# Patient Record
Sex: FEMALE | Race: WHITE | NOT HISPANIC OR LATINO | URBAN - METROPOLITAN AREA
[De-identification: names, ages, dates, MRNs, and addresses within clinical notes are randomized per-mention and may not be internally consistent; named-entity substitution may affect disease eponyms.]

---

## 2020-12-19 ENCOUNTER — INPATIENT (INPATIENT)
Facility: HOSPITAL | Age: 75
LOS: 8 days | Discharge: ANOTHER HOSPITAL | DRG: 207 | End: 2020-12-28
Attending: INTERNAL MEDICINE | Admitting: INTERNAL MEDICINE
Payer: MEDICARE

## 2020-12-19 VITALS
HEART RATE: 109 BPM | DIASTOLIC BLOOD PRESSURE: 74 MMHG | SYSTOLIC BLOOD PRESSURE: 118 MMHG | TEMPERATURE: 98 F | RESPIRATION RATE: 40 BRPM | HEIGHT: 64 IN | OXYGEN SATURATION: 95 % | WEIGHT: 139.99 LBS

## 2020-12-19 DIAGNOSIS — R06.89 OTHER ABNORMALITIES OF BREATHING: ICD-10-CM

## 2020-12-19 DIAGNOSIS — I10 ESSENTIAL (PRIMARY) HYPERTENSION: ICD-10-CM

## 2020-12-19 DIAGNOSIS — J96.02 ACUTE RESPIRATORY FAILURE WITH HYPERCAPNIA: ICD-10-CM

## 2020-12-19 DIAGNOSIS — F41.9 ANXIETY DISORDER, UNSPECIFIED: ICD-10-CM

## 2020-12-19 DIAGNOSIS — J44.1 CHRONIC OBSTRUCTIVE PULMONARY DISEASE WITH (ACUTE) EXACERBATION: ICD-10-CM

## 2020-12-19 DIAGNOSIS — Z87.81 PERSONAL HISTORY OF (HEALED) TRAUMATIC FRACTURE: Chronic | ICD-10-CM

## 2020-12-19 DIAGNOSIS — R41.82 ALTERED MENTAL STATUS, UNSPECIFIED: ICD-10-CM

## 2020-12-19 LAB
ALBUMIN SERPL ELPH-MCNC: 2.9 G/DL — LOW (ref 3.3–5)
ALP SERPL-CCNC: 119 U/L — SIGNIFICANT CHANGE UP (ref 40–120)
ALT FLD-CCNC: 103 U/L — HIGH (ref 10–45)
ANION GAP SERPL CALC-SCNC: 4 MMOL/L — LOW (ref 5–17)
ANION GAP SERPL CALC-SCNC: 6 MMOL/L — SIGNIFICANT CHANGE UP (ref 5–17)
APPEARANCE UR: CLEAR — SIGNIFICANT CHANGE UP
APTT BLD: 27.1 SEC — LOW (ref 27.5–35.5)
AST SERPL-CCNC: 167 U/L — HIGH (ref 10–40)
BACTERIA # UR AUTO: NEGATIVE /HPF — SIGNIFICANT CHANGE UP
BASOPHILS # BLD AUTO: 0.04 K/UL — SIGNIFICANT CHANGE UP (ref 0–0.2)
BASOPHILS NFR BLD AUTO: 0.4 % — SIGNIFICANT CHANGE UP (ref 0–2)
BILIRUB SERPL-MCNC: 0.9 MG/DL — SIGNIFICANT CHANGE UP (ref 0.2–1.2)
BILIRUB UR-MCNC: NEGATIVE — SIGNIFICANT CHANGE UP
BUN SERPL-MCNC: 19 MG/DL — SIGNIFICANT CHANGE UP (ref 7–23)
BUN SERPL-MCNC: 22 MG/DL — SIGNIFICANT CHANGE UP (ref 7–23)
CALCIUM SERPL-MCNC: 9 MG/DL — SIGNIFICANT CHANGE UP (ref 8.4–10.5)
CALCIUM SERPL-MCNC: 9.3 MG/DL — SIGNIFICANT CHANGE UP (ref 8.4–10.5)
CHLORIDE SERPL-SCNC: 94 MMOL/L — LOW (ref 96–108)
CHLORIDE SERPL-SCNC: 95 MMOL/L — LOW (ref 96–108)
CO2 BLDA-SCNC: 43 MMOL/L — HIGH (ref 22–30)
CO2 SERPL-SCNC: 37 MMOL/L — HIGH (ref 22–31)
CO2 SERPL-SCNC: 42 MMOL/L — HIGH (ref 22–31)
COLOR SPEC: YELLOW — SIGNIFICANT CHANGE UP
CREAT SERPL-MCNC: 0.86 MG/DL — SIGNIFICANT CHANGE UP (ref 0.5–1.3)
CREAT SERPL-MCNC: 0.94 MG/DL — SIGNIFICANT CHANGE UP (ref 0.5–1.3)
DIFF PNL FLD: ABNORMAL
EOSINOPHIL # BLD AUTO: 0.23 K/UL — SIGNIFICANT CHANGE UP (ref 0–0.5)
EOSINOPHIL NFR BLD AUTO: 2.3 % — SIGNIFICANT CHANGE UP (ref 0–6)
EPI CELLS # UR: SIGNIFICANT CHANGE UP
GAS PNL BLDA: SIGNIFICANT CHANGE UP
GAS PNL BLDA: SIGNIFICANT CHANGE UP
GLUCOSE SERPL-MCNC: 142 MG/DL — HIGH (ref 70–99)
GLUCOSE SERPL-MCNC: 152 MG/DL — HIGH (ref 70–99)
GLUCOSE UR QL: NEGATIVE — SIGNIFICANT CHANGE UP
GRAM STN FLD: SIGNIFICANT CHANGE UP
HCT VFR BLD CALC: 28.9 % — LOW (ref 34.5–45)
HCT VFR BLD CALC: 30.6 % — LOW (ref 34.5–45)
HGB BLD-MCNC: 8.9 G/DL — LOW (ref 11.5–15.5)
HGB BLD-MCNC: 9 G/DL — LOW (ref 11.5–15.5)
HOROWITZ INDEX BLDA+IHG-RTO: SIGNIFICANT CHANGE UP
HOROWITZ INDEX BLDA+IHG-RTO: SIGNIFICANT CHANGE UP
IMM GRANULOCYTES NFR BLD AUTO: 0.5 % — SIGNIFICANT CHANGE UP (ref 0–1.5)
INR BLD: 0.92 RATIO — SIGNIFICANT CHANGE UP (ref 0.88–1.16)
KETONES UR-MCNC: ABNORMAL
LACTATE SERPL-SCNC: 2.2 MMOL/L — HIGH (ref 0.7–2)
LACTATE SERPL-SCNC: 3.2 MMOL/L — HIGH (ref 0.7–2)
LACTATE SERPL-SCNC: 5 MMOL/L — CRITICAL HIGH (ref 0.7–2)
LEGIONELLA AG UR QL: NEGATIVE — SIGNIFICANT CHANGE UP
LEUKOCYTE ESTERASE UR-ACNC: NEGATIVE — SIGNIFICANT CHANGE UP
LYMPHOCYTES # BLD AUTO: 1.71 K/UL — SIGNIFICANT CHANGE UP (ref 1–3.3)
LYMPHOCYTES # BLD AUTO: 17 % — SIGNIFICANT CHANGE UP (ref 13–44)
MAGNESIUM SERPL-MCNC: 1.8 MG/DL — SIGNIFICANT CHANGE UP (ref 1.6–2.6)
MCHC RBC-ENTMCNC: 29.1 GM/DL — LOW (ref 32–36)
MCHC RBC-ENTMCNC: 29.4 PG — SIGNIFICANT CHANGE UP (ref 27–34)
MCHC RBC-ENTMCNC: 30.7 PG — SIGNIFICANT CHANGE UP (ref 27–34)
MCHC RBC-ENTMCNC: 31.1 GM/DL — LOW (ref 32–36)
MCV RBC AUTO: 101 FL — HIGH (ref 80–100)
MCV RBC AUTO: 98.6 FL — SIGNIFICANT CHANGE UP (ref 80–100)
MONOCYTES # BLD AUTO: 0.81 K/UL — SIGNIFICANT CHANGE UP (ref 0–0.9)
MONOCYTES NFR BLD AUTO: 8.1 % — SIGNIFICANT CHANGE UP (ref 2–14)
NEUTROPHILS # BLD AUTO: 7.21 K/UL — SIGNIFICANT CHANGE UP (ref 1.8–7.4)
NEUTROPHILS NFR BLD AUTO: 71.7 % — SIGNIFICANT CHANGE UP (ref 43–77)
NITRITE UR-MCNC: NEGATIVE — SIGNIFICANT CHANGE UP
NRBC # BLD: 0 /100 WBCS — SIGNIFICANT CHANGE UP (ref 0–0)
NRBC # BLD: 0 /100 WBCS — SIGNIFICANT CHANGE UP (ref 0–0)
PCO2 BLDA: 60 MMHG — HIGH (ref 32–46)
PCO2 BLDA: >99 MMHG — CRITICAL HIGH (ref 32–46)
PH BLDA: 7.1 — CRITICAL LOW (ref 7.35–7.45)
PH BLDA: 7.46 — HIGH (ref 7.35–7.45)
PH UR: 6 — SIGNIFICANT CHANGE UP (ref 5–8)
PHOSPHATE SERPL-MCNC: 3.3 MG/DL — SIGNIFICANT CHANGE UP (ref 2.5–4.5)
PLATELET # BLD AUTO: 341 K/UL — SIGNIFICANT CHANGE UP (ref 150–400)
PLATELET # BLD AUTO: 370 K/UL — SIGNIFICANT CHANGE UP (ref 150–400)
PO2 BLDA: 243 MMHG — HIGH (ref 74–108)
PO2 BLDA: 66 MMHG — LOW (ref 74–108)
POTASSIUM SERPL-MCNC: 4.8 MMOL/L — SIGNIFICANT CHANGE UP (ref 3.5–5.3)
POTASSIUM SERPL-MCNC: 4.9 MMOL/L — SIGNIFICANT CHANGE UP (ref 3.5–5.3)
POTASSIUM SERPL-SCNC: 4.8 MMOL/L — SIGNIFICANT CHANGE UP (ref 3.5–5.3)
POTASSIUM SERPL-SCNC: 4.9 MMOL/L — SIGNIFICANT CHANGE UP (ref 3.5–5.3)
PROCALCITONIN SERPL-MCNC: 1.2 NG/ML — HIGH
PROT SERPL-MCNC: 6.7 G/DL — SIGNIFICANT CHANGE UP (ref 6–8.3)
PROT UR-MCNC: 30 MG/DL
PROTHROM AB SERPL-ACNC: 11.2 SEC — SIGNIFICANT CHANGE UP (ref 10.6–13.6)
RAPID RVP RESULT: SIGNIFICANT CHANGE UP
RBC # BLD: 2.93 M/UL — LOW (ref 3.8–5.2)
RBC # BLD: 3.03 M/UL — LOW (ref 3.8–5.2)
RBC # FLD: 13.4 % — SIGNIFICANT CHANGE UP (ref 10.3–14.5)
RBC # FLD: 13.7 % — SIGNIFICANT CHANGE UP (ref 10.3–14.5)
RBC CASTS # UR COMP ASSIST: SIGNIFICANT CHANGE UP /HPF (ref 0–4)
SAO2 % BLDA: 94 % — SIGNIFICANT CHANGE UP (ref 92–96)
SAO2 % BLDA: 99 % — HIGH (ref 92–96)
SARS-COV-2 IGG SERPL QL IA: NEGATIVE — SIGNIFICANT CHANGE UP
SARS-COV-2 IGM SERPL IA-ACNC: <0.1 INDEX — SIGNIFICANT CHANGE UP
SARS-COV-2 RNA SPEC QL NAA+PROBE: SIGNIFICANT CHANGE UP
SARS-COV-2 RNA SPEC QL NAA+PROBE: SIGNIFICANT CHANGE UP
SODIUM SERPL-SCNC: 137 MMOL/L — SIGNIFICANT CHANGE UP (ref 135–145)
SODIUM SERPL-SCNC: 141 MMOL/L — SIGNIFICANT CHANGE UP (ref 135–145)
SP GR SPEC: 1.02 — SIGNIFICANT CHANGE UP (ref 1.01–1.02)
SPECIMEN SOURCE: SIGNIFICANT CHANGE UP
UROBILINOGEN FLD QL: NEGATIVE — SIGNIFICANT CHANGE UP
WBC # BLD: 10.05 K/UL — SIGNIFICANT CHANGE UP (ref 3.8–10.5)
WBC # BLD: 13.8 K/UL — HIGH (ref 3.8–10.5)
WBC # FLD AUTO: 10.05 K/UL — SIGNIFICANT CHANGE UP (ref 3.8–10.5)
WBC # FLD AUTO: 13.8 K/UL — HIGH (ref 3.8–10.5)
WBC UR QL: SIGNIFICANT CHANGE UP /HPF (ref 0–5)

## 2020-12-19 PROCEDURE — 99291 CRITICAL CARE FIRST HOUR: CPT | Mod: 25

## 2020-12-19 PROCEDURE — 71275 CT ANGIOGRAPHY CHEST: CPT | Mod: 26

## 2020-12-19 PROCEDURE — 71045 X-RAY EXAM CHEST 1 VIEW: CPT | Mod: 26,77

## 2020-12-19 PROCEDURE — 70450 CT HEAD/BRAIN W/O DYE: CPT | Mod: 26

## 2020-12-19 PROCEDURE — 31500 INSERT EMERGENCY AIRWAY: CPT

## 2020-12-19 PROCEDURE — 93306 TTE W/DOPPLER COMPLETE: CPT | Mod: 26

## 2020-12-19 PROCEDURE — 71045 X-RAY EXAM CHEST 1 VIEW: CPT | Mod: 26

## 2020-12-19 PROCEDURE — 93010 ELECTROCARDIOGRAM REPORT: CPT

## 2020-12-19 PROCEDURE — 99292 CRITICAL CARE ADDL 30 MIN: CPT | Mod: 25

## 2020-12-19 RX ORDER — CHLORHEXIDINE GLUCONATE 213 G/1000ML
15 SOLUTION TOPICAL EVERY 12 HOURS
Refills: 0 | Status: DISCONTINUED | OUTPATIENT
Start: 2020-12-19 | End: 2020-12-19

## 2020-12-19 RX ORDER — ALBUMIN HUMAN 25 %
250 VIAL (ML) INTRAVENOUS ONCE
Refills: 0 | Status: DISCONTINUED | OUTPATIENT
Start: 2020-12-19 | End: 2020-12-19

## 2020-12-19 RX ORDER — AZITHROMYCIN 500 MG/1
500 TABLET, FILM COATED ORAL EVERY 24 HOURS
Refills: 0 | Status: DISCONTINUED | OUTPATIENT
Start: 2020-12-20 | End: 2020-12-24

## 2020-12-19 RX ORDER — PANTOPRAZOLE SODIUM 20 MG/1
40 TABLET, DELAYED RELEASE ORAL DAILY
Refills: 0 | Status: DISCONTINUED | OUTPATIENT
Start: 2020-12-19 | End: 2020-12-28

## 2020-12-19 RX ORDER — DEXMEDETOMIDINE HYDROCHLORIDE IN 0.9% SODIUM CHLORIDE 4 UG/ML
0.7 INJECTION INTRAVENOUS
Qty: 400 | Refills: 0 | Status: DISCONTINUED | OUTPATIENT
Start: 2020-12-19 | End: 2020-12-25

## 2020-12-19 RX ORDER — ENOXAPARIN SODIUM 100 MG/ML
40 INJECTION SUBCUTANEOUS DAILY
Refills: 0 | Status: DISCONTINUED | OUTPATIENT
Start: 2020-12-19 | End: 2020-12-28

## 2020-12-19 RX ORDER — PROPOFOL 10 MG/ML
5 INJECTION, EMULSION INTRAVENOUS
Qty: 1000 | Refills: 0 | Status: DISCONTINUED | OUTPATIENT
Start: 2020-12-19 | End: 2020-12-19

## 2020-12-19 RX ORDER — CHLORHEXIDINE GLUCONATE 213 G/1000ML
15 SOLUTION TOPICAL EVERY 12 HOURS
Refills: 0 | Status: DISCONTINUED | OUTPATIENT
Start: 2020-12-19 | End: 2020-12-24

## 2020-12-19 RX ORDER — NOREPINEPHRINE BITARTRATE/D5W 8 MG/250ML
0.05 PLASTIC BAG, INJECTION (ML) INTRAVENOUS
Qty: 8 | Refills: 0 | Status: DISCONTINUED | OUTPATIENT
Start: 2020-12-19 | End: 2020-12-19

## 2020-12-19 RX ORDER — ALBUTEROL 90 UG/1
2 AEROSOL, METERED ORAL EVERY 4 HOURS
Refills: 0 | Status: DISCONTINUED | OUTPATIENT
Start: 2020-12-19 | End: 2020-12-21

## 2020-12-19 RX ORDER — SODIUM CHLORIDE 9 MG/ML
1000 INJECTION, SOLUTION INTRAVENOUS ONCE
Refills: 0 | Status: COMPLETED | OUTPATIENT
Start: 2020-12-19 | End: 2020-12-19

## 2020-12-19 RX ORDER — SODIUM CHLORIDE 9 MG/ML
1950 INJECTION INTRAMUSCULAR; INTRAVENOUS; SUBCUTANEOUS ONCE
Refills: 0 | Status: COMPLETED | OUTPATIENT
Start: 2020-12-19 | End: 2020-12-19

## 2020-12-19 RX ORDER — CEFTRIAXONE 500 MG/1
1000 INJECTION, POWDER, FOR SOLUTION INTRAMUSCULAR; INTRAVENOUS ONCE
Refills: 0 | Status: COMPLETED | OUTPATIENT
Start: 2020-12-19 | End: 2020-12-19

## 2020-12-19 RX ORDER — AZITHROMYCIN 500 MG/1
500 TABLET, FILM COATED ORAL ONCE
Refills: 0 | Status: COMPLETED | OUTPATIENT
Start: 2020-12-19 | End: 2020-12-19

## 2020-12-19 RX ORDER — CHLORHEXIDINE GLUCONATE 213 G/1000ML
1 SOLUTION TOPICAL
Refills: 0 | Status: DISCONTINUED | OUTPATIENT
Start: 2020-12-19 | End: 2020-12-19

## 2020-12-19 RX ORDER — PANTOPRAZOLE SODIUM 20 MG/1
40 TABLET, DELAYED RELEASE ORAL DAILY
Refills: 0 | Status: DISCONTINUED | OUTPATIENT
Start: 2020-12-19 | End: 2020-12-19

## 2020-12-19 RX ORDER — AZITHROMYCIN 500 MG/1
TABLET, FILM COATED ORAL
Refills: 0 | Status: DISCONTINUED | OUTPATIENT
Start: 2020-12-19 | End: 2020-12-24

## 2020-12-19 RX ORDER — SODIUM CHLORIDE 9 MG/ML
1000 INJECTION, SOLUTION INTRAVENOUS
Refills: 0 | Status: DISCONTINUED | OUTPATIENT
Start: 2020-12-19 | End: 2020-12-21

## 2020-12-19 RX ADMIN — SODIUM CHLORIDE 1000 MILLILITER(S): 9 INJECTION, SOLUTION INTRAVENOUS at 23:24

## 2020-12-19 RX ADMIN — SODIUM CHLORIDE 1000 MILLILITER(S): 9 INJECTION, SOLUTION INTRAVENOUS at 03:52

## 2020-12-19 RX ADMIN — SODIUM CHLORIDE 1950 MILLILITER(S): 9 INJECTION INTRAMUSCULAR; INTRAVENOUS; SUBCUTANEOUS at 03:15

## 2020-12-19 RX ADMIN — ALBUTEROL 2 PUFF(S): 90 AEROSOL, METERED ORAL at 21:07

## 2020-12-19 RX ADMIN — CEFTRIAXONE 100 MILLIGRAM(S): 500 INJECTION, POWDER, FOR SOLUTION INTRAMUSCULAR; INTRAVENOUS at 01:25

## 2020-12-19 RX ADMIN — CHLORHEXIDINE GLUCONATE 15 MILLILITER(S): 213 SOLUTION TOPICAL at 06:35

## 2020-12-19 RX ADMIN — Medication 40 MILLIGRAM(S): at 06:35

## 2020-12-19 RX ADMIN — ALBUTEROL 2 PUFF(S): 90 AEROSOL, METERED ORAL at 17:05

## 2020-12-19 RX ADMIN — ALBUTEROL 2 PUFF(S): 90 AEROSOL, METERED ORAL at 09:02

## 2020-12-19 RX ADMIN — AZITHROMYCIN 500 MILLIGRAM(S): 500 TABLET, FILM COATED ORAL at 02:15

## 2020-12-19 RX ADMIN — PANTOPRAZOLE SODIUM 40 MILLIGRAM(S): 20 TABLET, DELAYED RELEASE ORAL at 11:33

## 2020-12-19 RX ADMIN — Medication 40 MILLIGRAM(S): at 13:29

## 2020-12-19 RX ADMIN — CEFTRIAXONE 1000 MILLIGRAM(S): 500 INJECTION, POWDER, FOR SOLUTION INTRAMUSCULAR; INTRAVENOUS at 01:55

## 2020-12-19 RX ADMIN — CHLORHEXIDINE GLUCONATE 15 MILLILITER(S): 213 SOLUTION TOPICAL at 17:42

## 2020-12-19 RX ADMIN — CHLORHEXIDINE GLUCONATE 1 APPLICATION(S): 213 SOLUTION TOPICAL at 06:35

## 2020-12-19 RX ADMIN — Medication 40 MILLIGRAM(S): at 21:30

## 2020-12-19 RX ADMIN — Medication 40 MILLIGRAM(S): at 02:15

## 2020-12-19 RX ADMIN — DEXMEDETOMIDINE HYDROCHLORIDE IN 0.9% SODIUM CHLORIDE 3.01 MICROGRAM(S)/KG/HR: 4 INJECTION INTRAVENOUS at 14:34

## 2020-12-19 RX ADMIN — ENOXAPARIN SODIUM 40 MILLIGRAM(S): 100 INJECTION SUBCUTANEOUS at 11:33

## 2020-12-19 RX ADMIN — ALBUTEROL 2 PUFF(S): 90 AEROSOL, METERED ORAL at 13:12

## 2020-12-19 RX ADMIN — Medication 5.95 MICROGRAM(S)/KG/MIN: at 03:17

## 2020-12-19 RX ADMIN — PROPOFOL 1.91 MICROGRAM(S)/KG/MIN: 10 INJECTION, EMULSION INTRAVENOUS at 05:08

## 2020-12-19 RX ADMIN — DEXMEDETOMIDINE HYDROCHLORIDE IN 0.9% SODIUM CHLORIDE 3.01 MICROGRAM(S)/KG/HR: 4 INJECTION INTRAVENOUS at 14:27

## 2020-12-19 RX ADMIN — PROPOFOL 1.91 MICROGRAM(S)/KG/MIN: 10 INJECTION, EMULSION INTRAVENOUS at 02:12

## 2020-12-19 RX ADMIN — SODIUM CHLORIDE 1950 MILLILITER(S): 9 INJECTION INTRAMUSCULAR; INTRAVENOUS; SUBCUTANEOUS at 01:13

## 2020-12-19 NOTE — H&P ADULT - PROBLEM SELECTOR PLAN 4
Hold amlodipine for now in the setting of propofol and mechanical ventilation. Restart as hemodynamically indicated.

## 2020-12-19 NOTE — DIETITIAN INITIAL EVALUATION ADULT. - OTHER INFO
As per Physician,  75 year old female with h/o Severe COPD on home o2, ex smoker, HTN, anxiety who had recent right femur fx at [place] she was discharged to [facility] on 12/18 where shortly after developed AMS and brought to Hospital, Here found to have acute hypercarbic respiratory failure and was unresponsive so she was intubated.  Pt is NPO secondary to current intubation. As per physician, TF via nasogastric will be the primary means of nutrition s/p intubation removal. No GI distress reported. As per chart NKFA. No edema. Skin w/ PI stage 2 in left buttock. Current wt: 132 Lbs.

## 2020-12-19 NOTE — ED PROVIDER NOTE - CLINICAL SUMMARY MEDICAL DECISION MAKING FREE TEXT BOX
74 y/o F with a h/o advanced COPD (on home O2 3L), former smoker, HTN, anxiety, recent surgical repair of right femur fracture at [facility] discharged to [facility] on 12/18, presents to the ED from facility after developing respiratory difficulty and unresponsiveness. ABG performed. Pt vitals holding stable early on. Will start bipap with strict aspiration precautions pending results.   Results of ABG necessitate intubation. Patient is full code.   Intubation performed and ICU called for admission. CT ordered as well. d/w e-ICU accepted. PA from ICU will contact family.

## 2020-12-19 NOTE — H&P ADULT - ASSESSMENT
76 y/o F with a h/o advanced COPD (on home O2), former smoker, HTN, anxiety, recent surgical repair of right femur fracture at [facility] discharged to [facility] on 12/18, with acute hypercapnic respiratory failure, acute COPD exacerbation, AMS.     Admit to ICU for further management.    Case discussed in detail with eICU physician, Dr. Brown.        CRITICAL CARE TIME SPENT: 50 mins  Time spent evaluating/treating patient with medical issues that pose a high risk for life threatening deterioration and/or end-organ damage, reviewing data/labs/imaging, discussing case with multidisciplinary team, discussing plan/goals of care with patient/family. Non-inclusive of procedure time.   74 y/o F with a h/o advanced COPD (on home O2), former smoker, HTN, anxiety, recent surgical repair of right femur fracture at [facility] discharged to [facility] on 12/18, with acute hypercapnic respiratory failure, acute COPD exacerbation, AMS.     Admit to ICU for further management.    Case discussed in detail with eICU physician, Dr. Brown.    I have contacted the patient's neice and HCP, Charline, and updated her on tonight's unfortunate events and the management plan moving forward. Charline states that Maida wished to pursue a trial of intubation if necessary, but would not want long term mechanical ventilation. All questions answered and concerns addressed.      CRITICAL CARE TIME SPENT: 50 mins  Time spent evaluating/treating patient with medical issues that pose a high risk for life threatening deterioration and/or end-organ damage, reviewing data/labs/imaging, discussing case with multidisciplinary team, discussing plan/goals of care with patient/family. Non-inclusive of procedure time.

## 2020-12-19 NOTE — DIETITIAN INITIAL EVALUATION ADULT. - ADD RECOMMEND
Advance diet to PO as per SLP recommendations. Maintain bed 45 degree during and 1 hr after feeding.   Monitor residual before feedings. If 60 mL or > hold feeding.   Water flush as per MD recommendation. Suggest to add Vit C and MVI to promote wound healing. Advance diet to PO as per SLP recommendations. Maintain bed 45 degree during and 1 hr after feeding.   Monitor residual before feedings. If 60 mL or > hold feeding.   Water flush as per MD recommendation.

## 2020-12-19 NOTE — H&P ADULT - ATTENDING COMMENTS
pt seen and examined 12/19 AM  please refer to MD addendum  this note has not been edited/modified by me.

## 2020-12-19 NOTE — ED PROVIDER NOTE - OBJECTIVE STATEMENT
74 y/o F with a h/o advanced COPD (on home O2 3L), former smoker, HTN, anxiety, recent surgical repair of right femur fracture at [facility] discharged to [facility] on 12/18, presents to the ED from facility after developing respiratory difficulty and unresponsiveness.

## 2020-12-19 NOTE — ED ADULT NURSE REASSESSMENT NOTE - NS ED NURSE REASSESS COMMENT FT1
0057 intubation. VS: 88% O2, 107 HR, 32 RR, cannot obtain BP at this time. + color change noted, 8.0 tube, 25 at the lip, b/l breath sounds noted.

## 2020-12-19 NOTE — PROGRESS NOTE ADULT - SUBJECTIVE AND OBJECTIVE BOX
Addendum to H&P/ICU Consult note  - Patient seen and examined today    ICU CONSULT  Location of Patient : [facility] CCU1 0010 02 ( CCU1)  Attending requesting Consult:Saman Arreola  Chief Complaint :     Reason For consult :      Initial HPI on admission:  HPI:  75 year old female with h/o Severe COPD on home o2, ex smoker, HTN, anxiety who had recent right femur fx at [place] she was discharged to [facility] on  where shortly after developed AMS and brought to Hospital, Here found to have acute hypercarbic respiratory failure and was unresponsive so she was intubated.    at this time pt intubated, sedated on levophed with MAP >65    PAST MEDICAL & SURGICAL HISTORY:  HTN (hypertension)    Anxiety    Advanced COPD  on home O2    Status post closed fracture of right femur  surgical repair done at [place]      Allergies    No Known Allergies    Intolerances    Due to current medical status patient unable to provide medical, social, family history.  History obtained from available medical record.         Medications:  MEDICATIONS  (STANDING):  ALBUTerol    90 MICROgram(s) HFA Inhaler 2 Puff(s) Inhalation every 4 hours  azithromycin  IVPB      chlorhexidine 0.12% Liquid 15 milliLiter(s) Oral Mucosa every 12 hours  enoxaparin Injectable 40 milliGRAM(s) SubCutaneous daily  methylPREDNISolone sodium succinate Injectable 40 milliGRAM(s) IV Push every 8 hours  norepinephrine Infusion 0.05 MICROgram(s)/kG/Min (5.95 mL/Hr) IV Continuous <Continuous>  pantoprazole   Suspension 40 milliGRAM(s) Enteral Tube daily  propofol Infusion 5 MICROgram(s)/kG/Min (1.91 mL/Hr) IV Continuous <Continuous>    MEDICATIONS  (PRN):      Home Medications:  Last Order Reconciliation Date: 20 (Admission Reconciliation)  ALPRAZolam 0.25 mg oral tablet: 1 tab(s) orally 2 times a day, As Needed (20)  amLODIPine 5 mg oral tablet: 1 tab(s) orally once a day (20)  Breo Ellipta 100 mcg-25 mcg/inh inhalation powder: 1 puff(s) inhaled once a day (20)  Incruse Ellipta 62.5 mcg/inh inhalation powder: 1 puff(s) inhaled every 24 hours (20)        LABS:                        8.9    10.05 )-----------( 341      ( 19 Dec 2020 00:10 )             30.6     12-    141  |  95<L>  |  19  ----------------------------<  152<H>  4.8   |  42<H>  |  0.94    Ca    9.3      19 Dec 2020 00:10    TPro  6.7  /  Alb  2.9<L>  /  TBili  0.9  /  DBili  x   /  AST  167<H>  /  ALT  103<H>  /  AlkPhos  119      PT/INR - ( 19 Dec 2020 00:10 )   PT: 11.2 sec;   INR: 0.92 ratio         PTT - ( 19 Dec 2020 00:10 )  PTT:27.1 sec  Urinalysis Basic - ( 19 Dec 2020 01:41 )    Color: Yellow / Appearance: Clear / S.020 / pH: x  Gluc: x / Ketone: Small  / Bili: Negative / Urobili: Negative   Blood: x / Protein: 30 mg/dL / Nitrite: Negative   Leuk Esterase: Negative / RBC: 0-4 /HPF / WBC 0-2 /HPF   Sq Epi: x / Non Sq Epi: Neg.-Few / Bacteria: Negative /HPF    COVID  20 @ 08:00  COVID -   NotDetec  20 @ 00:15  COVID -   NotDetec    WBC Trend  20 @ 00:10   -  10.05    H/H Trend  20 @ 00:10   -  8.9<L> / 30.6<L>    Platelet Trend  20 @ 00:10   -  341    Trend Sodium  20 @ 00:10   -  141    Trend Potassium  20 @ 00:10   -  4.8    Trend Bun/Cr  20 @ 00:10  BUN/CR -  19 / 0.94    Lactic Acid Trend  20 @ 03:40   -   3.2<H>  20 @ 00:10   -   5.0<HH>    ABG Trend  20 @ 00:45   - 7.10<LL>/>99<HH>/243<H>/99<H>    Trend AST/ALT/ALK Phos/Bili  12-19-20 @ 00:10   167<H>/103<H>/119/0.9      Albumin Trend  20 @ 00:10   -   2.9<L>      PTT - PT - INR Trend  20 @ 00:10   -   27.1<L> - 11.2 - 0.92    Glucose Trend  20 @ 00:10   -  -- 152<H> --  20 @ 00:04   -  -- -- 166<H>        CULTURES: (if applicable)    Rapid RVP Result: NotDetec (20 @ 08:00)      CAPILLARY BLOOD GLUCOSE      POCT Blood Glucose.: 166 mg/dL (19 Dec 2020 00:04)      RADIOLOGY  CXR:  < from: CT Angio Chest w/ IV Cont (20 @ 02:02) >    EXAM:  CT ANGIO CHEST (W)AW IC      PROCEDURE DATE:  2020        INTERPRETATION:  CLINICAL INFORMATION: Unresponsive, hypoxia, critically ill    COMPARISON: No similar prior    PROCEDURE:  CT Angiography of the Chest.  90 ml of Omnipaque 350 was injected intravenously. 10 ml were discarded.  Sagittal and coronal reformats were performed as well as 3D (MIP) reconstructions.  CT dose lowering techniques were used, to include: automated exposure control, adjustment for patient size, and/or use of iterative reconstruction.?      FINDINGS:    LUNGS AND AIRWAYS: Severe, diffuse emphysema and COPD. Endotracheal tube terminates above the solomon. Mild peribronchial thickening in the right lower lobe. No pneumonic infiltrate.  PLEURA: No pleural effusion or pneumothorax  MEDIASTINUM AND JAMAR: No mediastinal hemorrhage. No enlarged lymph node measuring greater than 10 mm in short axis  VESSELS: Pulmonary arterial system is opacified to the segmental level. No acute thromboembolus. Pruning ofthe peripheral pulmonary vasculature, related to chronic lung disease. Main pulmonary artery is normal in caliber. Ectasia of the aortic annulus, measures up to 3.9 cm. Remainder of the thoracic aorta demonstrates atherosclerotic change without aneurysm.  HEART: Heart size is overall within normal limits. Right ventricle is borderline enlarged. No pericardial effusion.  CHEST WALL AND LOWER NECK: Within normal limits.  VISUALIZED UPPER ABDOMEN: No acute finding  BONES: No acute osseous abnormality    IMPRESSION:    1. No acute pulmonary embolus.  2. Severe emphysema and COPD.    < end of copied text >      CT:    < from: CT Head No Cont (20 @ 02:00) >    EXAM:  CT BRAIN      PROCEDURE DATE:  2020        INTERPRETATION:  CLINICAL INFORMATION:  AMS: Narcosis    TECHNIQUE:  Axial CT images were acquired through the head.  Intravenous contrast: None  Two-dimensional reformats were generated.    COMPARISON STUDY: None    FINDINGS:    There is no CT evidence of acute intracranial hemorrhage, mass effect, midline shift, or acute, large territorial infarct. The ventricles and sulci are prominent consistent with generalized brain parenchymal volume loss. Patchy periventricular and subcortical white matter hypodensities are nonspecific, although likely on the basis of moderate chronic microangiopathy. There is more discrete hypodensity in the left thalamus on 4:14, compatible with age indeterminate lacunar infarct. The basal cisterns are patent.    There are atherosclerotic vascular calcifications at the skull base.    The mastoid air cells and middle ear cavities are grossly clear. There is no significant paranasal sinus mucosal thickening.    The calvarium and skull base are grossly intact.    IMPRESSION:  No acute intracranial hemorrhage or mass effect. Age-related involutional changes and moderate chronic microangiopathy.    Age-indeterminate left thalamic lacunar infarct.      < end of copied text >  ECHO:  none available    VITALS:  T(C): 36.4 (20 @ 04:40), Max: 36.6 (20 @ 00:07)  T(F): 97.5 (20 @ 04:40), Max: 97.8 (20 @ 00:07)  HR: 106 (20 @ 06:00) (105 - 110)  BP: 161/80 (20 @ 06:00) (75/40 - 161/80)  BP(mean): 99 (20 @ 06:00) (57 - 115)  ABP: --  ABP(mean): --  RR: 20 (20 @ 06:00) (20 - 40)  SpO2: 96% (20 @ 06:00) (88% - 100%)  CVP(mm Hg): --  CVP(cm H2O): --    Ins and Outs     20 @ 07:01  -  20 @ 07:00  --------------------------------------------------------  IN: 99.9 mL / OUT: 900 mL / NET: -800.1 mL        Height (cm): 160 (20 @ 04:40)  Weight (kg): 60.2 (20 @ 04:40)  BMI (kg/m2): 23.5 (20 @ 04:40)    Device: LTV, Mode: AC/ CMV (Assist Control/ Continuous Mandatory Ventilation), RR (machine): 20, RR (patient): 20, TV (machine): 400, TV (patient): 400, FiO2: 40, PEEP: 5, ITime: 1, PIP: 28    I&O's Detail    18 Dec 2020 07:01  -  19 Dec 2020 07:00  --------------------------------------------------------  IN:    Norepinephrine: 32.4 mL    Propofol: 67.5 mL  Total IN: 99.9 mL    OUT:    Indwelling Catheter - Urethral (mL): 900 mL    Nasogastric/Oral tube (mL): 0 mL  Total OUT: 900 mL    Total NET: -800.1 mL

## 2020-12-19 NOTE — H&P ADULT - PROBLEM SELECTOR PLAN 2
Start ATC inhaled bronchodilators and IV steroids. Empiric abx coverage with azithromycin. No good evidence for bacterial pneumonia at this time (WBC normal, afebrile, no lung infiltrates on imaging). Blood cultures pending. Add sputum culture. Check procalcitonin level and urine legionella Ag. Suspect lactemia was related to work of breathing, not sepsis.

## 2020-12-19 NOTE — H&P ADULT - PROBLEM SELECTOR PLAN 1
Appears to be secondary to COPD exacerbation. [facility] intubated. Actively titrating vent settings to maintain SpO2 > 88%. Will repeat ABG now and adjust settings as necessary to improve ventilation and acid-base balance. Keep HOB elevated > 30 degrees. CXR without obvious acute lung infiltrates. CTA chest pending.

## 2020-12-19 NOTE — PROGRESS NOTE ADULT - ASSESSMENT
Physical Examination:  GENERAL:               Sedated, orally intubated  HEENT:                    Pupils equal, constricted  Symmetric. No JVD, dry MM  PULM:                     Bilateral air entry, diminished to auscultation bilaterally, no significant sputum production, No Rales, No Rhonchi, midl Wheezing  CVS:                         S1, S2,  + Murmur  ABD:                        Soft, nondistended, nontender, normoactive bowel sounds,   EXT:                         No edema, nontender, No Cyanosis or Clubbing   Vascular:                Warm Extremities, Normal Capillary refill, Normal Distal Pulses  NEURO:                sedated, unresponsive,   PSYC:                      Calm, No Insight.        Assessment  75 year old female with h/o Severe COPD on home o2, ex smoker, HTN, anxiety who had recent right femur fx at [place] she was discharged to [facility] on 12/18 where shortly after developed AMS and brought to Hospital, Here found to have acute hypercarbic respiratory failure and was unresponsive so she was intubated.    Problem List  Acute Hypercarbic respiratory failure due to acute on chronic COPD exacerbation, in patient with Severe COPD on home o2 (chronic hypoxic respiratory Insuficiency)    COVID/RVP Negative    Ex Smoker  Metabolic Encephelopathy due to above  Lactic Acidosis likely due to hypoxia    no source of infection found, normal UA, No PNA on CT  Recent Right femur fx     underlying Anxiety, HTN,       Plan  Patient on pressors, but MAP high, taper down  Sedation vacation ; taper propofol off, start Precedex    Repeat ABG  if able to be off pressors, and mental status ok, plan for CPAP today  Continue steroids, bronchodilators  f/u morning labs  no sputum for sputum cultures        Family Updated: 	Charline [phone]	                                 Date:  12/19/20 msg left for call back       Sedation & Analgesia:	as above  Diet/Nutrition:		Possible diet if not extubated  GI PPx:			PPI    DVT Ppx:		Lovenox  	RISK                                                          Points  	[ ] Previous VTE                                           	3  	[ ] Thrombophilia                                        	2  	[ ] Lower limb paralysis                              	2   	[ ] Current Cancer                                       	2   	[ ] Immobilization > 24 hrs                        	1  	[x ] ICU/CCU stay > 24 hours                       	1  	[x ] Age > 60                                                   	1  		Total:[2 ]      Activity:	 Advance as tolerated	                 Head of Bed:                35-45  Glycemic Control:        N/a    Lines: PIV  CENTRAL LINE: 	[ ] YES [ ] NO	                    LOCATION:   	                       DATE INSERTED:   	                    REMOVE:  [ ] YES [ ] NO    A-LINE:  	                [ ] YES [ ] NO                      LOCATION:   	                       DATE INSERTED: 		            REMOVE:  [ ] YES [ ] NO    RICH: 		        [ x] YES [ ] NO  		                                       DATE INSERTED:		            REMOVE:  [ ] YES [ x] NO      Restraints may be deemed necessary to prevent removal of life-sustaining devices [ x ] YES   [    ]  NO    Disposition: ICU monitoring     Goals of Care:

## 2020-12-19 NOTE — ED PROVIDER NOTE - WET READ LAUNCH FT
There is 1 Wet Read(s) to document. There are 2 Wet Read(s) to document. There are no Wet Read(s) to document.

## 2020-12-19 NOTE — ED PROVIDER NOTE - NS ED MD DISPO SPECIAL CONSIDERATION1
[facility]  Brief Op Note    Miriam Meyers Location: OR   [facility] [id number] MRN [id number]   Admission Date 8/11/2020 Operation Date 8/11/2020   Attending Physician Saúl Peñaloza MD Operating Physician Al Barrett MD     Pre-Operative Diagn
[facility]    PATIENT'S NAME: Ana Rosa Nico   ATTENDING PHYSICIAN: Paula Vazquez M.D. OPERATING PHYSICIAN: Paula Vazquez M.D.    PATIENT ACCOUNT#:   [de-identified]    LOCATION:  [address] [place] [address] 2 EDWP 10  MEDICAL RECORD #:   [id number]       DATE
Low Suspicion of COVID-19

## 2020-12-19 NOTE — H&P ADULT - HISTORY OF PRESENT ILLNESS
76 y/o F with a h/o advanced COPD (on home O2), former smoker, HTN, anxiety, recent surgical repair of right femur fracture at [facility] discharged to [facility] on 12/18, presents to the ED from facility after developing respiratory difficulty and AMS. Patient found to be severely hypercapnic with pCO2 >99 on ABG and unresponsive for which she was subsequently endotracheally intubated and placed on mechanical ventilation. CXR reveals hyperinflated lungs without obvious acute infiltrates.

## 2020-12-19 NOTE — ED ADULT NURSE NOTE - NSIMPLEMENTINTERV_GEN_ALL_ED
Implemented All Fall with Harm Risk Interventions:  [place] to call system. Call bell, personal items and telephone within reach. Instruct patient to call for assistance. Room bathroom lighting operational. Non-slip footwear when patient is off stretcher. Physically safe environment: no spills, clutter or unnecessary equipment. Stretcher in lowest position, wheels locked, appropriate side rails in place. Provide visual cue, wrist band, yellow gown, etc. Monitor gait and stability. Monitor for mental status changes and reorient to person, place, and time. Review medications for side effects contributing to fall risk. Reinforce activity limits and safety measures with patient and family. Provide visual clues: red socks.

## 2020-12-19 NOTE — H&P ADULT - NSICDXPASTSURGICALHX_GEN_ALL_CORE_FT
PAST SURGICAL HISTORY:  Status post closed fracture of right femur surgical repair done at [place]

## 2020-12-19 NOTE — ED ADULT NURSE NOTE - ISOLATION TYPE:
Telephone Encounter by Kishor Flores MD at 12/05/17 11:57 AM     Author:  Kishor Flores MD Service:  (none) Author Type:  Physician     Filed:  12/05/17 11:58 AM Encounter Date:  12/5/2017 Status:  Signed     :  Kishor Flores MD (Physician)            chest x-ray shows a patch of pneumonia at the right base  Recommend[LA1.1M] LEVAQUIN 500 MG DAILY FOR 10 DAYS[LA1.1T]          Revision History        User Key Date/Time User Provider Type Action    > LA1.1 12/05/17 11:58 AM Kishor Flores MD Physician Sign    M - Manual, T - Template            
Telephone Encounter by Kristi Patel RN at 12/05/17 11:32 AM     Author:  Kristi Patel RN Service:  (none) Author Type:  Registered Nurse     Filed:  12/05/17 11:33 AM Encounter Date:  12/5/2017 Status:  Signed     :  Kristi Patel RN (Registered Nurse)            Received call from radiology department with significant finding on patient's chest xray done today    Routed to Dr Flores to review[KC1.1M]      Revision History        User Key Date/Time User Provider Type Action    > KC1.1 12/05/17 11:33 AM Kristi Patel RN Registered Nurse Sign    M - Manual            
Telephone Encounter by Kristi Patel RN at 12/05/17 12:10 PM     Author:  Kristi Patel RN Service:  (none) Author Type:  Registered Nurse     Filed:  12/05/17 12:13 PM Encounter Date:  12/5/2017 Status:  Signed     :  Krsiti Patel RN (Registered Nurse)            Patient notified[KC1.1T] of Dr Flores's directives  Advised patient if symptoms persist or worsening of shortness of breath, fever, chills, coughing to call office to report    Verified allergies  Encouraged fluids and rest  Patient asked about working out  Advised patient to refrain at this time    RX escribed to pharmacy  Routed to coumadin clinic as well due to possible effects of antibiotic on INR[KC1.1M]  Mikala verbalized understanding of information given.[KC1.1T]        Revision History        User Key Date/Time User Provider Type Action    > KC1.1 12/05/17 12:13 PM Kristi Patel RN Registered Nurse Sign    M - Manual, T - Template            
None

## 2020-12-20 LAB
ALBUMIN SERPL ELPH-MCNC: 3.2 G/DL — LOW (ref 3.3–5)
ALP SERPL-CCNC: 95 U/L — SIGNIFICANT CHANGE UP (ref 40–120)
ALT FLD-CCNC: 144 U/L — HIGH (ref 10–45)
ANION GAP SERPL CALC-SCNC: 5 MMOL/L — SIGNIFICANT CHANGE UP (ref 5–17)
AST SERPL-CCNC: 112 U/L — HIGH (ref 10–40)
BILIRUB SERPL-MCNC: 0.5 MG/DL — SIGNIFICANT CHANGE UP (ref 0.2–1.2)
BLD GP AB SCN SERPL QL: SIGNIFICANT CHANGE UP
BUN SERPL-MCNC: 35 MG/DL — HIGH (ref 7–23)
CALCIUM SERPL-MCNC: 9.5 MG/DL — SIGNIFICANT CHANGE UP (ref 8.4–10.5)
CHLORIDE SERPL-SCNC: 94 MMOL/L — LOW (ref 96–108)
CO2 BLDA-SCNC: 46 MMOL/L — HIGH (ref 22–30)
CO2 SERPL-SCNC: 38 MMOL/L — HIGH (ref 22–31)
CREAT SERPL-MCNC: 0.81 MG/DL — SIGNIFICANT CHANGE UP (ref 0.5–1.3)
CULTURE RESULTS: NO GROWTH — SIGNIFICANT CHANGE UP
GAS PNL BLDA: SIGNIFICANT CHANGE UP
GLUCOSE BLDC GLUCOMTR-MCNC: 138 MG/DL — HIGH (ref 70–99)
GLUCOSE BLDC GLUCOMTR-MCNC: 146 MG/DL — HIGH (ref 70–99)
GLUCOSE BLDC GLUCOMTR-MCNC: 146 MG/DL — HIGH (ref 70–99)
GLUCOSE SERPL-MCNC: 130 MG/DL — HIGH (ref 70–99)
HCT VFR BLD CALC: 25.1 % — LOW (ref 34.5–45)
HCT VFR BLD CALC: 27 % — LOW (ref 34.5–45)
HCV AB S/CO SERPL IA: 0.06 S/CO — SIGNIFICANT CHANGE UP (ref 0–0.99)
HCV AB SERPL-IMP: SIGNIFICANT CHANGE UP
HGB BLD-MCNC: 7.7 G/DL — LOW (ref 11.5–15.5)
HGB BLD-MCNC: 8.3 G/DL — LOW (ref 11.5–15.5)
HOROWITZ INDEX BLDA+IHG-RTO: SIGNIFICANT CHANGE UP
MAGNESIUM SERPL-MCNC: 2 MG/DL — SIGNIFICANT CHANGE UP (ref 1.6–2.6)
MCHC RBC-ENTMCNC: 29.2 PG — SIGNIFICANT CHANGE UP (ref 27–34)
MCHC RBC-ENTMCNC: 29.7 PG — SIGNIFICANT CHANGE UP (ref 27–34)
MCHC RBC-ENTMCNC: 30.7 GM/DL — LOW (ref 32–36)
MCHC RBC-ENTMCNC: 30.7 GM/DL — LOW (ref 32–36)
MCV RBC AUTO: 95.1 FL — SIGNIFICANT CHANGE UP (ref 80–100)
MCV RBC AUTO: 96.8 FL — SIGNIFICANT CHANGE UP (ref 80–100)
NRBC # BLD: 0 /100 WBCS — SIGNIFICANT CHANGE UP (ref 0–0)
NRBC # BLD: 0 /100 WBCS — SIGNIFICANT CHANGE UP (ref 0–0)
PCO2 BLDA: 85 MMHG — CRITICAL HIGH (ref 32–46)
PH BLDA: 7.32 — LOW (ref 7.35–7.45)
PHOSPHATE SERPL-MCNC: 3.7 MG/DL — SIGNIFICANT CHANGE UP (ref 2.5–4.5)
PLATELET # BLD AUTO: 330 K/UL — SIGNIFICANT CHANGE UP (ref 150–400)
PLATELET # BLD AUTO: 392 K/UL — SIGNIFICANT CHANGE UP (ref 150–400)
PO2 BLDA: 89 MMHG — SIGNIFICANT CHANGE UP (ref 74–108)
POTASSIUM SERPL-MCNC: 4.4 MMOL/L — SIGNIFICANT CHANGE UP (ref 3.5–5.3)
POTASSIUM SERPL-SCNC: 4.4 MMOL/L — SIGNIFICANT CHANGE UP (ref 3.5–5.3)
PROT SERPL-MCNC: 6.6 G/DL — SIGNIFICANT CHANGE UP (ref 6–8.3)
RBC # BLD: 2.64 M/UL — LOW (ref 3.8–5.2)
RBC # BLD: 2.79 M/UL — LOW (ref 3.8–5.2)
RBC # FLD: 13.9 % — SIGNIFICANT CHANGE UP (ref 10.3–14.5)
RBC # FLD: 13.9 % — SIGNIFICANT CHANGE UP (ref 10.3–14.5)
SAO2 % BLDA: 96 % — SIGNIFICANT CHANGE UP (ref 92–96)
SODIUM SERPL-SCNC: 137 MMOL/L — SIGNIFICANT CHANGE UP (ref 135–145)
SPECIMEN SOURCE: SIGNIFICANT CHANGE UP
WBC # BLD: 10.84 K/UL — HIGH (ref 3.8–10.5)
WBC # BLD: 7.87 K/UL — SIGNIFICANT CHANGE UP (ref 3.8–10.5)
WBC # FLD AUTO: 10.84 K/UL — HIGH (ref 3.8–10.5)
WBC # FLD AUTO: 7.87 K/UL — SIGNIFICANT CHANGE UP (ref 3.8–10.5)

## 2020-12-20 PROCEDURE — 71045 X-RAY EXAM CHEST 1 VIEW: CPT | Mod: 26

## 2020-12-20 RX ORDER — MORPHINE SULFATE 50 MG/1
2 CAPSULE, EXTENDED RELEASE ORAL ONCE
Refills: 0 | Status: DISCONTINUED | OUTPATIENT
Start: 2020-12-20 | End: 2020-12-20

## 2020-12-20 RX ORDER — ALBUTEROL 90 UG/1
2 AEROSOL, METERED ORAL ONCE
Refills: 0 | Status: DISCONTINUED | OUTPATIENT
Start: 2020-12-20 | End: 2020-12-21

## 2020-12-20 RX ORDER — ALBUMIN HUMAN 25 %
100 VIAL (ML) INTRAVENOUS ONCE
Refills: 0 | Status: COMPLETED | OUTPATIENT
Start: 2020-12-20 | End: 2020-12-20

## 2020-12-20 RX ADMIN — ENOXAPARIN SODIUM 40 MILLIGRAM(S): 100 INJECTION SUBCUTANEOUS at 11:38

## 2020-12-20 RX ADMIN — ALBUTEROL 2 PUFF(S): 90 AEROSOL, METERED ORAL at 16:34

## 2020-12-20 RX ADMIN — Medication 40 MILLIGRAM(S): at 05:31

## 2020-12-20 RX ADMIN — ALBUTEROL 2 PUFF(S): 90 AEROSOL, METERED ORAL at 20:31

## 2020-12-20 RX ADMIN — ALBUTEROL 2 PUFF(S): 90 AEROSOL, METERED ORAL at 12:30

## 2020-12-20 RX ADMIN — CHLORHEXIDINE GLUCONATE 15 MILLILITER(S): 213 SOLUTION TOPICAL at 18:24

## 2020-12-20 RX ADMIN — ALBUTEROL 2 PUFF(S): 90 AEROSOL, METERED ORAL at 05:48

## 2020-12-20 RX ADMIN — MORPHINE SULFATE 2 MILLIGRAM(S): 50 CAPSULE, EXTENDED RELEASE ORAL at 19:31

## 2020-12-20 RX ADMIN — DEXMEDETOMIDINE HYDROCHLORIDE IN 0.9% SODIUM CHLORIDE 3.01 MICROGRAM(S)/KG/HR: 4 INJECTION INTRAVENOUS at 16:12

## 2020-12-20 RX ADMIN — MORPHINE SULFATE 2 MILLIGRAM(S): 50 CAPSULE, EXTENDED RELEASE ORAL at 19:45

## 2020-12-20 RX ADMIN — Medication 40 MILLIGRAM(S): at 18:24

## 2020-12-20 RX ADMIN — ALBUTEROL 2 PUFF(S): 90 AEROSOL, METERED ORAL at 08:27

## 2020-12-20 RX ADMIN — Medication 40 MILLIGRAM(S): at 23:44

## 2020-12-20 RX ADMIN — ALBUTEROL 2 PUFF(S): 90 AEROSOL, METERED ORAL at 01:23

## 2020-12-20 RX ADMIN — Medication 50 MILLILITER(S): at 00:32

## 2020-12-20 RX ADMIN — PANTOPRAZOLE SODIUM 40 MILLIGRAM(S): 20 TABLET, DELAYED RELEASE ORAL at 11:38

## 2020-12-20 RX ADMIN — AZITHROMYCIN 255 MILLIGRAM(S): 500 TABLET, FILM COATED ORAL at 02:02

## 2020-12-20 RX ADMIN — Medication 40 MILLIGRAM(S): at 11:37

## 2020-12-20 RX ADMIN — CHLORHEXIDINE GLUCONATE 15 MILLILITER(S): 213 SOLUTION TOPICAL at 05:31

## 2020-12-20 RX ADMIN — DEXMEDETOMIDINE HYDROCHLORIDE IN 0.9% SODIUM CHLORIDE 3.01 MICROGRAM(S)/KG/HR: 4 INJECTION INTRAVENOUS at 03:56

## 2020-12-20 NOTE — PROGRESS NOTE ADULT - ASSESSMENT
Physical Examination:  GENERAL:               Sedated, orally intubated  HEENT:                    Pupils equal, constricted  Symmetric. No JVD, dry MM  PULM:                     Bilateral air entry, diminished to auscultation bilaterally, no significant sputum production, No Rales, No Rhonchi, midl Wheezing  CVS:                         S1, S2,  + Murmur  ABD:                        Soft, nondistended, nontender, normoactive bowel sounds,   EXT:                         No edema, nontender, No Cyanosis or Clubbing   Vascular:                Warm Extremities, Normal Capillary refill, Normal Distal Pulses  NEURO:                sedated, unresponsive,   PSYC:                      Calm, No Insight.        Assessment  75 year old female with h/o Severe COPD on home o2, ex smoker, HTN, anxiety who had recent right femur fx at [place] she was discharged to [facility] on 12/18 where shortly after developed AMS and brought to Hospital, Here found to have acute hypercarbic respiratory failure and was unresponsive so she was intubated.    Problem List  Acute Hypercarbic respiratory failure due to acute on chronic COPD exacerbation, in patient with Severe COPD on home o2 (chronic hypoxic respiratory Insuficiency)    COVID/RVP Negative    Ex Smoker  Metabolic Encephelopathy due to above  Lactic Acidosis likely due to hypoxia    no source of infection found, normal UA, No PNA on CT  Recent Right femur fx     underlying Anxiety, HTN,       Plan  off pressors  on Precedex  cpap daily, taper PS, possible wean if tolerated, may need NIV  start tube feeding if not extubated.    increase steroids, continue  bronchodilators  f/u morning labs  no sputum for sputum cultures    repeat CBC this afternoon, type and screen, stool occult blood        Family Updated: 	Juancho Olivier [phone]	                                 Date:  12/19/20, 12/20       Sedation & Analgesia:	as above  Diet/Nutrition:		Possible diet if not extubated  GI PPx:			PPI    DVT Ppx:		Lovenox      Activity:	 Advance as tolerated	                 Head of Bed:                35-45  Glycemic Control:        N/a    Lines: PIV  CENTRAL LINE: 	[ ] YES [ ] NO	                    LOCATION:   	                       DATE INSERTED:   	                    REMOVE:  [ ] YES [ ] NO    A-LINE:  	                [ ] YES [ ] NO                      LOCATION:   	                       DATE INSERTED: 		            REMOVE:  [ ] YES [ ] NO    RICH: 		        [ x] YES [ ] NO  		                                       DATE INSERTED:		            REMOVE:  [z ] YES [ ] NO      Restraints may be deemed necessary to prevent removal of life-sustaining devices [ x ] YES   [    ]  NO    Disposition: ICU monitoring     Goals of Care: based on records full

## 2020-12-20 NOTE — PROGRESS NOTE ADULT - SUBJECTIVE AND OBJECTIVE BOX
Follow-up Critical Care Progress Note  Chief Complaint : Other abnormality of breathing      patient intubated  on precedex  arousable  overnight sedation vacation done and cpap, unable to wean  this am cpap re attempted and currently on cpap 10 and apepar to be tolerating      Allergies :No Known Allergies      PAST MEDICAL & SURGICAL HISTORY:  HTN (hypertension)    Anxiety    Advanced COPD  on home O2    Status post closed fracture of right femur  surgical repair done at [place]        Medications:  MEDICATIONS  (STANDING):  ALBUTerol    90 MICROgram(s) HFA Inhaler 2 Puff(s) Inhalation every 4 hours  azithromycin  IVPB      azithromycin  IVPB 500 milliGRAM(s) IV Intermittent every 24 hours  chlorhexidine 0.12% Liquid 15 milliLiter(s) Oral Mucosa every 12 hours  dexMEDEtomidine Infusion 0.2 MICROgram(s)/kG/Hr (3.01 mL/Hr) IV Continuous <Continuous>  enoxaparin Injectable 40 milliGRAM(s) SubCutaneous daily  lactated ringers. 1000 milliLiter(s) (1000 mL/Hr) IV Continuous <Continuous>  methylPREDNISolone sodium succinate Injectable 40 milliGRAM(s) IV Push every 8 hours  pantoprazole   Suspension 40 milliGRAM(s) Enteral Tube daily    MEDICATIONS  (PRN):    COVID  20 @ 08:00  COVID -   NotDetec  20 @ 00:15  COVID -   NotDetec      Procalcitonin Trend  20 @ 07:30   -   1.20<H>    WBC Trend  20 @ 04:55   -  7.87  20 @ 05:00   -  13.80<H>  20 @ 00:10   -  10.05    H/H Trend  20 @ 04:55   -  7.7<L> / 25.1<L>  20 @ 05:00   -  9.0<L> / 28.9<L>  20 @ 00:10   -  8.9<L> / 30.6<L>    Platelet Trend  20 @ 04:55   -  330  20 @ 05:00   -  370  20 @ 00:10   -  341    Trend Sodium  20 @ 04:55   -  137  20 @ 05:00   -  137  20 @ 00:10   -  141    Trend Potassium  12-20-20 @ 04:55   -  4.4  12-19-20 @ 05:00   -  4.9  20 @ 00:10   -  4.8    Trend Bun/Cr  20 @ 04:55  BUN/CR -  35<H> / 0.81  20 @ 05:00  BUN/CR -  22 / 0.86  20 @ 00:10  BUN/CR -  19 / 0.94    Lactic Acid Trend  20 07:30   -   2.2<H>  20 @ 03:40   -   3.2<H>  20 @ 00:10   -   5.0<HH>    ABG Trend  20 @ 08:27   - 7.46<H>/60<H>/66<L>/94  20 @ 00:45   - 7.10<LL>/>99<HH>/243<H>/99<H>    Trend AST/ALT/ALK Phos/Bili  20 @ 04:55   112<H>/144<H>/95/0.5  20 @ 00:10   167<H>/103<H>/119/0.9    Albumin Trend  20 @ 04:55   -   3.2<L>  20 00:10   -   2.9<L>      PTT - PT - INR Trend  20 @ 00:10   -   27.1<L> - 11.2 - 0.92    Glucose Trend  20 @ 08:47   -  -- -- 146<H>  20 @ 04:55   -  -- 130<H> --  20 @ 05:00   -  -- 142<H> --  20 @ 00:10   -  -- 152<H> --  20 @ 00:04   -  -- -- 166<H>        LABS:                        7.7    7.87  )-----------( 330      ( 20 Dec 2020 04:55 )             25.1         137  |  94<L>  |  35<H>  ----------------------------<  130<H>  4.4   |  38<H>  |  0.81    Ca    9.5      20 Dec 2020 04:55  Phos  3.7       Mg     2.0         TPro  6.6  /  Alb  3.2<L>  /  TBili  0.5  /  DBili  x   /  AST  112<H>  /  ALT  144<H>  /  AlkPhos  95              PT/INR - ( 19 Dec 2020 00:10 )   PT: 11.2 sec;   INR: 0.92 ratio         PTT - ( 19 Dec 2020 00:10 )  PTT:27.1 sec  Urinalysis Basic - ( 19 Dec 2020 01:41 )    Color: Yellow / Appearance: Clear / S.020 / pH: x  Gluc: x / Ketone: Small  / Bili: Negative / Urobili: Negative   Blood: x / Protein: 30 mg/dL / Nitrite: Negative   Leuk Esterase: Negative / RBC: 0-4 /HPF / WBC 0-2 /HPF   Sq Epi: x / Non Sq Epi: Neg.-Few / Bacteria: Negative /HPF      Procalcitonin, Serum: 1.20 ng/mL (20 @ 07:30)          CULTURES: (if applicable)    Culture - Sputum (collected 20 @ 11:30)  Source: .Sputum Sputum  Gram Stain (20 @ 22:33):    Few polymorphonuclear leukocytes per low power field    Rare Squamous epithelial cells per low power field    Few Gram positive cocci in pairs per oil power field    Culture - Urine (collected 20 @ 01:36)  Source: .Urine Clean Catch (Midstream)  Final Report (20 @ 08:52):    No growth      Rapid RVP Result: NotDetec (20 @ 08:00)      ABG - ( 19 Dec 2020 08:27 )  pH, Arterial: 7.46  pH, Blood: x     /  pCO2: 60    /  pO2: 66    / HCO3: x     / Base Excess: x     /  SaO2: 94        CXR clear no infiltrate, emphysema     VITALS:  T(C): 36.3 (20 @ 05:00), Max: 36.6 (20 @ 12:00)  T(F): 97.4 (20 @ 05:00), Max: 97.9 (20 @ 12:00)  HR: 87 (20 @ 08:29) (74 - 131)  BP: 126/65 (20 @ 08:00) (82/57 - 178/88)  BP(mean): 79 (20 @ 08:00) (63 - 120)  ABP: --  ABP(mean): --  RR: 16 (20 @ 08:00) (16 - 27)  SpO2: 100% (20 @ 08:29) (88% - 100%)  CVP(mm Hg): --  CVP(cm H2O): --    Ins and Outs     20 @ 07:01  -  20 @ 07:00  --------------------------------------------------------  IN: 1521.2 mL / OUT: 855 mL / NET: 666.2 mL    20 @ 07:01  -  20 @ 08:56  --------------------------------------------------------  IN: 7.5 mL / OUT: 0 mL / NET: 7.5 mL        Height (cm): 160 (20 @ 04:40)  Weight (kg): 60.2 (20 @ 04:40)  BMI (kg/m2): 23.5 (20 @ 04:40)    Device: Avea, Mode: CPAP with PS, RR (patient): 17, FiO2: 30, PEEP: 5, PS: 10, PIP: 16    I&O's Detail    19 Dec 2020 07:01  -  20 Dec 2020 07:00  --------------------------------------------------------  IN:    Dexmedetomidine: 102 mL    Enteral Tube Flush: 60 mL    IV PiggyBack: 100 mL    IV PiggyBack: 250 mL    Lactated Ringers: 1000 mL    Norepinephrine: 3.6 mL    Propofol: 5.6 mL  Total IN: 1521.2 mL    OUT:    Indwelling Catheter - Urethral (mL): 855 mL    Nasogastric/Oral tube (mL): 0 mL  Total OUT: 855 mL    Total NET: 666.2 mL      20 Dec 2020 07:01  -  20 Dec 2020 08:56  --------------------------------------------------------  IN:    Dexmedetomidine: 7.5 mL  Total IN: 7.5 mL    OUT:  Total OUT: 0 mL    Total NET: 7.5 mL

## 2020-12-20 NOTE — PROGRESS NOTE ADULT - SUBJECTIVE AND OBJECTIVE BOX
Patient is a 75y old  Female who presents with a chief complaint of Acute hypercapnic respiratory failure (19 Dec 2020 14:25)      BRIEF HOSPITAL COURSE:     75 year old female with h/o Severe COPD on home o2, ex smoker, HTN, anxiety who had recent right femur fx at [place] she was discharged to [facility] on  where shortly after developed AMS and brought to Hospital, Here found to have acute hypercarbic respiratory failure (PCO2 >99) and was unresponsive so she was intubated.    Events last 24 hours:  -off levophed at this time  -remains intubated, on precedex    PAST MEDICAL & SURGICAL HISTORY:  HTN (hypertension)    Anxiety    Advanced COPD  on home O2    Status post closed fracture of right femur  surgical repair done at [place]        Review of Systems:  unable to obtain as patient intubated and sedated       Medications:  azithromycin  IVPB      azithromycin  IVPB 500 milliGRAM(s) IV Intermittent every 24 hours      ALBUTerol    90 MICROgram(s) HFA Inhaler 2 Puff(s) Inhalation every 4 hours    dexMEDEtomidine Infusion 0.2 MICROgram(s)/kG/Hr IV Continuous <Continuous>      enoxaparin Injectable 40 milliGRAM(s) SubCutaneous daily    pantoprazole   Suspension 40 milliGRAM(s) Enteral Tube daily      methylPREDNISolone sodium succinate Injectable 40 milliGRAM(s) IV Push every 8 hours    lactated ringers. 1000 milliLiter(s) IV Continuous <Continuous>      chlorhexidine 0.12% Liquid 15 milliLiter(s) Oral Mucosa every 12 hours        Mode: AC/ CMV (Assist Control/ Continuous Mandatory Ventilation)  RR (machine): 20  TV (machine): 400  FiO2: 30  PEEP: 5  ITime: 1  MAP: 13  PIP: 38      ICU Vital Signs Last 24 Hrs  T(C): 36.5 (19 Dec 2020 20:00), Max: 36.6 (19 Dec 2020 08:00)  T(F): 97.7 (19 Dec 2020 20:00), Max: 97.9 (19 Dec 2020 12:00)  HR: 83 (20 Dec 2020 00:30) (82 - 131)  BP: 107/68 (20 Dec 2020 00:30) (75/40 - 178/88)  BP(mean): 78 (20 Dec 2020 00:30) (57 - 115)  RR: 20 (20 Dec 2020 00:30) (18 - 27)  SpO2: 100% (20 Dec 2020 00:30) (92% - 100%)      ABG - ( 19 Dec 2020 08:27 )  pH, Arterial: 7.46  pH, Blood: x     /  pCO2: 60    /  pO2: 66    / HCO3: x     / Base Excess: x     /  SaO2: 94                  I&O's Detail    18 Dec 2020 07:01  -  19 Dec 2020 07:00  --------------------------------------------------------  IN:    Norepinephrine: 43.2 mL    Propofol: 90 mL  Total IN: 133.2 mL    OUT:    Indwelling Catheter - Urethral (mL): 940 mL    Nasogastric/Oral tube (mL): 0 mL  Total OUT: 940 mL    Total NET: -806.8 mL      19 Dec 2020 07:01  -  20 Dec 2020 01:00  --------------------------------------------------------  IN:    Dexmedetomidine: 57 mL    Enteral Tube Flush: 60 mL    IV PiggyBack: 100 mL    Lactated Ringers: 1000 mL    Norepinephrine: 3.6 mL    Propofol: 5.6 mL  Total IN: 1226.2 mL    OUT:    Indwelling Catheter - Urethral (mL): 580 mL  Total OUT: 580 mL    Total NET: 646.2 mL            LABS:                        9.0    13.80 )-----------( 370      ( 19 Dec 2020 05:00 )             28.9         137  |  94<L>  |  22  ----------------------------<  142<H>  4.9   |  37<H>  |  0.86    Ca    9.0      19 Dec 2020 05:00  Phos  3.3       Mg     1.8         TPro  6.7  /  Alb  2.9<L>  /  TBili  0.9  /  DBili  x   /  AST  167<H>  /  ALT  103<H>  /  AlkPhos  119            CAPILLARY BLOOD GLUCOSE      POCT Blood Glucose.: 166 mg/dL (19 Dec 2020 00:04)    PT/INR - ( 19 Dec 2020 00:10 )   PT: 11.2 sec;   INR: 0.92 ratio         PTT - ( 19 Dec 2020 00:10 )  PTT:27.1 sec  Urinalysis Basic - ( 19 Dec 2020 01:41 )    Color: Yellow / Appearance: Clear / S.020 / pH: x  Gluc: x / Ketone: Small  / Bili: Negative / Urobili: Negative   Blood: x / Protein: 30 mg/dL / Nitrite: Negative   Leuk Esterase: Negative / RBC: 0-4 /HPF / WBC 0-2 /HPF   Sq Epi: x / Non Sq Epi: Neg.-Few / Bacteria: Negative /HPF      CULTURES:  Rapid RVP Result: NotDetec (20 @ 08:00)      Physical Examination:    General: No acute distress. on precedex, synchronous with vent     HEENT: Pupils equal, reactive to light.  Symmetric.    PULM: Clear to auscultation bilaterally, no significant sputum production    CVS: Regular rate and rhythm, no murmurs, rubs, or gallops    ABD: Soft, nondistended, nontender, normoactive bowel sounds, no masses    EXT: No edema, nontender    SKIN: Warm and well perfused, no rashes noted.

## 2020-12-20 NOTE — PROGRESS NOTE ADULT - ASSESSMENT
75 year old female with h/o Severe COPD on home o2, ex smoker, HTN, anxiety who had recent right femur fx at [place] she was discharged to [facility] on 12/18 where shortly after developed AMS and brought to Hospital, Here found to have acute hypercarbic respiratory failure (PCO2 >99) and was unresponsive so she was intubated.    Events last 24 hours:  -off levophed at this time  -remains intubated, on precedex    PLAN:  -Patient currently on Full vent support  -titrate settings to maintain SaO2 >90%, or pH >7.25  -consider low tidal volume ventilation strategy w/ goal Tv 4-6 cc/kg of ideal body weight  -plateu pressure goal <30  -Peridex oral care  -aggressive pulmonary toilet  -daily sedation vacation with spontaneous breathing trial if clinical condition warrants, discuss with respiratory therapy   -continue zithromax for AECOPD, nebs and steroids

## 2020-12-21 LAB
ANION GAP SERPL CALC-SCNC: 1 MMOL/L — LOW (ref 5–17)
BUN SERPL-MCNC: 42 MG/DL — HIGH (ref 7–23)
CALCIUM SERPL-MCNC: 9.6 MG/DL — SIGNIFICANT CHANGE UP (ref 8.4–10.5)
CHLORIDE SERPL-SCNC: 97 MMOL/L — SIGNIFICANT CHANGE UP (ref 96–108)
CO2 BLDA-SCNC: 44 MMOL/L — HIGH (ref 22–30)
CO2 SERPL-SCNC: 40 MMOL/L — HIGH (ref 22–31)
CREAT SERPL-MCNC: 0.68 MG/DL — SIGNIFICANT CHANGE UP (ref 0.5–1.3)
CULTURE RESULTS: SIGNIFICANT CHANGE UP
GAS PNL BLDA: SIGNIFICANT CHANGE UP
GLUCOSE SERPL-MCNC: 161 MG/DL — HIGH (ref 70–99)
HCT VFR BLD CALC: 24.1 % — LOW (ref 34.5–45)
HGB BLD-MCNC: 7.6 G/DL — LOW (ref 11.5–15.5)
HOROWITZ INDEX BLDA+IHG-RTO: SIGNIFICANT CHANGE UP
LACTATE SERPL-SCNC: 1 MMOL/L — SIGNIFICANT CHANGE UP (ref 0.7–2)
MAGNESIUM SERPL-MCNC: 2.1 MG/DL — SIGNIFICANT CHANGE UP (ref 1.6–2.6)
MCHC RBC-ENTMCNC: 30.2 PG — SIGNIFICANT CHANGE UP (ref 27–34)
MCHC RBC-ENTMCNC: 31.5 GM/DL — LOW (ref 32–36)
MCV RBC AUTO: 95.6 FL — SIGNIFICANT CHANGE UP (ref 80–100)
NRBC # BLD: 0 /100 WBCS — SIGNIFICANT CHANGE UP (ref 0–0)
PCO2 BLDA: 64 MMHG — HIGH (ref 32–46)
PH BLDA: 7.43 — SIGNIFICANT CHANGE UP (ref 7.35–7.45)
PHOSPHATE SERPL-MCNC: 3.5 MG/DL — SIGNIFICANT CHANGE UP (ref 2.5–4.5)
PLATELET # BLD AUTO: 377 K/UL — SIGNIFICANT CHANGE UP (ref 150–400)
PO2 BLDA: 83 MMHG — SIGNIFICANT CHANGE UP (ref 74–108)
POTASSIUM SERPL-MCNC: 3.9 MMOL/L — SIGNIFICANT CHANGE UP (ref 3.5–5.3)
POTASSIUM SERPL-SCNC: 3.9 MMOL/L — SIGNIFICANT CHANGE UP (ref 3.5–5.3)
PROCALCITONIN SERPL-MCNC: 1.16 NG/ML — HIGH
RBC # BLD: 2.52 M/UL — LOW (ref 3.8–5.2)
RBC # FLD: 13.9 % — SIGNIFICANT CHANGE UP (ref 10.3–14.5)
SAO2 % BLDA: 96 % — SIGNIFICANT CHANGE UP (ref 92–96)
SODIUM SERPL-SCNC: 138 MMOL/L — SIGNIFICANT CHANGE UP (ref 135–145)
SPECIMEN SOURCE: SIGNIFICANT CHANGE UP
WBC # BLD: 8.86 K/UL — SIGNIFICANT CHANGE UP (ref 3.8–10.5)
WBC # FLD AUTO: 8.86 K/UL — SIGNIFICANT CHANGE UP (ref 3.8–10.5)

## 2020-12-21 PROCEDURE — 71045 X-RAY EXAM CHEST 1 VIEW: CPT | Mod: 26

## 2020-12-21 RX ORDER — ALBUTEROL 90 UG/1
2 AEROSOL, METERED ORAL ONCE
Refills: 0 | Status: COMPLETED | OUTPATIENT
Start: 2020-12-21 | End: 2020-12-21

## 2020-12-21 RX ORDER — FUROSEMIDE 40 MG
40 TABLET ORAL ONCE
Refills: 0 | Status: COMPLETED | OUTPATIENT
Start: 2020-12-21 | End: 2020-12-21

## 2020-12-21 RX ORDER — CEFTRIAXONE 500 MG/1
INJECTION, POWDER, FOR SOLUTION INTRAMUSCULAR; INTRAVENOUS
Refills: 0 | Status: COMPLETED | OUTPATIENT
Start: 2020-12-21 | End: 2020-12-27

## 2020-12-21 RX ORDER — ALPRAZOLAM 0.25 MG
0.25 TABLET ORAL ONCE
Refills: 0 | Status: DISCONTINUED | OUTPATIENT
Start: 2020-12-21 | End: 2020-12-21

## 2020-12-21 RX ORDER — MORPHINE SULFATE 50 MG/1
2 CAPSULE, EXTENDED RELEASE ORAL ONCE
Refills: 0 | Status: DISCONTINUED | OUTPATIENT
Start: 2020-12-21 | End: 2020-12-21

## 2020-12-21 RX ORDER — CEFTRIAXONE 500 MG/1
1000 INJECTION, POWDER, FOR SOLUTION INTRAMUSCULAR; INTRAVENOUS EVERY 24 HOURS
Refills: 0 | Status: COMPLETED | OUTPATIENT
Start: 2020-12-22 | End: 2020-12-26

## 2020-12-21 RX ORDER — CEFTRIAXONE 500 MG/1
1000 INJECTION, POWDER, FOR SOLUTION INTRAMUSCULAR; INTRAVENOUS ONCE
Refills: 0 | Status: COMPLETED | OUTPATIENT
Start: 2020-12-21 | End: 2020-12-21

## 2020-12-21 RX ORDER — IPRATROPIUM BROMIDE 0.2 MG/ML
2 SOLUTION, NON-ORAL INHALATION EVERY 6 HOURS
Refills: 0 | Status: DISCONTINUED | OUTPATIENT
Start: 2020-12-21 | End: 2020-12-23

## 2020-12-21 RX ORDER — ALBUTEROL 90 UG/1
2 AEROSOL, METERED ORAL EVERY 6 HOURS
Refills: 0 | Status: DISCONTINUED | OUTPATIENT
Start: 2020-12-21 | End: 2020-12-23

## 2020-12-21 RX ADMIN — PANTOPRAZOLE SODIUM 40 MILLIGRAM(S): 20 TABLET, DELAYED RELEASE ORAL at 11:31

## 2020-12-21 RX ADMIN — Medication 0.25 MILLIGRAM(S): at 19:34

## 2020-12-21 RX ADMIN — Medication 40 MILLIGRAM(S): at 23:49

## 2020-12-21 RX ADMIN — CHLORHEXIDINE GLUCONATE 15 MILLILITER(S): 213 SOLUTION TOPICAL at 05:35

## 2020-12-21 RX ADMIN — ALBUTEROL 2 PUFF(S): 90 AEROSOL, METERED ORAL at 15:27

## 2020-12-21 RX ADMIN — AZITHROMYCIN 255 MILLIGRAM(S): 500 TABLET, FILM COATED ORAL at 02:10

## 2020-12-21 RX ADMIN — ALBUTEROL 2 PUFF(S): 90 AEROSOL, METERED ORAL at 21:30

## 2020-12-21 RX ADMIN — ENOXAPARIN SODIUM 40 MILLIGRAM(S): 100 INJECTION SUBCUTANEOUS at 11:31

## 2020-12-21 RX ADMIN — Medication 40 MILLIGRAM(S): at 11:31

## 2020-12-21 RX ADMIN — Medication 40 MILLIGRAM(S): at 05:36

## 2020-12-21 RX ADMIN — ALBUTEROL 2 PUFF(S): 90 AEROSOL, METERED ORAL at 01:22

## 2020-12-21 RX ADMIN — Medication 2 PUFF(S): at 15:27

## 2020-12-21 RX ADMIN — MORPHINE SULFATE 2 MILLIGRAM(S): 50 CAPSULE, EXTENDED RELEASE ORAL at 03:07

## 2020-12-21 RX ADMIN — MORPHINE SULFATE 2 MILLIGRAM(S): 50 CAPSULE, EXTENDED RELEASE ORAL at 03:22

## 2020-12-21 RX ADMIN — Medication 40 MILLIGRAM(S): at 18:41

## 2020-12-21 RX ADMIN — ALBUTEROL 2 PUFF(S): 90 AEROSOL, METERED ORAL at 06:00

## 2020-12-21 RX ADMIN — CEFTRIAXONE 100 MILLIGRAM(S): 500 INJECTION, POWDER, FOR SOLUTION INTRAMUSCULAR; INTRAVENOUS at 13:12

## 2020-12-21 RX ADMIN — ALBUTEROL 2 PUFF(S): 90 AEROSOL, METERED ORAL at 12:35

## 2020-12-21 RX ADMIN — Medication 2 PUFF(S): at 21:30

## 2020-12-21 RX ADMIN — DEXMEDETOMIDINE HYDROCHLORIDE IN 0.9% SODIUM CHLORIDE 3.01 MICROGRAM(S)/KG/HR: 4 INJECTION INTRAVENOUS at 05:36

## 2020-12-21 RX ADMIN — CHLORHEXIDINE GLUCONATE 15 MILLILITER(S): 213 SOLUTION TOPICAL at 18:40

## 2020-12-21 RX ADMIN — Medication 40 MILLIGRAM(S): at 19:33

## 2020-12-21 RX ADMIN — ALBUTEROL 2 PUFF(S): 90 AEROSOL, METERED ORAL at 08:25

## 2020-12-21 NOTE — PROGRESS NOTE ADULT - ASSESSMENT
75 year old female with h/o Severe COPD on home o2, ex smoker, HTN, anxiety who had recent right femur fx at [place] she was discharged to [facility] on 12/18 where shortly after developed AMS and brought to Hospital, Here found to have acute hypercarbic respiratory failure (PCO2 >99) and was unresponsive so she was intubated.      Events last 24 hours:   -patient is awake, alert, interactive, follows commands onv ent. However she is failing CPAP trials, becomes tachycardic, hypoxic, and develops significant accessory muscle use. This has prevented extubation  -has thus far failed trials x 3    PLAN:    -Patient currently on Full vent support  -titrate settings to maintain SaO2 >90%, or pH >7.25  -consider low tidal volume ventilation strategy w/ goal Tv 4-6 cc/kg of ideal body weight  -plateu pressure goal <30  -Peridex oral care  -aggressive pulmonary toilet  -daily sedation vacation with spontaneous breathing trial   -patient may benefit from attempt at extubating directly to BiPAP, otherwise if continues to fail SBTs will need to discuss trachesotomy   -continue zithromax for AECOPD, nebs and steroids  75 year old female with h/o Severe COPD on home o2, ex smoker, HTN, anxiety who had recent right femur fx at [place] she was discharged to [facility] on 12/18 where shortly after developed AMS and brought to Hospital, Here found to have acute hypercarbic respiratory failure (PCO2 >99) and was unresponsive so she was intubated.      Events last 24 hours:   -patient is awake, alert, interactive, follows commands onv ent. However she is failing CPAP trials, becomes tachycardic, hypoxic, and develops significant accessory muscle use. This has prevented extubation  -has thus far failed trials x 3    PLAN:    -Patient currently on Full vent support  -titrate settings to maintain SaO2 >90%, or pH >7.25  -consider low tidal volume ventilation strategy w/ goal Tv 4-6 cc/kg of ideal body weight  -plateu pressure goal <30  -Peridex oral care  -aggressive pulmonary toilet  -daily sedation vacation with spontaneous breathing trial   -patient may benefit from attempt at extubating directly to BiPAP, with discussion of patient's wishes if re-intubation becomes necessary otherwise if continues to fail SBTs will need to discuss trachesotomy   -continue zithromax for AECOPD, nebs and steroids

## 2020-12-21 NOTE — PROGRESS NOTE ADULT - SUBJECTIVE AND OBJECTIVE BOX
Patient is a 75y old  Female who presents with a chief complaint of Acute hypercapnic respiratory failure (20 Dec 2020 08:55)      BRIEF HOSPITAL COURSE:     75 year old female with h/o Severe COPD on home o2, ex smoker, HTN, anxiety who had recent right femur fx at [place] she was discharged to [facility] on 12/18 where shortly after developed AMS and brought to Hospital, Here found to have acute hypercarbic respiratory failure (PCO2 >99) and was unresponsive so she was intubated.      Events last 24 hours:   -patient is awake, alert, interactive, follows commands onv ent. However she is failing CPAP trials, becomes tachycardic, hypoxic, and develops significant accessory muscle use. This has prevented extubation  -has thus far failed trials x 3    PAST MEDICAL & SURGICAL HISTORY:  HTN (hypertension)    Anxiety    Advanced COPD  on home O2    Status post closed fracture of right femur  surgical repair done at [place]        Review of Systems:  -unable to obtain as patient is itnubated.     Medications:  azithromycin  IVPB      azithromycin  IVPB 500 milliGRAM(s) IV Intermittent every 24 hours      ALBUTerol    90 MICROgram(s) HFA Inhaler 2 Puff(s) Inhalation once PRN  ALBUTerol    90 MICROgram(s) HFA Inhaler 2 Puff(s) Inhalation every 4 hours    dexMEDEtomidine Infusion 0.2 MICROgram(s)/kG/Hr IV Continuous <Continuous>      enoxaparin Injectable 40 milliGRAM(s) SubCutaneous daily    pantoprazole   Suspension 40 milliGRAM(s) Enteral Tube daily      methylPREDNISolone sodium succinate Injectable 40 milliGRAM(s) IV Push every 6 hours    lactated ringers. 1000 milliLiter(s) IV Continuous <Continuous>      chlorhexidine 0.12% Liquid 15 milliLiter(s) Oral Mucosa every 12 hours        Mode: AC/ CMV (Assist Control/ Continuous Mandatory Ventilation)  RR (machine): 20  TV (machine): 400  FiO2: 30  PEEP: 5  ITime: 1  MAP: 11  PIP: 33      ICU Vital Signs Last 24 Hrs  T(C): 36.3 (21 Dec 2020 02:00), Max: 36.8 (20 Dec 2020 15:00)  T(F): 97.3 (21 Dec 2020 02:00), Max: 98.2 (20 Dec 2020 15:00)  HR: 97 (21 Dec 2020 03:00) (74 - 108)  BP: 160/83 (21 Dec 2020 03:00) (93/53 - 175/86)  BP(mean): 100 (21 Dec 2020 03:00) (60 - 132)  RR: 24 (21 Dec 2020 03:00) (16 - 27)  SpO2: 96% (21 Dec 2020 03:00) (88% - 100%)      ABG - ( 20 Dec 2020 14:15 )  pH, Arterial: 7.32  pH, Blood: x     /  pCO2: 85    /  pO2: 89    / HCO3: x     / Base Excess: x     /  SaO2: 96                  I&O's Detail    19 Dec 2020 07:01  -  20 Dec 2020 07:00  --------------------------------------------------------  IN:    Dexmedetomidine: 102 mL    Enteral Tube Flush: 60 mL    IV PiggyBack: 100 mL    IV PiggyBack: 250 mL    Lactated Ringers: 1000 mL    Norepinephrine: 3.6 mL    Propofol: 5.6 mL  Total IN: 1521.2 mL    OUT:    Indwelling Catheter - Urethral (mL): 855 mL    Nasogastric/Oral tube (mL): 0 mL  Total OUT: 855 mL    Total NET: 666.2 mL      20 Dec 2020 07:01  -  21 Dec 2020 03:33  --------------------------------------------------------  IN:    Dexmedetomidine: 150 mL    Enteral Tube Flush: 80 mL    Glucerna 1.5: 240 mL    IV PiggyBack: 250 mL  Total IN: 720 mL    OUT:    Indwelling Catheter - Urethral (mL): 800 mL  Total OUT: 800 mL    Total NET: -80 mL            LABS:                        8.3    10.84 )-----------( 392      ( 20 Dec 2020 13:25 )             27.0     12-20    137  |  94<L>  |  35<H>  ----------------------------<  130<H>  4.4   |  38<H>  |  0.81    Ca    9.5      20 Dec 2020 04:55  Phos  3.7     12-20  Mg     2.0     12-20    TPro  6.6  /  Alb  3.2<L>  /  TBili  0.5  /  DBili  x   /  AST  112<H>  /  ALT  144<H>  /  AlkPhos  95  12-20          CAPILLARY BLOOD GLUCOSE      POCT Blood Glucose.: 138 mg/dL (20 Dec 2020 18:26)        CULTURES:  Culture Results:   Normal Respiratory Aura present (12-19-20 @ 11:30)  Rapid RVP Result: NotDetec (12-19-20 @ 08:00)  Culture Results:   No growth (12-19-20 @ 01:36)  Culture Results:   No growth to date. (12-19-20 @ 00:15)  Culture Results:   No growth to date. (12-19-20 @ 00:10)      Physical Examination:    General: No acute distress.  Alert, oriented, interactive, nonfocal. On vent, synchronous, currently no resp. distress     HEENT: Pupils equal, reactive to light.  Symmetric.    PULM: Clear to auscultation bilaterally, no significant sputum production    CVS: Regular rate and rhythm, no murmurs, rubs, or gallops    ABD: Soft, nondistended, nontender, normoactive bowel sounds, no masses    EXT: No edema, nontender    SKIN: Warm and well perfused, no rashes noted.

## 2020-12-21 NOTE — PROGRESS NOTE ADULT - ASSESSMENT
Physical Examination:  GENERAL:               Alert, orally intubated  HEENT:                    Pupils equal, constricted  Symmetric. No JVD, dry MM  PULM:                     Bilateral air entry, diminished to auscultation bilaterally, no significant sputum production, No Rales, No Rhonchi, midl Wheezing  CVS:                         S1, S2,  + Murmur  ABD:                        Soft, nondistended, nontender, normoactive bowel sounds,   EXT:                         No edema, nontender, No Cyanosis or Clubbing   Vascular:                Warm Extremities, Normal Capillary refill, Normal Distal Pulses  NEURO:                alert, responsive, follows commands  PSYC:                      Calm, +Insight.        Assessment  75 year old female with h/o Severe COPD on home o2, ex smoker, HTN, anxiety who had recent right femur fx at [place] she was discharged to [facility] on 12/18 where shortly after developed AMS and brought to Hospital, Here found to have acute hypercarbic respiratory failure and was unresponsive so she was intubated.    Problem List  Acute Hypercarbic respiratory failure due to acute on chronic COPD exacerbation, in patient with Severe COPD on home o2 (chronic hypoxic respiratory Insuficiency)    COVID/RVP Negative    Ex Smoker  Metabolic Encephelopathy due to above  Lactic Acidosis likely due to hypoxia    no source of infection found, normal UA, No PNA on CT  Recent Right femur fx     underlying Anxiety, HTN,       Plan  off pressors  on Precedex  cpap daily, taper PS, possible wean if tolerated, may need NIV, failed again today due to hypoxia  start tube feeding if not extubated.  continue steroids, add atrovent.     increase steroids, continue  bronchodilators  f/u morning labs  cxr worsening will give cftriaxone  repeat CBC this afternoon, type and screen, stool occult blood        Family Updated: 	Juancho Olivier [phone]	                                 Date:  12/21    Sedation & Analgesia:	as above  Diet/Nutrition:		tube feeding  GI PPx:			PPI    DVT Ppx:		Lovenox      Activity:	 Advance as tolerated	                 Head of Bed:                35-45  Glycemic Control:        N/a    Lines: PIV  CENTRAL LINE: 	[ ] YES [ ] NO	                    LOCATION:   	                       DATE INSERTED:   	                    REMOVE:  [ ] YES [ ] NO    A-LINE:  	                [ ] YES [ ] NO                      LOCATION:   	                       DATE INSERTED: 		            REMOVE:  [ ] YES [ ] NO    RICH: 		        [ x] YES [ ] NO  		                                       DATE INSERTED:		            REMOVE:  [z ] YES [ ] NO      Restraints may be deemed necessary to prevent removal of life-sustaining devices [ x ] YES   [    ]  NO    Disposition: ICU monitoring     Goals of Care: based on records full

## 2020-12-21 NOTE — PROGRESS NOTE ADULT - SUBJECTIVE AND OBJECTIVE BOX
Follow-up Critical Care Progress Note  Chief Complaint : Other abnormality of breathing    patient failed cpap again  became hypoxic to 70s      Allergies :No Known Allergies      PAST MEDICAL & SURGICAL HISTORY:  HTN (hypertension)    Anxiety    Advanced COPD  on home O2    Status post closed fracture of right femur  surgical repair done at [place]        Medications:  MEDICATIONS  (STANDING):  ALBUTerol    90 MICROgram(s) HFA Inhaler 2 Puff(s) Inhalation every 4 hours  azithromycin  IVPB      azithromycin  IVPB 500 milliGRAM(s) IV Intermittent every 24 hours  chlorhexidine 0.12% Liquid 15 milliLiter(s) Oral Mucosa every 12 hours  dexMEDEtomidine Infusion 0.2 MICROgram(s)/kG/Hr (3.01 mL/Hr) IV Continuous <Continuous>  enoxaparin Injectable 40 milliGRAM(s) SubCutaneous daily  methylPREDNISolone sodium succinate Injectable 40 milliGRAM(s) IV Push every 6 hours  pantoprazole   Suspension 40 milliGRAM(s) Enteral Tube daily    MEDICATIONS  (PRN):  ALBUTerol    90 MICROgram(s) HFA Inhaler 2 Puff(s) Inhalation once PRN Bronchospasm    COVID  12-19-20 @ 08:00  COVID -   NotDetec  12-19-20 @ 00:15  COVID -   NotDetec      Procalcitonin Trend  12-21-20 @ 06:30   -   1.16<H>  12-19-20 @ 07:30   -   1.20<H>    WBC Trend  12-21-20 @ 06:30   -  8.86  12-20-20 @ 13:25   -  10.84<H>  12-20-20 @ 04:55   -  7.87  12-19-20 @ 05:00   -  13.80<H>  12-19-20 @ 00:10   -  10.05    H/H Trend  12-21-20 @ 06:30   -  7.6<L> / 24.1<L>  12-20-20 @ 13:25   -  8.3<L> / 27.0<L>  12-20-20 @ 04:55   -  7.7<L> / 25.1<L>  12-19-20 @ 05:00   -  9.0<L> / 28.9<L>  12-19-20 @ 00:10   -  8.9<L> / 30.6<L>    Platelet Trend  12-21-20 @ 06:30   -  377  12-20-20 @ 13:25   -  392  12-20-20 @ 04:55   -  330  12-19-20 @ 05:00   -  370  12-19-20 @ 00:10   -  341    Trend Sodium  12-21-20 @ 06:30   -  138  12-20-20 @ 04:55   -  137  12-19-20 @ 05:00   -  137  12-19-20 @ 00:10   -  141    Trend Potassium  12-21-20 @ 06:30   -  3.9  12-20-20 @ 04:55   -  4.4  12-19-20 @ 05:00   -  4.9  12-19-20 @ 00:10   -  4.8    Trend Bun/Cr  12-21-20 @ 06:30  BUN/CR -  42<H> / 0.68  12-20-20 @ 04:55  BUN/CR -  35<H> / 0.81  12-19-20 @ 05:00  BUN/CR -  22 / 0.86  12-19-20 @ 00:10  BUN/CR -  19 / 0.94    Lactic Acid Trend  12-21-20 @ 06:30   -   1.0  12-19-20 @ 07:30   -   2.2<H>  12-19-20 @ 03:40   -   3.2<H>  12-19-20 @ 00:10   -   5.0<HH>      LABS:                        7.6    8.86  )-----------( 377      ( 21 Dec 2020 06:30 )             24.1     12-21    138  |  97  |  42<H>  ----------------------------<  161<H>  3.9   |  40<H>  |  0.68    Ca    9.6      21 Dec 2020 06:30  Phos  3.5     12-21  Mg     2.1     12-21    TPro  6.6  /  Alb  3.2<L>  /  TBili  0.5  /  DBili  x   /  AST  112<H>  /  ALT  144<H>  /  AlkPhos  95  12-20      CULTURES: (if applicable)    Culture - Sputum (collected 12-19-20 @ 11:30)  Source: .Sputum Sputum  Gram Stain (12-19-20 @ 22:33):    Few polymorphonuclear leukocytes per low power field    Rare Squamous epithelial cells per low power field    Few Gram positive cocci in pairs per oil power field  Preliminary Report (12-20-20 @ 16:58):    Normal Respiratory Aura present    Culture - Urine (collected 12-19-20 @ 01:36)  Source: .Urine Clean Catch (Midstream)  Final Report (12-20-20 @ 08:52):    No growth    Culture - Blood (collected 12-19-20 @ 00:15)  Source: .Blood Blood-Peripheral  Preliminary Report (12-20-20 @ 10:01):    No growth to date.    Culture - Blood (collected 12-19-20 @ 00:10)  Source: .Blood Blood-Peripheral  Preliminary Report (12-20-20 @ 10:01):    No growth to date.      Rapid RVP Result: NotDetec (12-19-20 @ 08:00)    RADIOLOGY  CXR:    < from: Xray Chest 1 View- PORTABLE-Routine (12.21.20 @ 09:48) >    EXAM:  XR CHEST PORTABLE ROUTINE 1V      PROCEDURE DATE:  12/21/2020        INTERPRETATION:  Chest one view    HISTORY: COPD    COMPARISON STUDY: 12/20/2020    Frontal view of the chest shows the heart to be normal in size. Endotracheal tube and nasogastric tube are present, although the NG tube tip is not included.    The lungs show small patchy infiltrates of the lower lungs and there is no evidence of pneumothorax nor pleural effusion.    IMPRESSION:  Small patchy infiltrates.    Thank you for the courtesy of this referral.    < end of copied text >        VITALS:  T(C): 36.6 (12-21-20 @ 07:00), Max: 36.8 (12-20-20 @ 15:00)  T(F): 97.8 (12-21-20 @ 07:00), Max: 98.2 (12-20-20 @ 15:00)  HR: 102 (12-21-20 @ 11:00) (70 - 106)  BP: 157/130 (12-21-20 @ 11:00) (93/53 - 171/97)  BP(mean): 136 (12-21-20 @ 11:00) (60 - 136)  ABP: --  ABP(mean): --  RR: 23 (12-21-20 @ 11:00) (10 - 25)  SpO2: 97% (12-21-20 @ 11:00) (88% - 100%)  CVP(mm Hg): --  CVP(cm H2O): --    Ins and Outs     12-20-20 @ 07:01  -  12-21-20 @ 07:00  --------------------------------------------------------  IN: 850 mL / OUT: 850 mL / NET: 0 mL    12-21-20 @ 07:01  -  12-21-20 @ 11:35  --------------------------------------------------------  IN: 246.6 mL / OUT: 0 mL / NET: 246.6 mL        Height (cm): 160 (12-19-20 @ 04:40)  Weight (kg): 60.2 (12-19-20 @ 04:40)  BMI (kg/m2): 23.5 (12-19-20 @ 04:40)    Device: Avea, Mode: AC/ CMV (Assist Control/ Continuous Mandatory Ventilation), RR (machine): 20, RR (patient): 23, TV (machine): 400, TV (patient): 370, FiO2: 30, PEEP: 5, ITime: 1, MAP: 10, PIP: 28    I&O's Detail    20 Dec 2020 07:01  -  21 Dec 2020 07:00  --------------------------------------------------------  IN:    Dexmedetomidine: 180 mL    Enteral Tube Flush: 80 mL    Glucerna 1.5: 340 mL    IV PiggyBack: 250 mL  Total IN: 850 mL    OUT:    Incontinent per Collection Bag (mL): 50 mL    Indwelling Catheter - Urethral (mL): 800 mL  Total OUT: 850 mL    Total NET: 0 mL      21 Dec 2020 07:01  -  21 Dec 2020 11:35  --------------------------------------------------------  IN:    Dexmedetomidine: 46.6 mL    Glucerna 1.5: 200 mL  Total IN: 246.6 mL    OUT:  Total OUT: 0 mL    Total NET: 246.6 mL

## 2020-12-22 LAB
ALBUMIN SERPL ELPH-MCNC: 2.9 G/DL — LOW (ref 3.3–5)
ALP SERPL-CCNC: 98 U/L — SIGNIFICANT CHANGE UP (ref 40–120)
ALT FLD-CCNC: 89 U/L — HIGH (ref 10–45)
ANION GAP SERPL CALC-SCNC: 0 MMOL/L — LOW (ref 5–17)
APTT BLD: 27.3 SEC — LOW (ref 27.5–35.5)
AST SERPL-CCNC: 36 U/L — SIGNIFICANT CHANGE UP (ref 10–40)
BILIRUB SERPL-MCNC: 0.2 MG/DL — SIGNIFICANT CHANGE UP (ref 0.2–1.2)
BUN SERPL-MCNC: 50 MG/DL — HIGH (ref 7–23)
CALCIUM SERPL-MCNC: 9.4 MG/DL — SIGNIFICANT CHANGE UP (ref 8.4–10.5)
CHLORIDE SERPL-SCNC: 98 MMOL/L — SIGNIFICANT CHANGE UP (ref 96–108)
CO2 BLDA-SCNC: 46 MMOL/L — HIGH (ref 22–30)
CO2 SERPL-SCNC: 43 MMOL/L — HIGH (ref 22–31)
CREAT SERPL-MCNC: 0.77 MG/DL — SIGNIFICANT CHANGE UP (ref 0.5–1.3)
GAS PNL BLDA: SIGNIFICANT CHANGE UP
GLUCOSE SERPL-MCNC: 151 MG/DL — HIGH (ref 70–99)
HCT VFR BLD CALC: 24.4 % — LOW (ref 34.5–45)
HGB BLD-MCNC: 7.7 G/DL — LOW (ref 11.5–15.5)
HOROWITZ INDEX BLDA+IHG-RTO: SIGNIFICANT CHANGE UP
INR BLD: 0.9 RATIO — SIGNIFICANT CHANGE UP (ref 0.88–1.16)
MAGNESIUM SERPL-MCNC: 2.1 MG/DL — SIGNIFICANT CHANGE UP (ref 1.6–2.6)
MCHC RBC-ENTMCNC: 30.4 PG — SIGNIFICANT CHANGE UP (ref 27–34)
MCHC RBC-ENTMCNC: 31.6 GM/DL — LOW (ref 32–36)
MCV RBC AUTO: 96.4 FL — SIGNIFICANT CHANGE UP (ref 80–100)
NRBC # BLD: 0 /100 WBCS — SIGNIFICANT CHANGE UP (ref 0–0)
PCO2 BLDA: 99 MMHG — CRITICAL HIGH (ref 32–46)
PH BLDA: 7.26 — LOW (ref 7.35–7.45)
PHOSPHATE SERPL-MCNC: 3.7 MG/DL — SIGNIFICANT CHANGE UP (ref 2.5–4.5)
PLATELET # BLD AUTO: 406 K/UL — HIGH (ref 150–400)
PO2 BLDA: 123 MMHG — HIGH (ref 74–108)
POTASSIUM SERPL-MCNC: 3.5 MMOL/L — SIGNIFICANT CHANGE UP (ref 3.5–5.3)
POTASSIUM SERPL-SCNC: 3.5 MMOL/L — SIGNIFICANT CHANGE UP (ref 3.5–5.3)
PROT SERPL-MCNC: 6.3 G/DL — SIGNIFICANT CHANGE UP (ref 6–8.3)
PROTHROM AB SERPL-ACNC: 11 SEC — SIGNIFICANT CHANGE UP (ref 10.6–13.6)
RBC # BLD: 2.53 M/UL — LOW (ref 3.8–5.2)
RBC # FLD: 14.2 % — SIGNIFICANT CHANGE UP (ref 10.3–14.5)
SAO2 % BLDA: 97 % — HIGH (ref 92–96)
SODIUM SERPL-SCNC: 141 MMOL/L — SIGNIFICANT CHANGE UP (ref 135–145)
WBC # BLD: 9.37 K/UL — SIGNIFICANT CHANGE UP (ref 3.8–10.5)
WBC # FLD AUTO: 9.37 K/UL — SIGNIFICANT CHANGE UP (ref 3.8–10.5)

## 2020-12-22 RX ORDER — FENTANYL CITRATE 50 UG/ML
50 INJECTION INTRAVENOUS ONCE
Refills: 0 | Status: DISCONTINUED | OUTPATIENT
Start: 2020-12-22 | End: 2020-12-22

## 2020-12-22 RX ORDER — ALPRAZOLAM 0.25 MG
0.5 TABLET ORAL EVERY 12 HOURS
Refills: 0 | Status: DISCONTINUED | OUTPATIENT
Start: 2020-12-22 | End: 2020-12-24

## 2020-12-22 RX ORDER — BUDESONIDE AND FORMOTEROL FUMARATE DIHYDRATE 160; 4.5 UG/1; UG/1
2 AEROSOL RESPIRATORY (INHALATION)
Refills: 0 | Status: DISCONTINUED | OUTPATIENT
Start: 2020-12-22 | End: 2020-12-23

## 2020-12-22 RX ADMIN — Medication 2 PUFF(S): at 09:10

## 2020-12-22 RX ADMIN — Medication 40 MILLIGRAM(S): at 06:16

## 2020-12-22 RX ADMIN — DEXMEDETOMIDINE HYDROCHLORIDE IN 0.9% SODIUM CHLORIDE 10.5 MICROGRAM(S)/KG/HR: 4 INJECTION INTRAVENOUS at 20:57

## 2020-12-22 RX ADMIN — Medication 2 PUFF(S): at 21:22

## 2020-12-22 RX ADMIN — ALBUTEROL 2 PUFF(S): 90 AEROSOL, METERED ORAL at 16:46

## 2020-12-22 RX ADMIN — DEXMEDETOMIDINE HYDROCHLORIDE IN 0.9% SODIUM CHLORIDE 3.01 MICROGRAM(S)/KG/HR: 4 INJECTION INTRAVENOUS at 01:27

## 2020-12-22 RX ADMIN — Medication 40 MILLIGRAM(S): at 23:03

## 2020-12-22 RX ADMIN — AZITHROMYCIN 255 MILLIGRAM(S): 500 TABLET, FILM COATED ORAL at 01:57

## 2020-12-22 RX ADMIN — PANTOPRAZOLE SODIUM 40 MILLIGRAM(S): 20 TABLET, DELAYED RELEASE ORAL at 11:11

## 2020-12-22 RX ADMIN — Medication 40 MILLIGRAM(S): at 11:10

## 2020-12-22 RX ADMIN — ENOXAPARIN SODIUM 40 MILLIGRAM(S): 100 INJECTION SUBCUTANEOUS at 11:10

## 2020-12-22 RX ADMIN — DEXMEDETOMIDINE HYDROCHLORIDE IN 0.9% SODIUM CHLORIDE 10.5 MICROGRAM(S)/KG/HR: 4 INJECTION INTRAVENOUS at 12:28

## 2020-12-22 RX ADMIN — FENTANYL CITRATE 50 MICROGRAM(S): 50 INJECTION INTRAVENOUS at 01:31

## 2020-12-22 RX ADMIN — CHLORHEXIDINE GLUCONATE 15 MILLILITER(S): 213 SOLUTION TOPICAL at 18:41

## 2020-12-22 RX ADMIN — CEFTRIAXONE 100 MILLIGRAM(S): 500 INJECTION, POWDER, FOR SOLUTION INTRAMUSCULAR; INTRAVENOUS at 11:10

## 2020-12-22 RX ADMIN — Medication 2 PUFF(S): at 04:57

## 2020-12-22 RX ADMIN — ALBUTEROL 2 PUFF(S): 90 AEROSOL, METERED ORAL at 21:22

## 2020-12-22 RX ADMIN — Medication 0.5 MILLIGRAM(S): at 18:41

## 2020-12-22 RX ADMIN — CHLORHEXIDINE GLUCONATE 15 MILLILITER(S): 213 SOLUTION TOPICAL at 06:16

## 2020-12-22 RX ADMIN — Medication 2 PUFF(S): at 16:46

## 2020-12-22 RX ADMIN — ALBUTEROL 2 PUFF(S): 90 AEROSOL, METERED ORAL at 09:10

## 2020-12-22 RX ADMIN — Medication 40 MILLIGRAM(S): at 18:41

## 2020-12-22 RX ADMIN — ALBUTEROL 2 PUFF(S): 90 AEROSOL, METERED ORAL at 04:56

## 2020-12-22 RX ADMIN — FENTANYL CITRATE 50 MICROGRAM(S): 50 INJECTION INTRAVENOUS at 01:46

## 2020-12-22 NOTE — PROGRESS NOTE ADULT - ASSESSMENT
Physical Examination:  GENERAL:               Alert, orally intubated  HEENT:                    Pupils equal, Symmetric. No JVD, dry MM  PULM:                     Bilateral air entry, clear to auscultation bilaterally, no significant sputum production, No Rales, No Rhonchi, midl Wheezing  CVS:                         S1, S2,  + Murmur  ABD:                        Soft, nondistended, nontender, normoactive bowel sounds,   EXT:                         No edema, nontender, No Cyanosis or Clubbing   Vascular:                Warm Extremities, Normal Capillary refill, Normal Distal Pulses  NEURO:                alert, responsive, follows commands  PSYC:                      Calm, +Insight.        Assessment  75 year old female with h/o Severe COPD on home o2, ex smoker, HTN, anxiety who had recent right femur fx at [place] she was discharged to [facility] on 12/18 where shortly after developed AMS and brought to Hospital, Here found to have acute hypercarbic respiratory failure and was unresponsive so she was intubated.    Problem List  Acute Hypercarbic respiratory failure due to acute on chronic COPD exacerbation, in patient with Severe COPD on home o2 (chronic hypoxic respiratory Insuficiency)    COVID/RVP Negative    Ex Smoker  Metabolic Encephelopathy due to above  Lactic Acidosis likely due to hypoxia    no source of infection found, normal UA, No PNA on CT  Recent Right femur fx   underlying Anxiety, HTN,       Plan  on Precedex  cpap daily, taper PS, possible wean if tolerated, may need NIV, failed again today due to hypoxia    Failed again today  On atrovent/albuterol ATC will add Symbicort    tube feeding as tolerated    f/u morning labs  cxr showing increased markings will give ceftriaxone        Family Updated: 	Juancho Olivier [phone]	                                 Date:  12/21    Sedation & Analgesia:	as above  Diet/Nutrition:		tube feeding  GI PPx:			PPI    DVT Ppx:		Lovenox      Activity:	 Advance as tolerated	                 Head of Bed:                35-45  Glycemic Control:        N/a    Lines: PIV  CENTRAL LINE: 	[ ] YES [ ] NO	                    LOCATION:   	                       DATE INSERTED:   	                    REMOVE:  [ ] YES [ ] NO    A-LINE:  	                [ ] YES [ ] NO                      LOCATION:   	                       DATE INSERTED: 		            REMOVE:  [ ] YES [ ] NO    RICH: 		        [ ] YES [ x] NO  		                                       DATE INSERTED:		            REMOVE:  [ ] YES [ ] NO      Restraints may be deemed necessary to prevent removal of life-sustaining devices [ x ] YES   [    ]  NO    Disposition: ICU monitoring     Goals of Care: based on records full Physical Examination:  GENERAL:               Alert, orally intubated  HEENT:                    Pupils equal, Symmetric. No JVD, dry MM  PULM:                     Bilateral air entry, clear to auscultation bilaterally, no significant sputum production, No Rales, No Rhonchi, midl Wheezing  CVS:                         S1, S2,  + Murmur  ABD:                        Soft, nondistended, nontender, normoactive bowel sounds,   EXT:                         No edema, nontender, No Cyanosis or Clubbing   Vascular:                Warm Extremities, Normal Capillary refill, Normal Distal Pulses  NEURO:                alert, responsive, follows commands  PSYC:                      Calm, +Insight.        Assessment  75 year old female with h/o Severe COPD on home o2, ex smoker, HTN, anxiety who had recent right femur fx at [place] she was discharged to [facility] on 12/18 where shortly after developed AMS and brought to Hospital, Here found to have acute hypercarbic respiratory failure and was unresponsive so she was intubated.    Problem List  Acute Hypercarbic respiratory failure due to acute on chronic COPD exacerbation, in patient with Severe COPD on home o2 (chronic hypoxic respiratory Insuficiency)    COVID/RVP Negative    Ex Smoker  Metabolic Encephelopathy due to above  Lactic Acidosis likely due to hypoxia    no source of infection found, normal UA, No PNA on CT  Recent Right femur fx   underlying Anxiety, HTN,       Plan  on Precedex  cpap daily, taper PS, possible wean if tolerated, may need NIV, failed again today due to hypoxia    Failed again today  On atrovent/albuterol ATC will add Symbicort    tube feeding as tolerated    f/u morning labs  as having difficulty weaning, will give empiric abx        Family Updated: 	Juancho Olivier [phone]	                                 Date:  12/22    Sedation & Analgesia:	as above  Diet/Nutrition:		tube feeding  GI PPx:			PPI    DVT Ppx:		Lovenox      Activity:	 Advance as tolerated	                 Head of Bed:                35-45  Glycemic Control:        N/a    Lines: PIV  CENTRAL LINE: 	[ ] YES [ ] NO	                    LOCATION:   	                       DATE INSERTED:   	                    REMOVE:  [ ] YES [ ] NO    A-LINE:  	                [ ] YES [ ] NO                      LOCATION:   	                       DATE INSERTED: 		            REMOVE:  [ ] YES [ ] NO    RICH: 		        [ ] YES [ x] NO  		                                       DATE INSERTED:		            REMOVE:  [ ] YES [ ] NO      Restraints may be deemed necessary to prevent removal of life-sustaining devices [ x ] YES   [    ]  NO    Disposition: ICU monitoring     Goals of Care: based on records full

## 2020-12-22 NOTE — PROGRESS NOTE ADULT - ASSESSMENT
75 year old female with h/o Severe COPD on home o2, ex smoker, HTN, anxiety who had recent right femur fx at [place] she was discharged to [facility] on 12/18 where shortly after developed AMS and brought to Hospital, Here found to have acute hypercarbic respiratory failure (PCO2 >99) and was unresponsive so she was intubated.    Events last 24 hours:   -has remained un-able to extubate. Failing CPAP trails daily, becomes very anxious, tachycardic, with very high RSBI.      PLAN:    -Patient currently on Full vent support  -titrate settings to maintain SaO2 >90%, or pH >7.25  -consider low tidal volume ventilation strategy w/ goal Tv 4-6 cc/kg of ideal body weight  -plateu pressure goal <30  -Peridex oral care  -aggressive pulmonary toilet  -daily sedation vacation with spontaneous breathing trial   -patient may benefit from attempt at extubating directly to BiPAP, with discussion of patient's wishes if re-intubation becomes necessary otherwise if continues to fail SBTs will need to discuss trachesotomy   -continue rocephin/zithromax for AECOPD, nebs and steroids

## 2020-12-22 NOTE — PROGRESS NOTE ADULT - SUBJECTIVE AND OBJECTIVE BOX
Patient is a 75y old  Female who presents with a chief complaint of Acute hypercapnic respiratory failure (22 Dec 2020 20:00)      BRIEF HOSPITAL COURSE:     75 year old female with h/o Severe COPD on home o2, ex smoker, HTN, anxiety who had recent right femur fx at [place] she was discharged to [facility] on 12/18 where shortly after developed AMS and brought to Hospital, Here found to have acute hypercarbic respiratory failure (PCO2 >99) and was unresponsive so she was intubated.    Events last 24 hours:   -has remained un-able to extubate. Failing CPAP trails daily, becomes very anxious, tachycardic, with very high RSBI.    PAST MEDICAL & SURGICAL HISTORY:  HTN (hypertension)    Anxiety    Advanced COPD  on home O2    Status post closed fracture of right femur  surgical repair done at [place]        Review of Systems:  -unable to obtain as patient is intubated       Medications:  azithromycin  IVPB      azithromycin  IVPB 500 milliGRAM(s) IV Intermittent every 24 hours  cefTRIAXone   IVPB 1000 milliGRAM(s) IV Intermittent every 24 hours  cefTRIAXone   IVPB          ALBUTerol    90 MICROgram(s) HFA Inhaler 2 Puff(s) Inhalation every 6 hours  budesonide 160 MICROgram(s)/formoterol 4.5 MICROgram(s) Inhaler 2 Puff(s) Inhalation two times a day  ipratropium 17 MICROgram(s) HFA Inhaler 2 Puff(s) Inhalation every 6 hours    ALPRAZolam 0.5 milliGRAM(s) Oral every 12 hours  dexMEDEtomidine Infusion 0.7 MICROgram(s)/kG/Hr IV Continuous <Continuous>      enoxaparin Injectable 40 milliGRAM(s) SubCutaneous daily    pantoprazole   Suspension 40 milliGRAM(s) Enteral Tube daily      methylPREDNISolone sodium succinate Injectable 40 milliGRAM(s) IV Push every 6 hours        chlorhexidine 0.12% Liquid 15 milliLiter(s) Oral Mucosa every 12 hours        Mode: AC/ CMV (Assist Control/ Continuous Mandatory Ventilation)  RR (machine): 20  TV (machine): 400  FiO2: 30  PEEP: 5  ITime: 1  MAP: 12  PIP: 31      ICU Vital Signs Last 24 Hrs  T(C): 36.4 (22 Dec 2020 21:00), Max: 36.8 (22 Dec 2020 05:00)  T(F): 97.6 (22 Dec 2020 21:00), Max: 98.3 (22 Dec 2020 05:00)  HR: 71 (22 Dec 2020 22:00) (71 - 120)  BP: 132/65 (22 Dec 2020 22:00) (95/57 - 204/87)  BP(mean): 82 (22 Dec 2020 22:00) (66 - 150)  RR: 20 (22 Dec 2020 22:00) (20 - 28)  SpO2: 99% (22 Dec 2020 22:00) (88% - 100%)      ABG - ( 22 Dec 2020 10:20 )  pH, Arterial: 7.26  pH, Blood: x     /  pCO2: 99    /  pO2: 123   / HCO3: x     / Base Excess: x     /  SaO2: 97                  I&O's Detail    21 Dec 2020 07:01  -  22 Dec 2020 07:00  --------------------------------------------------------  IN:    Dexmedetomidine: 197.3 mL    Enteral Tube Flush: 150 mL    Glucerna 1.5: 1190 mL    IV PiggyBack: 250 mL  Total IN: 1787.3 mL    OUT:    Incontinent per Collection Bag (mL): 1150 mL    Nasogastric/Oral tube (mL): 150 mL  Total OUT: 1300 mL    Total NET: 487.3 mL      22 Dec 2020 07:01  -  22 Dec 2020 23:36  --------------------------------------------------------  IN:    Dexmedetomidine: 10.5 mL    Dexmedetomidine: 148.8 mL    Free Water: 150 mL    Glucerna 1.5: 575 mL  Total IN: 884.3 mL    OUT:    Voided (mL): 1200 mL  Total OUT: 1200 mL    Total NET: -315.7 mL            LABS:                        7.7    9.37  )-----------( 406      ( 22 Dec 2020 05:20 )             24.4     12-22    141  |  98  |  50<H>  ----------------------------<  151<H>  3.5   |  43<H>  |  0.77    Ca    9.4      22 Dec 2020 05:20  Phos  3.7     12-22  Mg     2.1     12-22    TPro  6.3  /  Alb  2.9<L>  /  TBili  0.2  /  DBili  x   /  AST  36  /  ALT  89<H>  /  AlkPhos  98  12-22          CAPILLARY BLOOD GLUCOSE        PT/INR - ( 22 Dec 2020 05:20 )   PT: 11.0 sec;   INR: 0.90 ratio         PTT - ( 22 Dec 2020 05:20 )  PTT:27.3 sec    CULTURES:  Culture Results:   Normal Respiratory Aura present (12-19-20 @ 11:30)  Rapid RVP Result: NotDetec (12-19-20 @ 08:00)  Culture Results:   No growth (12-19-20 @ 01:36)  Culture Results:   No growth to date. (12-19-20 @ 00:15)  Culture Results:   No growth to date. (12-19-20 @ 00:10)      Physical Examination:    General: No acute distress.  Alert, oriented, interactive, nonfocal. On vent, synchronous, currently no resp. distress     HEENT: Pupils equal, reactive to light.  Symmetric.    PULM: Clear to auscultation bilaterally, no significant sputum production    CVS: Regular rate and rhythm, no murmurs, rubs, or gallops    ABD: Soft, nondistended, nontender, normoactive bowel sounds, no masses    EXT: No edema, nontender    SKIN: Warm and well perfused, no rashes noted.

## 2020-12-22 NOTE — PROGRESS NOTE ADULT - SUBJECTIVE AND OBJECTIVE BOX
Follow-up Critical Care Progress Note  Chief Complaint : Other abnormality of breathing      patient did cpap again today  noted hypercarbia  and unable to be weaned off ventilator.       Allergies :No Known Allergies      PAST MEDICAL & SURGICAL HISTORY:  HTN (hypertension)    Anxiety    Advanced COPD  on home O2    Status post closed fracture of right femur  surgical repair done at [place]        Medications:  MEDICATIONS  (STANDING):  ALBUTerol    90 MICROgram(s) HFA Inhaler 2 Puff(s) Inhalation every 6 hours  azithromycin  IVPB      azithromycin  IVPB 500 milliGRAM(s) IV Intermittent every 24 hours  cefTRIAXone   IVPB 1000 milliGRAM(s) IV Intermittent every 24 hours  cefTRIAXone   IVPB      chlorhexidine 0.12% Liquid 15 milliLiter(s) Oral Mucosa every 12 hours  dexMEDEtomidine Infusion 0.7 MICROgram(s)/kG/Hr (10.5 mL/Hr) IV Continuous <Continuous>  enoxaparin Injectable 40 milliGRAM(s) SubCutaneous daily  ipratropium 17 MICROgram(s) HFA Inhaler 2 Puff(s) Inhalation every 6 hours  methylPREDNISolone sodium succinate Injectable 40 milliGRAM(s) IV Push every 6 hours  pantoprazole   Suspension 40 milliGRAM(s) Enteral Tube daily    MEDICATIONS  (PRN):    COVID  12-19-20 @ 08:00  COVID -   NotDetec  12-19-20 @ 00:15  COVID -   NotDetec    Covid 19 IgG ab Index/Interpretation 12-19-20 @ 07:30 <0.10 / Negative      Procalcitonin Trend  12-21-20 @ 06:30   -   1.16<H>  12-19-20 @ 07:30   -   1.20<H>    WBC Trend  12-22-20 @ 05:20   -  9.37  12-21-20 @ 06:30   -  8.86  12-20-20 @ 13:25   -  10.84<H>  12-20-20 @ 04:55   -  7.87    H/H Trend  12-22-20 @ 05:20   -  7.7<L> / 24.4<L>  12-21-20 @ 06:30   -  7.6<L> / 24.1<L>  12-20-20 @ 13:25   -  8.3<L> / 27.0<L>  12-20-20 @ 04:55   -  7.7<L> / 25.1<L>    Platelet Trend  12-22-20 @ 05:20   -  406<H>  12-21-20 @ 06:30   -  377  12-20-20 @ 13:25   -  392  12-20-20 @ 04:55   -  330    Trend Sodium  12-22-20 @ 05:20   -  141  12-21-20 @ 06:30   -  138  12-20-20 @ 04:55   -  137    Trend Potassium  12-22-20 @ 05:20   -  3.5  12-21-20 @ 06:30   -  3.9  12-20-20 @ 04:55   -  4.4    Trend Bun/Cr  12-22-20 @ 05:20  BUN/CR -  50<H> / 0.77  12-21-20 @ 06:30  BUN/CR -  42<H> / 0.68  12-20-20 @ 04:55  BUN/CR -  35<H> / 0.81    Lactic Acid Trend  12-21-20 @ 06:30   -   1.0  12-19-20 @ 07:30   -   2.2<H>  12-19-20 @ 03:40   -   3.2<H>  12-19-20 @ 00:10   -   5.0<HH>    ABG Trend  12-22-20 @ 10:20   - 7.26<L>/99<HH>/123<H>/97<H>  12-21-20 @ 05:05   - 7.43/64<H>/83/96  12-20-20 @ 14:15   - 7.32<L>/85<HH>/89/96  12-19-20 @ 08:27   - 7.46<H>/60<H>/66<L>/94  12-19-20 @ 00:45   - 7.10<LL>/>99<HH>/243<H>/99<H>    Trend AST/ALT/ALK Phos/Bili  12-22-20 @ 05:20   36/89<H>/98/0.2  12-20-20 @ 04:55   112<H>/144<H>/95/0.5  12-19-20 @ 00:10   167<H>/103<H>/119/0.9      Albumin Trend  12-22-20 @ 05:20   -   2.9<L>  12-20-20 @ 04:55   -   3.2<L>  12-19-20 @ 00:10   -   2.9<L>      PTT - PT - INR Trend  12-22-20 @ 05:20   -   27.3<L> - 11.0 - 0.90  12-19-20 @ 00:10   -   27.1<L> - 11.2 - 0.92    Glucose Trend  12-22-20 @ 05:20   -  -- 151<H> --  12-21-20 @ 06:30   -  -- 161<H> --  12-20-20 @ 18:26   -  -- -- 138<H>  12-20-20 @ 11:48   -  -- -- 146<H>  12-20-20 @ 08:47   -  -- -- 146<H>  12-20-20 @ 04:55   -  -- 130<H> --        LABS:                        7.7    9.37  )-----------( 406      ( 22 Dec 2020 05:20 )             24.4     12-22    141  |  98  |  50<H>  ----------------------------<  151<H>  3.5   |  43<H>  |  0.77    Ca    9.4      22 Dec 2020 05:20  Phos  3.7     12-22  Mg     2.1     12-22    TPro  6.3  /  Alb  2.9<L>  /  TBili  0.2  /  DBili  x   /  AST  36  /  ALT  89<H>  /  AlkPhos  98  12-22      PT/INR - ( 22 Dec 2020 05:20 )   PT: 11.0 sec;   INR: 0.90 ratio         PTT - ( 22 Dec 2020 05:20 )  PTT:27.3 sec    Procalcitonin, Serum: 1.16 ng/mL (12-21-20 @ 06:30)          CULTURES: (if applicable)    Culture - Sputum (collected 12-19-20 @ 11:30)  Source: .Sputum Sputum  Gram Stain (12-19-20 @ 22:33):    Few polymorphonuclear leukocytes per low power field    Rare Squamous epithelial cells per low power field    Few Gram positive cocci in pairs per oil power field  Final Report (12-21-20 @ 16:53):    Normal Respiratory Aura present    Culture - Urine (collected 12-19-20 @ 01:36)  Source: .Urine Clean Catch (Midstream)  Final Report (12-20-20 @ 08:52):    No growth    Culture - Blood (collected 12-19-20 @ 00:15)  Source: .Blood Blood-Peripheral  Preliminary Report (12-20-20 @ 10:01):    No growth to date.    Culture - Blood (collected 12-19-20 @ 00:10)  Source: .Blood Blood-Peripheral  Preliminary Report (12-20-20 @ 10:01):    No growth to date.      Rapid RVP Result: NotDetec (12-19-20 @ 08:00)          VITALS:  T(C): 36.6 (12-22-20 @ 08:00), Max: 36.8 (12-22-20 @ 05:00)  T(F): 97.9 (12-22-20 @ 08:00), Max: 98.3 (12-22-20 @ 05:00)  HR: 89 (12-22-20 @ 11:00) (72 - 120)  BP: 98/57 (12-22-20 @ 11:00) (98/57 - 204/87)  BP(mean): 67 (12-22-20 @ 11:00) (67 - 131)  ABP: --  ABP(mean): --  RR: 20 (12-22-20 @ 11:00) (18 - 28)  SpO2: 99% (12-22-20 @ 11:00) (95% - 100%)  CVP(mm Hg): --  CVP(cm H2O): --    Ins and Outs     12-21-20 @ 07:01  -  12-22-20 @ 07:00  --------------------------------------------------------  IN: 1787.3 mL / OUT: 1300 mL / NET: 487.3 mL    12-22-20 @ 07:01  -  12-22-20 @ 11:37  --------------------------------------------------------  IN: 99 mL / OUT: 0 mL / NET: 99 mL            Device: Avea, Mode: AC/ CMV (Assist Control/ Continuous Mandatory Ventilation), RR (machine): 20, RR (patient): 22, TV (machine): 400, TV (patient): 400, FiO2: 30, PEEP: 5, ITime: 1, MAP: 11, PIP: 25    I&O's Detail    21 Dec 2020 07:01  -  22 Dec 2020 07:00  --------------------------------------------------------  IN:    Dexmedetomidine: 197.3 mL    Enteral Tube Flush: 150 mL    Glucerna 1.5: 1190 mL    IV PiggyBack: 250 mL  Total IN: 1787.3 mL    OUT:    Incontinent per Collection Bag (mL): 1150 mL    Nasogastric/Oral tube (mL): 150 mL  Total OUT: 1300 mL    Total NET: 487.3 mL      22 Dec 2020 07:01  -  22 Dec 2020 11:37  --------------------------------------------------------  IN:    Dexmedetomidine: 10.5 mL    Dexmedetomidine: 13.5 mL    Glucerna 1.5: 75 mL  Total IN: 99 mL    OUT:  Total OUT: 0 mL    Total NET: 99 mL   Follow-up Critical Care Progress Note  Chief Complaint : Other abnormality of breathing      patient did cpap again today  noted hypercarbia  and unable to be weaned off ventilator.       Allergies :No Known Allergies      PAST MEDICAL & SURGICAL HISTORY:  HTN (hypertension)    Anxiety    Advanced COPD  on home O2    Status post closed fracture of right femur  surgical repair done at [place]        Medications:  MEDICATIONS  (STANDING):  ALBUTerol    90 MICROgram(s) HFA Inhaler 2 Puff(s) Inhalation every 6 hours  azithromycin  IVPB      azithromycin  IVPB 500 milliGRAM(s) IV Intermittent every 24 hours  cefTRIAXone   IVPB 1000 milliGRAM(s) IV Intermittent every 24 hours  cefTRIAXone   IVPB      chlorhexidine 0.12% Liquid 15 milliLiter(s) Oral Mucosa every 12 hours  dexMEDEtomidine Infusion 0.7 MICROgram(s)/kG/Hr (10.5 mL/Hr) IV Continuous <Continuous>  enoxaparin Injectable 40 milliGRAM(s) SubCutaneous daily  ipratropium 17 MICROgram(s) HFA Inhaler 2 Puff(s) Inhalation every 6 hours  methylPREDNISolone sodium succinate Injectable 40 milliGRAM(s) IV Push every 6 hours  pantoprazole   Suspension 40 milliGRAM(s) Enteral Tube daily    MEDICATIONS  (PRN):    COVID  12-19-20 @ 08:00  COVID -   NotDetec  12-19-20 @ 00:15  COVID -   NotDetec    Covid 19 IgG ab Index/Interpretation 12-19-20 @ 07:30 <0.10 / Negative      Procalcitonin Trend  12-21-20 @ 06:30   -   1.16<H>  12-19-20 @ 07:30   -   1.20<H>    WBC Trend  12-22-20 @ 05:20   -  9.37  12-21-20 @ 06:30   -  8.86  12-20-20 @ 13:25   -  10.84<H>  12-20-20 @ 04:55   -  7.87    H/H Trend  12-22-20 @ 05:20   -  7.7<L> / 24.4<L>  12-21-20 @ 06:30   -  7.6<L> / 24.1<L>  12-20-20 @ 13:25   -  8.3<L> / 27.0<L>  12-20-20 @ 04:55   -  7.7<L> / 25.1<L>    Platelet Trend  12-22-20 @ 05:20   -  406<H>  12-21-20 @ 06:30   -  377  12-20-20 @ 13:25   -  392  12-20-20 @ 04:55   -  330    Trend Sodium  12-22-20 @ 05:20   -  141  12-21-20 @ 06:30   -  138  12-20-20 @ 04:55   -  137    Trend Potassium  12-22-20 @ 05:20   -  3.5  12-21-20 @ 06:30   -  3.9  12-20-20 @ 04:55   -  4.4    Trend Bun/Cr  12-22-20 @ 05:20  BUN/CR -  50<H> / 0.77  12-21-20 @ 06:30  BUN/CR -  42<H> / 0.68  12-20-20 @ 04:55  BUN/CR -  35<H> / 0.81    Lactic Acid Trend  12-21-20 @ 06:30   -   1.0  12-19-20 @ 07:30   -   2.2<H>  12-19-20 @ 03:40   -   3.2<H>  12-19-20 @ 00:10   -   5.0<HH>    ABG Trend  12-22-20 @ 10:20   - 7.26<L>/99<HH>/123<H>/97<H> on CPAP   12-21-20 @ 05:05   - 7.43/64<H>/83/96  12-20-20 @ 14:15   - 7.32<L>/85<HH>/89/96 on CPAP   12-19-20 @ 08:27   - 7.46<H>/60<H>/66<L>/94  12-19-20 @ 00:45   - 7.10<LL>/>99<HH>/243<H>/99<H>    Trend AST/ALT/ALK Phos/Bili  12-22-20 @ 05:20   36/89<H>/98/0.2  12-20-20 @ 04:55   112<H>/144<H>/95/0.5  12-19-20 @ 00:10   167<H>/103<H>/119/0.9      Albumin Trend  12-22-20 @ 05:20   -   2.9<L>  12-20-20 @ 04:55   -   3.2<L>  12-19-20 @ 00:10   -   2.9<L>      PTT - PT - INR Trend  12-22-20 @ 05:20   -   27.3<L> - 11.0 - 0.90  12-19-20 @ 00:10   -   27.1<L> - 11.2 - 0.92    Glucose Trend  12-22-20 @ 05:20   -  -- 151<H> --  12-21-20 @ 06:30   -  -- 161<H> --  12-20-20 @ 18:26   -  -- -- 138<H>  12-20-20 @ 11:48   -  -- -- 146<H>  12-20-20 @ 08:47   -  -- -- 146<H>  12-20-20 @ 04:55   -  -- 130<H> --        LABS:                        7.7    9.37  )-----------( 406      ( 22 Dec 2020 05:20 )             24.4     12-22    141  |  98  |  50<H>  ----------------------------<  151<H>  3.5   |  43<H>  |  0.77    Ca    9.4      22 Dec 2020 05:20  Phos  3.7     12-22  Mg     2.1     12-22    TPro  6.3  /  Alb  2.9<L>  /  TBili  0.2  /  DBili  x   /  AST  36  /  ALT  89<H>  /  AlkPhos  98  12-22      PT/INR - ( 22 Dec 2020 05:20 )   PT: 11.0 sec;   INR: 0.90 ratio         PTT - ( 22 Dec 2020 05:20 )  PTT:27.3 sec    Procalcitonin, Serum: 1.16 ng/mL (12-21-20 @ 06:30)          CULTURES: (if applicable)    Culture - Sputum (collected 12-19-20 @ 11:30)  Source: .Sputum Sputum  Gram Stain (12-19-20 @ 22:33):    Few polymorphonuclear leukocytes per low power field    Rare Squamous epithelial cells per low power field    Few Gram positive cocci in pairs per oil power field  Final Report (12-21-20 @ 16:53):    Normal Respiratory Aura present    Culture - Urine (collected 12-19-20 @ 01:36)  Source: .Urine Clean Catch (Midstream)  Final Report (12-20-20 @ 08:52):    No growth    Culture - Blood (collected 12-19-20 @ 00:15)  Source: .Blood Blood-Peripheral  Preliminary Report (12-20-20 @ 10:01):    No growth to date.    Culture - Blood (collected 12-19-20 @ 00:10)  Source: .Blood Blood-Peripheral  Preliminary Report (12-20-20 @ 10:01):    No growth to date.      Rapid RVP Result: NotDetec (12-19-20 @ 08:00)          VITALS:  T(C): 36.6 (12-22-20 @ 08:00), Max: 36.8 (12-22-20 @ 05:00)  T(F): 97.9 (12-22-20 @ 08:00), Max: 98.3 (12-22-20 @ 05:00)  HR: 89 (12-22-20 @ 11:00) (72 - 120)  BP: 98/57 (12-22-20 @ 11:00) (98/57 - 204/87)  BP(mean): 67 (12-22-20 @ 11:00) (67 - 131)  ABP: --  ABP(mean): --  RR: 20 (12-22-20 @ 11:00) (18 - 28)  SpO2: 99% (12-22-20 @ 11:00) (95% - 100%)  CVP(mm Hg): --  CVP(cm H2O): --    Ins and Outs     12-21-20 @ 07:01  -  12-22-20 @ 07:00  --------------------------------------------------------  IN: 1787.3 mL / OUT: 1300 mL / NET: 487.3 mL    12-22-20 @ 07:01  -  12-22-20 @ 11:37  --------------------------------------------------------  IN: 99 mL / OUT: 0 mL / NET: 99 mL            Device: Avea, Mode: AC/ CMV (Assist Control/ Continuous Mandatory Ventilation), RR (machine): 20, RR (patient): 22, TV (machine): 400, TV (patient): 400, FiO2: 30, PEEP: 5, ITime: 1, MAP: 11, PIP: 25    I&O's Detail    21 Dec 2020 07:01  -  22 Dec 2020 07:00  --------------------------------------------------------  IN:    Dexmedetomidine: 197.3 mL    Enteral Tube Flush: 150 mL    Glucerna 1.5: 1190 mL    IV PiggyBack: 250 mL  Total IN: 1787.3 mL    OUT:    Incontinent per Collection Bag (mL): 1150 mL    Nasogastric/Oral tube (mL): 150 mL  Total OUT: 1300 mL    Total NET: 487.3 mL      22 Dec 2020 07:01  -  22 Dec 2020 11:37  --------------------------------------------------------  IN:    Dexmedetomidine: 10.5 mL    Dexmedetomidine: 13.5 mL    Glucerna 1.5: 75 mL  Total IN: 99 mL    OUT:  Total OUT: 0 mL    Total NET: 99 mL

## 2020-12-23 LAB
ANION GAP SERPL CALC-SCNC: 0 MMOL/L — LOW (ref 5–17)
BUN SERPL-MCNC: 52 MG/DL — HIGH (ref 7–23)
CALCIUM SERPL-MCNC: 9.1 MG/DL — SIGNIFICANT CHANGE UP (ref 8.4–10.5)
CHLORIDE SERPL-SCNC: 99 MMOL/L — SIGNIFICANT CHANGE UP (ref 96–108)
CO2 SERPL-SCNC: 43 MMOL/L — HIGH (ref 22–31)
CREAT SERPL-MCNC: 0.67 MG/DL — SIGNIFICANT CHANGE UP (ref 0.5–1.3)
GAS PNL BLDA: SIGNIFICANT CHANGE UP
GLUCOSE SERPL-MCNC: 145 MG/DL — HIGH (ref 70–99)
HCT VFR BLD CALC: 27.2 % — LOW (ref 34.5–45)
HGB BLD-MCNC: 8.2 G/DL — LOW (ref 11.5–15.5)
HOROWITZ INDEX BLDA+IHG-RTO: SIGNIFICANT CHANGE UP
MAGNESIUM SERPL-MCNC: 2.2 MG/DL — SIGNIFICANT CHANGE UP (ref 1.6–2.6)
MCHC RBC-ENTMCNC: 29.6 PG — SIGNIFICANT CHANGE UP (ref 27–34)
MCHC RBC-ENTMCNC: 30.1 GM/DL — LOW (ref 32–36)
MCV RBC AUTO: 98.2 FL — SIGNIFICANT CHANGE UP (ref 80–100)
NRBC # BLD: 0 /100 WBCS — SIGNIFICANT CHANGE UP (ref 0–0)
NT-PROBNP SERPL-SCNC: 589 PG/ML — HIGH (ref 0–300)
PCO2 BLDA: >103 MMHG — SIGNIFICANT CHANGE UP (ref 32–46)
PH BLDA: 7.21 — LOW (ref 7.35–7.45)
PHOSPHATE SERPL-MCNC: 4.2 MG/DL — SIGNIFICANT CHANGE UP (ref 2.5–4.5)
PLATELET # BLD AUTO: 428 K/UL — HIGH (ref 150–400)
PO2 BLDA: 104 MMHG — SIGNIFICANT CHANGE UP (ref 74–108)
POTASSIUM SERPL-MCNC: 4 MMOL/L — SIGNIFICANT CHANGE UP (ref 3.5–5.3)
POTASSIUM SERPL-SCNC: 4 MMOL/L — SIGNIFICANT CHANGE UP (ref 3.5–5.3)
PROCALCITONIN SERPL-MCNC: 0.27 NG/ML — HIGH
RBC # BLD: 2.77 M/UL — LOW (ref 3.8–5.2)
RBC # FLD: 14.1 % — SIGNIFICANT CHANGE UP (ref 10.3–14.5)
SAO2 % BLDA: 95 % — SIGNIFICANT CHANGE UP (ref 92–96)
SODIUM SERPL-SCNC: 142 MMOL/L — SIGNIFICANT CHANGE UP (ref 135–145)
WBC # BLD: 13.11 K/UL — HIGH (ref 3.8–10.5)
WBC # FLD AUTO: 13.11 K/UL — HIGH (ref 3.8–10.5)

## 2020-12-23 PROCEDURE — 71045 X-RAY EXAM CHEST 1 VIEW: CPT | Mod: 26

## 2020-12-23 RX ORDER — IPRATROPIUM/ALBUTEROL SULFATE 18-103MCG
3 AEROSOL WITH ADAPTER (GRAM) INHALATION EVERY 6 HOURS
Refills: 0 | Status: DISCONTINUED | OUTPATIENT
Start: 2020-12-23 | End: 2020-12-27

## 2020-12-23 RX ORDER — FUROSEMIDE 40 MG
20 TABLET ORAL ONCE
Refills: 0 | Status: COMPLETED | OUTPATIENT
Start: 2020-12-23 | End: 2020-12-23

## 2020-12-23 RX ORDER — ISOSORBIDE DINITRATE 5 MG/1
10 TABLET ORAL THREE TIMES A DAY
Refills: 0 | Status: DISCONTINUED | OUTPATIENT
Start: 2020-12-23 | End: 2020-12-28

## 2020-12-23 RX ORDER — BUDESONIDE, MICRONIZED 100 %
0.5 POWDER (GRAM) MISCELLANEOUS EVERY 12 HOURS
Refills: 0 | Status: DISCONTINUED | OUTPATIENT
Start: 2020-12-23 | End: 2020-12-28

## 2020-12-23 RX ADMIN — ISOSORBIDE DINITRATE 10 MILLIGRAM(S): 5 TABLET ORAL at 21:12

## 2020-12-23 RX ADMIN — Medication 2 PUFF(S): at 09:08

## 2020-12-23 RX ADMIN — CHLORHEXIDINE GLUCONATE 15 MILLILITER(S): 213 SOLUTION TOPICAL at 05:26

## 2020-12-23 RX ADMIN — Medication 2 PUFF(S): at 06:00

## 2020-12-23 RX ADMIN — CHLORHEXIDINE GLUCONATE 15 MILLILITER(S): 213 SOLUTION TOPICAL at 17:49

## 2020-12-23 RX ADMIN — PANTOPRAZOLE SODIUM 40 MILLIGRAM(S): 20 TABLET, DELAYED RELEASE ORAL at 12:04

## 2020-12-23 RX ADMIN — CEFTRIAXONE 100 MILLIGRAM(S): 500 INJECTION, POWDER, FOR SOLUTION INTRAMUSCULAR; INTRAVENOUS at 12:00

## 2020-12-23 RX ADMIN — Medication 0.5 MILLIGRAM(S): at 17:53

## 2020-12-23 RX ADMIN — ALBUTEROL 2 PUFF(S): 90 AEROSOL, METERED ORAL at 05:59

## 2020-12-23 RX ADMIN — AZITHROMYCIN 255 MILLIGRAM(S): 500 TABLET, FILM COATED ORAL at 01:46

## 2020-12-23 RX ADMIN — Medication 40 MILLIGRAM(S): at 05:26

## 2020-12-23 RX ADMIN — ISOSORBIDE DINITRATE 10 MILLIGRAM(S): 5 TABLET ORAL at 15:29

## 2020-12-23 RX ADMIN — DEXMEDETOMIDINE HYDROCHLORIDE IN 0.9% SODIUM CHLORIDE 10.5 MICROGRAM(S)/KG/HR: 4 INJECTION INTRAVENOUS at 20:15

## 2020-12-23 RX ADMIN — Medication 3 MILLILITER(S): at 15:24

## 2020-12-23 RX ADMIN — ALBUTEROL 2 PUFF(S): 90 AEROSOL, METERED ORAL at 09:09

## 2020-12-23 RX ADMIN — Medication 3 MILLILITER(S): at 21:15

## 2020-12-23 RX ADMIN — ENOXAPARIN SODIUM 40 MILLIGRAM(S): 100 INJECTION SUBCUTANEOUS at 12:00

## 2020-12-23 RX ADMIN — Medication 40 MILLIGRAM(S): at 23:33

## 2020-12-23 RX ADMIN — Medication 40 MILLIGRAM(S): at 12:00

## 2020-12-23 RX ADMIN — DEXMEDETOMIDINE HYDROCHLORIDE IN 0.9% SODIUM CHLORIDE 10.5 MICROGRAM(S)/KG/HR: 4 INJECTION INTRAVENOUS at 03:54

## 2020-12-23 RX ADMIN — BUDESONIDE AND FORMOTEROL FUMARATE DIHYDRATE 2 PUFF(S): 160; 4.5 AEROSOL RESPIRATORY (INHALATION) at 09:09

## 2020-12-23 RX ADMIN — Medication 20 MILLIGRAM(S): at 12:04

## 2020-12-23 RX ADMIN — Medication 0.5 MILLIGRAM(S): at 05:26

## 2020-12-23 RX ADMIN — Medication 40 MILLIGRAM(S): at 17:49

## 2020-12-23 NOTE — PROGRESS NOTE ADULT - ASSESSMENT
75 year old female with h/o Severe COPD on home o2, ex smoker, HTN, anxiety who had recent right femur fx at [place] she was discharged to [facility] on 12/18 where shortly after developed AMS and brought to Hospital, Here found to have acute hypercarbic respiratory failure (PCO2 >99) and was unresponsive so she was intubated.    Events last 24 hours:   -has remained un-able to extubate. Failing CPAP trails daily, becomes very anxious, tachycardic, with very high RSBI.      PLAN:    -Patient currently on Full vent support  -titrate settings to maintain SaO2 >90%, or pH >7.25  -consider low tidal volume ventilation strategy w/ goal Tv 4-6 cc/kg of ideal body weight  -plateu pressure goal <30  -Peridex oral care  -aggressive pulmonary toilet  -daily sedation vacation with spontaneous breathing trial   -patient may benefit from attempt at extubating directly to BiPAP, with discussion of patient's wishes if re-intubation becomes necessary otherwise if continues to fail SBTs will need to discuss trachesotomy   -continue rocephin/zithromax for AECOPD, nebs and steroids

## 2020-12-23 NOTE — PROGRESS NOTE ADULT - SUBJECTIVE AND OBJECTIVE BOX
Follow-up Critical Care Progress Note  Chief Complaint : Other abnormality of breathing      patient seen and examined  Failed cpap again today for high pco2  states difficulty breathing while on cpap        Allergies :No Known Allergies      PAST MEDICAL & SURGICAL HISTORY:  HTN (hypertension)    Anxiety    Advanced COPD  on home O2    Status post closed fracture of right femur  surgical repair done at [place]        Medications:  MEDICATIONS  (STANDING):  ALBUTerol    90 MICROgram(s) HFA Inhaler 2 Puff(s) Inhalation every 6 hours  ALPRAZolam 0.5 milliGRAM(s) Oral every 12 hours  azithromycin  IVPB 500 milliGRAM(s) IV Intermittent every 24 hours  azithromycin  IVPB      budesonide 160 MICROgram(s)/formoterol 4.5 MICROgram(s) Inhaler 2 Puff(s) Inhalation two times a day  cefTRIAXone   IVPB 1000 milliGRAM(s) IV Intermittent every 24 hours  cefTRIAXone   IVPB      chlorhexidine 0.12% Liquid 15 milliLiter(s) Oral Mucosa every 12 hours  dexMEDEtomidine Infusion 0.7 MICROgram(s)/kG/Hr (10.5 mL/Hr) IV Continuous <Continuous>  enoxaparin Injectable 40 milliGRAM(s) SubCutaneous daily  ipratropium 17 MICROgram(s) HFA Inhaler 2 Puff(s) Inhalation every 6 hours  methylPREDNISolone sodium succinate Injectable 40 milliGRAM(s) IV Push every 6 hours  pantoprazole   Suspension 40 milliGRAM(s) Enteral Tube daily    MEDICATIONS  (PRN):    COVID  12-19-20 @ 08:00  COVID -   NotDetec  12-19-20 @ 00:15  COVID -   NotDetec    Trend BNP  12-23-20 @ 05:45   -  589<H>    Procalcitonin Trend  12-23-20 @ 05:45   -   0.27<H>  12-21-20 @ 06:30   -   1.16<H>  12-19-20 @ 07:30   -   1.20<H>    WBC Trend  12-23-20 @ 05:45   -  13.11<H>  12-22-20 @ 05:20   -  9.37  12-21-20 @ 06:30   -  8.86  12-20-20 @ 13:25   -  10.84<H>    H/H Trend  12-23-20 @ 05:45   -  8.2<L> / 27.2<L>  12-22-20 @ 05:20   -  7.7<L> / 24.4<L>  12-21-20 @ 06:30   -  7.6<L> / 24.1<L>  12-20-20 @ 13:25   -  8.3<L> / 27.0<L>    Platelet Trend  12-23-20 @ 05:45   -  428<H>  12-22-20 @ 05:20   -  406<H>  12-21-20 @ 06:30   -  377  12-20-20 @ 13:25   -  392    Trend Sodium  12-23-20 @ 05:45   -  142  12-22-20 @ 05:20   -  141  12-21-20 @ 06:30   -  138    Lactic Acid Trend  12-21-20 @ 06:30   -   1.0  12-19-20 @ 07:30   -   2.2<H>  12-19-20 @ 03:40   -   3.2<H>  12-19-20 @ 00:10   -   5.0<HH>    ABG Trend  12-23-20 @ 09:10   - 7.21<L>/>103/104/95               CPAP  12-22-20 @ 10:20   - 7.26<L>/99<HH>/123<H>/97<H>CPAP  12-21-20 @ 05:05   - 7.43/64<H>/83/96  12-20-20 @ 14:15   - 7.32<L>/85<HH>/89/96  12-19-20 @ 08:27   - 7.46<H>/60<H>/66<L>/94  12-19-20 @ 00:45   - 7.10<LL>/>99<HH>/243<H>/99<H>      LABS:                        8.2    13.11 )-----------( 428      ( 23 Dec 2020 05:45 )             27.2     12-23    142  |  99  |  52<H>  ----------------------------<  145<H>  4.0   |  43<H>  |  0.67    Ca    9.1      23 Dec 2020 05:45  Phos  4.2     12-23  Mg     2.2     12-23    TPro  6.3  /  Alb  2.9<L>  /  TBili  0.2  /  DBili  x   /  AST  36  /  ALT  89<H>  /  AlkPhos  98  12-22        Procalcitonin, Serum: 0.27 ng/mL (12-23-20 @ 05:45)  Procalcitonin, Serum: 1.16 ng/mL (12-21-20 @ 06:30)    Serum Pro-Brain Natriuretic Peptide: 589 pg/mL (12-23-20 @ 05:45)        CULTURES: (if applicable)    Culture - Sputum (collected 12-19-20 @ 11:30)  Source: .Sputum Sputum  Gram Stain (12-19-20 @ 22:33):    Few polymorphonuclear leukocytes per low power field    Rare Squamous epithelial cells per low power field    Few Gram positive cocci in pairs per oil power field  Final Report (12-21-20 @ 16:53):    Normal Respiratory Aura present    Culture - Urine (collected 12-19-20 @ 01:36)  Source: .Urine Clean Catch (Midstream)  Final Report (12-20-20 @ 08:52):    No growth    Culture - Blood (collected 12-19-20 @ 00:15)  Source: .Blood Blood-Peripheral  Preliminary Report (12-20-20 @ 10:01):    No growth to date.    Culture - Blood (collected 12-19-20 @ 00:10)  Source: .Blood Blood-Peripheral  Preliminary Report (12-20-20 @ 10:01):    No growth to date.      Rapid RVP Result: NotDetec (12-19-20 @ 08:00)      ABG - ( 23 Dec 2020 09:10 )  pH, Arterial: 7.21  pH, Blood: x     /  pCO2: >103  /  pO2: 104   / HCO3: x     / Base Excess: x     /  SaO2: 95          CXR no gross infiltrate      VITALS:  T(C): 36.1 (12-23-20 @ 08:00), Max: 37 (12-23-20 @ 05:00)  T(F): 97 (12-23-20 @ 08:00), Max: 98.6 (12-23-20 @ 05:00)  HR: 91 (12-23-20 @ 09:42) (67 - 109)  BP: 185/79 (12-23-20 @ 09:00) (95/57 - 185/79)  BP(mean): 102 (12-23-20 @ 09:00) (66 - 150)  ABP: --  ABP(mean): --  RR: 30 (12-23-20 @ 09:00) (13 - 30)  SpO2: 100% (12-23-20 @ 09:42) (88% - 100%)  CVP(mm Hg): --  CVP(cm H2O): --    Ins and Outs     12-22-20 @ 07:01  -  12-23-20 @ 07:00  --------------------------------------------------------  IN: 1992.3 mL / OUT: 1850 mL / NET: 142.3 mL            Device: Avea, Mode: AC/ CMV (Assist Control/ Continuous Mandatory Ventilation), RR (machine): 20, RR (patient): 20, TV (machine): 400, TV (patient): 380, FiO2: 50, PEEP: 5, MAP: 12, PIP: 32    I&O's Detail    22 Dec 2020 07:01  -  23 Dec 2020 07:00  --------------------------------------------------------  IN:    Dexmedetomidine: 10.5 mL    Dexmedetomidine: 256.8 mL    Free Water: 450 mL    Glucerna 1.5: 1025 mL    IV PiggyBack: 250 mL  Total IN: 1992.3 mL    OUT:    Voided (mL): 1850 mL  Total OUT: 1850 mL    Total NET: 142.3 mL

## 2020-12-23 NOTE — PROGRESS NOTE ADULT - SUBJECTIVE AND OBJECTIVE BOX
Patient is a 75y old  Female who presents with a chief complaint of Acute hypercapnic respiratory failure (23 Dec 2020 20:21)      BRIEF HOSPITAL COURSE:     75 year old female with h/o Severe COPD on home o2, ex smoker, HTN, anxiety who had recent right femur fx at [place] she was discharged to [facility] on 12/18 where shortly after developed AMS and brought to Hospital, Here found to have acute hypercarbic respiratory failure (PCO2 >99) and was unresponsive so she was intubated.    Events last 24 hours:   -has remained un-able to extubate. Failing CPAP trails daily, becomes very anxious, tachycardic, with very high RSBI.          PAST MEDICAL & SURGICAL HISTORY:  HTN (hypertension)    Anxiety    Advanced COPD  on home O2    Status post closed fracture of right femur  surgical repair done at [place]        Review of Systems:    -unable to obtain as patient is intubated       Medications:  azithromycin  IVPB      azithromycin  IVPB 500 milliGRAM(s) IV Intermittent every 24 hours  cefTRIAXone   IVPB      cefTRIAXone   IVPB 1000 milliGRAM(s) IV Intermittent every 24 hours    isosorbide   dinitrate Tablet (ISORDIL) 10 milliGRAM(s) Oral three times a day    albuterol/ipratropium for Nebulization 3 milliLiter(s) Nebulizer every 6 hours  buDESOnide    Inhalation Suspension 0.5 milliGRAM(s) Inhalation every 12 hours    ALPRAZolam 0.5 milliGRAM(s) Oral every 12 hours  dexMEDEtomidine Infusion 0.7 MICROgram(s)/kG/Hr IV Continuous <Continuous>      enoxaparin Injectable 40 milliGRAM(s) SubCutaneous daily    pantoprazole   Suspension 40 milliGRAM(s) Enteral Tube daily      methylPREDNISolone sodium succinate Injectable 40 milliGRAM(s) IV Push every 6 hours        chlorhexidine 0.12% Liquid 15 milliLiter(s) Oral Mucosa every 12 hours        Mode: AC/ CMV (Assist Control/ Continuous Mandatory Ventilation)  RR (machine): 20  TV (machine): 400  FiO2: 50  PEEP: 5  MAP: 10  PIP: 30      ICU Vital Signs Last 24 Hrs  T(C): 36.4 (23 Dec 2020 20:00), Max: 37 (23 Dec 2020 05:00)  T(F): 97.6 (23 Dec 2020 20:00), Max: 98.6 (23 Dec 2020 05:00)  HR: 67 (23 Dec 2020 20:00) (67 - 109)  BP: 147/74 (23 Dec 2020 20:00) (90/54 - 185/79)  BP(mean): 92 (23 Dec 2020 20:00) (61 - 103)  RR: 20 (23 Dec 2020 20:00) (13 - 30)  SpO2: 100% (23 Dec 2020 20:00) (99% - 100%)      ABG - ( 23 Dec 2020 09:10 )  pH, Arterial: 7.21  pH, Blood: x     /  pCO2: >103  /  pO2: 104   / HCO3: x     / Base Excess: x     /  SaO2: 95                  I&O's Detail    22 Dec 2020 07:01  -  23 Dec 2020 07:00  --------------------------------------------------------  IN:    Dexmedetomidine: 10.5 mL    Dexmedetomidine: 256.8 mL    Free Water: 450 mL    Glucerna 1.5: 1025 mL    IV PiggyBack: 250 mL  Total IN: 1992.3 mL    OUT:    Voided (mL): 1850 mL  Total OUT: 1850 mL    Total NET: 142.3 mL      23 Dec 2020 07:01  -  23 Dec 2020 20:46  --------------------------------------------------------  IN:    Enteral Tube Flush: 30 mL    Free Water: 450 mL    Glucerna 1.5: 500 mL    IV PiggyBack: 50 mL  Total IN: 1030 mL    OUT:  Total OUT: 0 mL    Total NET: 1030 mL            LABS:                        8.2    13.11 )-----------( 428      ( 23 Dec 2020 05:45 )             27.2     12-23    142  |  99  |  52<H>  ----------------------------<  145<H>  4.0   |  43<H>  |  0.67    Ca    9.1      23 Dec 2020 05:45  Phos  4.2     12-23  Mg     2.2     12-23    TPro  6.3  /  Alb  2.9<L>  /  TBili  0.2  /  DBili  x   /  AST  36  /  ALT  89<H>  /  AlkPhos  98  12-22          CAPILLARY BLOOD GLUCOSE        PT/INR - ( 22 Dec 2020 05:20 )   PT: 11.0 sec;   INR: 0.90 ratio         PTT - ( 22 Dec 2020 05:20 )  PTT:27.3 sec    CULTURES:  Culture Results:   Normal Respiratory Aura present (12-19-20 @ 11:30)  Rapid RVP Result: NotDetec (12-19-20 @ 08:00)  Culture Results:   No growth (12-19-20 @ 01:36)  Culture Results:   No growth to date. (12-19-20 @ 00:15)  Culture Results:   No growth to date. (12-19-20 @ 00:10)      Physical Examination:    General: No acute distress.  Alert, oriented, interactive, nonfocal. On vent, synchronous, currently no resp. distress     HEENT: Pupils equal, reactive to light.  Symmetric.    PULM: Clear to auscultation bilaterally, no significant sputum production    CVS: Regular rate and rhythm, no murmurs, rubs, or gallops    ABD: Soft, nondistended, nontender, normoactive bowel sounds, no masses    EXT: No edema, nontender    SKIN: Warm and well perfused, no rashes noted.

## 2020-12-23 NOTE — PROVIDER CONTACT NOTE (CRITICAL VALUE NOTIFICATION) - ACTION/TREATMENT ORDERED:
placed back on AC control on the ventilator
GETTING BETTER ,WAS 3.2 BEFORE SO NO ORDERS
NO ORDER ,JUST MONITOR PATIENT
Primary RN made aware
Fio2 was increased from 40 10 60%

## 2020-12-23 NOTE — PROGRESS NOTE ADULT - ASSESSMENT
Physical Examination:  GENERAL:               Alert, orally intubated  HEENT:                    Pupils equal, Symmetric. No JVD, dry MM  PULM:                     Bilateral air entry, clear to auscultation bilaterally, no significant sputum production, No Rales, No Rhonchi, midl Wheezing  CVS:                         S1, S2,  + Murmur  ABD:                        Soft, nondistended, nontender, normoactive bowel sounds,   EXT:                         No edema, nontender, No Cyanosis or Clubbing   Vascular:                Warm Extremities, Normal Capillary refill, Normal Distal Pulses  NEURO:                alert, responsive, follows commands  PSYC:                      Calm, +Insight.        Assessment  75 year old female with h/o Severe COPD on home o2, ex smoker, HTN, anxiety who had recent right femur fx at [place] she was discharged to [facility] on 12/18 where shortly after developed AMS and brought to Hospital, Here found to have acute hypercarbic respiratory failure and was unresponsive so she was intubated.    Problem List  Acute Hypercarbic respiratory failure due to acute on chronic COPD exacerbation, in patient with Severe COPD on home o2 (chronic hypoxic respiratory Insuficiency)    COVID/RVP Negative    Ex Smoker  Metabolic Encephelopathy due to above  Lactic Acidosis likely due to hypoxia    no source of infection found, normal UA, No PNA on CT  Recent Right femur fx   underlying Anxiety, HTN,       Plan  patinet continues to fail cpap with patient feeling sob, as well as on ABG.  unsure if will tolerate NIV    Will change MDI to Nebs - try duoneb/pulmicort instead of symbicort/albuterol/atrovent  Will give lasix x 1 (low dose) and start low dose nitrates  pt on empiric abx as failing cpap    on Precedex for comfort  daily cpap trial     tube feeding as tolerated        Family Updated: 	Juancho Olivier [phone]	                                 Date:  12/22 12/23 told nurse to call in afternoon - will reach out to  her then          Sedation & Analgesia:	as above  Diet/Nutrition:		tube feeding  GI PPx:			PPI    DVT Ppx:		Lovenox      Activity:	 Advance as tolerated	                 Head of Bed:                35-45  Glycemic Control:        N/a    Lines: PIV  CENTRAL LINE: 	[ ] YES [ ] NO	                    LOCATION:   	                       DATE INSERTED:   	                    REMOVE:  [ ] YES [ ] NO    A-LINE:  	                [ ] YES [ ] NO                      LOCATION:   	                       DATE INSERTED: 		            REMOVE:  [ ] YES [ ] NO    RICH: 		        [ ] YES [ x] NO  		                                       DATE INSERTED:		            REMOVE:  [ ] YES [ ] NO      Restraints may be deemed necessary to prevent removal of life-sustaining devices [ x ] YES   [    ]  NO    Disposition: ICU monitoring     Goals of Care: based on records full

## 2020-12-24 LAB
ALBUMIN SERPL ELPH-MCNC: 2.8 G/DL — LOW (ref 3.3–5)
ALP SERPL-CCNC: 105 U/L — SIGNIFICANT CHANGE UP (ref 40–120)
ALT FLD-CCNC: 62 U/L — HIGH (ref 10–45)
ANION GAP SERPL CALC-SCNC: 2 MMOL/L — LOW (ref 5–17)
AST SERPL-CCNC: 22 U/L — SIGNIFICANT CHANGE UP (ref 10–40)
BASOPHILS # BLD AUTO: 0.02 K/UL — SIGNIFICANT CHANGE UP (ref 0–0.2)
BASOPHILS NFR BLD AUTO: 0.2 % — SIGNIFICANT CHANGE UP (ref 0–2)
BILIRUB SERPL-MCNC: 0.2 MG/DL — SIGNIFICANT CHANGE UP (ref 0.2–1.2)
BUN SERPL-MCNC: 61 MG/DL — HIGH (ref 7–23)
CALCIUM SERPL-MCNC: 8.9 MG/DL — SIGNIFICANT CHANGE UP (ref 8.4–10.5)
CHLORIDE SERPL-SCNC: 96 MMOL/L — SIGNIFICANT CHANGE UP (ref 96–108)
CO2 BLDA-SCNC: 42 MMOL/L — HIGH (ref 22–30)
CO2 BLDA-SCNC: 44 MMOL/L — HIGH (ref 22–30)
CO2 SERPL-SCNC: 43 MMOL/L — HIGH (ref 22–31)
CREAT SERPL-MCNC: 0.7 MG/DL — SIGNIFICANT CHANGE UP (ref 0.5–1.3)
CRP SERPL-MCNC: 0.19 MG/DL — SIGNIFICANT CHANGE UP (ref 0–0.4)
CULTURE RESULTS: SIGNIFICANT CHANGE UP
CULTURE RESULTS: SIGNIFICANT CHANGE UP
EOSINOPHIL # BLD AUTO: 0.01 K/UL — SIGNIFICANT CHANGE UP (ref 0–0.5)
EOSINOPHIL NFR BLD AUTO: 0.1 % — SIGNIFICANT CHANGE UP (ref 0–6)
GAS PNL BLDA: SIGNIFICANT CHANGE UP
GAS PNL BLDA: SIGNIFICANT CHANGE UP
GLUCOSE SERPL-MCNC: 152 MG/DL — HIGH (ref 70–99)
HCT VFR BLD CALC: 25.5 % — LOW (ref 34.5–45)
HGB BLD-MCNC: 7.8 G/DL — LOW (ref 11.5–15.5)
HOROWITZ INDEX BLDA+IHG-RTO: SIGNIFICANT CHANGE UP
HOROWITZ INDEX BLDA+IHG-RTO: SIGNIFICANT CHANGE UP
IMM GRANULOCYTES NFR BLD AUTO: 0.6 % — SIGNIFICANT CHANGE UP (ref 0–1.5)
LYMPHOCYTES # BLD AUTO: 1.05 K/UL — SIGNIFICANT CHANGE UP (ref 1–3.3)
LYMPHOCYTES # BLD AUTO: 8.1 % — LOW (ref 13–44)
MAGNESIUM SERPL-MCNC: 2.3 MG/DL — SIGNIFICANT CHANGE UP (ref 1.6–2.6)
MCHC RBC-ENTMCNC: 29.9 PG — SIGNIFICANT CHANGE UP (ref 27–34)
MCHC RBC-ENTMCNC: 30.6 GM/DL — LOW (ref 32–36)
MCV RBC AUTO: 97.7 FL — SIGNIFICANT CHANGE UP (ref 80–100)
MONOCYTES # BLD AUTO: 1.06 K/UL — HIGH (ref 0–0.9)
MONOCYTES NFR BLD AUTO: 8.2 % — SIGNIFICANT CHANGE UP (ref 2–14)
NEUTROPHILS # BLD AUTO: 10.74 K/UL — HIGH (ref 1.8–7.4)
NEUTROPHILS NFR BLD AUTO: 82.8 % — HIGH (ref 43–77)
NRBC # BLD: 0 /100 WBCS — SIGNIFICANT CHANGE UP (ref 0–0)
PCO2 BLDA: 59 MMHG — HIGH (ref 32–46)
PCO2 BLDA: 63 MMHG — HIGH (ref 32–46)
PH BLDA: 7.44 — SIGNIFICANT CHANGE UP (ref 7.35–7.45)
PH BLDA: 7.44 — SIGNIFICANT CHANGE UP (ref 7.35–7.45)
PHOSPHATE SERPL-MCNC: 3.6 MG/DL — SIGNIFICANT CHANGE UP (ref 2.5–4.5)
PLATELET # BLD AUTO: 386 K/UL — SIGNIFICANT CHANGE UP (ref 150–400)
PO2 BLDA: 62 MMHG — LOW (ref 74–108)
PO2 BLDA: 74 MMHG — SIGNIFICANT CHANGE UP (ref 74–108)
POTASSIUM SERPL-MCNC: 4 MMOL/L — SIGNIFICANT CHANGE UP (ref 3.5–5.3)
POTASSIUM SERPL-SCNC: 4 MMOL/L — SIGNIFICANT CHANGE UP (ref 3.5–5.3)
PROT SERPL-MCNC: 5.9 G/DL — LOW (ref 6–8.3)
RBC # BLD: 2.61 M/UL — LOW (ref 3.8–5.2)
RBC # FLD: 14.1 % — SIGNIFICANT CHANGE UP (ref 10.3–14.5)
SAO2 % BLDA: 91 % — LOW (ref 92–96)
SAO2 % BLDA: 94 % — SIGNIFICANT CHANGE UP (ref 92–96)
SODIUM SERPL-SCNC: 141 MMOL/L — SIGNIFICANT CHANGE UP (ref 135–145)
SPECIMEN SOURCE: SIGNIFICANT CHANGE UP
SPECIMEN SOURCE: SIGNIFICANT CHANGE UP
TSH SERPL-MCNC: 0.27 UIU/ML — SIGNIFICANT CHANGE UP (ref 0.27–4.2)
WBC # BLD: 12.96 K/UL — HIGH (ref 3.8–10.5)
WBC # FLD AUTO: 12.96 K/UL — HIGH (ref 3.8–10.5)

## 2020-12-24 RX ORDER — METOPROLOL TARTRATE 50 MG
5 TABLET ORAL ONCE
Refills: 0 | Status: COMPLETED | OUTPATIENT
Start: 2020-12-24 | End: 2020-12-24

## 2020-12-24 RX ORDER — IPRATROPIUM/ALBUTEROL SULFATE 18-103MCG
3 AEROSOL WITH ADAPTER (GRAM) INHALATION ONCE
Refills: 0 | Status: COMPLETED | OUTPATIENT
Start: 2020-12-24 | End: 2020-12-24

## 2020-12-24 RX ORDER — ALPRAZOLAM 0.25 MG
0.25 TABLET ORAL ONCE
Refills: 0 | Status: DISCONTINUED | OUTPATIENT
Start: 2020-12-24 | End: 2020-12-24

## 2020-12-24 RX ADMIN — Medication 3 MILLILITER(S): at 09:04

## 2020-12-24 RX ADMIN — Medication 0.5 MILLIGRAM(S): at 05:00

## 2020-12-24 RX ADMIN — Medication 40 MILLIGRAM(S): at 11:46

## 2020-12-24 RX ADMIN — Medication 5 MILLIGRAM(S): at 18:24

## 2020-12-24 RX ADMIN — ENOXAPARIN SODIUM 40 MILLIGRAM(S): 100 INJECTION SUBCUTANEOUS at 11:46

## 2020-12-24 RX ADMIN — Medication 0.25 MILLIGRAM(S): at 18:24

## 2020-12-24 RX ADMIN — CHLORHEXIDINE GLUCONATE 15 MILLILITER(S): 213 SOLUTION TOPICAL at 05:00

## 2020-12-24 RX ADMIN — ISOSORBIDE DINITRATE 10 MILLIGRAM(S): 5 TABLET ORAL at 05:00

## 2020-12-24 RX ADMIN — Medication 0.5 MILLIGRAM(S): at 21:17

## 2020-12-24 RX ADMIN — AZITHROMYCIN 255 MILLIGRAM(S): 500 TABLET, FILM COATED ORAL at 00:33

## 2020-12-24 RX ADMIN — DEXMEDETOMIDINE HYDROCHLORIDE IN 0.9% SODIUM CHLORIDE 10.5 MICROGRAM(S)/KG/HR: 4 INJECTION INTRAVENOUS at 04:59

## 2020-12-24 RX ADMIN — CEFTRIAXONE 100 MILLIGRAM(S): 500 INJECTION, POWDER, FOR SOLUTION INTRAMUSCULAR; INTRAVENOUS at 11:46

## 2020-12-24 RX ADMIN — PANTOPRAZOLE SODIUM 40 MILLIGRAM(S): 20 TABLET, DELAYED RELEASE ORAL at 11:46

## 2020-12-24 RX ADMIN — Medication 3 MILLILITER(S): at 13:49

## 2020-12-24 RX ADMIN — Medication 40 MILLIGRAM(S): at 23:06

## 2020-12-24 RX ADMIN — Medication 3 MILLILITER(S): at 05:30

## 2020-12-24 RX ADMIN — Medication 0.5 MILLIGRAM(S): at 09:04

## 2020-12-24 RX ADMIN — Medication 3 MILLILITER(S): at 21:17

## 2020-12-24 RX ADMIN — Medication 40 MILLIGRAM(S): at 05:00

## 2020-12-24 RX ADMIN — Medication 40 MILLIGRAM(S): at 17:34

## 2020-12-24 NOTE — PROGRESS NOTE ADULT - SUBJECTIVE AND OBJECTIVE BOX
Follow-up Critical Care Progress Note  Chief Complaint : Other abnormality of breathing    today pt placed on cpap  having apnea episodes   currently Precedex held, xanax d/ethel  attemp to cpap again will be done       Allergies :No Known Allergies      PAST MEDICAL & SURGICAL HISTORY:  HTN (hypertension)    Anxiety    Advanced COPD  on home O2    Status post closed fracture of right femur  surgical repair done at [place]        Medications:  MEDICATIONS  (STANDING):  albuterol/ipratropium for Nebulization 3 milliLiter(s) Nebulizer every 6 hours  buDESOnide    Inhalation Suspension 0.5 milliGRAM(s) Inhalation every 12 hours  cefTRIAXone   IVPB      cefTRIAXone   IVPB 1000 milliGRAM(s) IV Intermittent every 24 hours  chlorhexidine 0.12% Liquid 15 milliLiter(s) Oral Mucosa every 12 hours  dexMEDEtomidine Infusion 0.7 MICROgram(s)/kG/Hr (10.5 mL/Hr) IV Continuous <Continuous>  enoxaparin Injectable 40 milliGRAM(s) SubCutaneous daily  isosorbide   dinitrate Tablet (ISORDIL) 10 milliGRAM(s) Oral three times a day  methylPREDNISolone sodium succinate Injectable 40 milliGRAM(s) IV Push every 6 hours  pantoprazole   Suspension 40 milliGRAM(s) Enteral Tube daily    MEDICATIONS  (PRN):    COVID  12-19-20 @ 08:00  COVID -   NotDetec  12-19-20 @ 00:15  COVID -   NotDetec      COVID Biomarkers          Covid 19 IgG ab Index/Interpretation 12-19-20 @ 07:30 <0.10 / Negative    Trend Cardiac Enzymes    Trend BNP  12-23-20 @ 05:45   -  589<H>    Procalcitonin Trend  12-23-20 @ 05:45   -   0.27<H>  12-21-20 @ 06:30   -   1.16<H>  12-19-20 @ 07:30   -   1.20<H>    WBC Trend  12-24-20 @ 06:30   -  12.96<H>  12-23-20 @ 05:45   -  13.11<H>  12-22-20 @ 05:20   -  9.37    H/H Trend  12-24-20 @ 06:30   -  7.8<L> / 25.5<L>  12-23-20 @ 05:45   -  8.2<L> / 27.2<L>  12-22-20 @ 05:20   -  7.7<L> / 24.4<L>    Platelet Trend  12-24-20 @ 06:30   -  386  12-23-20 @ 05:45   -  428<H>  12-22-20 @ 05:20   -  406<H>    Trend Sodium  12-24-20 @ 06:30   -  141  12-23-20 @ 05:45   -  142  12-22-20 @ 05:20   -  141    Trend Potassium  12-24-20 @ 06:30   -  4.0  12-23-20 @ 05:45   -  4.0  12-22-20 @ 05:20   -  3.5    Trend Bun/Cr  12-24-20 @ 06:30  BUN/CR -  61<H> / 0.70  12-23-20 @ 05:45  BUN/CR -  52<H> / 0.67  12-22-20 @ 05:20  BUN/CR -  50<H> / 0.77    Lactic Acid Trend  12-21-20 @ 06:30   -   1.0  12-19-20 @ 07:30   -   2.2<H>  12-19-20 @ 03:40   -   3.2<H>  12-19-20 @ 00:10   -   5.0<HH>    ABG Trend  12-23-20 @ 09:10   - 7.21<L>/>103/104/95  12-22-20 @ 10:20   - 7.26<L>/99<HH>/123<H>/97<H>  12-21-20 @ 05:05   - 7.43/64<H>/83/96  12-20-20 @ 14:15   - 7.32<L>/85<HH>/89/96  12-19-20 @ 08:27   - 7.46<H>/60<H>/66<L>/94  12-19-20 @ 00:45   - 7.10<LL>/>99<HH>/243<H>/99<H>    Trend AST/ALT/ALK Phos/Bili  12-24-20 @ 06:30   22/62<H>/105/0.2  12-22-20 @ 05:20   36/89<H>/98/0.2  12-20-20 @ 04:55   112<H>/144<H>/95/0.5  12-19-20 @ 00:10   167<H>/103<H>/119/0.9      Ammonia Trend      Amylase / Lipase Trend      Albumin Trend  12-24-20 @ 06:30   -   2.8<L>  12-22-20 @ 05:20   -   2.9<L>  12-20-20 @ 04:55   -   3.2<L>  12-19-20 @ 00:10   -   2.9<L>      PTT - PT - INR Trend  12-22-20 @ 05:20   -   27.3<L> - 11.0 - 0.90  12-19-20 @ 00:10   -   27.1<L> - 11.2 - 0.92    Glucose Trend  12-24-20 @ 06:30   -  -- 152<H> --  12-23-20 @ 05:45   -  -- 145<H> --  12-22-20 @ 05:20   -  -- 151<H> --        LABS:                        7.8    12.96 )-----------( 386      ( 24 Dec 2020 06:30 )             25.5     12-24    141  |  96  |  61<H>  ----------------------------<  152<H>  4.0   |  43<H>  |  0.70    Ca    8.9      24 Dec 2020 06:30  Phos  3.6     12-24  Mg     2.3     12-24    TPro  5.9<L>  /  Alb  2.8<L>  /  TBili  0.2  /  DBili  x   /  AST  22  /  ALT  62<H>  /  AlkPhos  105  12-24                Procalcitonin, Serum: 0.27 ng/mL (12-23-20 @ 05:45)    Serum Pro-Brain Natriuretic Peptide: 589 pg/mL (12-23-20 @ 05:45)        CULTURES: (if applicable)    Culture - Sputum (collected 12-19-20 @ 11:30)  Source: .Sputum Sputum  Gram Stain (12-19-20 @ 22:33):    Few polymorphonuclear leukocytes per low power field    Rare Squamous epithelial cells per low power field    Few Gram positive cocci in pairs per oil power field  Final Report (12-21-20 @ 16:53):    Normal Respiratory Aura present    Culture - Urine (collected 12-19-20 @ 01:36)  Source: .Urine Clean Catch (Midstream)  Final Report (12-20-20 @ 08:52):    No growth    Culture - Blood (collected 12-19-20 @ 00:15)  Source: .Blood Blood-Peripheral  Final Report (12-24-20 @ 10:00):    No Growth Final    Culture - Blood (collected 12-19-20 @ 00:10)  Source: .Blood Blood-Peripheral  Final Report (12-24-20 @ 10:00):    No Growth Final      Rapid RVP Result: NotDetec (12-19-20 @ 08:00)      ABG - ( 23 Dec 2020 09:10 )  pH, Arterial: 7.21  pH, Blood: x     /  pCO2: >103  /  pO2: 104   / HCO3: x     / Base Excess: x     /  SaO2: 95                CAPILLARY BLOOD GLUCOSE          RADIOLOGY  CXR:      CT:    ECHO:      VITALS:  T(C): 36.6 (12-24-20 @ 08:00), Max: 36.7 (12-23-20 @ 16:00)  T(F): 97.9 (12-24-20 @ 08:00), Max: 98 (12-23-20 @ 16:00)  HR: 71 (12-24-20 @ 08:00) (62 - 83)  BP: 146/81 (12-24-20 @ 08:00) (90/54 - 167/85)  BP(mean): 97 (12-24-20 @ 08:00) (61 - 104)  ABP: --  ABP(mean): --  RR: 20 (12-24-20 @ 08:00) (0 - 21)  SpO2: 100% (12-24-20 @ 08:00) (100% - 100%)  CVP(mm Hg): --  CVP(cm H2O): --    Ins and Outs     12-23-20 @ 07:01  -  12-24-20 @ 07:00  --------------------------------------------------------  IN: 2486 mL / OUT: 500 mL / NET: 1986 mL    12-24-20 @ 07:01 - 12-24-20 @ 10:00  --------------------------------------------------------  IN: 12 mL / OUT: 0 mL / NET: 12 mL            Device: Avea, Mode: AC/ CMV (Assist Control/ Continuous Mandatory Ventilation), RR (machine): 20, RR (patient): 20, TV (machine): 400, TV (patient): 400, FiO2: 50, PEEP: 5, MAP: 12, PIP: 37    I&O's Detail    23 Dec 2020 07:01  -  24 Dec 2020 07:00  --------------------------------------------------------  IN:    Dexmedetomidine: 156 mL    Enteral Tube Flush: 30 mL    Free Water: 900 mL    Glucerna 1.5: 1100 mL    IV PiggyBack: 300 mL  Total IN: 2486 mL    OUT:    Voided (mL): 500 mL  Total OUT: 500 mL    Total NET: 1986 mL      24 Dec 2020 07:01  -  24 Dec 2020 10:00  --------------------------------------------------------  IN:    Dexmedetomidine: 12 mL  Total IN: 12 mL    OUT:  Total OUT: 0 mL    Total NET: 12 mL

## 2020-12-24 NOTE — PROGRESS NOTE ADULT - ASSESSMENT
Physical Examination:  GENERAL:               Alert, orally intubated  HEENT:                    No JVD, dry MM, poor dentition  PULM:                     Bilateral air entry, clear to auscultation bilaterally, no significant sputum production, No Rales, No Rhonchi, midl Wheezing  CVS:                         S1, S2,  + Murmur  ABD:                        Soft, nondistended, nontender, normoactive bowel sounds,   EXT:                         No edema, nontender, No Cyanosis or Clubbing   Vascular:                Warm Extremities, Normal Capillary refill, Normal Distal Pulses  NEURO:                alert, responsive, follows commands  PSYC:                      Calm, +Insight.        Assessment  75 year old female with h/o Severe COPD on home o2, ex smoker, HTN, anxiety who had recent right femur fx at [place] she was discharged to [facility] on 12/18 where shortly after developed AMS and brought to Hospital, Here found to have acute hypercarbic respiratory failure and was unresponsive so she was intubated.    Problem List  Acute Hypercarbic respiratory failure due to acute on chronic COPD exacerbation, in patient with Severe COPD on home o2 (chronic hypoxic respiratory Insuficiency)    COVID/RVP Negative    Ex Smoker  Metabolic Encephelopathy due to above  Lactic Acidosis likely due to hypoxia    no source of infection found, normal UA, No PNA on CT  Recent Right femur fx   underlying Anxiety, HTN,       Plan  CPAP if able  d/c xanax  precedex is held  continue  duoneb/pulmicort   d/c zithromax    tube feeding as tolerated        Family Updated: 	Juancho Olivier [phone]	                                 Date:  12/23           Sedation & Analgesia:	as above  Diet/Nutrition:		tube feeding  GI PPx:			PPI    DVT Ppx:		Lovenox      Activity:	 Advance as tolerated	                 Head of Bed:                35-45  Glycemic Control:        N/a    Lines: PIV  Restraints may be deemed necessary to prevent removal of life-sustaining devices [ x ] YES   [    ]  NO    Disposition: ICU monitoring     Goals of Care: based on records full

## 2020-12-25 LAB
ANION GAP SERPL CALC-SCNC: 8 MMOL/L — SIGNIFICANT CHANGE UP (ref 5–17)
BLOOD GAS COMMENTS ARTERIAL: SIGNIFICANT CHANGE UP
BUN SERPL-MCNC: 37 MG/DL — HIGH (ref 7–23)
CALCIUM SERPL-MCNC: 8.9 MG/DL — SIGNIFICANT CHANGE UP (ref 8.4–10.5)
CHLORIDE SERPL-SCNC: 103 MMOL/L — SIGNIFICANT CHANGE UP (ref 96–108)
CO2 BLDA-SCNC: 44 MMOL/L — HIGH (ref 22–30)
CO2 SERPL-SCNC: 38 MMOL/L — HIGH (ref 22–31)
CREAT SERPL-MCNC: 0.56 MG/DL — SIGNIFICANT CHANGE UP (ref 0.5–1.3)
GAS PNL BLDA: SIGNIFICANT CHANGE UP
GLUCOSE SERPL-MCNC: 107 MG/DL — HIGH (ref 70–99)
HCT VFR BLD CALC: 28 % — LOW (ref 34.5–45)
HGB BLD-MCNC: 8.6 G/DL — LOW (ref 11.5–15.5)
HOROWITZ INDEX BLDA+IHG-RTO: SIGNIFICANT CHANGE UP
INR BLD: 0.97 RATIO — SIGNIFICANT CHANGE UP (ref 0.88–1.16)
MAGNESIUM SERPL-MCNC: 1.9 MG/DL — SIGNIFICANT CHANGE UP (ref 1.6–2.6)
MCHC RBC-ENTMCNC: 30.1 PG — SIGNIFICANT CHANGE UP (ref 27–34)
MCHC RBC-ENTMCNC: 30.7 GM/DL — LOW (ref 32–36)
MCV RBC AUTO: 97.9 FL — SIGNIFICANT CHANGE UP (ref 80–100)
NRBC # BLD: 0 /100 WBCS — SIGNIFICANT CHANGE UP (ref 0–0)
PCO2 BLDA: 62 MMHG — HIGH (ref 32–46)
PH BLDA: 7.44 — SIGNIFICANT CHANGE UP (ref 7.35–7.45)
PHOSPHATE SERPL-MCNC: 3.4 MG/DL — SIGNIFICANT CHANGE UP (ref 2.5–4.5)
PLATELET # BLD AUTO: 498 K/UL — HIGH (ref 150–400)
PO2 BLDA: 104 MMHG — SIGNIFICANT CHANGE UP (ref 74–108)
POTASSIUM SERPL-MCNC: 3.7 MMOL/L — SIGNIFICANT CHANGE UP (ref 3.5–5.3)
POTASSIUM SERPL-SCNC: 3.7 MMOL/L — SIGNIFICANT CHANGE UP (ref 3.5–5.3)
PROTHROM AB SERPL-ACNC: 11.8 SEC — SIGNIFICANT CHANGE UP (ref 10.6–13.6)
RBC # BLD: 2.86 M/UL — LOW (ref 3.8–5.2)
RBC # FLD: 14.5 % — SIGNIFICANT CHANGE UP (ref 10.3–14.5)
SAO2 % BLDA: 97 % — HIGH (ref 92–96)
SODIUM SERPL-SCNC: 149 MMOL/L — HIGH (ref 135–145)
WBC # BLD: 17.96 K/UL — HIGH (ref 3.8–10.5)
WBC # FLD AUTO: 17.96 K/UL — HIGH (ref 3.8–10.5)

## 2020-12-25 RX ORDER — ALPRAZOLAM 0.25 MG
0.25 TABLET ORAL EVERY 8 HOURS
Refills: 0 | Status: DISCONTINUED | OUTPATIENT
Start: 2020-12-25 | End: 2020-12-28

## 2020-12-25 RX ORDER — METOPROLOL TARTRATE 50 MG
5 TABLET ORAL ONCE
Refills: 0 | Status: COMPLETED | OUTPATIENT
Start: 2020-12-25 | End: 2020-12-25

## 2020-12-25 RX ORDER — AMLODIPINE BESYLATE 2.5 MG/1
5 TABLET ORAL DAILY
Refills: 0 | Status: DISCONTINUED | OUTPATIENT
Start: 2020-12-26 | End: 2020-12-28

## 2020-12-25 RX ORDER — HYDRALAZINE HCL 50 MG
10 TABLET ORAL ONCE
Refills: 0 | Status: COMPLETED | OUTPATIENT
Start: 2020-12-25 | End: 2020-12-25

## 2020-12-25 RX ORDER — AMLODIPINE BESYLATE 2.5 MG/1
10 TABLET ORAL ONCE
Refills: 0 | Status: COMPLETED | OUTPATIENT
Start: 2020-12-25 | End: 2020-12-25

## 2020-12-25 RX ADMIN — Medication 40 MILLIGRAM(S): at 17:38

## 2020-12-25 RX ADMIN — Medication 0.25 MILLIGRAM(S): at 13:01

## 2020-12-25 RX ADMIN — Medication 40 MILLIGRAM(S): at 23:38

## 2020-12-25 RX ADMIN — Medication 5 MILLIGRAM(S): at 02:54

## 2020-12-25 RX ADMIN — AMLODIPINE BESYLATE 10 MILLIGRAM(S): 2.5 TABLET ORAL at 12:12

## 2020-12-25 RX ADMIN — Medication 3 MILLILITER(S): at 10:35

## 2020-12-25 RX ADMIN — ISOSORBIDE DINITRATE 10 MILLIGRAM(S): 5 TABLET ORAL at 23:38

## 2020-12-25 RX ADMIN — Medication 3 MILLILITER(S): at 16:11

## 2020-12-25 RX ADMIN — ENOXAPARIN SODIUM 40 MILLIGRAM(S): 100 INJECTION SUBCUTANEOUS at 11:30

## 2020-12-25 RX ADMIN — Medication 0.5 MILLIGRAM(S): at 10:40

## 2020-12-25 RX ADMIN — Medication 40 MILLIGRAM(S): at 05:08

## 2020-12-25 RX ADMIN — ISOSORBIDE DINITRATE 10 MILLIGRAM(S): 5 TABLET ORAL at 13:01

## 2020-12-25 RX ADMIN — Medication 3 MILLILITER(S): at 21:03

## 2020-12-25 RX ADMIN — Medication 10 MILLIGRAM(S): at 05:02

## 2020-12-25 RX ADMIN — Medication 40 MILLIGRAM(S): at 11:30

## 2020-12-25 RX ADMIN — CEFTRIAXONE 100 MILLIGRAM(S): 500 INJECTION, POWDER, FOR SOLUTION INTRAMUSCULAR; INTRAVENOUS at 11:31

## 2020-12-25 RX ADMIN — Medication 0.5 MILLIGRAM(S): at 21:03

## 2020-12-25 RX ADMIN — Medication 3 MILLILITER(S): at 04:03

## 2020-12-25 RX ADMIN — Medication 5 MILLIGRAM(S): at 01:11

## 2020-12-25 NOTE — PROGRESS NOTE ADULT - SUBJECTIVE AND OBJECTIVE BOX
Patient is a 75y old  Female who presents with a chief complaint of Acute hypercapnic respiratory failure (25 Dec 2020 19:29)      BRIEF HOSPITAL COURSE:     75 year old female with h/o Severe COPD on home o2, ex smoker, HTN, anxiety who had recent right femur fx at [place] she was discharged to [facility] on 12/18 where shortly after developed AMS and brought to Hospital, Here found to have acute hypercarbic respiratory failure (PCO2 >99) and was unresponsive so she was intubated.    Events last 24 hours:   -patient was extubated directly to BiPAP yesterday, now has been able to toelrate BiPAP free periods, but does appears anxious when off BiPAP.     PAST MEDICAL & SURGICAL HISTORY:  HTN (hypertension)    Anxiety    Advanced COPD  on home O2    Status post closed fracture of right femur  surgical repair done at [place]        Review of Systems:  CONSTITUTIONAL: No fever, chills, or fatigue  EYES: No eye pain, visual disturbances, or discharge  ENMT:  No difficulty hearing, tinnitus, vertigo; No sinus or throat pain  NECK: No pain or stiffness  RESPIRATORY: No cough, wheezing, chills or hemoptysis; No shortness of breath  CARDIOVASCULAR: No chest pain, palpitations, dizziness, or leg swelling  GASTROINTESTINAL: No abdominal or epigastric pain. No nausea, vomiting, or hematemesis; No diarrhea or constipation. No melena or hematochezia.  GENITOURINARY: No dysuria, frequency, hematuria, or incontinence  NEUROLOGICAL: No headaches, memory loss, loss of strength, numbness, or tremors  SKIN: No itching, burning, rashes, or lesions   MUSCULOSKELETAL: No joint pain or swelling; No muscle, back, or extremity pain  PSYCHIATRIC: (+) Anxiety    Medications:  cefTRIAXone   IVPB      cefTRIAXone   IVPB 1000 milliGRAM(s) IV Intermittent every 24 hours    isosorbide   dinitrate Tablet (ISORDIL) 10 milliGRAM(s) Oral three times a day    albuterol/ipratropium for Nebulization 3 milliLiter(s) Nebulizer every 6 hours  buDESOnide    Inhalation Suspension 0.5 milliGRAM(s) Inhalation every 12 hours    ALPRAZolam 0.25 milliGRAM(s) Oral every 8 hours PRN      enoxaparin Injectable 40 milliGRAM(s) SubCutaneous daily    pantoprazole   Suspension 40 milliGRAM(s) Enteral Tube daily      methylPREDNISolone sodium succinate Injectable 40 milliGRAM(s) IV Push every 6 hours                  ICU Vital Signs Last 24 Hrs  T(C): 36.5 (25 Dec 2020 15:00), Max: 37 (25 Dec 2020 11:00)  T(F): 97.7 (25 Dec 2020 15:00), Max: 98.6 (25 Dec 2020 11:00)  HR: 118 (25 Dec 2020 18:00) (80 - 118)  BP: 186/97 (25 Dec 2020 18:00) (127/65 - 206/85)  BP(mean): 116 (25 Dec 2020 18:00) (80 - 122)  ABP: --  ABP(mean): --  RR: 22 (25 Dec 2020 18:00) (17 - 38)  SpO2: 100% (25 Dec 2020 18:00) (97% - 100%)      ABG - ( 25 Dec 2020 04:00 )  pH, Arterial: 7.44  pH, Blood: x     /  pCO2: 62    /  pO2: 104   / HCO3: x     / Base Excess: x     /  SaO2: 97                  I&O's Detail    24 Dec 2020 07:01  -  25 Dec 2020 07:00  --------------------------------------------------------  IN:    Dexmedetomidine: 18 mL    Free Water: 150 mL    Glucerna 1.5: 50 mL  Total IN: 218 mL    OUT:    Voided (mL): 1600 mL  Total OUT: 1600 mL    Total NET: -1382 mL            LABS:                        8.6    17.96 )-----------( 498      ( 25 Dec 2020 05:30 )             28.0     12-25    149<H>  |  103  |  37<H>  ----------------------------<  107<H>  3.7   |  38<H>  |  0.56    Ca    8.9      25 Dec 2020 05:30  Phos  3.4     12-25  Mg     1.9     12-25    TPro  5.9<L>  /  Alb  2.8<L>  /  TBili  0.2  /  DBili  x   /  AST  22  /  ALT  62<H>  /  AlkPhos  105  12-24          CAPILLARY BLOOD GLUCOSE        PT/INR - ( 25 Dec 2020 05:30 )   PT: 11.8 sec;   INR: 0.97 ratio             CULTURES:  Culture Results:   Normal Respiratory Aura present (12-19-20 @ 11:30)  Rapid RVP Result: NotDetec (12-19-20 @ 08:00)  Culture Results:   No growth (12-19-20 @ 01:36)  Culture Results:   No Growth Final (12-19-20 @ 00:15)  Culture Results:   No Growth Final (12-19-20 @ 00:10)      Physical Examination:    General: Appears anxious.  Alert, oriented, interactive, nonfocal    HEENT: Pupils equal, reactive to light.  Symmetric.    PULM: Clear to auscultation bilaterally, no significant sputum production    CVS: Regular rate and rhythm, no murmurs, rubs, or gallops    ABD: Soft, nondistended, nontender, normoactive bowel sounds, no masses    EXT: No edema, nontender    SKIN: Warm and well perfused, no rashes noted.

## 2020-12-25 NOTE — PROGRESS NOTE ADULT - ASSESSMENT
75 year old female with h/o Severe COPD on home o2, ex smoker, HTN, anxiety who had recent right femur fx at [place] she was discharged to [facility] on 12/18 where shortly after developed AMS and brought to Hospital, Here found to have acute hypercarbic respiratory failure (PCO2 >99) and was unresponsive so she was intubated.    Events last 24 hours:   -patient was extubated directly to BiPAP yesterday, now has been able to toelrate BiPAP free periods, but does appears anxious when off BiPAP.      PLAN:    -now extubated, will continue to offer BiPAP with NIPPV free periods on nasal canula  -will likeyl need to use BiPAp at bedtime eahc night  -on course of rocephin now, will need to complete.   -nebs and steroids for COPD  -mild benzo 9Xanax) to help with anxiety  -has been hypertensive as well, likely from anxiety. Is on isosorbide, also on norvasc at home will re-start now.

## 2020-12-25 NOTE — PROGRESS NOTE ADULT - SUBJECTIVE AND OBJECTIVE BOX
Follow-up Critical Care Progress Note  Chief Complaint : Other abnormality of breathing    patient extubated yesterday  was on NIV  episodes of hypertension and anxiety noted.   off precedex.         Allergies :No Known Allergies      PAST MEDICAL & SURGICAL HISTORY:  HTN (hypertension)    Anxiety    Advanced COPD  on home O2    Status post closed fracture of right femur  surgical repair done at [facility]        Medications:  MEDICATIONS  (STANDING):  albuterol/ipratropium for Nebulization 3 milliLiter(s) Nebulizer every 6 hours  buDESOnide    Inhalation Suspension 0.5 milliGRAM(s) Inhalation every 12 hours  cefTRIAXone   IVPB      cefTRIAXone   IVPB 1000 milliGRAM(s) IV Intermittent every 24 hours  dexMEDEtomidine Infusion 0.7 MICROgram(s)/kG/Hr (10.5 mL/Hr) IV Continuous <Continuous>  enoxaparin Injectable 40 milliGRAM(s) SubCutaneous daily  isosorbide   dinitrate Tablet (ISORDIL) 10 milliGRAM(s) Oral three times a day  methylPREDNISolone sodium succinate Injectable 40 milliGRAM(s) IV Push every 6 hours  pantoprazole   Suspension 40 milliGRAM(s) Enteral Tube daily    MEDICATIONS  (PRN):    COVID  12-19-20 @ 08:00  COVID -   NotDetec  12-19-20 @ 00:15  COVID -   NotDete      COVID Biomarkers    12-24-20 @ 06:30 ESR --  ---  CRP 0.19  ---  DDimer  --   ---   LDH --   ---   Ferritin --          Covid 19 IgG ab Index/Interpretation 12-19-20 @ 07:30 <0.10 / Negative    Trend Cardiac Enzymes    Trend BNP  12-23-20 @ 05:45   -  589<H>    Procalcitonin Trend  12-23-20 @ 05:45   -   0.27<H>  12-21-20 @ 06:30   -   1.16<H>  12-19-20 @ 07:30   -   1.20<H>    WBC Trend  12-25-20 @ 05:30   -  17.96<H>  12-24-20 @ 06:30   -  12.96<H>  12-23-20 @ 05:45   -  13.11<H>    H/H Trend  12-25-20 @ 05:30   -  8.6<L> / 28.0<L>  12-24-20 @ 06:30   -  7.8<L> / 25.5<L>  12-23-20 @ 05:45   -  8.2<L> / 27.2<L>    Platelet Trend  12-25-20 @ 05:30   -  498<H>  12-24-20 @ 06:30   -  386  12-23-20 @ 05:45   -  428<H>    Trend Sodium  12-25-20 @ 05:30   -  149<H>  12-24-20 @ 06:30   -  141  12-23-20 @ 05:45   -  142    Trend Potassium  12-25-20 @ 05:30   -  3.7  12-24-20 @ 06:30   -  4.0  12-23-20 @ 05:45   -  4.0    Trend Bun/Cr  12-25-20 @ 05:30  BUN/CR -  37<H> / 0.56  12-24-20 @ 06:30  BUN/CR -  61<H> / 0.70  12-23-20 @ 05:45  BUN/CR -  52<H> / 0.67    Lactic Acid Trend  12-21-20 @ 06:30   -   1.0  12-19-20 @ 07:30   -   2.2<H>  12-19-20 @ 03:40   -   3.2<H>  12-19-20 @ 00:10   -   5.0<HH>    ABG Trend  12-25-20 @ 04:00   - 7.44/62<H>/104/97<H>  12-24-20 @ 13:25   - 7.44/63<H>/74/94  12-24-20 @ 11:10   - 7.44/59<H>/62<L>/91<L>  12-23-20 @ 09:10   - 7.21<L>/>103/104/95  PTT - PT - INR Trend  12-25-20 @ 05:30   -   -- - 11.8 - 0.97  12-22-20 @ 05:20   -   27.3<L> - 11.0 - 0.90  12-19-20 @ 00:10   -   27.1<L> - 11.2 - 0.92    Glucose Trend  12-25-20 @ 05:30   -  -- 107<H> --  12-24-20 @ 06:30   -  -- 152<H> --  12-23-20 @ 05:45   -  -- 145<H> --        LABS:                        8.6    17.96 )-----------( 498      ( 25 Dec 2020 05:30 )             28.0     12-25    149<H>  |  103  |  37<H>  ----------------------------<  107<H>  3.7   |  38<H>  |  0.56    Ca    8.9      25 Dec 2020 05:30  Phos  3.4     12-25  Mg     1.9     12-25    TPro  5.9<L>  /  Alb  2.8<L>  /  TBili  0.2  /  DBili  x   /  AST  22  /  ALT  62<H>  /  AlkPhos  105  12-24            PT/INR - ( 25 Dec 2020 05:30 )   PT: 11.8 sec;   INR: 0.97 ratio             Procalcitonin, Serum: 0.27 ng/mL (12-23-20 @ 05:45)    Serum Pro-Brain Natriuretic Peptide: 589 pg/mL (12-23-20 @ 05:45)        CULTURES: (if applicable)    Culture - Sputum (collected 12-19-20 @ 11:30)  Source: .Sputum Sputum  Gram Stain (12-19-20 @ 22:33):    Few polymorphonuclear leukocytes per low power field    Rare Squamous epithelial cells per low power field    Few Gram positive cocci in pairs per oil power field  Final Report (12-21-20 @ 16:53):    Normal Respiratory Aura present    Culture - Urine (collected 12-19-20 @ 01:36)  Source: .Urine Clean Catch (Midstream)  Final Report (12-20-20 @ 08:52):    No growth    Culture - Blood (collected 12-19-20 @ 00:15)  Source: .Blood Blood-Peripheral  Final Report (12-24-20 @ 10:00):    No Growth Final    Culture - Blood (collected 12-19-20 @ 00:10)  Source: .Blood Blood-Peripheral  Final Report (12-24-20 @ 10:00):    No Growth Final      Rapid RVP Result: NotDetec (12-19-20 @ 08:00)      ABG - ( 25 Dec 2020 04:00 )  pH, Arterial: 7.44  pH, Blood: x     /  pCO2: 62    /  pO2: 104   / HCO3: x     / Base Excess: x     /  SaO2: 97            RADIOLOGY  CXR:      CT:    ECHO:      VITALS:  T(C): 36.9 (12-25-20 @ 07:00), Max: 36.9 (12-24-20 @ 23:00)  T(F): 98.4 (12-25-20 @ 07:00), Max: 98.4 (12-24-20 @ 23:00)  HR: 109 (12-25-20 @ 09:30) (81 - 113)  BP: 185/80 (12-25-20 @ 09:30) (105/81 - 206/85)  BP(mean): 103 (12-25-20 @ 09:30) (79 - 122)  ABP: --  ABP(mean): --  RR: 24 (12-25-20 @ 09:30) (17 - 38)  SpO2: 99% (12-25-20 @ 09:30) (98% - 100%)  CVP(mm Hg): --  CVP(cm H2O): --    Ins and Outs     12-24-20 @ 07:01  -  12-25-20 @ 07:00  --------------------------------------------------------  IN: 218 mL / OUT: 1600 mL / NET: -1382 mL                I&O's Detail    24 Dec 2020 07:01  -  25 Dec 2020 07:00  --------------------------------------------------------  IN:    Dexmedetomidine: 18 mL    Free Water: 150 mL    Glucerna 1.5: 50 mL  Total IN: 218 mL    OUT:    Voided (mL): 1600 mL  Total OUT: 1600 mL    Total NET: -1382 mL         Follow-up Critical Care Progress Note  Chief Complaint : Other abnormality of breathing    patient extubated yesterday  was on NIV  episodes of hypertension and anxiety noted.   off precedex.     episodes of hypertension and tachycardia suspect Anxiety  placed on n/c today with sats 100% on 1-4 L, but on RA sat hypoxic < 80!  now back on n/c  no secretions, cough noted.     Allergies :No Known Allergies      PAST MEDICAL & SURGICAL HISTORY:  HTN (hypertension)    Anxiety    Advanced COPD  on home O2    Status post closed fracture of right femur  surgical repair done at [place]        Medications:  MEDICATIONS  (STANDING):  albuterol/ipratropium for Nebulization 3 milliLiter(s) Nebulizer every 6 hours  buDESOnide    Inhalation Suspension 0.5 milliGRAM(s) Inhalation every 12 hours  cefTRIAXone   IVPB      cefTRIAXone   IVPB 1000 milliGRAM(s) IV Intermittent every 24 hours  dexMEDEtomidine Infusion 0.7 MICROgram(s)/kG/Hr (10.5 mL/Hr) IV Continuous <Continuous>  enoxaparin Injectable 40 milliGRAM(s) SubCutaneous daily  isosorbide   dinitrate Tablet (ISORDIL) 10 milliGRAM(s) Oral three times a day  methylPREDNISolone sodium succinate Injectable 40 milliGRAM(s) IV Push every 6 hours  pantoprazole   Suspension 40 milliGRAM(s) Enteral Tube daily    MEDICATIONS  (PRN):    COVID  12-19-20 @ 08:00  COVID -   NotDetec  12-19-20 @ 00:15  [facility]OVID -   [facility]      COVID Biomarkers    12-24-20 @ 06:30 ESR --  ---  CRP 0.19  ---  DDimer  --   ---   LDH --   ---   Ferritin --          Covid 19 IgG ab Index/Interpretation 12-19-20 @ 07:30 <0.10 / Negative    Trend Cardiac Enzymes    Trend BNP  12-23-20 @ 05:45   -  589<H>    Procalcitonin Trend  12-23-20 @ 05:45   -   0.27<H>  12-21-20 @ 06:30   -   1.16<H>  12-19-20 @ 07:30   -   1.20<H>    WBC Trend  12-25-20 @ 05:30   -  17.96<H>  12-24-20 @ 06:30   -  12.96<H>  12-23-20 @ 05:45   -  13.11<H>    H/H Trend  12-25-20 @ 05:30   -  8.6<L> / 28.0<L>  12-24-20 @ 06:30   -  7.8<L> / 25.5<L>  12-23-20 @ 05:45   -  8.2<L> / 27.2<L>    Platelet Trend  12-25-20 @ 05:30   -  498<H>  12-24-20 @ 06:30   -  386  12-23-20 @ 05:45   -  428<H>    Trend Sodium  12-25-20 @ 05:30   -  149<H>  12-24-20 @ 06:30   -  141  12-23-20 @ 05:45   -  142    Trend Potassium  12-25-20 @ 05:30   -  3.7  12-24-20 @ 06:30   -  4.0  12-23-20 @ 05:45   -  4.0    Trend Bun/Cr  12-25-20 @ 05:30  BUN/CR -  37<H> / 0.56  12-24-20 @ 06:30  BUN/CR -  61<H> / 0.70  12-23-20 @ 05:45  BUN/CR -  52<H> / 0.67    Lactic Acid Trend  12-21-20 @ 06:30   -   1.0  12-19-20 @ 07:30   -   2.2<H>  12-19-20 @ 03:40   -   3.2<H>  12-19-20 @ 00:10   -   5.0<HH>    ABG Trend  12-25-20 @ 04:00   - 7.44/62<H>/104/97<H>  12-24-20 @ 13:25   - 7.44/63<H>/74/94  12-24-20 @ 11:10   - 7.44/59<H>/62<L>/91<L>  12-23-20 @ 09:10   - 7.21<L>/>103/104/95  PTT - PT - INR Trend  12-25-20 @ 05:30   -   -- - 11.8 - 0.97  12-22-20 @ 05:20   -   27.3<L> - 11.0 - 0.90  12-19-20 @ 00:10   -   27.1<L> - 11.2 - 0.92    Glucose Trend  12-25-20 @ 05:30   -  -- 107<H> --  12-24-20 @ 06:30   -  -- 152<H> --  12-23-20 @ 05:45   -  -- 145<H> --        LABS:                        8.6    17.96 )-----------( 498      ( 25 Dec 2020 05:30 )             28.0     12-25    149<H>  |  103  |  37<H>  ----------------------------<  107<H>  3.7   |  38<H>  |  0.56    Ca    8.9      25 Dec 2020 05:30  Phos  3.4     12-25  Mg     1.9     12-25    TPro  5.9<L>  /  Alb  2.8<L>  /  TBili  0.2  /  DBili  x   /  AST  22  /  ALT  62<H>  /  AlkPhos  105  12-24            PT/INR - ( 25 Dec 2020 05:30 )   PT: 11.8 sec;   INR: 0.97 ratio             Procalcitonin, Serum: 0.27 ng/mL (12-23-20 @ 05:45)    Serum Pro-Brain Natriuretic Peptide: 589 pg/mL (12-23-20 @ 05:45)        CULTURES: (if applicable)    Culture - Sputum (collected 12-19-20 @ 11:30)  Source: .Sputum Sputum  Gram Stain (12-19-20 @ 22:33):    Few polymorphonuclear leukocytes per low power field    Rare Squamous epithelial cells per low power field    Few Gram positive cocci in pairs per oil power field  Final Report (12-21-20 @ 16:53):    Normal Respiratory Aura present    Culture - Urine (collected 12-19-20 @ 01:36)  Source: .Urine Clean Catch (Midstream)  Final Report (12-20-20 @ 08:52):    No growth    Culture - Blood (collected 12-19-20 @ 00:15)  Source: .Blood Blood-Peripheral  Final Report (12-24-20 @ 10:00):    No Growth Final    Culture - Blood (collected 12-19-20 @ 00:10)  Source: .Blood Blood-Peripheral  Final Report (12-24-20 @ 10:00):    No Growth Final      Rapid RVP Result: NotDetec (12-19-20 @ 08:00)      ABG - ( 25 Dec 2020 04:00 )  pH, Arterial: 7.44  pH, Blood: x     /  pCO2: 62    /  pO2: 104   / HCO3: x     / Base Excess: x     /  SaO2: 97          VITALS:  T(C): 36.9 (12-25-20 @ 07:00), Max: 36.9 (12-24-20 @ 23:00)  T(F): 98.4 (12-25-20 @ 07:00), Max: 98.4 (12-24-20 @ 23:00)  HR: 109 (12-25-20 @ 09:30) (81 - 113)  BP: 185/80 (12-25-20 @ 09:30) (105/81 - 206/85)  BP(mean): 103 (12-25-20 @ 09:30) (79 - 122)  ABP: --  ABP(mean): --  RR: 24 (12-25-20 @ 09:30) (17 - 38)  SpO2: 99% (12-25-20 @ 09:30) (98% - 100%)  CVP(mm Hg): --  CVP(cm H2O): --    Ins and Outs     12-24-20 @ 07:01  -  12-25-20 @ 07:00  --------------------------------------------------------  IN: 218 mL / OUT: 1600 mL / NET: -1382 mL                I&O's Detail    24 Dec 2020 07:01  -  25 Dec 2020 07:00  --------------------------------------------------------  IN:    Dexmedetomidine: 18 mL    Free Water: 150 mL    Glucerna 1.5: 50 mL  Total IN: 218 mL    OUT:    Voided (mL): 1600 mL  Total OUT: 1600 mL    Total NET: -1382 mL

## 2020-12-25 NOTE — PROGRESS NOTE ADULT - ASSESSMENT
Physical Examination:  GENERAL:               Alert, verbal, NAD on N/C  HEENT:                    No JVD, dry MM, poor dentition  PULM:                     Bilateral air entry, diminished to auscultation bilaterally, no significant sputum production, No Rales, No Rhonchi, no Wheezing  CVS:                         S1, S2,  + Murmur  ABD:                        Soft, nondistended, nontender, normoactive bowel sounds,   EXT:                         No edema, nontender, No Cyanosis or Clubbing   Vascular:                Warm Extremities, Normal Capillary refill, Normal Distal Pulses  NEURO:                alert, responsive, follows commands  PSYC:                      Calm, +Insight.        Assessment  75 year old female with h/o Severe COPD on home o2, ex smoker, HTN, anxiety who had recent right femur fx at [place] she was discharged to [facility] on 12/18 where shortly after developed AMS and brought to Hospital, Here found to have acute hypercarbic respiratory failure and was unresponsive so she was intubated.    Problem List  Acute Hypercarbic respiratory failure due to acute on chronic COPD exacerbation, in patient with Severe COPD on home o2 (chronic hypoxic and hypercarbic respiratory Insufficiency    S/p Extubation on 12/24/20     COVID/RVP Negative    Ex Smoker  Metabolic Encephelopathy due to above Resolved  Lactic Acidosis likely due to hypoxia Resolved    no source of infection found, normal UA, No PNA on CT  Recent Right femur fx   underlying Anxiety, HTN,       Plan  N/C o2 with NIV qhs  xanax for anxiety to be restarted   BP control with Norvasc  finished course of abx  continue steroids and bronchodilators via neb  advance diet  tube feeding as tolerated        Family Updated: 	Juancho Olivier [phone]	                                 Date:  12/24-5           Sedation & Analgesia:	as above  Diet/Nutrition:		tube feeding  GI PPx:			PPI    DVT Ppx:		Lovenox      Activity:	 Advance as tolerated	                 Head of Bed:                35-45  Glycemic Control:        N/a    Lines: PIV  Restraints may be deemed necessary to prevent removal of life-sustaining devices [  ] YES   [   x ]  NO    Disposition: ICU monitoring     Goals of Care: based on records full

## 2020-12-26 LAB
ANION GAP SERPL CALC-SCNC: 4 MMOL/L — LOW (ref 5–17)
BUN SERPL-MCNC: 44 MG/DL — HIGH (ref 7–23)
CALCIUM SERPL-MCNC: 9.5 MG/DL — SIGNIFICANT CHANGE UP (ref 8.4–10.5)
CHLORIDE SERPL-SCNC: 103 MMOL/L — SIGNIFICANT CHANGE UP (ref 96–108)
CO2 SERPL-SCNC: 41 MMOL/L — HIGH (ref 22–31)
CREAT SERPL-MCNC: 0.62 MG/DL — SIGNIFICANT CHANGE UP (ref 0.5–1.3)
D DIMER BLD IA.RAPID-MCNC: 462 NG/ML DDU — HIGH
GLUCOSE SERPL-MCNC: 152 MG/DL — HIGH (ref 70–99)
HCT VFR BLD CALC: 27.9 % — LOW (ref 34.5–45)
HGB BLD-MCNC: 8.3 G/DL — LOW (ref 11.5–15.5)
MAGNESIUM SERPL-MCNC: 2.1 MG/DL — SIGNIFICANT CHANGE UP (ref 1.6–2.6)
MCHC RBC-ENTMCNC: 29.5 PG — SIGNIFICANT CHANGE UP (ref 27–34)
MCHC RBC-ENTMCNC: 29.7 GM/DL — LOW (ref 32–36)
MCV RBC AUTO: 99.3 FL — SIGNIFICANT CHANGE UP (ref 80–100)
NRBC # BLD: 0 /100 WBCS — SIGNIFICANT CHANGE UP (ref 0–0)
PHOSPHATE SERPL-MCNC: 3.4 MG/DL — SIGNIFICANT CHANGE UP (ref 2.5–4.5)
PLATELET # BLD AUTO: 461 K/UL — HIGH (ref 150–400)
POTASSIUM SERPL-MCNC: 3.9 MMOL/L — SIGNIFICANT CHANGE UP (ref 3.5–5.3)
POTASSIUM SERPL-SCNC: 3.9 MMOL/L — SIGNIFICANT CHANGE UP (ref 3.5–5.3)
RBC # BLD: 2.81 M/UL — LOW (ref 3.8–5.2)
RBC # FLD: 14.6 % — HIGH (ref 10.3–14.5)
SODIUM SERPL-SCNC: 148 MMOL/L — HIGH (ref 135–145)
WBC # BLD: 16.8 K/UL — HIGH (ref 3.8–10.5)
WBC # FLD AUTO: 16.8 K/UL — HIGH (ref 3.8–10.5)

## 2020-12-26 PROCEDURE — 71045 X-RAY EXAM CHEST 1 VIEW: CPT | Mod: 26

## 2020-12-26 RX ADMIN — ISOSORBIDE DINITRATE 10 MILLIGRAM(S): 5 TABLET ORAL at 13:39

## 2020-12-26 RX ADMIN — Medication 3 MILLILITER(S): at 04:40

## 2020-12-26 RX ADMIN — ISOSORBIDE DINITRATE 10 MILLIGRAM(S): 5 TABLET ORAL at 05:03

## 2020-12-26 RX ADMIN — Medication 0.25 MILLIGRAM(S): at 20:27

## 2020-12-26 RX ADMIN — Medication 3 MILLILITER(S): at 15:12

## 2020-12-26 RX ADMIN — Medication 40 MILLIGRAM(S): at 05:03

## 2020-12-26 RX ADMIN — Medication 40 MILLIGRAM(S): at 17:40

## 2020-12-26 RX ADMIN — Medication 0.5 MILLIGRAM(S): at 10:24

## 2020-12-26 RX ADMIN — Medication 0.25 MILLIGRAM(S): at 07:54

## 2020-12-26 RX ADMIN — Medication 3 MILLILITER(S): at 10:24

## 2020-12-26 RX ADMIN — PANTOPRAZOLE SODIUM 40 MILLIGRAM(S): 20 TABLET, DELAYED RELEASE ORAL at 11:18

## 2020-12-26 RX ADMIN — Medication 0.5 MILLIGRAM(S): at 21:11

## 2020-12-26 RX ADMIN — ISOSORBIDE DINITRATE 10 MILLIGRAM(S): 5 TABLET ORAL at 20:28

## 2020-12-26 RX ADMIN — Medication 40 MILLIGRAM(S): at 11:18

## 2020-12-26 RX ADMIN — ENOXAPARIN SODIUM 40 MILLIGRAM(S): 100 INJECTION SUBCUTANEOUS at 11:18

## 2020-12-26 RX ADMIN — CEFTRIAXONE 100 MILLIGRAM(S): 500 INJECTION, POWDER, FOR SOLUTION INTRAMUSCULAR; INTRAVENOUS at 11:17

## 2020-12-26 RX ADMIN — AMLODIPINE BESYLATE 5 MILLIGRAM(S): 2.5 TABLET ORAL at 05:03

## 2020-12-26 NOTE — PROGRESS NOTE ADULT - SUBJECTIVE AND OBJECTIVE BOX
CC: Patient is a 75y old  Female who presents with a chief complaint of Acute hypercapnic respiratory failure (25 Dec 2020 19:29)      S: No events overnight. Patient tolerated BIPAP overnight. Transition to Oxymizer nasal cannula with great response. Tolerating oral diet. BIPAP remains on standby  as needed and at for sleeping.     Patient seen and examined at bedside.    ALLERGIES:  No Known Allergies      MEDICATIONS:  albuterol/ipratropium for Nebulization 3 milliLiter(s) Nebulizer every 6 hours  ALPRAZolam 0.25 milliGRAM(s) Oral every 8 hours PRN  amLODIPine   Tablet 5 milliGRAM(s) Oral daily  buDESOnide    Inhalation Suspension 0.5 milliGRAM(s) Inhalation every 12 hours  enoxaparin Injectable 40 milliGRAM(s) SubCutaneous daily  isosorbide   dinitrate Tablet (ISORDIL) 10 milliGRAM(s) Oral three times a day  methylPREDNISolone sodium succinate Injectable 40 milliGRAM(s) IV Push every 6 hours  pantoprazole   Suspension 40 milliGRAM(s) Enteral Tube daily        Vital Signs Last 24 Hrs  T(F): 98 (26 Dec 2020 08:00), Max: 98.5 (25 Dec 2020 19:00)  HR: 120 (26 Dec 2020 11:00) (80 - 120)  BP: 141/61 (26 Dec 2020 11:00) (117/68 - 186/97)  RR: 22 (26 Dec 2020 11:00) (14 - 30)  SpO2: 100% (26 Dec 2020 11:00) (97% - 100%)  I&O's Summary    25 Dec 2020 07:01  -  26 Dec 2020 07:00  --------------------------------------------------------  IN: 700 mL / OUT: 500 mL / NET: 200 mL    26 Dec 2020 07:01  -  26 Dec 2020 12:07  --------------------------------------------------------  IN: 240 mL / OUT: 200 mL / NET: 40 mL        PHYSICAL EXAM:  General: NAD  ENT: MMM  Neck: Supple, No JVD  Lungs: Supplemental O2 via NC, mildly diminished breath sound  Cardio: RRR, S1/S2, No murmurs  Abdomen: Soft, Nontender, Nondistended; Bowel sounds present  Extremities: No cyanosis, No edema  Neuro: no new deficits  Skin: no rashes  Psych: awake, alert. pleasantly confused with intermittent    LABS:                        8.3    16.80 )-----------( 461      ( 26 Dec 2020 05:00 )             27.9     12-26    148  |  103  |  44  ----------------------------<  152  3.9   |  41  |  0.62    Ca    9.5      26 Dec 2020 05:00  Phos  3.4     12-26  Mg     2.1     12-26    TPro  5.9  /  Alb  2.8  /  TBili  0.2  /  DBili  x   /  AST  22  /  ALT  62  /  AlkPhos  105  12-24    eGFR if Non African American: 89 mL/min/1.73M2 (12-26-20 @ 05:00)  eGFR if : 103 mL/min/1.73M2 (12-26-20 @ 05:00)    PT/INR - ( 25 Dec 2020 05:30 )   PT: 11.8 sec;   INR: 0.97 ratio                   TSH 0.27   TSH with FT4 reflex --  Total T3 --      ABG - ( 25 Dec 2020 04:00 )  pH, Arterial: 7.44  pH, Blood: x     /  pCO2: 62    /  pO2: 104   / HCO3: x     / Base Excess: x     /  SaO2: 97                                RADIOLOGY & ADDITIONAL TESTS:    Care Discussed with Consultants/Other Providers:

## 2020-12-26 NOTE — PROGRESS NOTE ADULT - ASSESSMENT
ASSESSMENT:  1.  Hypoxic-Hypercarbic respiratory failure 2/2 COPD exacerbation on home O2 at baseline  2.   3.  4.  5.  6.  7.      PLAN:  -  -  -  -  -  -  -  -  _  -  -  - Physical Examination:  GENERAL:               Alert, verbal, NAD on N/C  HEENT:                    No JVD, dry MM, poor dentition  PULM:                     Bilateral air entry, diminished to auscultation bilaterally, no significant sputum production, No Rales, No Rhonchi, no Wheezing  CVS:                         S1, S2,  + Murmur  ABD:                        Soft, nondistended, nontender, normoactive bowel sounds,   EXT:                         No edema, nontender, No Cyanosis or Clubbing   Vascular:                Warm Extremities, Normal Capillary refill, Normal Distal Pulses  NEURO:                alert, responsive, follows commands  PSYC:                      Calm, +Insight.        Assessment  75 year old female with h/o Severe COPD on home o2, ex smoker, HTN, anxiety who had recent right femur fx at [place] she was discharged to [facility] on 12/18 where shortly after developed AMS and brought to Hospital, Here found to have acute hypercarbic respiratory failure and was unresponsive so she was intubated.    Problem List  Acute Hypercarbic respiratory failure due to acute on chronic COPD exacerbation, in patient with Severe COPD on home o2 (chronic hypoxic and hypercarbic respiratory Insufficiency    S/p Extubation on 12/24/20     COVID/RVP Negative    Ex Smoker  Metabolic Encephelopathy due to above Resolved  Lactic Acidosis likely due to hypoxia Resolved    no source of infection found, normal UA, No PNA on CT  Recent Right femur fx   underlying Anxiety, HTN,       Plan  N/C o2 with NIV qhs  xanax for anxiety to be restarted   BP control with Norvasc  finished course of abx  taper steroids and continue  bronchodilators via neb  advance diet  PT  S&S eval     Family Updated: 	Juancho Olivier [phone]	                                 Date:  12/26           Sedation & Analgesia:	as above  Diet/Nutrition:		tube feeding  GI PPx:			PPI    DVT Ppx:		Lovenox      Activity:	 Advance as tolerated	                 Head of Bed:                35-45  Glycemic Control:        N/a    Lines: PIV  Restraints may be deemed necessary to prevent removal of life-sustaining devices [  ] YES   [   x ]  NO    Disposition: transfer to     Goals of Care: based on records full

## 2020-12-27 LAB
ANION GAP SERPL CALC-SCNC: 3 MMOL/L — LOW (ref 5–17)
BUN SERPL-MCNC: 55 MG/DL — HIGH (ref 7–23)
CALCIUM SERPL-MCNC: 9.7 MG/DL — SIGNIFICANT CHANGE UP (ref 8.4–10.5)
CHLORIDE SERPL-SCNC: 99 MMOL/L — SIGNIFICANT CHANGE UP (ref 96–108)
CO2 SERPL-SCNC: 40 MMOL/L — HIGH (ref 22–31)
CREAT SERPL-MCNC: 0.72 MG/DL — SIGNIFICANT CHANGE UP (ref 0.5–1.3)
GLUCOSE SERPL-MCNC: 150 MG/DL — HIGH (ref 70–99)
HCT VFR BLD CALC: 27.2 % — LOW (ref 34.5–45)
HGB BLD-MCNC: 8.3 G/DL — LOW (ref 11.5–15.5)
MCHC RBC-ENTMCNC: 30.4 PG — SIGNIFICANT CHANGE UP (ref 27–34)
MCHC RBC-ENTMCNC: 30.5 GM/DL — LOW (ref 32–36)
MCV RBC AUTO: 99.6 FL — SIGNIFICANT CHANGE UP (ref 80–100)
NRBC # BLD: 0 /100 WBCS — SIGNIFICANT CHANGE UP (ref 0–0)
PLATELET # BLD AUTO: 443 K/UL — HIGH (ref 150–400)
POTASSIUM SERPL-MCNC: 3.9 MMOL/L — SIGNIFICANT CHANGE UP (ref 3.5–5.3)
POTASSIUM SERPL-SCNC: 3.9 MMOL/L — SIGNIFICANT CHANGE UP (ref 3.5–5.3)
RBC # BLD: 2.73 M/UL — LOW (ref 3.8–5.2)
RBC # FLD: 14.2 % — SIGNIFICANT CHANGE UP (ref 10.3–14.5)
SODIUM SERPL-SCNC: 142 MMOL/L — SIGNIFICANT CHANGE UP (ref 135–145)
WBC # BLD: 13.7 K/UL — HIGH (ref 3.8–10.5)
WBC # FLD AUTO: 13.7 K/UL — HIGH (ref 3.8–10.5)

## 2020-12-27 PROCEDURE — 99233 SBSQ HOSP IP/OBS HIGH 50: CPT

## 2020-12-27 RX ORDER — IPRATROPIUM/ALBUTEROL SULFATE 18-103MCG
3 AEROSOL WITH ADAPTER (GRAM) INHALATION EVERY 6 HOURS
Refills: 0 | Status: DISCONTINUED | OUTPATIENT
Start: 2020-12-27 | End: 2020-12-27

## 2020-12-27 RX ORDER — LEVALBUTEROL 1.25 MG/.5ML
1.25 SOLUTION, CONCENTRATE RESPIRATORY (INHALATION) EVERY 6 HOURS
Refills: 0 | Status: DISCONTINUED | OUTPATIENT
Start: 2020-12-27 | End: 2020-12-28

## 2020-12-27 RX ADMIN — Medication 0.5 MILLIGRAM(S): at 21:15

## 2020-12-27 RX ADMIN — Medication 0.25 MILLIGRAM(S): at 04:50

## 2020-12-27 RX ADMIN — Medication 40 MILLIGRAM(S): at 00:30

## 2020-12-27 RX ADMIN — Medication 0.25 MILLIGRAM(S): at 20:16

## 2020-12-27 RX ADMIN — Medication 40 MILLIGRAM(S): at 20:15

## 2020-12-27 RX ADMIN — LEVALBUTEROL 1.25 MILLIGRAM(S): 1.25 SOLUTION, CONCENTRATE RESPIRATORY (INHALATION) at 21:15

## 2020-12-27 RX ADMIN — Medication 40 MILLIGRAM(S): at 04:52

## 2020-12-27 RX ADMIN — LEVALBUTEROL 1.25 MILLIGRAM(S): 1.25 SOLUTION, CONCENTRATE RESPIRATORY (INHALATION) at 17:15

## 2020-12-27 RX ADMIN — LEVALBUTEROL 1.25 MILLIGRAM(S): 1.25 SOLUTION, CONCENTRATE RESPIRATORY (INHALATION) at 10:35

## 2020-12-27 RX ADMIN — ISOSORBIDE DINITRATE 10 MILLIGRAM(S): 5 TABLET ORAL at 13:49

## 2020-12-27 RX ADMIN — Medication 40 MILLIGRAM(S): at 13:48

## 2020-12-27 RX ADMIN — ISOSORBIDE DINITRATE 10 MILLIGRAM(S): 5 TABLET ORAL at 04:51

## 2020-12-27 RX ADMIN — AMLODIPINE BESYLATE 5 MILLIGRAM(S): 2.5 TABLET ORAL at 04:51

## 2020-12-27 RX ADMIN — Medication 0.5 MILLIGRAM(S): at 10:34

## 2020-12-27 RX ADMIN — ENOXAPARIN SODIUM 40 MILLIGRAM(S): 100 INJECTION SUBCUTANEOUS at 11:52

## 2020-12-27 RX ADMIN — PANTOPRAZOLE SODIUM 40 MILLIGRAM(S): 20 TABLET, DELAYED RELEASE ORAL at 11:52

## 2020-12-27 RX ADMIN — ISOSORBIDE DINITRATE 10 MILLIGRAM(S): 5 TABLET ORAL at 20:16

## 2020-12-27 RX ADMIN — Medication 3 MILLILITER(S): at 04:36

## 2020-12-27 NOTE — PROGRESS NOTE ADULT - SUBJECTIVE AND OBJECTIVE BOX
Patient is a 75y old  Female who presents with a chief complaint of Acute hypercapnic respiratory failure (27 Dec 2020 07:36)      INTERVAL HPI/OVERNIGHT EVENTS: Patient seen and examined at bedside. States she is on 2L NC at home. Had BiPAP overnight.     MEDICATIONS  (STANDING):  albuterol/ipratropium for Nebulization 3 milliLiter(s) Nebulizer every 6 hours  amLODIPine   Tablet 5 milliGRAM(s) Oral daily  buDESOnide    Inhalation Suspension 0.5 milliGRAM(s) Inhalation every 12 hours  enoxaparin Injectable 40 milliGRAM(s) SubCutaneous daily  isosorbide   dinitrate Tablet (ISORDIL) 10 milliGRAM(s) Oral three times a day  levalbuterol Inhalation 1.25 milliGRAM(s) Inhalation every 6 hours  methylPREDNISolone sodium succinate Injectable 40 milliGRAM(s) IV Push every 8 hours  pantoprazole   Suspension 40 milliGRAM(s) Enteral Tube daily    MEDICATIONS  (PRN):  ALPRAZolam 0.25 milliGRAM(s) Oral every 8 hours PRN anxiety      Allergies    No Known Allergies    Intolerances        REVIEW OF SYSTEMS:  CONSTITUTIONAL: No fever or chills  HEENT:  No headache, no sore throat  RESPIRATORY: positive cough, no wheezing, continued shortness of breath  CARDIOVASCULAR: No chest pain, palpitations  GASTROINTESTINAL: No abd pain, nausea, vomiting, or diarrhea  GENITOURINARY: No dysuria, frequency, or hematuria  NEUROLOGICAL: lower extremity paraplegia     Vital Signs Last 24 Hrs  T(C): 36.7 (27 Dec 2020 05:00), Max: 36.8 (26 Dec 2020 21:00)  T(F): 98.1 (27 Dec 2020 05:00), Max: 98.2 (26 Dec 2020 21:00)  HR: 94 (27 Dec 2020 10:39) (94 - 124)  BP: 163/95 (27 Dec 2020 08:06) (115/55 - 163/95)  BP(mean): 70 (26 Dec 2020 14:00) (70 - 113)  RR: 20 (27 Dec 2020 08:55) (16 - 26)  SpO2: 100% (27 Dec 2020 10:39) (94% - 100%)    PHYSICAL EXAM:  GENERAL: elderly female in NAD, 4L NC  HEENT:  AT/NC, anicteric, dry mucous membranes  CHEST/LUNG: diminished breath sounds throughout, tight airway   HEART:  RRR, S1, S2  ABDOMEN:  BS+, soft, nontender, nondistended  EXTREMITIES: no edema, cyanosis, or calf tenderness  NERVOUS SYSTEM: A&Ox2-3, lower extremity paraplegia    LABS:                        8.3    13.70 )-----------( 443      ( 27 Dec 2020 08:17 )             27.2     12-27    142  |  99  |  55  ----------------------------<  150  3.9   |  40  |  0.72    Ca    9.7      27 Dec 2020 08:17  Phos  3.4     12-26  Mg     2.1     12-26          eGFR if Non African American: 82 mL/min/1.73M2 (12-27-20 @ 08:17)  eGFR if African American: 95 mL/min/1.73M2 (12-27-20 @ 08:17)    PT/INR - ( 25 Dec 2020 05:30 )   PT: 11.8 sec;   INR: 0.97 ratio                         ABG - ( 25 Dec 2020 04:00 )  pH, Arterial: 7.44  pH, Blood: x     /  pCO2: 62    /  pO2: 104   / HCO3: x     / Base Excess: x     /  SaO2: 97                                    RADIOLOGY & ADDITIONAL TESTS: Personally reviewed.   < from: Xray Chest 1 View- PORTABLE-Routine (Xray Chest 1 View- PORTABLE-Routine in AM.) (12.26.20 @ 08:22) >    EXAM:  XR CHEST PORTABLE ROUTINE 1V      PROCEDURE DATE:  12/26/2020        INTERPRETATION:  HISTORY: ADMDIAG1: R06.89 OTHER ABNORMALITIES OF BREATHING/; eval pna; eval copd/pna;  TECHNIQUE: Portable frontal view of the chest, 1 view.  COMPARISON: 12/23/2020.  FINDINGS:    HEART:  Enlarged  LUNGS: free of consolidation or effusion.. Calcified right apical nodule.  BONES: degenerative changes    IMPRESSION:    Cardiomegaly.    No infiltrates.              OSITO PARK MD; Attending Interventional Radiologist  This document has been electronically signed. Dec 26 2020 12:57PM    < end of copied text >    Chest x-ray personally reviewed - no infiltrates   Consultant(s) Notes Reviewed:  [x] YES  [ ] NO  Discussed with Dr. Arreola, patient is under his care, .

## 2020-12-27 NOTE — PROGRESS NOTE ADULT - SUBJECTIVE AND OBJECTIVE BOX
CC: Patient is a 75y old  Female who presents with a chief complaint of Acute hypercapnic respiratory failure (27 Dec 2020 07:36)      S: Patient now AI. No events overnight. Continues to alternated between BIPAP and Oxymizer as needed. Tolerating oral diet.     Patient seen and examined at bedside.    ALLERGIES:  No Known Allergies      MEDICATIONS:  albuterol/ipratropium for Nebulization 3 milliLiter(s) Nebulizer every 6 hours  ALPRAZolam 0.25 milliGRAM(s) Oral every 8 hours PRN  amLODIPine   Tablet 5 milliGRAM(s) Oral daily  buDESOnide    Inhalation Suspension 0.5 milliGRAM(s) Inhalation every 12 hours  enoxaparin Injectable 40 milliGRAM(s) SubCutaneous daily  isosorbide   dinitrate Tablet (ISORDIL) 10 milliGRAM(s) Oral three times a day  levalbuterol Inhalation 1.25 milliGRAM(s) Inhalation every 6 hours  methylPREDNISolone sodium succinate Injectable 40 milliGRAM(s) IV Push every 8 hours  pantoprazole   Suspension 40 milliGRAM(s) Enteral Tube daily        Vital Signs Last 24 Hrs  T(F): 97.8 (27 Dec 2020 13:46), Max: 98.2 (26 Dec 2020 21:00)  HR: 116 (27 Dec 2020 13:46) (94 - 124)  BP: 112/70 (27 Dec 2020 13:46) (112/70 - 163/95)  RR: 20 (27 Dec 2020 13:46) (16 - 20)  SpO2: 96% (27 Dec 2020 13:46) (96% - 100%)  I&O's Summary    26 Dec 2020 07:01  -  27 Dec 2020 07:00  --------------------------------------------------------  IN: 710 mL / OUT: 950 mL / NET: -240 mL        PHYSICAL EXAM:  General: NAD  ENT: MMM  Neck: Supple, No JVD  Lungs: Supplemental O2 via NC, mildly diminished breath sound  Cardio: RRR, S1/S2, No murmurs  Abdomen: Soft, Nontender, Nondistended; Bowel sounds present  Extremities: No cyanosis, No edema  Neuro: no new deficits  Skin: no rashes  Psych: awake, alert. pleasantly confused with intermittent      LABS:                        8.3    13.70 )-----------( 443      ( 27 Dec 2020 08:17 )             27.2     12-27    142  |  99  |  55  ----------------------------<  150  3.9   |  40  |  0.72    Ca    9.7      27 Dec 2020 08:17  Phos  3.4     12-26  Mg     2.1     12-26      eGFR if Non African American: 82 mL/min/1.73M2 (12-27-20 @ 08:17)  eGFR if African American: 95 mL/min/1.73M2 (12-27-20 @ 08:17)    PT/INR - ( 25 Dec 2020 05:30 )   PT: 11.8 sec;   INR: 0.97 ratio                       ABG - ( 25 Dec 2020 04:00 )  pH, Arterial: 7.44  pH, Blood: x     /  pCO2: 62    /  pO2: 104   / HCO3: x     / Base Excess: x     /  SaO2: 97                                RADIOLOGY & ADDITIONAL TESTS:    Care Discussed with Consultants/Other Providers:

## 2020-12-27 NOTE — SWALLOW BEDSIDE ASSESSMENT ADULT - COMMENTS
Pt received at the bedside receiving 4L O2 via nasal canula.  Pt c/o sob h/e Dr. Arreola reported this is pt's baseline.  Pt denied pain throughout assessment.  CXR 12/26/20: Cardiomegaly, infiltrates.  CTH 12/19/20: No acute intracranial hemorrhage or mass effect. Age- related involutional changes and moderate chronic microangiopathy. Age-indeterminate left thalamic lacunar infarct.

## 2020-12-27 NOTE — PROGRESS NOTE ADULT - ASSESSMENT
75F with PMH of severe COPD (on home O2), former smoker, HTN, anxiety who had recent right femur fx at [place] she was discharged to [facility] on 12/18 where shortly after developed AMS and brought to [facility], found have acute hypercarbic respiratory failure and was unresponsive - intubated admitted to the ICU. Extubated 12/24, metabolic encephelopathy resolved. Downgraded to tele on 12/27.    #Acute Hypercarbic respiratory failure due to acute on chronic COPD exacerbation  #Severe COPD on home o2 (chronic hypoxic and hypercarbic respiratory Insufficiency)  -ICU downgrade 12/27  -Patient currently on   -Completed course of Azithromycin and Rocephin  -On Solu-medrol 40mg q 6 hours - Taper to 40mg q 8 hours. Taper to discontinuation.   -Continue Duoneb, Pulmicort  -Pulm consulted, recommendations appreciated    #Leukocytosis  -Likely due to steroid use  -No overt signs of infection, completed course of abx  -Afebrile  -Follow up AM BMP    #Anemia  -Likely anemia of chronic disease  -No overt signs of bleeding  -Monitor H/H    #Hypernatremia  -Could be due to poor PO intake  -Continue pureed diet, S&S evaluation  -PO encouraged    #Recent femur fracture  -weight bearing as tolerated  -PT evaluation    #Dysphagia   -Currently on purred diet, S&S evaluation     #Metabolic encephalopathy - resolved  #Lactic acidosis likely due to hypoxia - resolved    #HTN  -Chronic, stable  -Continue Norvasc, Isordil    #Anxiety  -Continue Xanax PRN     #Prophylactic Measure  DVT prophylaxis: Lovenox  GI prophylaxis: Protonix 75F with PMH of severe COPD (on home O2), former smoker, HTN, anxiety who had recent right femur fx at [place] she was discharged to [facility] on 12/18 where shortly after developed AMS and brought to [facility], found have acute hypercarbic respiratory failure and was unresponsive - intubated admitted to the ICU. Extubated 12/24, metabolic encephelopathy resolved. Downgraded to AI on 12/27.    #Acute Hypercarbic respiratory failure due to acute on chronic COPD exacerbation  #Severe COPD on home o2 (chronic hypoxic and hypercarbic respiratory Insufficiency)  -Patient currently on 4L NC  -Completed course of Azithromycin and Rocephin  -On Solu-medrol 40mg q 6 hours - Taper to 40mg q 8 hours. Taper to discontinuation. Discussed with Dr. Arreola, critical care attending. Patient under his care. Agreeable to continue steroid taper.   -Continue Duoneb, Pulmicort. Add Xopenx PRN.   -Pulm consulted, recommendations appreciated    #Leukocytosis  -Likely due to steroid use  -No overt signs of infection, completed course of abx  -Afebrile  -Follow up AM BMP    #Anemia  -Likely anemia of chronic disease  -No overt signs of bleeding  -Monitor H/H    #Hypernatremia  -Could be due to poor PO intake  -Continue pureed diet, S&S evaluation  -PO encouraged    #Recent femur fracture  -weight bearing as tolerated  -PT evaluation    #Dysphagia   -Currently on purred diet, S&S evaluation     #Metabolic encephalopathy - resolved  #Lactic acidosis likely due to hypoxia - resolved    #HTN  -Chronic, stable  -Continue Norvasc, Isordil    #Anxiety  -Continue Xanax PRN     #Prophylactic Measure  DVT prophylaxis: Lovenox  GI prophylaxis: Protonix      Patient under the care of Dr. Arreola, agreeable to medication adjustments. Patient in ICU step down, I will follow when patient transferred to tele.

## 2020-12-27 NOTE — SWALLOW BEDSIDE ASSESSMENT ADULT - SLP PERTINENT HISTORY OF CURRENT PROBLEM
75F with PMH of severe COPD (on home O2), former smoker, HTN, anxiety who had recent right femur fx at [place] she was discharged to [facility] on 12/18 where shortly after developed AMS and brought to [facility], found have acute hypercarbic respiratory failure and was unresponsive - intubated admitted to the ICU. Extubated 12/24, metabolic encephelopathy resolved. Downgraded to [facility] on 12/27.

## 2020-12-27 NOTE — SWALLOW BEDSIDE ASSESSMENT ADULT - SWALLOW EVAL: DIAGNOSIS
Pt presents with oral dysphagia exacerbated by pt's respiratory status. On this date, pt was trialed with thin liquids via cup sip and straw, puree consistencies, and mechanical soft consistencies.   Pt exhibited adequate oral prep, labored and incomplete mastication of mechanical soft consistencies and reduced coordination between respiration and mastication. Pt presented with suspected timely A-P transport of all consistencies with a sispected timely swallow trigger and adequate hyolaryngeal elevation and excursion upon digital palpation.  Pt exhibited no overt s/s of penetration/aspiration during assessment, h/e, pt at increased risk of aspiration with mechanical soft consistencies secondary to incomplete dentition and reduced coordination between respiration and mastication.

## 2020-12-27 NOTE — PROGRESS NOTE ADULT - ASSESSMENT
ASSESSMENT:  1. Acute Hypercarbic respiratory failure due to acute on chronic COPD exacerbation, in patient with Severe COPD on home o2 (chronic hypoxic and hypercarbic respiratory Insufficiency    S/p Extubation on 12/24/20     COVID/RVP Negative    Ex Smoker  2. Metabolic Encephelopathy due to above Resolved  3. Lactic Acidosis likely due to hypoxia Resolved    no source of infection found, normal UA, No PNA on CT  4. Recent Right femur fx   5. underlying Anxiety, HTN,     PLAN:  - N/C o2 with NIV qhs  - xanax for anxiety to be restarted   - BP control with Norvasc  - finished course of abx  - taper steroids and continue  bronchodilators via neb  - advance diet  - PT  - S&S eval

## 2020-12-28 ENCOUNTER — INPATIENT (INPATIENT)
Facility: HOSPITAL | Age: 75
LOS: 25 days | Discharge: NURSING, REHAB OR LONG-TERM CARE | DRG: 97 | End: 2021-01-23
Attending: HOSPITALIST | Admitting: PSYCHIATRY & NEUROLOGY
Payer: MEDICARE

## 2020-12-28 ENCOUNTER — TRANSCRIPTION ENCOUNTER (OUTPATIENT)
Age: 75
End: 2020-12-28

## 2020-12-28 VITALS
TEMPERATURE: 98 F | WEIGHT: 164.91 LBS | SYSTOLIC BLOOD PRESSURE: 146 MMHG | HEART RATE: 116 BPM | DIASTOLIC BLOOD PRESSURE: 70 MMHG | HEIGHT: 66 IN | OXYGEN SATURATION: 97 % | RESPIRATION RATE: 17 BRPM

## 2020-12-28 VITALS
OXYGEN SATURATION: 100 % | RESPIRATION RATE: 16 BRPM | TEMPERATURE: 98 F | DIASTOLIC BLOOD PRESSURE: 77 MMHG | HEART RATE: 115 BPM | SYSTOLIC BLOOD PRESSURE: 146 MMHG

## 2020-12-28 DIAGNOSIS — Z87.81 PERSONAL HISTORY OF (HEALED) TRAUMATIC FRACTURE: Chronic | ICD-10-CM

## 2020-12-28 LAB
ALBUMIN SERPL ELPH-MCNC: 3.7 G/DL — SIGNIFICANT CHANGE UP (ref 3.3–5)
ALP SERPL-CCNC: 83 U/L — SIGNIFICANT CHANGE UP (ref 40–120)
ALT FLD-CCNC: 37 U/L — SIGNIFICANT CHANGE UP (ref 10–45)
ANION GAP SERPL CALC-SCNC: 5 MMOL/L — SIGNIFICANT CHANGE UP (ref 5–17)
AST SERPL-CCNC: 20 U/L — SIGNIFICANT CHANGE UP (ref 10–40)
BILIRUB SERPL-MCNC: 0.3 MG/DL — SIGNIFICANT CHANGE UP (ref 0.2–1.2)
BUN SERPL-MCNC: 42 MG/DL — HIGH (ref 7–23)
CALCIUM SERPL-MCNC: 9.5 MG/DL — SIGNIFICANT CHANGE UP (ref 8.4–10.5)
CHLORIDE SERPL-SCNC: 96 MMOL/L — SIGNIFICANT CHANGE UP (ref 96–108)
CO2 SERPL-SCNC: 40 MMOL/L — HIGH (ref 22–31)
CREAT SERPL-MCNC: 0.54 MG/DL — SIGNIFICANT CHANGE UP (ref 0.5–1.3)
GLUCOSE SERPL-MCNC: 156 MG/DL — HIGH (ref 70–99)
HCT VFR BLD CALC: 27.5 % — LOW (ref 34.5–45)
HGB BLD-MCNC: 8.2 G/DL — LOW (ref 11.5–15.5)
MAGNESIUM SERPL-MCNC: 2.1 MG/DL — SIGNIFICANT CHANGE UP (ref 1.6–2.6)
MCHC RBC-ENTMCNC: 29.8 GM/DL — LOW (ref 32–36)
MCHC RBC-ENTMCNC: 29.9 PG — SIGNIFICANT CHANGE UP (ref 27–34)
MCV RBC AUTO: 100.4 FL — HIGH (ref 80–100)
NRBC # BLD: 0 /100 WBCS — SIGNIFICANT CHANGE UP (ref 0–0)
PLATELET # BLD AUTO: 453 K/UL — HIGH (ref 150–400)
POTASSIUM SERPL-MCNC: 4.8 MMOL/L — SIGNIFICANT CHANGE UP (ref 3.5–5.3)
POTASSIUM SERPL-SCNC: 4.8 MMOL/L — SIGNIFICANT CHANGE UP (ref 3.5–5.3)
PROT SERPL-MCNC: 5.7 G/DL — LOW (ref 6–8.3)
RBC # BLD: 2.74 M/UL — LOW (ref 3.8–5.2)
RBC # FLD: 14 % — SIGNIFICANT CHANGE UP (ref 10.3–14.5)
SODIUM SERPL-SCNC: 141 MMOL/L — SIGNIFICANT CHANGE UP (ref 135–145)
WBC # BLD: 16.9 K/UL — HIGH (ref 3.8–10.5)
WBC # FLD AUTO: 16.9 K/UL — HIGH (ref 3.8–10.5)

## 2020-12-28 PROCEDURE — 93010 ELECTROCARDIOGRAM REPORT: CPT | Mod: GC

## 2020-12-28 PROCEDURE — 99285 EMERGENCY DEPT VISIT HI MDM: CPT | Mod: CS,GC

## 2020-12-28 RX ORDER — ISOSORBIDE DINITRATE 5 MG/1
1 TABLET ORAL
Qty: 0 | Refills: 0 | DISCHARGE
Start: 2020-12-28

## 2020-12-28 RX ORDER — SODIUM CHLORIDE 9 MG/ML
1000 INJECTION, SOLUTION INTRAVENOUS
Refills: 0 | Status: DISCONTINUED | OUTPATIENT
Start: 2020-12-28 | End: 2020-12-31

## 2020-12-28 RX ORDER — BUDESONIDE, MICRONIZED 100 %
0.5 POWDER (GRAM) MISCELLANEOUS
Qty: 0 | Refills: 0 | DISCHARGE
Start: 2020-12-28

## 2020-12-28 RX ORDER — LEVALBUTEROL 1.25 MG/.5ML
3 SOLUTION, CONCENTRATE RESPIRATORY (INHALATION)
Qty: 0 | Refills: 0 | DISCHARGE
Start: 2020-12-28

## 2020-12-28 RX ORDER — PANTOPRAZOLE SODIUM 20 MG/1
0 TABLET, DELAYED RELEASE ORAL
Qty: 0 | Refills: 0 | DISCHARGE
Start: 2020-12-28

## 2020-12-28 RX ORDER — ENOXAPARIN SODIUM 100 MG/ML
40 INJECTION SUBCUTANEOUS
Qty: 0 | Refills: 0 | DISCHARGE
Start: 2020-12-28

## 2020-12-28 RX ADMIN — ISOSORBIDE DINITRATE 10 MILLIGRAM(S): 5 TABLET ORAL at 05:57

## 2020-12-28 RX ADMIN — ENOXAPARIN SODIUM 40 MILLIGRAM(S): 100 INJECTION SUBCUTANEOUS at 13:01

## 2020-12-28 RX ADMIN — ISOSORBIDE DINITRATE 10 MILLIGRAM(S): 5 TABLET ORAL at 14:30

## 2020-12-28 RX ADMIN — AMLODIPINE BESYLATE 5 MILLIGRAM(S): 2.5 TABLET ORAL at 05:56

## 2020-12-28 RX ADMIN — SODIUM CHLORIDE 75 MILLILITER(S): 9 INJECTION, SOLUTION INTRAVENOUS at 21:31

## 2020-12-28 RX ADMIN — Medication 40 MILLIGRAM(S): at 14:29

## 2020-12-28 RX ADMIN — PANTOPRAZOLE SODIUM 40 MILLIGRAM(S): 20 TABLET, DELAYED RELEASE ORAL at 13:01

## 2020-12-28 RX ADMIN — LEVALBUTEROL 1.25 MILLIGRAM(S): 1.25 SOLUTION, CONCENTRATE RESPIRATORY (INHALATION) at 09:35

## 2020-12-28 RX ADMIN — Medication 0.5 MILLIGRAM(S): at 09:32

## 2020-12-28 RX ADMIN — LEVALBUTEROL 1.25 MILLIGRAM(S): 1.25 SOLUTION, CONCENTRATE RESPIRATORY (INHALATION) at 16:27

## 2020-12-28 RX ADMIN — LEVALBUTEROL 1.25 MILLIGRAM(S): 1.25 SOLUTION, CONCENTRATE RESPIRATORY (INHALATION) at 05:01

## 2020-12-28 RX ADMIN — Medication 40 MILLIGRAM(S): at 05:57

## 2020-12-28 NOTE — PROGRESS NOTE ADULT - SUBJECTIVE AND OBJECTIVE BOX
Follow-up Critical Care Progress Note  Chief Complaint : Other abnormality of breathing      patient seen and examined  complain of 1 hour of LLE feeling trapped  unable to move and having no sensation  + pulses noted distally no cyanosis noted    no facial droop or numbness       Allergies :No Known Allergies      PAST MEDICAL & SURGICAL HISTORY:  HTN (hypertension)    Anxiety    Advanced COPD  on home O2    Status post closed fracture of right femur  surgical repair done at [place]        Medications:  MEDICATIONS  (STANDING):  amLODIPine   Tablet 5 milliGRAM(s) Oral daily  buDESOnide    Inhalation Suspension 0.5 milliGRAM(s) Inhalation every 12 hours  enoxaparin Injectable 40 milliGRAM(s) SubCutaneous daily  isosorbide   dinitrate Tablet (ISORDIL) 10 milliGRAM(s) Oral three times a day  levalbuterol Inhalation 1.25 milliGRAM(s) Inhalation every 6 hours  methylPREDNISolone sodium succinate Injectable 40 milliGRAM(s) IV Push every 8 hours  pantoprazole   Suspension 40 milliGRAM(s) Enteral Tube daily    MEDICATIONS  (PRN):  ALPRAZolam 0.25 milliGRAM(s) Oral every 8 hours PRN anxiety    COVID  12-19-20 @ 08:00  COVID -   NotDetec  12-19-20 @ 00:15  COVID -   [facility]      COVID Biomarkers    12-26-20 @ 05:00 ESR --  ---  CRP --  ---  DDimer  462<H>   ---   LDH --   ---   Ferritin --    12-24-20 @ 06:30 ESR --  ---  CRP 0.19  ---  DDimer  --   ---   LDH --   ---   Ferritin --          Covid 19 IgG ab Index/Interpretation 12-19-20 @ 07:30 <0.10 / Negative    Trend Cardiac Enzymes    Trend BNP  12-23-20 @ 05:45   -  589<H>    Procalcitonin Trend  12-23-20 @ 05:45   -   0.27<H>  12-21-20 @ 06:30   -   1.16<H>  12-19-20 @ 07:30   -   1.20<H>    WBC Trend  12-27-20 @ 08:17   -  13.70<H>  12-26-20 @ 05:00   -  16.80<H>    H/H Trend  12-27-20 @ 08:17   -  8.3<L> / 27.2<L>  12-26-20 @ 05:00   -  8.3<L> / 27.9<L>    Platelet Trend  12-27-20 @ 08:17   -  443<H>  12-26-20 @ 05:00   -  461<H>    Trend Sodium  12-27-20 @ 08:17   -  142  12-26-20 @ 05:00   -  148<H>    Trend Potassium  12-27-20 @ 08:17   -  3.9  12-26-20 @ 05:00   -  3.9    Trend Bun/Cr  12-27-20 @ 08:17  BUN/CR -  55<H> / 0.72  12-26-20 @ 05:00  BUN/CR -  44<H> / 0.62    Lactic Acid Trend  12-21-20 @ 06:30   -   1.0  12-19-20 @ 07:30   -   2.2<H>  12-19-20 @ 03:40   -   3.2<H>  12-19-20 @ 00:10   -   5.0<HH>    ABG Trend  12-25-20 @ 04:00   - 7.44/62<H>/104/97<H>  12-24-20 @ 13:25   - 7.44/63<H>/74/94  12-24-20 @ 11:10   - 7.44/59<H>/62<L>/91<L>  12-23-20 @ 09:10   - 7.21<L>/>103/104/95        LABS:                        8.3    13.70 )-----------( 443      ( 27 Dec 2020 08:17 )             27.2     12-27    142  |  99  |  55<H>  ----------------------------<  150<H>  3.9   |  40<H>  |  0.72    Ca    9.7      27 Dec 2020 08:17        VITALS:  T(C): 36.6 (12-28-20 @ 12:13), Max: 36.8 (12-28-20 @ 00:00)  T(F): 97.8 (12-28-20 @ 12:13), Max: 98.3 (12-28-20 @ 00:00)  HR: 98 (12-28-20 @ 14:30) (97 - 125)  BP: 147/78 (12-28-20 @ 14:30) (116/77 - 147/78)  BP(mean): --  ABP: --  ABP(mean): --  RR: 17 (12-28-20 @ 12:13) (17 - 19)  SpO2: 100% (12-28-20 @ 12:13) (94% - 100%)  CVP(mm Hg): --  CVP(cm H2O): --    Ins and Outs     12-27-20 @ 07:01  -  12-28-20 @ 07:00  --------------------------------------------------------  IN: 240 mL / OUT: 600 mL / NET: -360 mL    12-28-20 @ 07:01  -  12-28-20 @ 15:31  --------------------------------------------------------  IN: 400 mL / OUT: 400 mL / NET: 0 mL                I&O's Detail    27 Dec 2020 07:01  -  28 Dec 2020 07:00  --------------------------------------------------------  IN:    Oral Fluid: 240 mL  Total IN: 240 mL    OUT:    Voided (mL): 600 mL  Total OUT: 600 mL    Total NET: -360 mL      28 Dec 2020 07:01  -  28 Dec 2020 15:31  --------------------------------------------------------  IN:    Oral Fluid: 400 mL  Total IN: 400 mL    OUT:    Voided (mL): 400 mL  Total OUT: 400 mL    Total NET: 0 mL

## 2020-12-28 NOTE — CONSULT NOTE ADULT - ASSESSMENT
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  History is vague and maybe subacute or chronic but an acute decompensation is suspected.  R/O acute spinal cord lesion such as spinal cord infarct or mass.  R/O peripheral neuropathy.    REC:  transfer for complete spinal MRIs.

## 2020-12-28 NOTE — ED ADULT NURSE NOTE - OBJECTIVE STATEMENT
74 y/o female PMHX COPD, RIGHT HIP FX, BIBA as per EMS pt was at [facility] 3 weeks ago for right hip fx, sent to rehab and transferred to [facility] for respiratory failure, pt was intubated, then extubated, after pt was not able to move her legs, sent to [facility] to R/O spinal infarct. pt denies any CP or SOB, breathing unlabored, chest rise symmetrical, pt denies any abdominal tenderness, denies any urinary symptoms, pt has equal strength in bilateral arms, no facial droop noted, pt has no sensation or movement in bilateral legs. pt has stage 2 pressure ulcer on sacral region, pt placed on cardiac monitor, safety precautions in place, call bell in reach.

## 2020-12-28 NOTE — ED PROVIDER NOTE - PHYSICAL EXAMINATION
Vitals: I have reviewed the patients vital signs  General: well appearing, well nourished, no acute distress on home oxygen  HEENT: atraumatic, normocephalic, airway patent, EOMI and appropriate tracking  Neck: no JVD, no tracheal deviation  Chest/Lungs: no trauma, symmetric chest rise, speaking in complete sentences  Heart: Regular rate, regular rhythm, skin well perfused  Abdomen: Soft and nontender throughout, no rebound or guarding  Neuro: A+Ox3, long pauses before speaking, gait unable to be assessed, weak upper extremities, absent strength LE.   Eyes: PERRL, EOMI, no conjunctival injection   MSK: bilateral LE contractures, absent movement, decreased sensation thoughout, most notably absent distal to ankle. pulses present.   Skin: no bleeding, no cyanosis, no jaundice, no new emergent lesions

## 2020-12-28 NOTE — DISCHARGE NOTE PROVIDER - NSDCMRMEDTOKEN_GEN_ALL_CORE_FT
ALPRAZolam 0.25 mg oral tablet: 1 tab(s) orally 2 times a day, As Needed  amLODIPine 5 mg oral tablet: 1 tab(s) orally once a day  Breo Ellipta 100 mcg-25 mcg/inh inhalation powder: 1 puff(s) inhaled once a day  budesonide: 0.5 milligram(s) inhaled 2 times a day  enoxaparin: 40 milligram(s) subcutaneous once a day  Incruse Ellipta 62.5 mcg/inh inhalation powder: 1 puff(s) inhaled every 24 hours  isosorbide dinitrate 10 mg oral tablet: 1 tab(s) orally 3 times a day  levalbuterol 1.25 mg/3 mL inhalation solution: 3 milliliter(s) inhaled every 6 hours  methylPREDNISolone: 40 milligram(s) injectable every 8 hours  pantoprazole 40 mg oral granule, delayed release:  orally

## 2020-12-28 NOTE — ED PROVIDER NOTE - ATTENDING CONTRIBUTION TO CARE
------------ATTENDING NOTE------------   pt transferred for NSGY evaluation and MRI spine, recent extubation from respiratory arrest/COPD, no w/ significantly decreased sensation/strength in BLE, evaluated by NSGY on arrival, has rectal tone, is continent of urine, has slight sensation intact in BLE, ? related to deconditioning, awaiting imaging and NSGY consult -->  - Tmi Holcomb MD    ----------------------------------------------- ------------ATTENDING NOTE------------   pt transferred for NSGY evaluation and MRI spine, recent extubation from respiratory arrest/COPD, no w/ significantly decreased sensation/strength in BLE, evaluated by NSGY on arrival, has rectal tone, is continent of urine, has slight sensation intact in BLE, ? related to deconditioning, awaiting imaging and NSGY consult --> remained stable, pending MRI imaging and NSGY consult for recs, will need admission for continue w/u, tx, optimize medical mgmt, PT/OT, outpt needs assessment.  - Tim Holcomb MD    -----------------------------------------------

## 2020-12-28 NOTE — ED ADULT NURSE NOTE - CHIEF COMPLAINT QUOTE
transfer from [facility] for MRI of spine to r/o spinal infarct   bilateral loss of sensation to legs

## 2020-12-28 NOTE — PROVIDER CONTACT NOTE (OTHER) - BACKGROUND
pt states loss of movement happened 1 hour ago, then changed her story to it started days to weeks ago. pt is poor historian.

## 2020-12-28 NOTE — CHART NOTE - NSCHARTNOTEFT_GEN_A_CORE
Nutrition Follow Up Note  Hospital Course (Per Electronic Medical Record):   Source: Medical Record [X] Nursing Staff [X]     Diet: Glucerna 1.5 @ 50cc/hr x 24hrs     Patient remains intubated . tolerating EN feeds @ 50cc/hr , POCT noted (+) steroids noted , Current EN feeds are meeting assessed nutrition needs     Current Weight: (12/23) 134.4/61kg , stable weight since admission     Pertinent Medications: MEDICATIONS  (STANDING):  albuterol/ipratropium for Nebulization 3 milliLiter(s) Nebulizer every 6 hours  ALPRAZolam 0.5 milliGRAM(s) Oral every 12 hours  azithromycin  IVPB      azithromycin  IVPB 500 milliGRAM(s) IV Intermittent every 24 hours  buDESOnide    Inhalation Suspension 0.5 milliGRAM(s) Inhalation every 12 hours  cefTRIAXone   IVPB      cefTRIAXone   IVPB 1000 milliGRAM(s) IV Intermittent every 24 hours  chlorhexidine 0.12% Liquid 15 milliLiter(s) Oral Mucosa every 12 hours  dexMEDEtomidine Infusion 0.7 MICROgram(s)/kG/Hr (10.5 mL/Hr) IV Continuous <Continuous>  enoxaparin Injectable 40 milliGRAM(s) SubCutaneous daily  isosorbide   dinitrate Tablet (ISORDIL) 10 milliGRAM(s) Oral three times a day  methylPREDNISolone sodium succinate Injectable 40 milliGRAM(s) IV Push every 6 hours  pantoprazole   Suspension 40 milliGRAM(s) Enteral Tube daily    MEDICATIONS  (PRN):      Pertinent Labs:  12-23 Na142 mmol/L Glu 145 mg/dL<H> K+ 4.0 mmol/L Cr  0.67 mg/dL BUN 52 mg/dL<H> 12-23 Phos 4.2 mg/dL 12-22 Alb 2.9 g/dL<L>        Skin: Stage II buttock, suggest MVI, Vit C 500mg BID      Edema: none     Last BM: none , PTA     Estimated Needs:   [X] No Change since Previous Assessment      Previous Nutrition Diagnosis: Inadequate Oral Nutrition , addressed with EN feeds , Increased nutrient needs, , ongoing.        New Nutrition Diagnosis: [X] Not Applicable      Interventions:   1. Recommend continue current EN feeds .   2. MVI ,Vit C     Monitoring & Evaluation: will monitor:  [X] Weights   [X] Tolerance to EN feeds   [X] Follow Up (Per Protocol)        RD to follow as per Nutrition protocol  Mehreen Gilbert RDN
Time of evaluation: 19:28    Called to evaluate patient for restraints. Patient is currently intubated and is keep pulling at her endotracheal tube. If patient self extubates, her outcomes will be poor.        Other interventions attempted: de-escalation, orientation check, environment modification, medication assessment    REVIEW OF SYSTEMS:  CONSTITUTIONAL: [  ] fever   [  ] fatigue  EYES: [  ] visual disturbances  ENMT:  [  ]difficulty hearing  [  ] throat pain  RESPIRATORY:  [  ]cough  [   ] wheezing  [   ] hemoptysis [  ] shortness of breath  CARDIOVASCULAR: [  ]chest pain  [  ] palpitations   GASTROINTESTINAL: [  ]  abdominal pain [  ] nausea [  ] vomiting  [  ] diarrhea  [  ] constipation  GENITOURINARY: [  ] dysuria [  ] frequency  [  ] hematuria [  ] incontinence  NEUROLOGICAL: [  ] headaches [  ] new numbness  SKIN: [  ]itching [  ] new rash   MUSCULOSKELETAL: [  ] back pain  [  ] extremity pain  PSYCHIATRIC: [  ] depression  [  ] anxiety  [  ] mood swings [  ] difficulty sleeping  ALLERGY AND IMMUNOLOGIC: [  ] hives    [ X ]Unable to perfrom ROS due to: patient intubated  [  ] ROS reviewed and all negative    PAST MEDICAL & SURGICAL HISTORY:  HTN (hypertension)    Anxiety    Advanced COPD  on home O2    Status post closed fracture of right femur  surgical repair done at [place]      MEDICATIONS  (STANDING):  ALBUTerol    90 MICROgram(s) HFA Inhaler 2 Puff(s) Inhalation every 4 hours  azithromycin  IVPB      azithromycin  IVPB 500 milliGRAM(s) IV Intermittent every 24 hours  chlorhexidine 0.12% Liquid 15 milliLiter(s) Oral Mucosa every 12 hours  dexMEDEtomidine Infusion 0.2 MICROgram(s)/kG/Hr (3.01 mL/Hr) IV Continuous <Continuous>  enoxaparin Injectable 40 milliGRAM(s) SubCutaneous daily  lactated ringers. 1000 milliLiter(s) (1000 mL/Hr) IV Continuous <Continuous>  methylPREDNISolone sodium succinate Injectable 40 milliGRAM(s) IV Push every 6 hours  morphine  - Injectable 2 milliGRAM(s) IV Push once  pantoprazole   Suspension 40 milliGRAM(s) Enteral Tube daily    MEDICATIONS  (PRN):  ALBUTerol    90 MICROgram(s) HFA Inhaler 2 Puff(s) Inhalation once PRN Bronchospasm                          8.3    10.84 )-----------( 392      ( 20 Dec 2020 13:25 )             27.0     12-20    137  |  94<L>  |  35<H>  ----------------------------<  130<H>  4.4   |  38<H>  |  0.81    Ca    9.5      20 Dec 2020 04:55  Phos  3.7     12-20  Mg     2.0     12-20    TPro  6.6  /  Alb  3.2<L>  /  TBili  0.5  /  DBili  x   /  AST  112<H>  /  ALT  144<H>  /  AlkPhos  95  12-20      Vital Signs Last 24 Hrs  T(C): 36.4 (20 Dec 2020 18:00), Max: 36.8 (20 Dec 2020 15:00)  T(F): 97.6 (20 Dec 2020 18:00), Max: 98.2 (20 Dec 2020 15:00)  HR: 90 (20 Dec 2020 18:00) (74 - 108)  BP: 164/85 (20 Dec 2020 18:00) (82/57 - 175/86)  BP(mean): 105 (20 Dec 2020 18:00) (63 - 127)  RR: 20 (20 Dec 2020 18:00) (16 - 27)  SpO2: 96% (20 Dec 2020 18:00) (88% - 100%)    Physical Examination:  General: No acute distress.    HEENT: Pupils equal, round, reactive to light. Symmetric.  PULM: b/l wheezes, no significant sputum production  CVS: Regular rate and rhythm, no murmurs, rubs, or gallops  ABD: Soft, nondistended, nontender, normoactive bowel sounds, no masses  EXT: No edema, nontender  SKIN: Warm and well perfused.      [  ] Unable to perform physical exam due to    [X ] I have evaluated this patient and have determined that restraints are warranted to optimize medical care. Patient was assessed for current physical and psychological risk factors as well as special needs. There are no medical conditions or limitations that would place this patient at risk while in restraints.    Type of restraint: Bilateral soft wrist restraints    Behavioral criteria for discontinuation of restraint: [ X ] See order    Attending MD Aware
NUTRITION FOLLOW UP    SOURCE: Patient [ )   Family [ ]    Medical Record (X)    Diet, Dysphagia 1 Pureed-Thin Liquids:   Supplement Feeding Modality:  Oral  Ensure Enlive Servings Per Day:  1       Frequency:  Daily (12-28-20 @ 08:25) [Pending Verification By Attending]    PATIENT REPORT[ ] nausea  [ ] vomiting [ ] diarrhea [x] constipation  [ ]chewing problems [ ] swallowing issues  [ ] other: no GI distress    PO INTAKE:   75-80% on puree/thin diet and tolerating per MD. Recommend Ensure Enlive daily to encourage PO. Had been on EN until 12/25 when diet was advanced. Continues on steroids and antibiotics. No BM since 12/24.     Enteral Nutrition : discontinued on 12/25              CURRENT WEIGHT:  132# (12/28)  134# (12/25)    PERTINENT MEDS:   Pertinent Medications: MEDICATIONS  (STANDING):  amLODIPine   Tablet 5 milliGRAM(s) Oral daily  buDESOnide    Inhalation Suspension 0.5 milliGRAM(s) Inhalation every 12 hours  enoxaparin Injectable 40 milliGRAM(s) SubCutaneous daily  isosorbide   dinitrate Tablet (ISORDIL) 10 milliGRAM(s) Oral three times a day  levalbuterol Inhalation 1.25 milliGRAM(s) Inhalation every 6 hours  methylPREDNISolone sodium succinate Injectable 40 milliGRAM(s) IV Push every 8 hours  pantoprazole   Suspension 40 milliGRAM(s) Enteral Tube daily    MEDICATIONS  (PRN):  ALPRAZolam 0.25 milliGRAM(s) Oral every 8 hours PRN anxiety      PERTINENT LABS:  12-27 Na142 mmol/L Glu 150 mg/dL<H> K+ 3.9 mmol/L Cr  0.72 mg/dL BUN 55 mg/dL<H> 12-26 Phos 3.4 mg/dL 12-24 Alb 2.8 g/dL<L>      SKIN:  hx stage 2. Per flow sheet area is healed.   EDEMA: none noted  LAST BM:  12/24. Bowel regimen per medical team.     ESTIMATED NEEDS:   [X] no change since previous assessment  [ ] recalculated:     PREVIOUS NUTRITION DIAGNOSIS:  increased nutrient needs; added Ensure Enlive daily. Inadequate oral intakes improving; on PO diet.    NUTRITION RECOMMENDATIONS:   1. Consistency and texture per SLP recommendations.   2. Provide assistance with PO as needed.   3. Recommend daily Ensure Enlive. (350 calories/20g protein).    MONITORING AND EVALUATION:   1. Tolerance to diet prescription   2. PO intake  3. Weights  4. Labs  5. Follow Up per protocol     RD to remain available   Mell Melgar RDN #[id number]

## 2020-12-28 NOTE — PROVIDER CONTACT NOTE (OTHER) - SITUATION
Pt systolic BP down in 80's since approx 2100.  Urine output only 100cc total since 1900.  Patient not on feedings or any IV fluids.
pt alert and oriented x2-3, confused at times. pt here for Alt ms from [facility]. pt s/p hip fx and surgery prior  to adm. pt c/o of unable to move extremities to MD Arreola.

## 2020-12-28 NOTE — DISCHARGE NOTE PROVIDER - HOSPITAL COURSE
Patient is a 75y old  Female who presents with a chief complaint of Acute hypercapnic respiratory failure (28 Dec 2020 15:31)      BRIEF HOSPITAL COURSE: ***    Events last 24 hours: ***    PAST MEDICAL & SURGICAL HISTORY:  HTN (hypertension)    Anxiety    Advanced COPD  on home O2    Status post closed fracture of right femur  surgical repair done at [place]          Medications:    amLODIPine   Tablet 5 milliGRAM(s) Oral daily  isosorbide   dinitrate Tablet (ISORDIL) 10 milliGRAM(s) Oral three times a day    buDESOnide    Inhalation Suspension 0.5 milliGRAM(s) Inhalation every 12 hours  levalbuterol Inhalation 1.25 milliGRAM(s) Inhalation every 6 hours    ALPRAZolam 0.25 milliGRAM(s) Oral every 8 hours PRN      enoxaparin Injectable 40 milliGRAM(s) SubCutaneous daily    pantoprazole   Suspension 40 milliGRAM(s) Enteral Tube daily      methylPREDNISolone sodium succinate Injectable 40 milliGRAM(s) IV Push every 12 hours                  ICU Vital Signs Last 24 Hrs  T(C): 36.6 (28 Dec 2020 12:13), Max: 36.8 (28 Dec 2020 00:00)  T(F): 97.8 (28 Dec 2020 12:13), Max: 98.3 (28 Dec 2020 00:00)  HR: 98 (28 Dec 2020 14:30) (97 - 125)  BP: 147/78 (28 Dec 2020 14:30) (116/77 - 147/78)  BP(mean): --  ABP: --  ABP(mean): --  RR: 17 (28 Dec 2020 12:13) (17 - 19)  SpO2: 100% (28 Dec 2020 12:13) (94% - 100%)          I&O's Detail    27 Dec 2020 07:01  -  28 Dec 2020 07:00  --------------------------------------------------------  IN:    Oral Fluid: 240 mL  Total IN: 240 mL    OUT:    Voided (mL): 600 mL  Total OUT: 600 mL    Total NET: -360 mL      28 Dec 2020 07:01  -  28 Dec 2020 16:11  --------------------------------------------------------  IN:    Oral Fluid: 400 mL  Total IN: 400 mL    OUT:    Voided (mL): 400 mL  Total OUT: 400 mL Patient is a 75y old  Female who presents with a chief complaint of Acute hypercapnic respiratory failure (28 Dec 2020 15:31)      BRIEF HOSPITAL COURSE:   75 year old female with h/o Severe COPD on home o2, ex smoker, HTN, anxiety who had recent right femur fx at [place] she was discharged [facility]18 where shortly after developed AMS and brought to Hospital, Here found to have acute hypercarbic respiratory failure (PCO2 >99) and was unresponsive so she was intubated.    Patient was extubated directly to BiPAP on 12/26/20, she has been able to toelrate BiPAP free periods, but does appears anxious when off BiPAP. Otherwise, she has been doing well since being extubated, with pending discharge plans to rehab.    Events last 24 hours:   Patient was found to be weak in bilateral lower extremities. Patient admits to having weakness to bilateral lower extremities the last several days, but she is a poor historian.  SShe is now being transferred to [facility] [place] for MRI Lumbar Spine fo further evaluation and suspected spinal shock.    PAST MEDICAL & SURGICAL HISTORY:  HTN (hypertension)  Anxiety  Advanced COPD on home O2  Status post closed fracture of right femur  surgical repair done at [place]          Medications:    amLODIPine   Tablet 5 milliGRAM(s) Oral daily  isosorbide   dinitrate Tablet (ISORDIL) 10 milliGRAM(s) Oral three times a day  buDESOnide    Inhalation Suspension 0.5 milliGRAM(s) Inhalation every 12 hours  levalbuterol Inhalation 1.25 milliGRAM(s) Inhalation every 6 hours  ALPRAZolam 0.25 milliGRAM(s) Oral every 8 hours PRN  enoxaparin Injectable 40 milliGRAM(s) SubCutaneous daily  pantoprazole   Suspension 40 milliGRAM(s) Enteral Tube daily  methylPREDNISolone sodium succinate Injectable 40 milliGRAM(s) IV Push every 12 hours        ICU Vital Signs Last 24 Hrs  T(C): 36.6 (28 Dec 2020 12:13), Max: 36.8 (28 Dec 2020 00:00)  T(F): 97.8 (28 Dec 2020 12:13), Max: 98.3 (28 Dec 2020 00:00)  HR: 98 (28 Dec 2020 14:30) (97 - 125)  BP: 147/78 (28 Dec 2020 14:30) (116/77 - 147/78)  BP(mean): --  ABP: --  ABP(mean): --  RR: 17 (28 Dec 2020 12:13) (17 - 19)  SpO2: 100% (28 Dec 2020 12:13) (94% - 100%)          I&O's Detail    27 Dec 2020 07:01  -  28 Dec 2020 07:00  --------------------------------------------------------  IN:    Oral Fluid: 240 mL  Total IN: 240 mL    OUT:    Voided (mL): 600 mL  Total OUT: 600 mL    Total NET: -360 mL      28 Dec 2020 07:01  -  28 Dec 2020 16:11  --------------------------------------------------------  IN:    Oral Fluid: 400 mL  Total IN: 400 mL    OUT:    Voided (mL): 400 mL  Total OUT: 400 mL Patient is a 75y old  Female who presents with a chief complaint of Acute hypercapnic respiratory failure (28 Dec 2020 15:31)      BRIEF HOSPITAL COURSE:   75 year old female with h/o Severe COPD on home o2, ex smoker, HTN, anxiety who had recent right femur fx at [place] she was discharged to [facility] on 12/18 where shortly after developed AMS and brought to Hospital, Here found to have acute hypercarbic respiratory failure (PCO2 >99) and was unresponsive so she was intubated.    Patient was extubated directly to BiPAP on 12/26/20, she has been able to toelrate BiPAP free periods, but does appears anxious when off BiPAP. Otherwise, she has been doing well since being extubated, with pending discharge plans to rehab.    Events last 24 hours:   Patient was found to be weak in bilateral lower extremities. Patient admits to having weakness to bilateral lower extremities the last several days, but she is a poor historian.  SShe is now being transferred to [facility] [place] for MRI Lumbar Spine fo further evaluation and suspected spinal shock.    PAST MEDICAL & SURGICAL HISTORY:  HTN (hypertension)  Anxiety  Advanced COPD on home O2  Status post closed fracture of right femur  surgical repair done at [place]          Medications:    amLODIPine   Tablet 5 milliGRAM(s) Oral daily  isosorbide   dinitrate Tablet (ISORDIL) 10 milliGRAM(s) Oral three times a day  buDESOnide    Inhalation Suspension 0.5 milliGRAM(s) Inhalation every 12 hours  levalbuterol Inhalation 1.25 milliGRAM(s) Inhalation every 6 hours  ALPRAZolam 0.25 milliGRAM(s) Oral every 8 hours PRN  enoxaparin Injectable 40 milliGRAM(s) SubCutaneous daily  pantoprazole   Suspension 40 milliGRAM(s) Enteral Tube daily  methylPREDNISolone sodium succinate Injectable 40 milliGRAM(s) IV Push every 12 hours        ICU Vital Signs Last 24 Hrs  T(C): 36.6 (28 Dec 2020 12:13), Max: 36.8 (28 Dec 2020 00:00)  T(F): 97.8 (28 Dec 2020 12:13), Max: 98.3 (28 Dec 2020 00:00)  HR: 98 (28 Dec 2020 14:30) (97 - 125)  BP: 147/78 (28 Dec 2020 14:30) (116/77 - 147/78)  BP(mean): --  ABP: --  ABP(mean): --  RR: 17 (28 Dec 2020 12:13) (17 - 19)  SpO2: 100% (28 Dec 2020 12:13) (94% - 100%)    For full account of hospital course please refer to actual medical records. As this is a brief summery.

## 2020-12-28 NOTE — ED PROVIDER NOTE - PROGRESS NOTE DETAILS
Deon: NSGY aware of atient after arrival rec MR total spine, ordered, mri tech contacted and pt will be able to go up once screening form completed. lauren pgy3: received s/o on pt. Patient reassessed, NAD, non-toxic appearing. Dr. Chavarria updated MR to protocol MR for eval for anterior spinal cord infarction. contacted MR, states they have sent for transport for pt. lauren pgy3: Patient reassessed, NAD, non-toxic appearing. possible myelitis on MR. no evidence cord compression/spinal infarct. will require IV studies. eval by neurology. accepted to service. lauren pgy3: pt w/ acute desat, dyspnea. placed on bipap. updated medicine team and neuro, admission changed from neuro. lauren pgy3: pt w/ acute desat, dyspnea. placed on bipap. updated medicine team and neuro, admission changed from neuro to medicine. Attending Catrina:  pt on bipap, solumedrol ordered, requested pt receive all three duonebs as resp is at bs. abg shows elevated c02, Discussed w/ medicine who is at the bedside. They agree with repeat ABG. they will be assuming care of patient Tee CROWE MD PGY3: Rpt ABG pending. Initial clotted. MICu at bedside. Agree patient is mentating and not in distres at this time. Rec f/u ABG, if improved can go to floor, if not will call back. Tee CROWE MD PGY3: MICU declined patient at this time. Signed out to [facility] as patient to go to floors. Comfortable with plan. Patient appears clinically improved from when evaluated at 7:30 am.

## 2020-12-28 NOTE — ED PROVIDER NOTE - CLINICAL SUMMARY MEDICAL DECISION MAKING FREE TEXT BOX
see MD Note see MD Note    Deon: 75 f recently extubated and discharged from ICU here with bilateral LE weakness and decreased sensation, unclear onset. Sent from OSH for eval cord compression. intact rectal tone, not incontnent, not able to move LE distal to knee, decreased sensation distal to knee, absent distal to ankle, weak UE, tachy. Concerning for cord compresssion, nsgy aware will get MR spine. TBA.

## 2020-12-28 NOTE — DISCHARGE NOTE PROVIDER - NSDCCPCAREPLAN_GEN_ALL_CORE_FT
PRINCIPAL DISCHARGE DIAGNOSIS  Diagnosis: Carbon dioxide narcosis  Assessment and Plan of Treatment:       SECONDARY DISCHARGE DIAGNOSES  Diagnosis: Unresponsive state  Assessment and Plan of Treatment:

## 2020-12-28 NOTE — ED PROVIDER NOTE - OBJECTIVE STATEMENT
76 y/o F hx ?COPD no other stated medical problems, recently intubated, here with bilateral lower extremity weakness and decreased sensation. Unclear onset. Pt states one week approximately but unclear. States normally able to ambulate without assistance. States no pain, "feels dead". Able to urinate and defecate normally no incontinence. Denies trauma. No known hx of malignancy. Txfer for eval poss cord compression.

## 2020-12-28 NOTE — DISCHARGE NOTE PROVIDER - CARE PROVIDER_API CALL
Carlos Mckoen (MD; MS)  Unallocated  [facility], [address] [place] [address]  Phone: [phone]  Fax: [phone]  Follow Up Time:

## 2020-12-28 NOTE — GOALS OF CARE CONVERSATION - ADVANCED CARE PLANNING - CONVERSATION DETAILS
Met with patient at bedside. She is awake and alert. oriented to person and place. She acknowledges that her niece is her HCP. States she lives alone in [place] with her 3 cats. Has MD come to her (DR. Peña?), somewhat difficult to understand patient as she is edentulous, has 1 front bottom tooth. Pt. did not remember that she has been seen by PT. Attempted to initiate GOC discussion, but patient appeared uncomfortable, and was not able to state what her wishes would be in the event of cardiac arrest. She stated she was alright with me speaking to her niece. Jaky left for [place] at 10:15am.
Pt. had been reluctant to speak about GOC, and I called niece Charline Patel to establish patient's overall mental ability and functioning. Juancho stated that patient has been able to manage at home, with bills being paid automatically by her bank. She rarely leaves the house, and has accumulated years of paper, etc. to the point where her niece described her as a "hoarder". She agrees that her Aunt told her she has a Dr. that comes to the home but does not know who this is. Juancho states she believes her Aunt is capable of making decisions. Juancho emailed me HCP signed by patient at [place] 2 weeks ago. Nijag stated her Aunt wanted attempted resuscitation during surgery, and did not want to live on a ventilator. Will attempt to discuss GOC with patient at a later time.

## 2020-12-28 NOTE — PROGRESS NOTE ADULT - REASON FOR ADMISSION
Acute hypercapnic respiratory failure

## 2020-12-28 NOTE — ED ADULT TRIAGE NOTE - CHIEF COMPLAINT QUOTE
transfer from [facility] for MRI of spine to r/o spinal infarct   bilateral loss of sensation to legs

## 2020-12-28 NOTE — ED PROVIDER NOTE - CARE PLAN
Principal Discharge DX:	Weakness of both lower extremities  Secondary Diagnosis:	Sacral decubitus ulcer, stage II

## 2020-12-28 NOTE — DISCHARGE NOTE PROVIDER - NSDCCPTREATMENT_GEN_ALL_CORE_FT
PRINCIPAL PROCEDURE  Procedure: Breathing response to carbon dioxide  Findings and Treatment:

## 2020-12-28 NOTE — ED PROVIDER NOTE - NS ED ROS FT
Constitutional: (-) fever (-) vomiting  Eyes/ENT: (-) vision changes, (-) hearing changes  Cardiovascular: (-) chest pain, (-) wheezing  Respiratory: (-) cough, (-) shortness of breath  Gastrointestinal: (-) vomiting, (-) diarrhea, (-) abdominal pain  : (-) dysuria   Musculoskeletal: + weakness  Integumentary: (-) rash, (-) edema  Neurological: (-)loc  Allergic/Immunologic: (-) pruritus

## 2020-12-28 NOTE — CONSULT NOTE ADULT - SUBJECTIVE AND OBJECTIVE BOX
Neurology consult    CASEY [id number]     Patient is a 75y old  Female who presents with a chief complaint of Acute hypercapnic respiratory failure (28 Dec 2020 15:31)      HPI:  76 y/o F with a h/o advanced COPD (on home O2), former smoker, HTN, anxiety, recent surgical repair of right femur fracture at [facility] discharged to [facility] on 12/18, presents to the ED from facility after developing respiratory difficulty and AMS. Patient found to be severely hypercapnic with pCO2 >99 on ABG and unresp    Today found to be weak in both lower extremities.  Patient admits to weak lower limbs last several days to weeks and she is a poor historian.  No cranial or upper limb complaints or pain in neck back or legs.      REVIEW OF SYSTEMS:    Constitutional: No fever, chills, fatigue, weakness  Eyes: no eye pain, visual disturbances, or discharge  ENT:  No difficulty hearing, tinnitus, vertigo; No sinus or throat pain  Neck: No pain or stiffness  Respiratory: SOB  Cardiovascular: No chest pain, palpitations,   Gastrointestinal: No abdominal or epigastric pain. No nausea, vomiting  No diarrhea or constipation.   Genitourinary: No dysuria, frequency, hematuria or incontinence  Neurological: See above  Psychiatric: No depression, anxiety, mood swings or difficulty sleeping  Musculoskeletal: No joint pain or swelling; No muscle, back or extremity pain  Skin: No itching, burning, rashes or lesions   Lymph Nodes: No enlarged glands  Endocrine: No heat or cold intolerance; No hair loss, No h/o diabetes or thyroid dysfunction  Allergy and Immunologic: No hives or eczema    MEDICATIONS    ALPRAZolam 0.25 milliGRAM(s) Oral every 8 hours PRN  amLODIPine   Tablet 5 milliGRAM(s) Oral daily  buDESOnide    Inhalation Suspension 0.5 milliGRAM(s) Inhalation every 12 hours  enoxaparin Injectable 40 milliGRAM(s) SubCutaneous daily  isosorbide   dinitrate Tablet (ISORDIL) 10 milliGRAM(s) Oral three times a day  levalbuterol Inhalation 1.25 milliGRAM(s) Inhalation every 6 hours  methylPREDNISolone sodium succinate Injectable 40 milliGRAM(s) IV Push every 12 hours  pantoprazole   Suspension 40 milliGRAM(s) Enteral Tube daily      PMH: HTN (hypertension)    Anxiety    Advanced COPD         PSH: Status post closed fracture of right femur        Family history:   FAMILY HISTORY:      SOCIAL HISTORY:  No history of tobacco or alcohol use     Allergies    No Known Allergies    Intolerances            Vital Signs Last 24 Hrs  T(C): 36.6 (28 Dec 2020 12:13), Max: 36.8 (28 Dec 2020 00:00)  T(F): 97.8 (28 Dec 2020 12:13), Max: 98.3 (28 Dec 2020 00:00)  HR: 98 (28 Dec 2020 14:30) (97 - 125)  BP: 147/78 (28 Dec 2020 14:30) (116/77 - 147/78)  BP(mean): --  RR: 17 (28 Dec 2020 12:13) (17 - 19)  SpO2: 100% (28 Dec 2020 12:13) (94% - 100%)      On Neurological Examination:    Head: normocephalic Neck: supple no carotid bruits    Mental Status - Patient is alert, oriented speech intact    Cranial Nerves - PERRL, EOMI, VFF, normal V through XII no nystagmus    Motor Exam : Paraplegia hypotonia flaccid both lower extremities    Sensory:  reduced pin vague both lower limbs no definite sensory level.    Reflexes:  symmetric @ 2+ upper extremities and absent lower extremities with silent toes                                                              LABS:  CBC Full  -  ( 27 Dec 2020 08:17 )  WBC Count : 13.70 K/uL  RBC Count : 2.73 M/uL  Hemoglobin : 8.3 g/dL  Hematocrit : 27.2 %  Platelet Count - Automated : 443 K/uL  Mean Cell Volume : 99.6 fl  Mean Cell Hemoglobin : 30.4 pg  Mean Cell Hemoglobin Concentration : 30.5 gm/dL  Auto Neutrophil # : x  Auto Lymphocyte # : x  Auto Monocyte # : x  Auto Eosinophil # : x  Auto Basophil # : x  Auto Neutrophil % : x  Auto Lymphocyte % : x  Auto Monocyte % : x  Auto Eosinophil % : x  Auto Basophil % : x      12-27    142  |  99  |  55<H>  ----------------------------<  150<H>  3.9   |  40<H>  |  0.72    Ca    9.7      27 Dec 2020 08:17        Hemoglobin A1C:                 RADIOLOGY  CT:         < from: CT Head No Cont (12.19.20 @ 02:00) >    EXAM:  CT BRAIN      PROCEDURE DATE:  12/19/2020        INTERPRETATION:  CLINICAL INFORMATION:  AMS: Narcosis    TECHNIQUE:  Axial CT images were acquired through the head.  Intravenous contrast: None  Two-dimensional reformats were generated.    COMPARISON STUDY: None    FINDINGS:    There is no CT evidence of acute intracranial hemorrhage, mass effect, midline shift, or acute, large territorial infarct. The ventricles and sulci are prominent consistent with generalized brain parenchymal volume loss. Patchy periventricular and subcortical white matter hypodensities are nonspecific, although likely on the basis of moderate chronic microangiopathy. There is more discrete hypodensity in the left thalamus on 4:14, compatible with age indeterminate lacunar infarct. The basal cisterns are patent.    There are atherosclerotic vascular calcifications at the skull base.    The mastoid air cells and middle ear cavities are grossly clear. There is no significant paranasal sinus mucosal thickening.    The calvarium and skull base are grossly intact.    IMPRESSION:  No acute intracranial hemorrhage or mass effect. Age-related involutional changes and moderate chronic microangiopathy.    Age-indeterminate left thalamic lacunar infarct.        < end of copied text >

## 2020-12-28 NOTE — CONSULT NOTE ADULT - ASSESSMENT
CASEY ROBERTS    76YO F hx severe COPD on home O2, HTN, Anxiety, recently admitted to  for hypercarbic resp failure, intubated for unresponsiveness 12/19, extubated on 12/26, since then has not moved BLE, but remains aware of urine continence has had significant constipation. There is no imaging. Exam: Wide awake, AOx3, pupils 3R, EOMI, no facial, BUE 5/5, no drift, BLE flaccid, no mvmt to deep noxious, no babinski response, decreased but persistent sensation BLE (can discriminate which leg I am touching), +rectal tone and aware of urine continence (in diaper).   - STAT total MRI cord compression protocol (now 2-3 days out from presenting sxs)  - high suspicion for significant deconditioning>cord compression given no clear sensory level and maintains good rectal tone  - dispo pending MRI  - q4h neuro checks, pain control, PT

## 2020-12-28 NOTE — PROGRESS NOTE ADULT - ASSESSMENT
Physical Examination:  GENERAL:               Alert, verbal, NAD on N/C  HEENT:                    No JVD, dry MM, poor dentition  PULM:                     Bilateral air entry, diminished to auscultation bilaterally, no significant sputum production, No Rales, No Rhonchi, no Wheezing  CVS:                         S1, S2,  + Murmur  ABD:                        Soft, nondistended, nontender, normoactive bowel sounds,   NEURO:                alert, responsive, follows commands, LLE foot drop, warm to touch, good pulses, does not move to painful stimuli   PSYC:                      Calm, +Insight.        Assessment  75 year old female with h/o Severe COPD on home o2, ex smoker, HTN, anxiety who had recent right femur fx at [place] she was discharged to [facility] on 12/18 where shortly after developed AMS and brought to Hospital, Here found to have acute hypercarbic respiratory failure and was unresponsive so she was intubated.    Problem List  r/o LLE CVA  Acute Hypercarbic respiratory failure due to acute on chronic COPD exacerbation, in patient with Severe COPD on home o2 (chronic hypoxic and hypercarbic respiratory Insufficiency    S/p Extubation on 12/24/20     COVID/RVP Negative    Ex Smoker  Metabolic Encephelopathy due to above Resolved  Lactic Acidosis likely due to hypoxia Resolved    no source of infection found, normal UA, No PNA on CT  Recent Right femur fx   underlying Anxiety, HTN,       Plan  Urgent Neuro consult called, and CT head, cta head/neck ordered   will be coming bedside to evaluate    N/C o2 during with NIV qhs      Patient has chronic hypercarbic respiratory failure and will need home NIV QHS to maintain good vetntilation and minimize hospitialization     ABG Trend  12-25-20 @ 04:00   - 7.44/62<H>/104/97<H>  12-24-20 @ 13:25   - 7.44/63<H>/74/94  12-24-20 @ 11:10   - 7.44/59<H>/62<L>/91<L>  12-23-20 @ 09:10   - 7.21<L>/>103/104/95  12-22-20 @ 10:20   - 7.26<L>/99<HH>/123<H>/97<H>  12-21-20 @ 05:05   - 7.43/64<H>/83/96  12-20-20 @ 14:15   - 7.32<L>/85<HH>/89/96  12-19-20 @ 08:27   - 7.46<H>/60<H>/66<L>/94  12-19-20 @ 00:45   - 7.10<LL>/>99<HH>/243<H>/99<H>    xanax for anxiety to be restarted   BP control with Norvasc  finished course of abx  Family Updated: 	Juancho Olivier [phone]	                                 Date:  12/28  Disposition: continue care on AI, unless TPA candidate by neuro   Goals of Care: based on records full  palliative care eval called

## 2020-12-29 DIAGNOSIS — I10 ESSENTIAL (PRIMARY) HYPERTENSION: ICD-10-CM

## 2020-12-29 DIAGNOSIS — J44.9 CHRONIC OBSTRUCTIVE PULMONARY DISEASE, UNSPECIFIED: ICD-10-CM

## 2020-12-29 DIAGNOSIS — F41.9 ANXIETY DISORDER, UNSPECIFIED: ICD-10-CM

## 2020-12-29 DIAGNOSIS — Z98.890 OTHER SPECIFIED POSTPROCEDURAL STATES: Chronic | ICD-10-CM

## 2020-12-29 DIAGNOSIS — J96.01 ACUTE RESPIRATORY FAILURE WITH HYPOXIA: ICD-10-CM

## 2020-12-29 DIAGNOSIS — Z29.9 ENCOUNTER FOR PROPHYLACTIC MEASURES, UNSPECIFIED: ICD-10-CM

## 2020-12-29 DIAGNOSIS — R29.898 OTHER SYMPTOMS AND SIGNS INVOLVING THE MUSCULOSKELETAL SYSTEM: ICD-10-CM

## 2020-12-29 DIAGNOSIS — G62.9 POLYNEUROPATHY, UNSPECIFIED: ICD-10-CM

## 2020-12-29 DIAGNOSIS — J44.1 CHRONIC OBSTRUCTIVE PULMONARY DISEASE WITH (ACUTE) EXACERBATION: ICD-10-CM

## 2020-12-29 LAB
A1C WITH ESTIMATED AVERAGE GLUCOSE RESULT: 5.4 % — SIGNIFICANT CHANGE UP (ref 4–5.6)
ALBUMIN SERPL ELPH-MCNC: 3.8 G/DL — SIGNIFICANT CHANGE UP (ref 3.3–5)
ALP SERPL-CCNC: 81 U/L — SIGNIFICANT CHANGE UP (ref 40–120)
ALT FLD-CCNC: 36 U/L — SIGNIFICANT CHANGE UP (ref 10–45)
ANION GAP SERPL CALC-SCNC: 8 MMOL/L — SIGNIFICANT CHANGE UP (ref 5–17)
AST SERPL-CCNC: 21 U/L — SIGNIFICANT CHANGE UP (ref 10–40)
BASE EXCESS BLDA CALC-SCNC: 15.1 MMOL/L — HIGH (ref -2–2)
BASOPHILS # BLD AUTO: 0.02 K/UL — SIGNIFICANT CHANGE UP (ref 0–0.2)
BASOPHILS NFR BLD AUTO: 0.1 % — SIGNIFICANT CHANGE UP (ref 0–2)
BILIRUB SERPL-MCNC: 0.3 MG/DL — SIGNIFICANT CHANGE UP (ref 0.2–1.2)
BUN SERPL-MCNC: 36 MG/DL — HIGH (ref 7–23)
CALCIUM SERPL-MCNC: 9.8 MG/DL — SIGNIFICANT CHANGE UP (ref 8.4–10.5)
CERULOPLASMIN SERPL-MCNC: 26 MG/DL — SIGNIFICANT CHANGE UP (ref 16–45)
CHLORIDE SERPL-SCNC: 96 MMOL/L — SIGNIFICANT CHANGE UP (ref 96–108)
CHOLEST SERPL-MCNC: 250 MG/DL — HIGH
CO2 BLDA-SCNC: 48 MMOL/L — HIGH (ref 22–30)
CO2 SERPL-SCNC: 39 MMOL/L — HIGH (ref 22–31)
CREAT SERPL-MCNC: 0.51 MG/DL — SIGNIFICANT CHANGE UP (ref 0.5–1.3)
CRP SERPL-MCNC: 0.12 MG/DL — SIGNIFICANT CHANGE UP (ref 0–0.4)
EOSINOPHIL # BLD AUTO: 0.01 K/UL — SIGNIFICANT CHANGE UP (ref 0–0.5)
EOSINOPHIL NFR BLD AUTO: 0.1 % — SIGNIFICANT CHANGE UP (ref 0–6)
ERYTHROCYTE [SEDIMENTATION RATE] IN BLOOD: 10 MM/HR — SIGNIFICANT CHANGE UP (ref 0–20)
ESTIMATED AVERAGE GLUCOSE: 108 MG/DL — SIGNIFICANT CHANGE UP (ref 68–114)
FOLATE SERPL-MCNC: 17.2 NG/ML — SIGNIFICANT CHANGE UP
GAS PNL BLDA: SIGNIFICANT CHANGE UP
GLUCOSE BLDC GLUCOMTR-MCNC: 106 MG/DL — HIGH (ref 70–99)
GLUCOSE BLDC GLUCOMTR-MCNC: 110 MG/DL — HIGH (ref 70–99)
GLUCOSE BLDC GLUCOMTR-MCNC: 151 MG/DL — HIGH (ref 70–99)
GLUCOSE SERPL-MCNC: 97 MG/DL — SIGNIFICANT CHANGE UP (ref 70–99)
HCO3 BLDA-SCNC: 45 MMOL/L — HIGH (ref 21–29)
HCT VFR BLD CALC: 30.7 % — LOW (ref 34.5–45)
HDLC SERPL-MCNC: 76 MG/DL — SIGNIFICANT CHANGE UP
HGB BLD-MCNC: 8.9 G/DL — LOW (ref 11.5–15.5)
IMM GRANULOCYTES NFR BLD AUTO: 0.7 % — SIGNIFICANT CHANGE UP (ref 0–1.5)
LIPID PNL WITH DIRECT LDL SERPL: 137 MG/DL — HIGH
LYMPHOCYTES # BLD AUTO: 1.11 K/UL — SIGNIFICANT CHANGE UP (ref 1–3.3)
LYMPHOCYTES # BLD AUTO: 6.3 % — LOW (ref 13–44)
MCHC RBC-ENTMCNC: 29 GM/DL — LOW (ref 32–36)
MCHC RBC-ENTMCNC: 30.2 PG — SIGNIFICANT CHANGE UP (ref 27–34)
MCV RBC AUTO: 104.1 FL — HIGH (ref 80–100)
MONOCYTES # BLD AUTO: 1.77 K/UL — HIGH (ref 0–0.9)
MONOCYTES NFR BLD AUTO: 10.1 % — SIGNIFICANT CHANGE UP (ref 2–14)
NEUTROPHILS # BLD AUTO: 14.49 K/UL — HIGH (ref 1.8–7.4)
NEUTROPHILS NFR BLD AUTO: 82.7 % — HIGH (ref 43–77)
NON HDL CHOLESTEROL: 174 MG/DL — HIGH
NRBC # BLD: 0 /100 WBCS — SIGNIFICANT CHANGE UP (ref 0–0)
PCO2 BLDA: >104 MMHG — CRITICAL HIGH (ref 32–46)
PH BLDA: 7.25 — LOW (ref 7.35–7.45)
PLATELET # BLD AUTO: 487 K/UL — HIGH (ref 150–400)
PO2 BLDA: 64 MMHG — LOW (ref 74–108)
POTASSIUM SERPL-MCNC: 4.3 MMOL/L — SIGNIFICANT CHANGE UP (ref 3.5–5.3)
POTASSIUM SERPL-SCNC: 4.3 MMOL/L — SIGNIFICANT CHANGE UP (ref 3.5–5.3)
PROT SERPL-MCNC: 6 G/DL — SIGNIFICANT CHANGE UP (ref 6–8.3)
RBC # BLD: 2.95 M/UL — LOW (ref 3.8–5.2)
RBC # FLD: 14 % — SIGNIFICANT CHANGE UP (ref 10.3–14.5)
SAO2 % BLDA: 91 % — LOW (ref 92–96)
SARS-COV-2 RNA SPEC QL NAA+PROBE: SIGNIFICANT CHANGE UP
SODIUM SERPL-SCNC: 143 MMOL/L — SIGNIFICANT CHANGE UP (ref 135–145)
T3 SERPL-MCNC: 56 NG/DL — LOW (ref 80–200)
T4 AB SER-ACNC: 7 UG/DL — SIGNIFICANT CHANGE UP (ref 4.6–12)
TRIGL SERPL-MCNC: 184 MG/DL — HIGH
TSH SERPL-MCNC: 0.19 UIU/ML — LOW (ref 0.27–4.2)
VIT B12 SERPL-MCNC: 1319 PG/ML — HIGH (ref 232–1245)
WBC # BLD: 17.53 K/UL — HIGH (ref 3.8–10.5)
WBC # FLD AUTO: 17.53 K/UL — HIGH (ref 3.8–10.5)

## 2020-12-29 PROCEDURE — 71045 X-RAY EXAM CHEST 1 VIEW: CPT | Mod: 26

## 2020-12-29 PROCEDURE — 99223 1ST HOSP IP/OBS HIGH 75: CPT

## 2020-12-29 PROCEDURE — 99223 1ST HOSP IP/OBS HIGH 75: CPT | Mod: GC

## 2020-12-29 RX ORDER — PANTOPRAZOLE SODIUM 20 MG/1
40 TABLET, DELAYED RELEASE ORAL DAILY
Refills: 0 | Status: DISCONTINUED | OUTPATIENT
Start: 2020-12-29 | End: 2021-01-04

## 2020-12-29 RX ORDER — ENOXAPARIN SODIUM 100 MG/ML
40 INJECTION SUBCUTANEOUS DAILY
Refills: 0 | Status: DISCONTINUED | OUTPATIENT
Start: 2020-12-29 | End: 2020-12-31

## 2020-12-29 RX ORDER — ALBUTEROL 90 UG/1
1 AEROSOL, METERED ORAL EVERY 4 HOURS
Refills: 0 | Status: DISCONTINUED | OUTPATIENT
Start: 2020-12-29 | End: 2020-12-29

## 2020-12-29 RX ORDER — AZITHROMYCIN 500 MG/1
500 TABLET, FILM COATED ORAL EVERY 24 HOURS
Refills: 0 | Status: COMPLETED | OUTPATIENT
Start: 2020-12-29 | End: 2021-01-02

## 2020-12-29 RX ORDER — AMLODIPINE BESYLATE 2.5 MG/1
5 TABLET ORAL ONCE
Refills: 0 | Status: COMPLETED | OUTPATIENT
Start: 2020-12-29 | End: 2020-12-29

## 2020-12-29 RX ORDER — INSULIN LISPRO 100/ML
VIAL (ML) SUBCUTANEOUS EVERY 6 HOURS
Refills: 0 | Status: DISCONTINUED | OUTPATIENT
Start: 2020-12-29 | End: 2021-01-02

## 2020-12-29 RX ORDER — IPRATROPIUM BROMIDE 0.2 MG/ML
1 SOLUTION, NON-ORAL INHALATION
Refills: 0 | Status: DISCONTINUED | OUTPATIENT
Start: 2020-12-29 | End: 2020-12-29

## 2020-12-29 RX ORDER — DEXTROSE 50 % IN WATER 50 %
12.5 SYRINGE (ML) INTRAVENOUS ONCE
Refills: 0 | Status: DISCONTINUED | OUTPATIENT
Start: 2020-12-29 | End: 2021-01-02

## 2020-12-29 RX ORDER — SODIUM CHLORIDE 9 MG/ML
1000 INJECTION, SOLUTION INTRAVENOUS
Refills: 0 | Status: DISCONTINUED | OUTPATIENT
Start: 2020-12-29 | End: 2021-01-02

## 2020-12-29 RX ORDER — DEXTROSE 50 % IN WATER 50 %
25 SYRINGE (ML) INTRAVENOUS ONCE
Refills: 0 | Status: DISCONTINUED | OUTPATIENT
Start: 2020-12-29 | End: 2021-01-02

## 2020-12-29 RX ORDER — BUDESONIDE AND FORMOTEROL FUMARATE DIHYDRATE 160; 4.5 UG/1; UG/1
2 AEROSOL RESPIRATORY (INHALATION)
Refills: 0 | Status: DISCONTINUED | OUTPATIENT
Start: 2020-12-29 | End: 2021-01-23

## 2020-12-29 RX ORDER — DEXTROSE 50 % IN WATER 50 %
15 SYRINGE (ML) INTRAVENOUS ONCE
Refills: 0 | Status: DISCONTINUED | OUTPATIENT
Start: 2020-12-29 | End: 2021-01-02

## 2020-12-29 RX ORDER — ALBUTEROL 90 UG/1
2 AEROSOL, METERED ORAL EVERY 4 HOURS
Refills: 0 | Status: DISCONTINUED | OUTPATIENT
Start: 2020-12-29 | End: 2020-12-30

## 2020-12-29 RX ORDER — BUDESONIDE, MICRONIZED 100 %
0.5 POWDER (GRAM) MISCELLANEOUS ONCE
Refills: 0 | Status: COMPLETED | OUTPATIENT
Start: 2020-12-29 | End: 2020-12-29

## 2020-12-29 RX ORDER — IPRATROPIUM/ALBUTEROL SULFATE 18-103MCG
3 AEROSOL WITH ADAPTER (GRAM) INHALATION ONCE
Refills: 0 | Status: COMPLETED | OUTPATIENT
Start: 2020-12-29 | End: 2020-12-29

## 2020-12-29 RX ORDER — TIOTROPIUM BROMIDE 18 UG/1
1 CAPSULE ORAL; RESPIRATORY (INHALATION) DAILY
Refills: 0 | Status: DISCONTINUED | OUTPATIENT
Start: 2020-12-29 | End: 2021-01-23

## 2020-12-29 RX ORDER — GLUCAGON INJECTION, SOLUTION 0.5 MG/.1ML
1 INJECTION, SOLUTION SUBCUTANEOUS ONCE
Refills: 0 | Status: DISCONTINUED | OUTPATIENT
Start: 2020-12-29 | End: 2021-01-02

## 2020-12-29 RX ADMIN — TIOTROPIUM BROMIDE 1 CAPSULE(S): 18 CAPSULE ORAL; RESPIRATORY (INHALATION) at 13:28

## 2020-12-29 RX ADMIN — Medication 60 MILLIGRAM(S): at 07:40

## 2020-12-29 RX ADMIN — ENOXAPARIN SODIUM 40 MILLIGRAM(S): 100 INJECTION SUBCUTANEOUS at 17:23

## 2020-12-29 RX ADMIN — SODIUM CHLORIDE 75 MILLILITER(S): 9 INJECTION, SOLUTION INTRAVENOUS at 07:41

## 2020-12-29 RX ADMIN — Medication 0.5 MILLIGRAM(S): at 05:49

## 2020-12-29 RX ADMIN — AMLODIPINE BESYLATE 5 MILLIGRAM(S): 2.5 TABLET ORAL at 01:18

## 2020-12-29 RX ADMIN — AZITHROMYCIN 250 MILLIGRAM(S): 500 TABLET, FILM COATED ORAL at 13:31

## 2020-12-29 RX ADMIN — PANTOPRAZOLE SODIUM 40 MILLIGRAM(S): 20 TABLET, DELAYED RELEASE ORAL at 13:28

## 2020-12-29 RX ADMIN — Medication 1: at 17:10

## 2020-12-29 RX ADMIN — Medication 3 MILLILITER(S): at 07:43

## 2020-12-29 RX ADMIN — Medication 40 MILLIGRAM(S): at 17:24

## 2020-12-29 RX ADMIN — Medication 40 MILLIGRAM(S): at 23:35

## 2020-12-29 NOTE — H&P ADULT - PROBLEM SELECTOR PLAN 3
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 hiv, lyme, tb, ebv, hep b/c), inflammatory etiology (acute demyelating inflammatory polyneuropathy/gbs), toxin-induced (b12, folate, copper deficiency; lead poisoning), para-neoplastic syndrome, syringomyelia, spinal infarction   -Motor deficits only noted on neurologic exam (sensory exam intact per limited exam); this is most consistent with diagnosis of anterior spinal cord infarction or guillan-barre syndrome; less consistent with infectious or toxic etiologies, which would likely affect peripheral nerve and cause sensory deficits in addition   -Will send B12, folate, and copper titers; will order mri/mra of spine and mri of brain   -If above workup is negative, patient will require lumbar puncture to look for protein-cytologic dissociation characteristic of gbs 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 hiv, lyme, tb, ebv, hep b/c), inflammatory etiology (acute demyelating inflammatory polyneuropathy/gbs), toxin-induced (b12, folate, copper deficiency; lead poisoning), para-neoplastic syndrome, syringomyelia, spinal infarction   -Motor deficits only noted on neurologic exam (sensory exam intact per limited exam); this is most consistent with diagnosis of anterior spinal cord infarction or guillan-barre syndrome; less consistent with infectious or toxic etiologies, which would likely affect peripheral nerve and cause sensory deficits in addition   -Will send B12, folate, and copper titers; will order mri/mra of spine and mri of brain   -If above workup is negative, patient will require lumbar puncture to look for protein-cytologic dissociation characteristic of gbs 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 hiv, lyme, tb, ebv, hep b/c), inflammatory etiology (acute demyelating inflammatory polyneuropathy/gbs), toxin-induced (b12, folate, copper deficiency; lead poisoning), para-neoplastic syndrome, syringomyelia, spinal infarction   -Motor deficits only noted on neurologic exam (sensory exam intact per limited exam); this is most consistent with diagnosis of anterior spinal cord infarction (less likely as no bowel or bladder incontinence) or guillan-barre syndrome; less consistent with infectious or toxic etiologies, which would likely affect peripheral nerve and cause sensory deficits in addition   -Will send B12, folate, and copper titers; will order mri/mra of spine and mri of brain   -If above workup is negative, patient will require lumbar puncture to look for protein-cytologic dissociation characteristic of gbs

## 2020-12-29 NOTE — CONSULT NOTE ADULT - ASSESSMENT
74 y/o F with COPD, recently admitted at OSH for hypercapnic respiratory failure, HTN, anxiety transferred from OSH for evaluation for cord compression.     # Acute on chronic hypercapnic respiratory failure  - CO2 improving on bipap  - Adjusted bipap to 14/5  - Start duonebs  - Repeat ABG to trend CO2  - Can trial off bipap when pH normalizes  - Airway clearance device when off bipap  - Will need further evaluation of platelike atelectasis if it does not resolve with airway clearance    Patient is not a MICU candidate at this time. Please reconsult as needed. Rest of care per primary team.  74 y/o F with COPD, recently admitted at OSH for hypercapnic respiratory failure, HTN, anxiety transferred from OSH for evaluation for cord compression.     # Acute on chronic hypercapnic respiratory failure  - CO2 improving on bipap  - Adjusted bipap to 14/5  - Start duonebs  - Repeat ABG to trend CO2      Patient is not a MICU candidate at this time. Please reconsult as needed. Rest of care per primary team.

## 2020-12-29 NOTE — PHYSICAL THERAPY INITIAL EVALUATION ADULT - PERTINENT HX OF CURRENT PROBLEM, REHAB EVAL
Pt is a 76 y/o female admitted to [facility] on 12/29/20 pmh of severe chronic obstructive pulmonary disease, managed at home with home oxygen therapy, who presented to the emergency department overnight as a transfer from [facility] for management of unexplained bilateral lower extremity weakness.

## 2020-12-29 NOTE — H&P ADULT - NSHPSOCIALHISTORY_GEN_ALL_CORE
Per documentation, the patient is a former smoker of many years (unclear pack-year history). There is no documented history of alcohol use or of other drug use. She lives at home alone. Her next of kin is her niece, Charline, who is also her surrogate decision maker.

## 2020-12-29 NOTE — H&P ADULT - ATTENDING COMMENTS
Pt seen and examined. Agree with above with additional findings:    # acute hypoxic/hypercapnic respiratory failure  # LE weakness, r/o myelitis vs. ICU-neuropathy/myopathy  # COPD exacerbation    - s/p BIPAP with improvement in SpO2, pCO2 still remains >90  - unclear etiology for acute hypercapnia given pt was on solumedrol ATC and pCO2 60s day before transfer  - continue solumedrol 40mg q6hr and taper as tolerated  - recheck ABG   - appreciate neuro/nsg recs: obtain MRI spines/brains  - pt would like to benefit from LP for definitive diagnosis; will discuss with neuro  - DVT ppx    Karen Mcconnell MD  Division of Hospital Medicine  Cell: [phone]  Office: [phone]

## 2020-12-29 NOTE — OCCUPATIONAL THERAPY INITIAL EVALUATION ADULT - DIAGNOSIS, OT EVAL
Pt p/w decreased strength, balance, ROM and endurance impacting functional mobility and participation in ADLs

## 2020-12-29 NOTE — H&P ADULT - NSHPREVIEWOFSYSTEMS_GEN_ALL_CORE
Could not perform a complete review of systems given the patient's current clinical condition and her associated level of sedation.   CONSTITUTIONAL: No fever, weight loss, or fatigue  EYES: No eye pain, visual disturbances, or discharge  ENMT:  No difficulty hearing, tinnitus, vertigo; No sinus or throat pain  NECK: No pain or stiffness  BREASTS: No pain, masses, or nipple discharge  RESPIRATORY: No cough, wheezing, chills or hemoptysis; No shortness of breath  CARDIOVASCULAR: No chest pain, palpitations, dizziness, or leg swelling  GASTROINTESTINAL: No abdominal or epigastric pain. No nausea, vomiting, or hematemesis; No diarrhea or constipation. No melena or hematochezia.  GENITOURINARY: No dysuria, frequency, hematuria, or incontinence  NEUROLOGICAL: No headaches, memory loss, loss of strength, numbness, or tremors  SKIN: No itching, burning, rashes, or lesions   LYMPH NODES: No enlarged glands  ENDOCRINE: No heat or cold intolerance; No hair loss  MUSCULOSKELETAL: No joint pain or swelling; No muscle, back, or extremity pain  PSYCHIATRIC: No depression, anxiety, mood swings, or difficulty sleeping  HEME/LYMPH: No easy bruising, or bleeding gums  ALLERY AND IMMUNOLOGIC: No hives or eczema Could not perform a complete review of systems given the patient's current clinical condition and her associated level of sedation.   CONSTITUTIONAL: No fever, weight loss, or fatigue  EYES: No eye pain, visual disturbances, or discharge  ENMT:  No difficulty hearing, tinnitus, vertigo; No sinus or throat pain  NECK: No pain, no stiffness  BREASTS: No pain, masses, or nipple discharge  RESPIRATORY: No cough, wheezing, chills or hemoptysis; No shortness of breath  CARDIOVASCULAR: No chest pain, palpitations, dizziness, or leg swelling  GASTROINTESTINAL: No abdominal or epigastric pain. No nausea, vomiting, or hematemesis; No diarrhea or constipation. No melena or hematochezia.  GENITOURINARY: No dysuria, frequency, hematuria, or incontinence  NEUROLOGICAL: Weakness noted in bilateral lower extremities; sensation also reported as diminished; motor function, sensory function, and coordination intact in bilateral upper extremities   SKIN: No itching, burning, rashes, or lesions   LYMPH NODES: No enlarged glands  ENDOCRINE: No heat or cold intolerance; No hair loss  MUSCULOSKELETAL: No joint pain or swelling; No muscle, back, or extremity pain  PSYCHIATRIC: No depression, anxiety, mood swings, or difficulty sleeping  HEME/LYMPH: No easy bruising, or bleeding gums  ALLERY AND IMMUNOLOGIC: No hives or eczema

## 2020-12-29 NOTE — OCCUPATIONAL THERAPY INITIAL EVALUATION ADULT - PERTINENT HX OF CURRENT PROBLEM, REHAB EVAL
74 yo female with PMH ?COPD initially admitted at [place] who had been intubated for respiratory arrest, and upon extubation was noted to be flaccid in the legs with decreased sensation and transferred here for evaluation for possible spinal cord compression, with no evidence of compression on MRI, but with T2 hyperintensity in lumbar region concerning for possible myelitis.

## 2020-12-29 NOTE — H&P ADULT - PROBLEM SELECTOR PLAN 2
-In the emergency department, the patient was noted to have an acute desaturation to 86% while managed on 4L nasal cannula (saturating at 100% previously while being monitored in the ED on 4L nasal cannula)   -Of note, on my initial exam, shortly after this desaturation, the patient was moving very minimal air and demonstrating abdominal paradox  -Arterial blood gas demonstrates evidence of acute-on-chronic carbon dioxide retention  -Etiology of acute respiratory failure with hypoxia most likely is respiratory muscle collapse, which may represent insufficient respiratory muscle reserve in setting of great degree of carbon dioxide retention; may also represent loss of respiratory drive in setting of oxygen saturations of 100% in ED   -Managing acute exacerbation of COPD as above; titrate oxygen saturation on pulse oximetry to 88-92%

## 2020-12-29 NOTE — PHYSICAL THERAPY INITIAL EVALUATION ADULT - ACTIVE RANGE OF MOTION EXAMINATION, REHAB EVAL
Pt with no AROM in B LEs/bilateral upper extremity Active ROM was WFL (within functional limits)/deficits as listed below

## 2020-12-29 NOTE — H&P ADULT - ASSESSMENT
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

## 2020-12-29 NOTE — H&P ADULT - PROBLEM SELECTOR PLAN 1
-In the emergency department, the patient was noted to have an acute desaturation to 86% while managed on 4L nasal cannula (saturating at 100% previously while being monitored in the ED on 4L nasal cannula)   -Of note, on my initial exam, shortly after this desaturation, the patient was moving very minimal air and demonstrating abdominal paradox  -Etiology of acute respiratory failure with hypoxia most likely is respiratory muscle collapse, which may represent insufficient respiratory muscle reserve in the setting of such great carbon dioxide retention  -Etiology of acute exacerbation of COPD uncertain; no documentation in [place] of acute viral prodrome; no changes in medication management; no evidence of missed doses   -Of note, the patient was previously saturating at 100% on 4L nasal cannula; may also represent Haldane effect, and sudden loss of respiratory drive   -Will manage as COPD exacerbation; will administer albuterol metered dose inhaler every four hours as needed for bronchospasm or respiratory distress  -Will administer 40 mg of intravenous methylprednisolone every four hours (and space as needed)   -Will administer five days of azithromycin   -Will manage with bilevel positive airway pressure (currently at inspiratory pressure of 14 and expiratory pressure of 5), titrate oxygen saturation on pulse oximetry to 88-92%  -Will administer home doses of LAMA, LABA, and inhaled corticosteroid   -Repeat arterial blood gas later today; space administration of intravenous steroid and inhaled short-acting bronchodilator as necessary; down-titrate respiratory support from bipap to nasal cannula as possible

## 2020-12-29 NOTE — H&P ADULT - NSHPLABSRESULTS_GEN_ALL_CORE
8.9    17.53 )-----------( 487      ( 29 Dec 2020 07:33 )             30.7     Auto Eosinophil # 0.01  / Auto Eosinophil % 0.1   / Auto Neutrophil # 14.49 / Auto Neutrophil % 82.7  / BANDS % x                            8.2    16.90 )-----------( 453      ( 28 Dec 2020 18:24 )             27.5     Auto Eosinophil # x     / Auto Eosinophil % x     / Auto Neutrophil # x     / Auto Neutrophil % x     / BANDS % x        12-29    143  |  96  |  36<H>  ----------------------------<  97  4.3   |  39<H>  |  0.51  12-28    141  |  96  |  42<H>  ----------------------------<  156<H>  4.8   |  40<H>  |  0.54    Ca    9.8      29 Dec 2020 07:33  Mg     2.1     12-28  TPro  6.0  /  Alb  3.8  /  TBili  0.3  /  DBili  x   /  AST  21  /  ALT  36  /  AlkPhos  81  12-29  TPro  5.7<L>  /  Alb  3.7  /  TBili  0.3  /  DBili  x   /  AST  20  /  ALT  37  /  AlkPhos  83  12-28    RESPIRATORY  VENT: BIPAP 14/5    ABG: ( 29 Dec 2020 08:34 ) pH: 7.29  /  pCO2: 96    /  pO2: 229   / HCO3: 44    / Base Excess: 15.4  /  SaO2: 100             ( 29 Dec 2020 06:43 ) pH: 7.25  /  pCO2: >104  /  pO2: 64    / HCO3: 45    / Base Excess: 15.1  /  SaO2: 91

## 2020-12-29 NOTE — CONSULT NOTE ADULT - ASSESSMENT
76 yo female with PMH ?COPD initially admitted at [facility] who had been intubated for respiratory arrest, and upon extubation was noted to be flaccid in the legs with decreased sensation and transferred here for evaluation for possible spinal cord compression, with no evidence of compression on MRI, but with T2 hyperintensity in lumbar region concerning for possible myelitis. Patient with flaccid legs with no strength, but full strength in arms and no cranial nerve deficits. Also with sensory level with decreased sensation below the knees, including decreased light touch, vibration, and proprioception. T2 signal on MRI is concerning for possible myelitis, but differential is broad at this time and also includes critical illness neuropathy/myopathy given recent extended intubation.    Recommendations:  [] needs further evaluation with MRI brain, MRI spine with and without contrast, especially in lumbar spine to further elucidate prior finding, as initial MRI without contrast  [] management of respiratory issues per medicine  [] PT/OT evaluation and treatment  [] pending MRI findings, may require LP to further evaluate for myelitis  [] can send inflammatory workup including CRP, ESR, autoimmune antibody workup  [] check copper, ceruloplasmin, B12, vitamin E, methylmalonic acid levels for neuropathy workup.  [] further recommendations pending MRI and lab workup    Case discussed with neurology attending. 76 yo female with PMH ?COPD initially admitted at [facility] who had been intubated for respiratory arrest, and upon extubation was noted to be flaccid in the legs with decreased sensation and transferred here for evaluation for possible spinal cord compression, with no evidence of compression on MRI, but with T2 hyperintensity in lumbar region concerning for possible myelitis. Patient with flaccid legs with no strength, but full strength in arms and no cranial nerve deficits. Also with sensory level with decreased sensation below the knees, including decreased light touch, vibration, and proprioception. T2 signal on MRI is concerning for possible myelitis, but differential is broad at this time and also includes critical illness neuropathy/myopathy given recent extended intubation.    Recommendations: SEE ATTENDING ATTESTATION  [] needs further evaluation with MRI brain, MRI spine with and without contrast, especially in lumbar spine to further elucidate prior finding, as initial MRI without contrast  [] management of respiratory issues per medicine  [] PT/OT evaluation and treatment  [] pending MRI findings, may require LP to further evaluate for myelitis  [] can send inflammatory workup including CRP, ESR, autoimmune antibody workup  [] check copper, ceruloplasmin, B12, vitamin E, methylmalonic acid levels for neuropathy workup.  [] further recommendations pending MRI and lab workup    Case discussed with neurology attending.

## 2020-12-29 NOTE — OCCUPATIONAL THERAPY INITIAL EVALUATION ADULT - LIVES WITH, PROFILE
pt reports living alone in  with [address]. Pt reports being Ind PLOF, tub shower/combo no AD/DME. Pt owns wheelchair. As per pt, pt has some help a few days a week for home management (laundry, cleaning)/alone

## 2020-12-29 NOTE — OCCUPATIONAL THERAPY INITIAL EVALUATION ADULT - PRECAUTIONS/LIMITATIONS, REHAB EVAL
Patient with flaccid legs with no strength, but full strength in arms and no cranial nerve deficits. Also with sensory level with decreased sensation below the knees, including decreased light touch, vibration, and proprioception. T2 signal on MRI is concerning for possible myelitis, but differential is broad at this time and also includes critical illness neuropathy/myopathy given recent extended intubation.   MR ACUTE CORD COMPRESSION: Central T2 hyperintense signal in the distal lumbar cord and conus medullaris. Differential includes myelitis, syrinx, and dilated ventriculus terminalis, amongst other etiologies. Further evaluation with contrast-enhanced MRI including axial images in the lower thoracic spine is recommended./fall precautions

## 2020-12-29 NOTE — H&P ADULT - PROBLEM SELECTOR PLAN 4
-History of severe copd noted; managed at home with LAMA, LABA, and long-acting inhaled corticosteroid in addition to home oxygen   -Will manage copd exacerbation as above; also will administer home medications (budesonide/formoterol and tiotropium per forumulary) and will titrate oxygen therapy to 88-92%

## 2020-12-29 NOTE — H&P ADULT - PROBLEM SELECTOR PLAN 5
-History of hypertension noted; holding home amlodipine and isosorbide dinitrate at this time as cannot administer po medications; depending on duration of need for bipap, may administer isosorbide dinitrate via iv

## 2020-12-29 NOTE — CONSULT NOTE ADULT - SUBJECTIVE AND OBJECTIVE BOX
CHIEF COMPLAINT:    HPI: 74 y/o F with COPD, recently admitted at OSH for hypercapnic respiratory failure, HTN, anxiety transferred from OSH for evaluation for cord compression.     PAST MEDICAL & SURGICAL HISTORY:    FAMILY HISTORY:      SOCIAL HISTORY:  Smoking: quit 2016, did not clarify when started    Allergies No Known Allergies    Home Medications:    REVIEW OF SYSTEMS:  Constitutional:   Eyes:  ENT:  CV:  Resp:  GI:  :  MSK:  Integumentary:  Neurological:  Psychiatric:  Endocrine:  Hematologic/Lymphatic:  Allergic/Immunologic:  [ ] All other systems negative  [ ] Unable to assess ROS because ________    OBJECTIVE:  ICU Vital Signs Last 24 Hrs  T(C): 36.3 (29 Dec 2020 08:02), Max: 36.6 (28 Dec 2020 17:32)  T(F): 97.4 (29 Dec 2020 08:02), Max: 97.8 (28 Dec 2020 17:32)  HR: 101 (29 Dec 2020 08:02) (99 - 116)  BP: 133/73 (29 Dec 2020 08:02) (125/66 - 178/79)  BP(mean): --  ABP: --  ABP(mean): --  RR: 25 (29 Dec 2020 08:02) (17 - 26)  SpO2: 100% (29 Dec 2020 08:02) (84% - 100%)        CAPILLARY BLOOD GLUCOSE          PHYSICAL EXAM:  General:   HEENT:   Lymph Nodes:  Neck:   Respiratory:   Cardiovascular:   Abdomen:   Extremities:   Skin:   Neurological:  Psychiatry:    HOSPITAL MEDICATIONS:  MEDICATIONS  (STANDING):  lactated ringers. 1000 milliLiter(s) (75 mL/Hr) IV Continuous <Continuous>    MEDICATIONS  (PRN):      LABS:                        8.9    17.53 )-----------( 487      ( 29 Dec 2020 07:33 )             30.7     12-29    143  |  96  |  36<H>  ----------------------------<  97  4.3   |  39<H>  |  0.51    Ca    9.8      29 Dec 2020 07:33  Mg     2.1     12-28    TPro  6.0  /  Alb  3.8  /  TBili  0.3  /  DBili  x   /  AST  21  /  ALT  36  /  AlkPhos  81  12-29        Arterial Blood Gas:  12-29 @ 08:34  7.29/96/229/44/100/15.4  ABG lactate: --  Arterial Blood Gas:  12-29 @ 06:43  7.25/>104/64/45/91/15.1  ABG lactate: --        MICROBIOLOGY:     RADIOLOGY:  [ ] Reviewed and interpreted by me    EKG: CHIEF COMPLAINT:    HPI: The patient is a 75-year-old woman with a past medical history of severe chronic obstructive pulmonary disease, managed at home with home oxygen therapy, who presented to the emergency department overnight as a transfer from [facility] for management of unexplained bilateral lower extremity weakness. Per chart review, the patient had a recent admission to [facility] for management of a left hip fracture and was sent to rehab on 12/18. However, shortly thereafter, the patient was transferred to [facility] for workup and management of an acute alteration in mental status, found to be secondary to acute retention of carbon dioxide, most likely secondary to an acute exacerbation, of uncertain etiology, of her chronic obstructive pulmonary disease. The patient was sedated and intubated and was managed in the intensive care unit at [place] with standing methylprednisolone, azithromycin, and albuterol in conjunction with her outpatient long-acting beta agonist, long-acting muscarinic antagonist, and inhaled corticosteroid. On 12/24, the patient was successfully extubated and transitioned to management with bilevel positive airway pressure, and ultimately to oxygen by nasal cannula. On the morning of 12/28, the patient's care was transitioned to the rehabilitation facility at [place], and it was noted that she had--presumably new--weakness in her bilateral lower extremities, so she was transferred to [facility] for further workup and management.     While, in the emergency department, the patient underwent evaluation with neurosurgery, which included an emergent MRI of the spinal cord to rule out a spinal cord compression. None was identified; however, enhancement on T2 of the distal lumbar cord and conus medullaris was evident. Also while the patient was in the emergency department, she was noted to have an acute oxygen desaturation while managed on 4L nasal cannula (from 100% spO2 to about 84%), so she was transitioned to management with bilevel positive airway pressure. Her vital signs did not demonstrate any signs of fever or hypotension but showed evident tachycardia (sinus on EKG). An arterial blood gas drawn and demonstrated evidence of acute-on-chronic retention of carbon dioxide.     MICU was consulted for hypercarbic respiratory failure.     PAST MEDICAL & SURGICAL HISTORY: anxiety, severe COPD, previously intubated for COPD    PSHx: hip fracture s/p repair    FAMILY HISTORY:      SOCIAL HISTORY:  Smoking: quit 2016, did not clarify when started    Allergies No Known Allergies    Home Medications:    REVIEW OF SYSTEMS:  Constitutional:   Eyes:  ENT:  CV:  Resp:  GI:  :  MSK:  Integumentary:  Neurological:  Psychiatric:  Endocrine:  Hematologic/Lymphatic:  Allergic/Immunologic:  [ ] All other systems negative  [ ] Unable to assess ROS because ________    OBJECTIVE:  ICU Vital Signs Last 24 Hrs  T(C): 36.3 (29 Dec 2020 08:02), Max: 36.6 (28 Dec 2020 17:32)  T(F): 97.4 (29 Dec 2020 08:02), Max: 97.8 (28 Dec 2020 17:32)  HR: 101 (29 Dec 2020 08:02) (99 - 116)  BP: 133/73 (29 Dec 2020 08:02) (125/66 - 178/79)  RR: 25 (29 Dec 2020 08:02) (17 - 26)  SpO2: 100% (29 Dec 2020 08:02) (84% - 100%)    CAPILLARY BLOOD GLUCOSE      PHYSICAL EXAM:  GENERAL: lethargic on ibpap   HEAD: NCAT, poor dentition  EYES: EOMI, PERRLA  NECK: Supple, No JVD  CHEST/LUNG:   HEART: Regular rate and rhythm; No murmurs, rubs, or gallops  ABDOMEN: Soft, Nontender, Nondistended; Bowel sounds present  EXTREMITIES:  2+ Peripheral Pulses, No clubbing, cyanosis, or edema  PSYCH: AOx self  NEUROLOGY: non-focal  SKIN: No rashes or lesions      HOSPITAL MEDICATIONS:  MEDICATIONS  (STANDING):  lactated ringers. 1000 milliLiter(s) (75 mL/Hr) IV Continuous <Continuous>    MEDICATIONS  (PRN):      LABS:                        8.9    17.53 )-----------( 487      ( 29 Dec 2020 07:33 )             30.7     12-29    143  |  96  |  36<H>  ----------------------------<  97  4.3   |  39<H>  |  0.51    Ca    9.8      29 Dec 2020 07:33  Mg     2.1     12-28    TPro  6.0  /  Alb  3.8  /  TBili  0.3  /  DBili  x   /  AST  21  /  ALT  36  /  AlkPhos  81  12-29        Arterial Blood Gas:  12-29 @ 08:34  7.29/96/229/44/100/15.4  ABG lactate: --  Arterial Blood Gas:  12-29 @ 06:43  7.25/>104/64/45/91/15.1  ABG lactate: --        MICROBIOLOGY:     RADIOLOGY:  [ ] Reviewed and interpreted by me    EKG: CHIEF COMPLAINT:    HPI: 75-year-old woman with severe COPD, on home O2, recently intubated at OS for hypercapnic respiratory failure, successfully extubated, then transferred from [facility] for management of unexplained bilateral lower extremity weakness. Per chart review, the patient had a recent admission to [facility] for management of a left hip fracture and was sent to rehab on 12/18. However, shortly thereafter, the patient was transferred to [facility] for workup and management of an acute alteration in mental status, found to be secondary to acute retention of carbon dioxide, most likely secondary to an acute exacerbation, of uncertain etiology, of her chronic obstructive pulmonary disease. The patient was sedated and intubated and was managed in the intensive care unit at [place] with standing methylprednisolone, azithromycin, and albuterol in conjunction with her outpatient long-acting beta agonist, long-acting muscarinic antagonist, and inhaled corticosteroid. On 12/24, the patient was successfully extubated and transitioned to management with bilevel positive airway pressure, and ultimately to oxygen by nasal cannula. On the morning of 12/28, the patient's care was transitioned to the rehabilitation facility at [place], and it was noted that she had--presumably new--weakness in her bilateral lower extremities, so she was transferred to [facility] for further workup and management.     While, in the emergency department, the patient underwent evaluation with neurosurgery, which included an emergent MRI of the spinal cord to rule out a spinal cord compression. None was identified; however, enhancement on T2 of the distal lumbar cord and conus medullaris was evident. Also while the patient was in the emergency department, she was noted to have an acute oxygen desaturation while managed on 4L nasal cannula (from 100% spO2 to about 84%), so she was transitioned to management with bilevel positive airway pressure. Her vital signs did not demonstrate any signs of fever or hypotension but showed evident tachycardia (sinus on EKG). An arterial blood gas drawn and demonstrated evidence of acute-on-chronic retention of carbon dioxide.     MICU was consulted for acute on chronic hypercarbic respiratory failure. Pt was on bipap 10/5.     PAST MEDICAL & SURGICAL HISTORY: anxiety, severe COPD, previously intubated for COPD    PSHx: hip fracture s/p repair    FAMILY HISTORY:    SOCIAL HISTORY:  Smoking: quit 2016, did not clarify when started    Allergies No Known Allergies    Home Medications:    REVIEW OF SYSTEMS:  [ ] Unable to assess ROS because lethargy    OBJECTIVE:  ICU Vital Signs Last 24 Hrs  T(C): 36.3 (29 Dec 2020 08:02), Max: 36.6 (28 Dec 2020 17:32)  T(F): 97.4 (29 Dec 2020 08:02), Max: 97.8 (28 Dec 2020 17:32)  HR: 101 (29 Dec 2020 08:02) (99 - 116)  BP: 133/73 (29 Dec 2020 08:02) (125/66 - 178/79)  RR: 25 (29 Dec 2020 08:02) (17 - 26)  SpO2: 100% (29 Dec 2020 08:02) (84% - 100%)    CAPILLARY BLOOD GLUCOSE      PHYSICAL EXAM:  GENERAL: lethargic on ibpap   HEAD: NCAT, poor dentition  EYES: EOMI, PERRLA  NECK: Supple, No JVD  CHEST/LUNG:   HEART: Regular rate and rhythm; No murmurs, rubs, or gallops  ABDOMEN: Soft, Nontender, Nondistended; Bowel sounds present  EXTREMITIES:  2+ Peripheral Pulses, No clubbing, cyanosis, or edema  PSYCH: AOx self  NEUROLOGY: non-focal  SKIN: No rashes or lesions      HOSPITAL MEDICATIONS:  MEDICATIONS  (STANDING):  lactated ringers. 1000 milliLiter(s) (75 mL/Hr) IV Continuous <Continuous>    MEDICATIONS  (PRN):      LABS:                        8.9    17.53 )-----------( 487      ( 29 Dec 2020 07:33 )             30.7     12-29    143  |  96  |  36<H>  ----------------------------<  97  4.3   |  39<H>  |  0.51    Ca    9.8      29 Dec 2020 07:33  Mg     2.1     12-28    TPro  6.0  /  Alb  3.8  /  TBili  0.3  /  DBili  x   /  AST  21  /  ALT  36  /  AlkPhos  81  12-29        Arterial Blood Gas:  12-29 @ 08:34  7.29/96/229/44/100/15.4  ABG lactate: --  Arterial Blood Gas:  12-29 @ 06:43  7.25/>104/64/45/91/15.1  ABG lactate: --        MICROBIOLOGY:     RADIOLOGY:  [ ] Reviewed and interpreted by me    EKG: CHIEF COMPLAINT: SOB    HPI: 75-year-old woman with severe COPD, on home O2, recently intubated at OSH for hypercapnic respiratory failure, successfully extubated, then transferred from [facility] for management of unexplained bilateral lower extremity weakness. Per chart review, the patient had a recent admission to [facility] for management of a left hip fracture and was sent to rehab on 12/18. However, shortly thereafter, the patient was transferred to [facility] for workup and management of an acute alteration in mental status, found to be secondary to acute retention of carbon dioxide, most likely secondary to an acute exacerbation, of uncertain etiology, of her chronic obstructive pulmonary disease. The patient was sedated and intubated and was managed in the intensive care unit at [place] with standing methylprednisolone, azithromycin, and albuterol in conjunction with her outpatient long-acting beta agonist, long-acting muscarinic antagonist, and inhaled corticosteroid. On 12/24, the patient was successfully extubated and transitioned to management with bilevel positive airway pressure, and ultimately to oxygen by nasal cannula. On the morning of 12/28, the patient's care was transitioned to the rehabilitation facility at [place], and it was noted that she had--presumably new--weakness in her bilateral lower extremities, so she was transferred to [facility] for further workup and management.     While, in the emergency department, the patient underwent evaluation with neurosurgery, which included an emergent MRI of the spinal cord to rule out a spinal cord compression. None was identified; however, enhancement on T2 of the distal lumbar cord and conus medullaris was evident. Also while the patient was in the emergency department, she was noted to have an acute oxygen desaturation while managed on 4L nasal cannula (from 100% spO2 to about 84%), so she was transitioned to management with bilevel positive airway pressure. Her vital signs did not demonstrate any signs of fever or hypotension but showed evident tachycardia (sinus on EKG). An arterial blood gas drawn and demonstrated evidence of acute-on-chronic retention of carbon dioxide.     MICU was consulted for acute on chronic hypercarbic respiratory failure. Pt was on bipap 10/5.     PAST MEDICAL & SURGICAL HISTORY: anxiety, severe COPD, previously intubated for COPD    PSHx: hip fracture s/p repair    FAMILY HISTORY:    SOCIAL HISTORY:  Smoking: quit 2016, did not clarify when started    Allergies No Known Allergies    Home Medications:    REVIEW OF SYSTEMS:  [ ] Unable to assess ROS because lethargy    OBJECTIVE:  ICU Vital Signs Last 24 Hrs  T(C): 36.3 (29 Dec 2020 08:02), Max: 36.6 (28 Dec 2020 17:32)  T(F): 97.4 (29 Dec 2020 08:02), Max: 97.8 (28 Dec 2020 17:32)  HR: 101 (29 Dec 2020 08:02) (99 - 116)  BP: 133/73 (29 Dec 2020 08:02) (125/66 - 178/79)  RR: 25 (29 Dec 2020 08:02) (17 - 26)  SpO2: 100% (29 Dec 2020 08:02) (84% - 100%)    CAPILLARY BLOOD GLUCOSE    PHYSICAL EXAM:  GENERAL: lethargic on ibpap   HEAD: NCAT, poor dentition  EYES: EOMI, PERRLA  NECK: Supple, No JVD  CHEST/LUNG: grossly clear on bipap, no accessory muscle usage, tachypneic to 30s  HEART: Regular rate and rhythm; No murmurs, rubs, or gallops  ABDOMEN: Soft, Nontender, Nondistended; Bowel sounds present  EXTREMITIES:  2+ Peripheral Pulses, No clubbing, cyanosis, or edema  PSYCH: AOx self, place, "2019"  NEUROLOGY: non-focal  SKIN: No rashes or lesions    HOSPITAL MEDICATIONS:  MEDICATIONS  (STANDING):  lactated ringers. 1000 milliLiter(s) (75 mL/Hr) IV Continuous <Continuous>    MEDICATIONS  (PRN):    LABS:                        8.9    17.53 )-----------( 487      ( 29 Dec 2020 07:33 )             30.7     12-29    143  |  96  |  36<H>  ----------------------------<  97  4.3   |  39<H>  |  0.51    Ca    9.8      29 Dec 2020 07:33  Mg     2.1     12-28    TPro  6.0  /  Alb  3.8  /  TBili  0.3  /  DBili  x   /  AST  21  /  ALT  36  /  AlkPhos  81  12-29        Arterial Blood Gas:  12-29 @ 08:34  7.29/96/229/44/100/15.4  ABG lactate: --  Arterial Blood Gas:  12-29 @ 06:43  7.25/>104/64/45/91/15.1  ABG lactate: --        MICROBIOLOGY:     RADIOLOGY:  [ ] Reviewed and interpreted by me    EKG: CHIEF COMPLAINT: SOB    HPI: 75-year-old woman with severe COPD, on home O2, recently intubated at OS for hypercapnic respiratory failure, successfully extubated, then transferred from [facility] for management of unexplained bilateral lower extremity weakness. Per chart review, the patient had a recent admission to [facility] for management of a left hip fracture and was sent to rehab on 12/18. However, shortly thereafter, the patient was transferred to [facility] for workup and management of an acute alteration in mental status, found to be secondary to acute retention of carbon dioxide, most likely secondary to an acute exacerbation, of uncertain etiology, of her chronic obstructive pulmonary disease. The patient was sedated and intubated and was managed in the intensive care unit at [place] with standing methylprednisolone, azithromycin, and albuterol in conjunction with her outpatient long-acting beta agonist, long-acting muscarinic antagonist, and inhaled corticosteroid. On 12/24, the patient was successfully extubated and transitioned to management with bilevel positive airway pressure, and ultimately to oxygen by nasal cannula. On the morning of 12/28, the patient's care was transitioned to the rehabilitation facility at [place], and it was noted that she had--presumably new--weakness in her bilateral lower extremities, so she was transferred to [facility] for further workup and management.     While, in the emergency department, the patient underwent evaluation with neurosurgery, which included an emergent MRI of the spinal cord to rule out a spinal cord compression. None was identified; however, enhancement on T2 of the distal lumbar cord and conus medullaris was evident. Also while the patient was in the emergency department, she was noted to have an acute oxygen desaturation while managed on 4L nasal cannula (from 100% spO2 to about 84%), so she was transitioned to management with bilevel positive airway pressure. Her vital signs did not demonstrate any signs of fever or hypotension but showed evident tachycardia (sinus on EKG). An arterial blood gas drawn and demonstrated evidence of acute-on-chronic retention of carbon dioxide.     MICU was consulted for acute on chronic hypercarbic respiratory failure. Pt was on bipap 10/5. By the time of exam, patient was more alert than prior to initiation of bipap per ED nursing staff.     PAST MEDICAL & SURGICAL HISTORY: anxiety, severe COPD, previously intubated for COPD    PSHx: hip fracture s/p repair    FAMILY HISTORY:    SOCIAL HISTORY:  Smoking: quit 2016, did not clarify when started    Allergies No Known Allergies    Home Medications:    REVIEW OF SYSTEMS:  [ ] Unable to assess ROS because lethargy    OBJECTIVE:  ICU Vital Signs Last 24 Hrs  T(C): 36.3 (29 Dec 2020 08:02), Max: 36.6 (28 Dec 2020 17:32)  T(F): 97.4 (29 Dec 2020 08:02), Max: 97.8 (28 Dec 2020 17:32)  HR: 101 (29 Dec 2020 08:02) (99 - 116)  BP: 133/73 (29 Dec 2020 08:02) (125/66 - 178/79)  RR: 25 (29 Dec 2020 08:02) (17 - 26)  SpO2: 100% (29 Dec 2020 08:02) (84% - 100%)    CAPILLARY BLOOD GLUCOSE    PHYSICAL EXAM:  GENERAL: lethargic on ibpap   HEAD: NCAT, poor dentition  EYES: EOMI, PERRLA  NECK: Supple, No JVD  CHEST/LUNG: grossly clear on bipap, no accessory muscle usage, tachypneic to 30s  HEART: Regular rate and rhythm; No murmurs, rubs, or gallops  ABDOMEN: Soft, Nontender, Nondistended; Bowel sounds present  EXTREMITIES:  2+ Peripheral Pulses, No clubbing, cyanosis, or edema  PSYCH: AOx self, place, "2019"  NEUROLOGY: unable to move b/l lower extremities, sensation decreased on LLE more than RLE  SKIN: No rashes or lesions    HOSPITAL MEDICATIONS:  MEDICATIONS  (STANDING):  lactated ringers. 1000 milliLiter(s) (75 mL/Hr) IV Continuous <Continuous>    MEDICATIONS  (PRN):    LABS:                        8.9    17.53 )-----------( 487      ( 29 Dec 2020 07:33 )             30.7     12-29    143  |  96  |  36<H>  ----------------------------<  97  4.3   |  39<H>  |  0.51    Ca    9.8      29 Dec 2020 07:33  Mg     2.1     12-28    TPro  6.0  /  Alb  3.8  /  TBili  0.3  /  DBili  x   /  AST  21  /  ALT  36  /  AlkPhos  81  12-29        Arterial Blood Gas:  12-29 @ 08:34  7.29/96/229/44/100/15.4  ABG lactate: --  Arterial Blood Gas:  12-29 @ 06:43  7.25/>104/64/45/91/15.1  ABG lactate: --        MICROBIOLOGY:     RADIOLOGY:  [X Reviewed and interpreted by me  < from: Xray Chest 1 View- PORTABLE-Urgent (Xray Chest 1 View- PORTABLE-Urgent .) (12.29.20 @ 07:39) >  Impression:    The heart is normal in size. Platelike atelectasis right lower lobe. Otherwise the lungs appear to be clear. No pleural effusion. No pneumothorax.    < end of copied text >      EKG:

## 2020-12-29 NOTE — PHYSICAL THERAPY INITIAL EVALUATION ADULT - PRECAUTIONS/LIMITATIONS, REHAB EVAL
Per chart review, the patient had a recent admission to [facility] for management of a left hip fracture and was sent to rehab on 12/18. However, shortly thereafter, the patient was transferred to [facility] for workup and management of an acute alteration in mental status, found to be secondary to acute retention of carbon dioxide.  The pt was sedated and intubated  and was successfully extubated and transitioned to management with bilevel positive airway pressure, and ultimately to oxygen by nasal cannula.  On the morning of 12/28, the patient's care was transitioned to the rehabilitation facility at [place], and it was noted that she had--presumably new--weakness in her bilateral lower extremities, so she was transferred to [facility] for further workup and management.  While, in the emergency department, the patient underwent evaluation with neurosurgery, which included an emergent MRI of the spinal cord to rule out a spinal cord compression.   MRI spinal cord: Central T2 hyperintense signal in the distal lumbar cord and conus medullaris. Per chart review, the patient had a recent admission to [facility] for management of a left hip fracture and was sent to rehab on 12/18. However, shortly thereafter, the patient was transferred to [facility] for workup and management of an acute alteration in mental status, found to be secondary to acute retention of carbon dioxide.  The pt was sedated and intubated  and was successfully extubated and transitioned to management with bilevel positive airway pressure, and ultimately to oxygen by nasal cannula.  On the morning of 12/28, the patient's care was transitioned to the rehabilitation facility at [place], and it was noted that she had--presumably new--weakness in her bilateral lower extremities, so she was transferred to [facility] for further workup and management.  While, in the emergency department, the patient underwent evaluation with neurosurgery, which included an emergent MRI of the spinal cord to rule out a spinal cord compression.   MRI spinal cord: Central T2 hyperintense signal in the distal lumbar cord and conus medullaris. MR Head 12/31: Nonspecific enhancement of the globus pallidus and dentate nucleus which may be due to metabolic abnormality.  Moderate atrophy. Small vessel white matter ischemic changes. No acute infarcts. MR Thoracic/Lumbar 12/31: Cord expansion from T8 through the conus subtle enhancement and enhancement of the cauda equina nerve roots is nonspecific and may be related to infectious, inflammatory or neoplastic process./fall precautions

## 2020-12-29 NOTE — PATIENT PROFILE ADULT - NSASFALLATTEMPTOOB_GEN_A_NUR
Patient came in with hemoglobin 10.9 which is decreased from 12 last week. She denies any blood in stool or urine. She does not appear to be actively bleeding. Patient states that she had a colonoscopy 3 years ago that was normal. Patient deferred rectal exam. MCV is normal.    Plan:  - Follow up with PCP regarding anemia.        no

## 2020-12-29 NOTE — ED ADULT NURSE REASSESSMENT NOTE - NS ED NURSE REASSESS COMMENT FT1
Patient resting in the stretcher. VSS, patient on BiPAP. Patient awaiting inpatient bed assignment. Calling respiratory to give patient nebulizers while on Bipap. Safety measures maintained. Bed in the lowest position. Call bell within reach. No acute distress noted or further complaints at this time. Report received from night RN pat.  Patient resting in the stretcher. VSS, patient on BiPAP. PERRL. Lower extremities immobile and patient reports decreased sensation in her feet. Upper extremities strong and equal b/l. Patient Denies pain. Patient awaiting inpatient bed assignment. Calling respiratory to give patient nebulizers while on Bipap. Safety measures maintained. Bed in the lowest position. Call bell within reach. No acute distress noted or further complaints at this time.

## 2020-12-29 NOTE — ED ADULT NURSE REASSESSMENT NOTE - NS ED NURSE REASSESS COMMENT FT1
Repeat ABG performed. Respiratory at the bedside to assist with seeing up nebulizer treatment. Awaiting repeat ABG to determine plan of care, as well as inpatient bed assignment,. Safety measures maintained. Bed in the lowest position. Call bell within reach.  MD at the bedside. No acute distress noted or further complaints at this time.

## 2020-12-29 NOTE — CONSULT NOTE ADULT - SUBJECTIVE AND OBJECTIVE BOX
*************************************  NEUROLOGY CONSULT  SERVICE  **************************************    CASEY ROBERTS  Female  MRN-[id number]    HPI:  76 yo female with PMH COPD, no known other PMH who initially presented to [facility] and was intubated for respiratory arrest, remained intubated for 6-7 days and upon extubation was noted to have flaccid lower extremities with decreased sensation. She was transferred for evaluation of possible cord compression and had MRI which showed a T2 hyperintensity in the distal lumbar cord, but no evidence of cord compression. Neurology consulted for concern for possible transverse myelitis. Patient is a poor historian, but describes that she started feeling weak in the legs about 4 months ago after a fall. She is nondescript with events up through her  hospitalization and cannot pinpoint a specific time where her legs became completely flaccid as they are now. She notes decreased sensation in both legs, especially below the knees. She denies any weakness or numbness in her arms. She also denies recent fever, headache, dizziness, vision changes, nausea/vomiting, difficulty with speech. She has never had weakness to the extent before.      ROS: All negative except as mentioned in HPI    PAST MEDICAL & SURGICAL HISTORY:    MEDICATIONS  (STANDING):  ALBUTerol    90 MICROgram(s) HFA Inhaler 1 Puff(s) Inhalation every 4 hours  lactated ringers. 1000 milliLiter(s) (75 mL/Hr) IV Continuous <Continuous>    MEDICATIONS  (PRN):    Allergies    No Known Allergies    Intolerances        VITAL SIGNS:  Vital Signs Last 24 Hrs  T(C): 36.3 (29 Dec 2020 08:02), Max: 36.6 (28 Dec 2020 17:32)  T(F): 97.4 (29 Dec 2020 08:02), Max: 97.8 (28 Dec 2020 17:32)  HR: 99 (29 Dec 2020 09:44) (91 - 116)  BP: 123/70 (29 Dec 2020 09:44) (123/70 - 178/79)  BP(mean): --  RR: 23 (29 Dec 2020 09:44) (17 - 26)  SpO2: 100% (29 Dec 2020 09:44) (84% - 100%)    PHYSICAL EXAMINATION:  General: Well-developed, well nourished, in no acute distress.  Eyes: Conjunctiva and sclera clear.  Neck: Supple, nontender  Lung: no respiratory distress noted    Neurologic:  - Mental Status:  Alert, awake, oriented to person, and time; Speech is fluent with intact naming, repetition, but some elements of delay in conversation; follows most simple commands, no extinction or neglect noted;   - Cranial Nerves II-XII:  VFF, No nystagmus noted, EOMI, PERRL, V1-V3 intact, no facial asymmetry, t/p midline, SCM/trap intact.  - Motor: Normal muscle bulk and tone in UEs, with 5/5 strength. No myoclonus or tremor. LEs both with flaccid tone, 0/5 strength  - Reflexes: 2+ biceps, brachioradialis reflexes bilaterally. Patellar and ankle areflexic. Plantar responses mute.  - Sensory: Decreased sensation to light touch and pinprick below the knee, with more loss distally compare to proximal. Decreased vibration sensation in LEs/feet compared to UEs/hands. Decreased proprioception in toes bilaterally.  - Coordination:  Finger-nose-finger without dysmetria.  - Gait: Unable to walk.    LABS:                          8.9    17.53 )-----------( 487      ( 29 Dec 2020 07:33 )             30.7     12-29    143  |  96  |  36<H>  ----------------------------<  97  4.3   |  39<H>  |  0.51    Ca    9.8      29 Dec 2020 07:33  Mg     2.1     12-28    TPro  6.0  /  Alb  3.8  /  TBili  0.3  /  DBili  x   /  AST  21  /  ALT  36  /  AlkPhos  81  12-29        RADIOLOGY & ADDITIONAL STUDIES:    MR Acute Spinal Cord Compression (12.28.20 @ 23:34)  IMPRESSION:    Central T2 hyperintense signal in the distal lumbar cord and conus medullaris. Differential includes myelitis, syrinx, and dilated ventriculus terminalis, amongst other etiologies. Further evaluation with contrast-enhanced MRI including axial images in the lower thoracic spine is recommended.    No evidence of cord compression.           *************************************  NEUROLOGY CONSULT  SERVICE  **************************************    CASEY ROBERTS  Female  [id number]    HPI:  76 yo female with PMH COPD, no known other PMH who initially presented to [facility] and was intubated for respiratory arrest, remained intubated for 6-7 days and upon extubation was noted to have flaccid lower extremities with decreased sensation. She was transferred for evaluation of possible cord compression and had MRI which showed a T2 hyperintensity in the distal lumbar cord, but no evidence of cord compression. Neurology consulted for concern for possible transverse myelitis. Patient is a poor historian, but describes that she started feeling weak in the legs about 4 months ago after a fall. She is nondescript with events up through her  hospitalization and cannot pinpoint a specific time where her legs became completely flaccid as they are now. She notes decreased sensation in both legs, especially below the knees. She denies any weakness or numbness in her arms. She also denies recent fever, headache, dizziness, vision changes, nausea/vomiting, difficulty with speech. She has never had weakness to the extent before.      ROS: All negative except as mentioned in HPI    PAST MEDICAL & SURGICAL HISTORY:    MEDICATIONS  (STANDING):  ALBUTerol    90 MICROgram(s) HFA Inhaler 1 Puff(s) Inhalation every 4 hours  lactated ringers. 1000 milliLiter(s) (75 mL/Hr) IV Continuous <Continuous>    MEDICATIONS  (PRN):    Allergies    No Known Allergies    Intolerances        VITAL SIGNS:  Vital Signs Last 24 Hrs  T(C): 36.3 (29 Dec 2020 08:02), Max: 36.6 (28 Dec 2020 17:32)  T(F): 97.4 (29 Dec 2020 08:02), Max: 97.8 (28 Dec 2020 17:32)  HR: 99 (29 Dec 2020 09:44) (91 - 116)  BP: 123/70 (29 Dec 2020 09:44) (123/70 - 178/79)  BP(mean): --  RR: 23 (29 Dec 2020 09:44) (17 - 26)  SpO2: 100% (29 Dec 2020 09:44) (84% - 100%)    PHYSICAL EXAMINATION:  General: Well-developed, well nourished, in no acute distress.  Eyes: Conjunctiva and sclera clear.  Neck: Supple, nontender  Lung: no respiratory distress noted    Neurologic SEE ATTENDING ATTESTATION:  - Mental Status:  Alert, awake, oriented to person, and time; Speech is fluent with intact naming, repetition, but some elements of delay in conversation; follows most simple commands, no extinction or neglect noted;   - Cranial Nerves II-XII:  VFF, No nystagmus noted, EOMI, PERRL, V1-V3 intact, no facial asymmetry, t/p midline, SCM/trap intact.  - Motor: Normal muscle bulk and tone in UEs, with 5/5 strength. No myoclonus or tremor. LEs both with flaccid tone, 0/5 strength  - Reflexes: 2+ biceps, brachioradialis reflexes bilaterally. Patellar and ankle areflexic. Plantar responses mute.  - Sensory: Decreased sensation to light touch and pinprick below the knee, with more loss distally compare to proximal. Decreased vibration sensation in LEs/feet compared to UEs/hands. Decreased proprioception in toes bilaterally.  - Coordination:  Finger-nose-finger without dysmetria.  - Gait: Unable to walk.    LABS:                          8.9    17.53 )-----------( 487      ( 29 Dec 2020 07:33 )             30.7     12-29    143  |  96  |  36<H>  ----------------------------<  97  4.3   |  39<H>  |  0.51    Ca    9.8      29 Dec 2020 07:33  Mg     2.1     12-28    TPro  6.0  /  Alb  3.8  /  TBili  0.3  /  DBili  x   /  AST  21  /  ALT  36  /  AlkPhos  81  12-29        RADIOLOGY & ADDITIONAL STUDIES:    MR Acute Spinal Cord Compression (12.28.20 @ 23:34)  IMPRESSION:    Central T2 hyperintense signal in the distal lumbar cord and conus medullaris. Differential includes myelitis, syrinx, and dilated ventriculus terminalis, amongst other etiologies. Further evaluation with contrast-enhanced MRI including axial images in the lower thoracic spine is recommended.    No evidence of cord compression.

## 2020-12-29 NOTE — H&P ADULT - NSHPPHYSICALEXAM_GEN_ALL_CORE
GENERAL: Breathing uncomfortably on bipap, single left bottom front tooth noted   HEAD: Atraumatic, Normocephalic  EYES: PERRL grossly, conjunctiva and sclera clear  ENMT: No tonsillar erythema, exudates, or enlargement; Moist mucous membranes, Good dentition, No lesions  NECK: Supple, No JVD, Normal thyroid  NERVOUS SYSTEM:  Wakes up and responds to questions on repeat examination; normal bulk, tone, and strength in bilateral upper extremities; coordination intact in bilateral upper extremities; atony of bilateral lower extremities evident, strength 0/5 bilaterally, normal bulk; sensation to light touch intact in bilateral lower extremities; deep tendon reflexes mute in bilateral lower extremities   CHEST/LUNG: Inspiratory effort very minimal bilaterally; diffuse dry rales noted; no wheezing evident   HEART: Fast rate and regular rhythm; No murmurs, rubs, or gallops  ABDOMEN: Soft, Nontender, Nondistended; Bowel sounds present  EXTREMITIES:  2+ Peripheral Pulses, No clubbing, cyanosis, or edema  LYMPH: No lymphadenopathy noted  SKIN: No rashes, no lesions

## 2020-12-29 NOTE — CHART NOTE - NSCHARTNOTEFT_GEN_A_CORE
MRI imaging reviewed:   Central T2 hyperintense signal in the distal lumbar cord and conus medullaris. Differential includes myelitis, syrinx, and dilated ventriculus terminalis, amongst other etiologies. Further evaluation with contrast-enhanced MRI including axial images in the lower thoracic spine is recommended.    Recommend neurology consult, no neurosurgical intervention, no e/o cord compression.     Will sign off, please reconsult as needed

## 2020-12-29 NOTE — ED ADULT NURSE REASSESSMENT NOTE - NS ED NURSE REASSESS COMMENT FT1
Pt. became tachypneic not able to speak in full sentences. Desaturated down to 84% on 6 L nasal cannula. Respiratory called; patient started on BiPAP. MD Cooley at bedside.

## 2020-12-29 NOTE — H&P ADULT - HISTORY OF PRESENT ILLNESS
The patient is a 75-year-old woman with a past medical history of severe chronic obstructive pulmonary disease, managed at home with home oxygen therapy, who presented to the emergency department overnight as a transfer from [facility] for management of unexplained bilateral lower extremity weakness. Per chart review, the patient had a recent admission to [facility] for management of a left hip fracture and was sent to rehab on 12/18. However, shortly thereafter, the patient was transferred to [facility] for workup and management of an acute alteration in mental status, found to be secondary to acute retention of carbon dioxide, most likely secondary to an acute exacerbation, of uncertain etiology, of her chronic obstructive pulmonary disease. The patient was sedated and intubated and was managed in the intensive care unit at [facility] with standing methylprednisolone, azithromycin, and albuterol in conjunction with her outpatient long-acting beta agonist, long-acting muscarinic antagonist, and inhaled corticosteroid. On 12/24, the patient was successfully extubated and transitioned to management with bilevel positive airway pressure, and ultimately to oxygen by nasal cannula. On the morning of 12/28, the patient's care was transitioned to the rehabilitation facility at [facility], and it was noted that she had--presumably new--weakness in her bilateral lower extremities, so she was transferred to [facility] for further workup and management.     While, in the emergency department, the patient underwent evaluation with neurosurgery, which included an emergent MRI of the spinal cord to rule out a spinal cord compression. None was identified; however, enhancement on T2 of the distal lumbar cord and conus medullaris was evident. Also while the patient was in the emergency department, she was noted to have an acute oxygen desaturation while managed on 4L nasal cannula (from 100% spO2 to about 84%), so she was transitioned to management with bilevel positive airway pressure.  The patient is a 75-year-old woman with a past medical history of severe chronic obstructive pulmonary disease, managed at home with home oxygen therapy, who presented to the emergency department overnight as a transfer from [facility] for management of unexplained bilateral lower extremity weakness. Per chart review, the patient had a recent admission to [facility] for management of a left hip fracture and was sent to rehab on 12/18. However, shortly thereafter, the patient was transferred to [facility] for workup and management of an acute alteration in mental status, found to be secondary to acute retention of carbon dioxide, most likely secondary to an acute exacerbation, of uncertain etiology, of her chronic obstructive pulmonary disease. The patient was sedated and intubated and was managed in the intensive care unit at [place] with standing methylprednisolone, azithromycin, and albuterol in conjunction with her outpatient long-acting beta agonist, long-acting muscarinic antagonist, and inhaled corticosteroid. On 12/24, the patient was successfully extubated and transitioned to management with bilevel positive airway pressure, and ultimately to oxygen by nasal cannula. On the morning of 12/28, the patient's care was transitioned to the rehabilitation facility at [place], and it was noted that she had--presumably new--weakness in her bilateral lower extremities, so she was transferred to [facility] for further workup and management.     While, in the emergency department, the patient underwent evaluation with neurosurgery, which included an emergent MRI of the spinal cord to rule out a spinal cord compression. None was identified; however, enhancement on T2 of the distal lumbar cord and conus medullaris was evident. Also while the patient was in the emergency department, she was noted to have an acute oxygen desaturation while managed on 4L nasal cannula (from 100% spO2 to about 84%), so she was transitioned to management with bilevel positive airway pressure. Her vital signs did not demonstrate any signs of fever or hypotension but showed evident tachycardia (sinus on EKG). An arterial blood gas drawn and demonstrated evidence of acute-on-chronic retention of carbon dioxide.

## 2020-12-29 NOTE — PHYSICAL THERAPY INITIAL EVALUATION ADULT - ADDITIONAL COMMENTS
Per patient and confirmed with CM [place] (from patient's niece): Pt admitted to hospital from [facility]. Prior to [facility] pt independent with ambulation, transfers and ADLs. Pt lives alone in private house with 3 steps to enter and 1 flight internally. Pt owns W/C but unclear when acquired and denies use.

## 2020-12-29 NOTE — PHYSICAL THERAPY INITIAL EVALUATION ADULT - PLANNED THERAPY INTERVENTIONS, PT EVAL
LTG 1 - Stairs: Pt will be independent with negotiation of 3 steps to enter home within 4 weeks./balance training/bed mobility training/gait training/transfer training

## 2020-12-29 NOTE — ED ADULT NURSE REASSESSMENT NOTE - NS ED NURSE REASSESS COMMENT FT1
Patient keeps taking off the BiPAP, with verbal reassurance patient leaves it on. Will continue to monitor and assess BiPAP mask placement and seal.

## 2020-12-30 LAB
ANION GAP SERPL CALC-SCNC: 5 MMOL/L — SIGNIFICANT CHANGE UP (ref 5–17)
BASE EXCESS BLDA CALC-SCNC: 17.1 MMOL/L — HIGH (ref -2–2)
BUN SERPL-MCNC: 36 MG/DL — HIGH (ref 7–23)
CALCIUM SERPL-MCNC: 9.6 MG/DL — SIGNIFICANT CHANGE UP (ref 8.4–10.5)
CHLORIDE SERPL-SCNC: 95 MMOL/L — LOW (ref 96–108)
CO2 BLDA-SCNC: 46 MMOL/L — HIGH (ref 22–30)
CO2 SERPL-SCNC: 41 MMOL/L — HIGH (ref 22–31)
CREAT SERPL-MCNC: 0.43 MG/DL — LOW (ref 0.5–1.3)
GAS PNL BLDA: SIGNIFICANT CHANGE UP
GAS PNL BLDA: SIGNIFICANT CHANGE UP
GLUCOSE BLDC GLUCOMTR-MCNC: 105 MG/DL — HIGH (ref 70–99)
GLUCOSE BLDC GLUCOMTR-MCNC: 114 MG/DL — HIGH (ref 70–99)
GLUCOSE BLDC GLUCOMTR-MCNC: 172 MG/DL — HIGH (ref 70–99)
GLUCOSE BLDC GLUCOMTR-MCNC: 209 MG/DL — HIGH (ref 70–99)
GLUCOSE SERPL-MCNC: 104 MG/DL — HIGH (ref 70–99)
HCO3 BLDA-SCNC: 44 MMOL/L — HIGH (ref 21–29)
HCT VFR BLD CALC: 29.1 % — LOW (ref 34.5–45)
HCV AB S/CO SERPL IA: 0.09 S/CO — SIGNIFICANT CHANGE UP (ref 0–0.99)
HCV AB SERPL-IMP: SIGNIFICANT CHANGE UP
HGB BLD-MCNC: 8.9 G/DL — LOW (ref 11.5–15.5)
HOROWITZ INDEX BLDA+IHG-RTO: 40 — SIGNIFICANT CHANGE UP
MAGNESIUM SERPL-MCNC: 1.9 MG/DL — SIGNIFICANT CHANGE UP (ref 1.6–2.6)
MCHC RBC-ENTMCNC: 30.5 PG — SIGNIFICANT CHANGE UP (ref 27–34)
MCHC RBC-ENTMCNC: 30.6 GM/DL — LOW (ref 32–36)
MCV RBC AUTO: 99.7 FL — SIGNIFICANT CHANGE UP (ref 80–100)
NRBC # BLD: 0 /100 WBCS — SIGNIFICANT CHANGE UP (ref 0–0)
PCO2 BLDA: 75 MMHG — CRITICAL HIGH (ref 32–46)
PH BLDA: 7.39 — SIGNIFICANT CHANGE UP (ref 7.35–7.45)
PHOSPHATE SERPL-MCNC: 2.9 MG/DL — SIGNIFICANT CHANGE UP (ref 2.5–4.5)
PLATELET # BLD AUTO: 455 K/UL — HIGH (ref 150–400)
PO2 BLDA: 128 MMHG — HIGH (ref 74–108)
POTASSIUM SERPL-MCNC: 4.8 MMOL/L — SIGNIFICANT CHANGE UP (ref 3.5–5.3)
POTASSIUM SERPL-SCNC: 4.8 MMOL/L — SIGNIFICANT CHANGE UP (ref 3.5–5.3)
RBC # BLD: 2.92 M/UL — LOW (ref 3.8–5.2)
RBC # FLD: 13.6 % — SIGNIFICANT CHANGE UP (ref 10.3–14.5)
SAO2 % BLDA: 99 % — HIGH (ref 92–96)
SARS-COV-2 IGG SERPL QL IA: NEGATIVE — SIGNIFICANT CHANGE UP
SARS-COV-2 IGM SERPL IA-ACNC: 0.01 INDEX — SIGNIFICANT CHANGE UP
SODIUM SERPL-SCNC: 141 MMOL/L — SIGNIFICANT CHANGE UP (ref 135–145)
T3 SERPL-MCNC: 49 NG/DL — LOW (ref 80–200)
T4 AB SER-ACNC: 6.5 UG/DL — SIGNIFICANT CHANGE UP (ref 4.6–12)
TSH SERPL-MCNC: 0.12 UIU/ML — LOW (ref 0.27–4.2)
WBC # BLD: 16.53 K/UL — HIGH (ref 3.8–10.5)
WBC # FLD AUTO: 16.53 K/UL — HIGH (ref 3.8–10.5)

## 2020-12-30 PROCEDURE — 99233 SBSQ HOSP IP/OBS HIGH 50: CPT | Mod: GC

## 2020-12-30 RX ORDER — LEVALBUTEROL 1.25 MG/.5ML
0.31 SOLUTION, CONCENTRATE RESPIRATORY (INHALATION) EVERY 6 HOURS
Refills: 0 | Status: DISCONTINUED | OUTPATIENT
Start: 2020-12-30 | End: 2021-01-01

## 2020-12-30 RX ADMIN — Medication 2: at 16:48

## 2020-12-30 RX ADMIN — Medication 40 MILLIGRAM(S): at 06:37

## 2020-12-30 RX ADMIN — TIOTROPIUM BROMIDE 1 CAPSULE(S): 18 CAPSULE ORAL; RESPIRATORY (INHALATION) at 15:58

## 2020-12-30 RX ADMIN — SODIUM CHLORIDE 75 MILLILITER(S): 9 INJECTION, SOLUTION INTRAVENOUS at 15:51

## 2020-12-30 RX ADMIN — LEVALBUTEROL 0.31 MILLIGRAM(S): 1.25 SOLUTION, CONCENTRATE RESPIRATORY (INHALATION) at 17:05

## 2020-12-30 RX ADMIN — Medication 40 MILLIGRAM(S): at 11:35

## 2020-12-30 RX ADMIN — AZITHROMYCIN 250 MILLIGRAM(S): 500 TABLET, FILM COATED ORAL at 12:56

## 2020-12-30 RX ADMIN — ENOXAPARIN SODIUM 40 MILLIGRAM(S): 100 INJECTION SUBCUTANEOUS at 11:23

## 2020-12-30 RX ADMIN — PANTOPRAZOLE SODIUM 40 MILLIGRAM(S): 20 TABLET, DELAYED RELEASE ORAL at 11:21

## 2020-12-30 RX ADMIN — Medication 40 MILLIGRAM(S): at 17:05

## 2020-12-30 RX ADMIN — BUDESONIDE AND FORMOTEROL FUMARATE DIHYDRATE 2 PUFF(S): 160; 4.5 AEROSOL RESPIRATORY (INHALATION) at 17:21

## 2020-12-30 NOTE — PROGRESS NOTE ADULT - PROBLEM SELECTOR PLAN 3
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 hiv, lyme, tb, ebv, hep b/c), inflammatory etiology (acute demyelating inflammatory polyneuropathy/gbs), toxin-induced (b12, folate, copper deficiency; lead poisoning), para-neoplastic syndrome, syringomyelia, spinal infarction   -Motor deficits only noted on neurologic exam (sensory exam intact per limited exam); this is most consistent with diagnosis of anterior spinal cord infarction (less likely as no bowel or bladder incontinence) or guillan-barre syndrome; less consistent with infectious or toxic etiologies, which would likely affect peripheral nerve and cause sensory deficits in addition   -Will send B12, folate, and copper titers; will order mri/mra of spine and mri of brain   -If above workup is negative, patient will require lumbar puncture to look for protein-cytologic dissociation characteristic of gbs 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 hiv, lyme, tb, ebv, hep b/c), inflammatory etiology (acute demyelating inflammatory polyneuropathy/gbs), toxin-induced (b12, folate, copper deficiency; lead poisoning), para-neoplastic syndrome, syringomyelia, spinal infarction   -Motor deficits only noted on neurologic exam (sensory exam intact per limited exam); this is most consistent with diagnosis of anterior spinal cord infarction (less likely as no bowel or bladder incontinence) or guillan-barre syndrome; less consistent with infectious or toxic etiologies, which would likely affect peripheral nerve and cause sensory deficits in addition   -Will send B12, folate, and copper titers; will order mri/mra of spine and mri of brain   -If above workup is negative, patient will require lumbar puncture to look for protein-cytologic dissociation characteristic of gbs  -Also possible that weakness could be explained by ICU-associated deconditioning; however, would not explain lack of evident upper extremity weakness

## 2020-12-30 NOTE — PROGRESS NOTE ADULT - ASSESSMENT
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

## 2020-12-30 NOTE — PROGRESS NOTE ADULT - PROBLEM SELECTOR PLAN 1
-In the emergency department, the patient was noted to have an acute desaturation to 86% while managed on 4L nasal cannula (saturating at 100% previously while being monitored in the ED on 4L nasal cannula)   -Of note, on my initial exam, shortly after this desaturation, the patient was moving very minimal air and demonstrating abdominal paradox  -Etiology of acute respiratory failure with hypoxia most likely is respiratory muscle collapse, which may represent insufficient respiratory muscle reserve in the setting of such great carbon dioxide retention  -Etiology of acute exacerbation of COPD uncertain; no documentation in [facility] of acute viral prodrome; no changes in medication management; no evidence of missed doses   -Of note, the patient was previously saturating at 100% on 4L nasal cannula; may also represent Haldane effect, and sudden loss of respiratory drive   -Will manage as COPD exacerbation; will administer albuterol metered dose inhaler every four hours as needed for bronchospasm or respiratory distress  -Will administer 40 mg of intravenous methylprednisolone every four hours (and space as needed)   -Will administer five days of azithromycin   -Will manage with bilevel positive airway pressure (currently at inspiratory pressure of 14 and expiratory pressure of 5), titrate oxygen saturation on pulse oximetry to 88-92%  -Will administer home doses of LAMA, LABA, and inhaled corticosteroid   -Repeat arterial blood gas later today; space administration of intravenous steroid and inhaled short-acting bronchodilator as necessary; down-titrate respiratory support from bipap to nasal cannula as possible -In the emergency department, the patient was noted to have an acute desaturation to 86% while managed on 4L nasal cannula (saturating at 100% previously while being monitored in the ED on 4L nasal cannula)   -Of note, on my initial exam, shortly after this desaturation, the patient was moving very minimal air and demonstrating abdominal paradox  -Etiology of acute respiratory failure with hypoxia most likely is respiratory muscle collapse, which may represent insufficient respiratory muscle reserve in the setting of such great carbon dioxide retention  -Etiology of acute exacerbation of COPD uncertain; no documentation in [facility] of acute viral prodrome; no changes in medication management; no evidence of missed doses   -Of note, the patient was previously saturating at 100% on 4L nasal cannula; may also represent Haldane effect, and sudden loss of respiratory drive   -Arterial blood gas demonstrates marginally less carbon dioxide retention today  -Will manage as COPD exacerbation; will administer albuterol metered dose inhaler every six hours as needed for bronchospasm or respiratory distress  -Will administer 40 mg of intravenous methylprednisolone every six hours (and space as needed)   -Will administer five days of azithromycin   -Will manage with bilevel positive airway pressure (currently at inspiratory pressure of 14 and expiratory pressure of 5), titrate oxygen saturation on pulse oximetry to 88-92%  -Will administer home doses of LAMA, LABA, and inhaled corticosteroid   -Repeat arterial blood gas later today; space administration of intravenous steroid and inhaled short-acting bronchodilator as necessary; down-titrate respiratory support from bipap to nasal cannula as possible

## 2020-12-30 NOTE — PROGRESS NOTE ADULT - ATTENDING COMMENTS
Pt seen and examined. Agree with above with additional findings:    # acute hypoxic/hypercapnic respiratory failure  # LE weakness, r/o myelitis vs. ICU-neuropathy/myopathy  # COPD exacerbation    - s/p BIPAP with improvement in SpO2, pCO2 70s; wean off BIPAP during the day  - continue solumedrol 40mg q8hr and taper as tolerated  - appreciate neuro/nsg recs: obtain MRI spines/brains  - pt would like to benefit from LP for definitive diagnosis; will discuss with neuro  - DVT ppx  - PT consult    Karen Mcconnell MD  Division of Hospital Medicine  Cell: [phone]  Office: [phone] .

## 2020-12-30 NOTE — PROGRESS NOTE ADULT - SUBJECTIVE AND OBJECTIVE BOX
Steven Tse pgy1  [phone]    INTERVAL HPI/OVERNIGHT EVENTS:    SUBJECTIVE: Patient seen and examined at bedside.     CONSTITUTIONAL: No weakness, fevers or chills  EYES/ENT: No visual changes;  No vertigo or throat pain   NECK: No pain or stiffness  RESPIRATORY: No cough, wheezing, hemoptysis; No shortness of breath  CARDIOVASCULAR: No chest pain or palpitations  GASTROINTESTINAL: No abdominal or epigastric pain. No nausea, vomiting, or hematemesis; No diarrhea or constipation. No melena or hematochezia.  GENITOURINARY: No dysuria, frequency or hematuria  NEUROLOGICAL: No numbness or weakness  SKIN: No itching, rashes    OBJECTIVE:    VITAL SIGNS:  ICU Vital Signs Last 24 Hrs  T(C): 36.5 (30 Dec 2020 04:42), Max: 36.8 (29 Dec 2020 12:42)  T(F): 97.7 (30 Dec 2020 04:42), Max: 98.3 (29 Dec 2020 12:42)  HR: 101 (30 Dec 2020 06:01) (91 - 116)  BP: 147/88 (30 Dec 2020 04:42) (123/70 - 162/93)  BP(mean): --  ABP: --  ABP(mean): --  RR: 23 (30 Dec 2020 06:01) (18 - 25)  SpO2: 100% (30 Dec 2020 06:01) (98% - 100%)        CAPILLARY BLOOD GLUCOSE      POCT Blood Glucose.: 105 mg/dL (30 Dec 2020 06:46)      PHYSICAL EXAM:    General: NAD  HEENT: NC/AT; PERRL, clear conjunctiva  Neck: supple  Respiratory: CTA b/l  Cardiovascular: +S1/S2; RRR  Abdomen: soft, NT/ND; +BS x4  Extremities: WWP, 2+ peripheral pulses b/l; no LE edema  Skin: normal color and turgor; no rash  Neurological:    MEDICATIONS:  MEDICATIONS  (STANDING):  azithromycin  IVPB 500 milliGRAM(s) IV Intermittent every 24 hours  budesonide  80 MICROgram(s)/formoterol 4.5 MICROgram(s) Inhaler 2 Puff(s) Inhalation two times a day  dextrose 40% Gel 15 Gram(s) Oral once  dextrose 5%. 1000 milliLiter(s) (50 mL/Hr) IV Continuous <Continuous>  dextrose 5%. 1000 milliLiter(s) (100 mL/Hr) IV Continuous <Continuous>  dextrose 50% Injectable 25 Gram(s) IV Push once  dextrose 50% Injectable 12.5 Gram(s) IV Push once  dextrose 50% Injectable 25 Gram(s) IV Push once  enoxaparin Injectable 40 milliGRAM(s) SubCutaneous daily  glucagon  Injectable 1 milliGRAM(s) IntraMuscular once  insulin lispro (ADMELOG) corrective regimen sliding scale   SubCutaneous every 6 hours  lactated ringers. 1000 milliLiter(s) (75 mL/Hr) IV Continuous <Continuous>  methylPREDNISolone sodium succinate Injectable 40 milliGRAM(s) IV Push every 6 hours  pantoprazole  Injectable 40 milliGRAM(s) IV Push daily  tiotropium 18 MICROgram(s) Capsule 1 Capsule(s) Inhalation daily    MEDICATIONS  (PRN):  ALBUTerol    90 MICROgram(s) HFA Inhaler 2 Puff(s) Inhalation every 4 hours PRN Bronchospasm      ALLERGIES:  Allergies    No Known Allergies    Intolerances        LABS:                        8.9    16.53 )-----------( 455      ( 30 Dec 2020 06:47 )             29.1     12-30    141  |  95<L>  |  36<H>  ----------------------------<  104<H>  4.8   |  41<H>  |  0.43<L>    Ca    9.6      30 Dec 2020 06:48  Phos  2.9     12-30  Mg     1.9     12-30    TPro  6.0  /  Alb  3.8  /  TBili  0.3  /  DBili  x   /  AST  21  /  ALT  36  /  AlkPhos  81  12-29          RADIOLOGY & ADDITIONAL TESTS: Reviewed. Steven Tse pgy1  [phone]    INTERVAL HPI/OVERNIGHT EVENTS: Arterial blood gas results improving with management of copd exacerbation. Some improvement in weakness in bilateral lower extremities.     SUBJECTIVE: Patient seen and examined at bedside. Awake on bipap and having conversation with me.     CONSTITUTIONAL: No weakness, fevers or chills  EYES/ENT: No visual changes, no blurry vision, no double vision; no dizziness    NECK: No pain, no stiffness  RESPIRATORY: Discomfort breathing on bipap noted; no cough   CARDIOVASCULAR: No chest pain, no palpitations  GASTROINTESTINAL: No abdominal or epigastric pain. No nausea, no vomiting; no bowel movements   GENITOURINARY: No dysuria, frequency or hematuria  NEUROLOGICAL: Persistent weakness noted in bilateral lower extremities   SKIN: No itching, no rashes    OBJECTIVE:    VITAL SIGNS:  T(C): 36.5 (30 Dec 2020 04:42), Max: 36.8 (29 Dec 2020 12:42)  T(F): 97.7 (30 Dec 2020 04:42), Max: 98.3 (29 Dec 2020 12:42)  HR: 101 (30 Dec 2020 06:01) (91 - 116)  BP: 147/88 (30 Dec 2020 04:42) (123/70 - 162/93)  BP(mean): --  ABP: --  ABP(mean): --  RR: 23 (30 Dec 2020 06:01) (18 - 25)  SpO2: 100% (30 Dec 2020 06:01) (98% - 100%)        CAPILLARY BLOOD GLUCOSE      POCT Blood Glucose.: 105 mg/dL (30 Dec 2020 06:46)      PHYSICAL EXAM:    General: NAD; awake and alert with bipap on   HEENT: PERRL grossly, clear conjunctiva  Neck: no jvd, no lymphadenopathy   Respiratory: CTA b/l, air movement improved, no wheezes on bipap; respiratory excursion equal bilaterally   Cardiovascular: +S1/S2; RRR  Abdomen: soft, NT/ND; +BS x4  Extremities: WWP, 2+ peripheral pulses b/l; no LE edema  Skin: normal color and turgor; no rash  Neurological: Alert and oriented; CN II-XII grossly intact; 5/5 strength in bilateral upper extremities; 0/5 strength in hip flexors, thighs, calves; able to wiggle toes; sensation intact to proprioception and hot/cold; coordination grossly intact; patellar reflexes not elicited     MEDICATIONS:  MEDICATIONS  (STANDING):  azithromycin  IVPB 500 milliGRAM(s) IV Intermittent every 24 hours  budesonide  80 MICROgram(s)/formoterol 4.5 MICROgram(s) Inhaler 2 Puff(s) Inhalation two times a day  dextrose 40% Gel 15 Gram(s) Oral once  dextrose 5%. 1000 milliLiter(s) (50 mL/Hr) IV Continuous <Continuous>  dextrose 5%. 1000 milliLiter(s) (100 mL/Hr) IV Continuous <Continuous>  dextrose 50% Injectable 25 Gram(s) IV Push once  dextrose 50% Injectable 12.5 Gram(s) IV Push once  dextrose 50% Injectable 25 Gram(s) IV Push once  enoxaparin Injectable 40 milliGRAM(s) SubCutaneous daily  glucagon  Injectable 1 milliGRAM(s) IntraMuscular once  insulin lispro (ADMELOG) corrective regimen sliding scale   SubCutaneous every 6 hours  lactated ringers. 1000 milliLiter(s) (75 mL/Hr) IV Continuous <Continuous>  methylPREDNISolone sodium succinate Injectable 40 milliGRAM(s) IV Push every 6 hours  pantoprazole  Injectable 40 milliGRAM(s) IV Push daily  tiotropium 18 MICROgram(s) Capsule 1 Capsule(s) Inhalation daily    MEDICATIONS  (PRN):  ALBUTerol    90 MICROgram(s) HFA Inhaler 2 Puff(s) Inhalation every 4 hours PRN Bronchospasm      ALLERGIES:  Allergies    No Known Allergies    Intolerances        LABS:                        8.9    16.53 )-----------( 455      ( 30 Dec 2020 06:47 )             29.1     12-30    141  |  95<L>  |  36<H>  ----------------------------<  104<H>  4.8   |  41<H>  |  0.43<L>    Ca    9.6      30 Dec 2020 06:48  Phos  2.9     12-30  Mg     1.9     12-30    TPro  6.0  /  Alb  3.8  /  TBili  0.3  /  DBili  x   /  AST  21  /  ALT  36  /  AlkPhos  81  12-29          RADIOLOGY & ADDITIONAL TESTS: Reviewed.

## 2020-12-30 NOTE — PROGRESS NOTE ADULT - PROBLEM SELECTOR PLAN 2
-In the emergency department, the patient was noted to have an acute desaturation to 86% while managed on 4L nasal cannula (saturating at 100% previously while being monitored in the ED on 4L nasal cannula)   -Of note, on my initial exam, shortly after this desaturation, the patient was moving very minimal air and demonstrating abdominal paradox  -Arterial blood gas demonstrates evidence of acute-on-chronic carbon dioxide retention  -Etiology of acute respiratory failure with hypoxia most likely is respiratory muscle collapse, which may represent insufficient respiratory muscle reserve in setting of great degree of carbon dioxide retention; may also represent loss of respiratory drive in setting of oxygen saturations of 100% in ED   -Managing acute exacerbation of COPD as above; titrate oxygen saturation on pulse oximetry to 88-92% -In the emergency department, the patient was noted to have an acute desaturation to 86% while managed on 4L nasal cannula (saturating at 100% previously while being monitored in the ED on 4L nasal cannula)   -Of note, on my initial exam, shortly after this desaturation, the patient was moving very minimal air and demonstrating abdominal paradox  -Arterial blood gas demonstrates evidence of acute-on-chronic carbon dioxide retention; improved today   -Etiology of acute respiratory failure with hypoxia most likely is respiratory muscle collapse, which may represent insufficient respiratory muscle reserve in setting of great degree of carbon dioxide retention; may also represent loss of respiratory drive in setting of oxygen saturations of 100% in ED   -Managing acute exacerbation of COPD as above; titrate oxygen saturation on pulse oximetry to 88-92%

## 2020-12-30 NOTE — PROGRESS NOTE ADULT - PROBLEM SELECTOR PLAN 7
shall be construed as though they were used in the form appropriate to the circumstances.)    MD Vale Avila  Attending Emergency Medicine Physician            Bret Wilkins MD  11/03/17 [id number] Stevenson Dean MD  11/03/17 [id number] -Will administer lovenox 40 for dvt prophylaxis   -NPO for now in setting of bipap administration

## 2020-12-31 LAB
ALBUMIN SERPL ELPH-MCNC: 3.5 G/DL — SIGNIFICANT CHANGE UP (ref 3.3–5)
ALP SERPL-CCNC: 79 U/L — SIGNIFICANT CHANGE UP (ref 40–120)
ALT FLD-CCNC: 29 U/L — SIGNIFICANT CHANGE UP (ref 10–45)
ANION GAP SERPL CALC-SCNC: 5 MMOL/L — SIGNIFICANT CHANGE UP (ref 5–17)
AST SERPL-CCNC: 20 U/L — SIGNIFICANT CHANGE UP (ref 10–40)
BASOPHILS # BLD AUTO: 0.03 K/UL — SIGNIFICANT CHANGE UP (ref 0–0.2)
BASOPHILS NFR BLD AUTO: 0.1 % — SIGNIFICANT CHANGE UP (ref 0–2)
BILIRUB SERPL-MCNC: 0.4 MG/DL — SIGNIFICANT CHANGE UP (ref 0.2–1.2)
BUN SERPL-MCNC: 35 MG/DL — HIGH (ref 7–23)
CALCIUM SERPL-MCNC: 9.6 MG/DL — SIGNIFICANT CHANGE UP (ref 8.4–10.5)
CHLORIDE SERPL-SCNC: 94 MMOL/L — LOW (ref 96–108)
CO2 SERPL-SCNC: 39 MMOL/L — HIGH (ref 22–31)
CREAT SERPL-MCNC: 0.47 MG/DL — LOW (ref 0.5–1.3)
EOSINOPHIL # BLD AUTO: 0 K/UL — SIGNIFICANT CHANGE UP (ref 0–0.5)
EOSINOPHIL NFR BLD AUTO: 0 % — SIGNIFICANT CHANGE UP (ref 0–6)
GLUCOSE BLDC GLUCOMTR-MCNC: 121 MG/DL — HIGH (ref 70–99)
GLUCOSE BLDC GLUCOMTR-MCNC: 135 MG/DL — HIGH (ref 70–99)
GLUCOSE BLDC GLUCOMTR-MCNC: 137 MG/DL — HIGH (ref 70–99)
GLUCOSE BLDC GLUCOMTR-MCNC: 152 MG/DL — HIGH (ref 70–99)
GLUCOSE BLDC GLUCOMTR-MCNC: 169 MG/DL — HIGH (ref 70–99)
GLUCOSE BLDC GLUCOMTR-MCNC: 222 MG/DL — HIGH (ref 70–99)
GLUCOSE SERPL-MCNC: 137 MG/DL — HIGH (ref 70–99)
HCT VFR BLD CALC: 28.6 % — LOW (ref 34.5–45)
HGB BLD-MCNC: 8.6 G/DL — LOW (ref 11.5–15.5)
IMM GRANULOCYTES NFR BLD AUTO: 0.7 % — SIGNIFICANT CHANGE UP (ref 0–1.5)
LYMPHOCYTES # BLD AUTO: 0.6 K/UL — LOW (ref 1–3.3)
LYMPHOCYTES # BLD AUTO: 2.6 % — LOW (ref 13–44)
MAGNESIUM SERPL-MCNC: 1.8 MG/DL — SIGNIFICANT CHANGE UP (ref 1.6–2.6)
MCHC RBC-ENTMCNC: 30.1 GM/DL — LOW (ref 32–36)
MCHC RBC-ENTMCNC: 30.3 PG — SIGNIFICANT CHANGE UP (ref 27–34)
MCV RBC AUTO: 100.7 FL — HIGH (ref 80–100)
MONOCYTES # BLD AUTO: 0.85 K/UL — SIGNIFICANT CHANGE UP (ref 0–0.9)
MONOCYTES NFR BLD AUTO: 3.7 % — SIGNIFICANT CHANGE UP (ref 2–14)
NEUTROPHILS # BLD AUTO: 21.35 K/UL — HIGH (ref 1.8–7.4)
NEUTROPHILS NFR BLD AUTO: 92.9 % — HIGH (ref 43–77)
NRBC # BLD: 0 /100 WBCS — SIGNIFICANT CHANGE UP (ref 0–0)
PHOSPHATE SERPL-MCNC: 2.9 MG/DL — SIGNIFICANT CHANGE UP (ref 2.5–4.5)
PLATELET # BLD AUTO: 421 K/UL — HIGH (ref 150–400)
POTASSIUM SERPL-MCNC: 4.9 MMOL/L — SIGNIFICANT CHANGE UP (ref 3.5–5.3)
POTASSIUM SERPL-SCNC: 4.9 MMOL/L — SIGNIFICANT CHANGE UP (ref 3.5–5.3)
PROT SERPL-MCNC: 5.4 G/DL — LOW (ref 6–8.3)
RBC # BLD: 2.84 M/UL — LOW (ref 3.8–5.2)
RBC # FLD: 13.7 % — SIGNIFICANT CHANGE UP (ref 10.3–14.5)
SODIUM SERPL-SCNC: 138 MMOL/L — SIGNIFICANT CHANGE UP (ref 135–145)
WBC # BLD: 23 K/UL — HIGH (ref 3.8–10.5)
WBC # FLD AUTO: 23 K/UL — HIGH (ref 3.8–10.5)

## 2020-12-31 PROCEDURE — 70553 MRI BRAIN STEM W/O & W/DYE: CPT | Mod: 26

## 2020-12-31 PROCEDURE — 72149 MRI LUMBAR SPINE W/DYE: CPT | Mod: 26

## 2020-12-31 PROCEDURE — 72157 MRI CHEST SPINE W/O & W/DYE: CPT | Mod: 26

## 2020-12-31 PROCEDURE — 99232 SBSQ HOSP IP/OBS MODERATE 35: CPT | Mod: GC

## 2020-12-31 PROCEDURE — 99233 SBSQ HOSP IP/OBS HIGH 50: CPT | Mod: GC

## 2020-12-31 RX ORDER — AMLODIPINE BESYLATE 2.5 MG/1
5 TABLET ORAL DAILY
Refills: 0 | Status: DISCONTINUED | OUTPATIENT
Start: 2020-12-31 | End: 2021-01-23

## 2020-12-31 RX ORDER — ISOSORBIDE DINITRATE 5 MG/1
10 TABLET ORAL
Refills: 0 | Status: DISCONTINUED | OUTPATIENT
Start: 2021-01-01 | End: 2021-01-23

## 2020-12-31 RX ADMIN — LEVALBUTEROL 0.31 MILLIGRAM(S): 1.25 SOLUTION, CONCENTRATE RESPIRATORY (INHALATION) at 17:46

## 2020-12-31 RX ADMIN — Medication 1: at 07:49

## 2020-12-31 RX ADMIN — Medication 40 MILLIGRAM(S): at 13:52

## 2020-12-31 RX ADMIN — ENOXAPARIN SODIUM 40 MILLIGRAM(S): 100 INJECTION SUBCUTANEOUS at 11:13

## 2020-12-31 RX ADMIN — LEVALBUTEROL 0.31 MILLIGRAM(S): 1.25 SOLUTION, CONCENTRATE RESPIRATORY (INHALATION) at 05:23

## 2020-12-31 RX ADMIN — BUDESONIDE AND FORMOTEROL FUMARATE DIHYDRATE 2 PUFF(S): 160; 4.5 AEROSOL RESPIRATORY (INHALATION) at 05:22

## 2020-12-31 RX ADMIN — LEVALBUTEROL 0.31 MILLIGRAM(S): 1.25 SOLUTION, CONCENTRATE RESPIRATORY (INHALATION) at 00:17

## 2020-12-31 RX ADMIN — TIOTROPIUM BROMIDE 1 CAPSULE(S): 18 CAPSULE ORAL; RESPIRATORY (INHALATION) at 11:14

## 2020-12-31 RX ADMIN — BUDESONIDE AND FORMOTEROL FUMARATE DIHYDRATE 2 PUFF(S): 160; 4.5 AEROSOL RESPIRATORY (INHALATION) at 17:17

## 2020-12-31 RX ADMIN — AZITHROMYCIN 250 MILLIGRAM(S): 500 TABLET, FILM COATED ORAL at 13:50

## 2020-12-31 RX ADMIN — Medication 40 MILLIGRAM(S): at 05:22

## 2020-12-31 RX ADMIN — SODIUM CHLORIDE 75 MILLILITER(S): 9 INJECTION, SOLUTION INTRAVENOUS at 05:22

## 2020-12-31 RX ADMIN — Medication 40 MILLIGRAM(S): at 01:17

## 2020-12-31 RX ADMIN — LEVALBUTEROL 0.31 MILLIGRAM(S): 1.25 SOLUTION, CONCENTRATE RESPIRATORY (INHALATION) at 23:22

## 2020-12-31 RX ADMIN — AMLODIPINE BESYLATE 5 MILLIGRAM(S): 2.5 TABLET ORAL at 07:51

## 2020-12-31 RX ADMIN — PANTOPRAZOLE SODIUM 40 MILLIGRAM(S): 20 TABLET, DELAYED RELEASE ORAL at 11:13

## 2020-12-31 RX ADMIN — Medication 2: at 23:22

## 2020-12-31 RX ADMIN — Medication 40 MILLIGRAM(S): at 21:17

## 2020-12-31 RX ADMIN — LEVALBUTEROL 0.31 MILLIGRAM(S): 1.25 SOLUTION, CONCENTRATE RESPIRATORY (INHALATION) at 13:43

## 2020-12-31 RX ADMIN — Medication 1: at 17:17

## 2020-12-31 NOTE — PROGRESS NOTE ADULT - ATTENDING COMMENTS
I personally interviewed, examined, and participated in the care of this patient, on rounds 1/1/21 with the neurology consult service team.  Reviewed findings and management plan with the team.  History as reported above.  Pt was initially seen un neurologic consultation 12/29/20.      On examination I note in particular or in addition as follows;    No voluntary movements in the LEs except for trace movement distally.  Sensory levels to pin (not perceived below the indicated levels) ~ L3 on the R and T12 on the L (a small improvement vs. 12 29 when the level on the R was T10).     LP was performed earlier today; results awaited.      Further recommendations depending on LP results.

## 2020-12-31 NOTE — PROGRESS NOTE ADULT - ATTENDING COMMENTS
Pt seen and examined. Agree with above with additional findings:    # acute hypoxic/hypercapnic respiratory failure  # LE weakness, r/o myelitis vs. ICU-neuropathy/myopathy  # COPD exacerbation    - s/p BIPAP with improvement; wean off BIPAP during the day  - continue solumedrol 40mg q8hr and taper as tolerated  - appreciate neuro/nsg recs: obtain MRI spines/brains  - pt would like to benefit from LP for definitive diagnosis; will discuss with neuro  - DVT ppx  - PT consult    Karen Mcconnell MD  Division of Hospital Medicine  Cell: [phone]  Office: [phone] .

## 2020-12-31 NOTE — PROGRESS NOTE ADULT - SUBJECTIVE AND OBJECTIVE BOX
PROGRESS NOTE:     Patient is a 75y old  Female who presents with a chief complaint of acute respiratory failure (30 Dec 2020 07:23)      SUBJECTIVE / OVERNIGHT EVENTS: Patient seen and examined at bedside. /103 overnight and remained on bipap.     ADDITIONAL REVIEW OF SYSTEMS:    MEDICATIONS  (STANDING):  azithromycin  IVPB 500 milliGRAM(s) IV Intermittent every 24 hours  budesonide  80 MICROgram(s)/formoterol 4.5 MICROgram(s) Inhaler 2 Puff(s) Inhalation two times a day  dextrose 40% Gel 15 Gram(s) Oral once  dextrose 5%. 1000 milliLiter(s) (50 mL/Hr) IV Continuous <Continuous>  dextrose 5%. 1000 milliLiter(s) (100 mL/Hr) IV Continuous <Continuous>  dextrose 50% Injectable 25 Gram(s) IV Push once  dextrose 50% Injectable 12.5 Gram(s) IV Push once  dextrose 50% Injectable 25 Gram(s) IV Push once  enoxaparin Injectable 40 milliGRAM(s) SubCutaneous daily  glucagon  Injectable 1 milliGRAM(s) IntraMuscular once  insulin lispro (ADMELOG) corrective regimen sliding scale   SubCutaneous every 6 hours  lactated ringers. 1000 milliLiter(s) (75 mL/Hr) IV Continuous <Continuous>  levalbuterol Inhalation 0.31 milliGRAM(s) Inhalation every 6 hours  methylPREDNISolone sodium succinate Injectable 40 milliGRAM(s) IV Push every 6 hours  pantoprazole  Injectable 40 milliGRAM(s) IV Push daily  tiotropium 18 MICROgram(s) Capsule 1 Capsule(s) Inhalation daily    MEDICATIONS  (PRN):      CAPILLARY BLOOD GLUCOSE      POCT Blood Glucose.: 137 mg/dL (31 Dec 2020 00:54)  POCT Blood Glucose.: 172 mg/dL (30 Dec 2020 21:13)  POCT Blood Glucose.: 209 mg/dL (30 Dec 2020 16:24)  POCT Blood Glucose.: 114 mg/dL (30 Dec 2020 11:34)    I&O's Summary    30 Dec 2020 07:01  -  31 Dec 2020 07:00  --------------------------------------------------------  IN: 120 mL / OUT: 100 mL / NET: 20 mL        PHYSICAL EXAM:  Vital Signs Last 24 Hrs  T(C): 36.6 (31 Dec 2020 04:34), Max: 37.1 (30 Dec 2020 20:24)  T(F): 97.9 (31 Dec 2020 04:34), Max: 98.8 (31 Dec 2020 00:31)  HR: 103 (31 Dec 2020 06:25) (100 - 114)  BP: 166/103 (31 Dec 2020 04:34) (146/89 - 166/103)  BP(mean): --  RR: 19 (31 Dec 2020 06:25) (18 - 23)  SpO2: 98% (31 Dec 2020 06:25) (93% - 100%)    CONSTITUTIONAL: NAD, well-developed  RESPIRATORY: Normal respiratory effort; lungs are clear to auscultation bilaterally  CARDIOVASCULAR: Regular rate and rhythm, normal S1 and S2, no murmur/rub/gallop; No lower extremity edema; Peripheral pulses are 2+ bilaterally  ABDOMEN: Nontender to palpation, normoactive bowel sounds, no rebound/guarding; No hepatosplenomegaly  MUSCLOSKELETAL: no clubbing or cyanosis of digits; no joint swelling or tenderness to palpation  PSYCH: A+O to person, place, and time; affect appropriate    LABS:                        8.6    23.00 )-----------( 421      ( 31 Dec 2020 06:34 )             28.6     12-31    138  |  94<L>  |  35<H>  ----------------------------<  137<H>  4.9   |  39<H>  |  0.47<L>    Ca    9.6      31 Dec 2020 06:34  Phos  2.9     12-31  Mg     1.8     12-31    TPro  5.4<L>  /  Alb  3.5  /  TBili  0.4  /  DBili  x   /  AST  20  /  ALT  29  /  AlkPhos  79  12-31              Culture - Blood (collected 29 Dec 2020 16:29)  Source: .Blood Blood-Venous  Preliminary Report (30 Dec 2020 17:02):    No growth to date.    Culture - Blood (collected 29 Dec 2020 16:28)  Source: .Blood Blood-Peripheral  Preliminary Report (30 Dec 2020 17:02):    No growth to date.        RADIOLOGY & ADDITIONAL TESTS:  Results Reviewed:   Imaging Personally Reviewed:  Electrocardiogram Personally Reviewed:    COORDINATION OF CARE:  Care Discussed with Consultants/Other Providers [Y/N]:  Prior or Outpatient Records Reviewed [Y/N]:   PROGRESS NOTE:     Patient is a 75y old  Female who presents with a chief complaint of acute respiratory failure (30 Dec 2020 07:23)      SUBJECTIVE / OVERNIGHT EVENTS: Patient seen and examined at bedside. /103 overnight and remained on bipap. This complaints this AM.    ADDITIONAL REVIEW OF SYSTEMS:    MEDICATIONS  (STANDING):  azithromycin  IVPB 500 milliGRAM(s) IV Intermittent every 24 hours  budesonide  80 MICROgram(s)/formoterol 4.5 MICROgram(s) Inhaler 2 Puff(s) Inhalation two times a day  dextrose 40% Gel 15 Gram(s) Oral once  dextrose 5%. 1000 milliLiter(s) (50 mL/Hr) IV Continuous <Continuous>  dextrose 5%. 1000 milliLiter(s) (100 mL/Hr) IV Continuous <Continuous>  dextrose 50% Injectable 25 Gram(s) IV Push once  dextrose 50% Injectable 12.5 Gram(s) IV Push once  dextrose 50% Injectable 25 Gram(s) IV Push once  enoxaparin Injectable 40 milliGRAM(s) SubCutaneous daily  glucagon  Injectable 1 milliGRAM(s) IntraMuscular once  insulin lispro (ADMELOG) corrective regimen sliding scale   SubCutaneous every 6 hours  lactated ringers. 1000 milliLiter(s) (75 mL/Hr) IV Continuous <Continuous>  levalbuterol Inhalation 0.31 milliGRAM(s) Inhalation every 6 hours  methylPREDNISolone sodium succinate Injectable 40 milliGRAM(s) IV Push every 6 hours  pantoprazole  Injectable 40 milliGRAM(s) IV Push daily  tiotropium 18 MICROgram(s) Capsule 1 Capsule(s) Inhalation daily    MEDICATIONS  (PRN):      CAPILLARY BLOOD GLUCOSE      POCT Blood Glucose.: 137 mg/dL (31 Dec 2020 00:54)  POCT Blood Glucose.: 172 mg/dL (30 Dec 2020 21:13)  POCT Blood Glucose.: 209 mg/dL (30 Dec 2020 16:24)  POCT Blood Glucose.: 114 mg/dL (30 Dec 2020 11:34)    I&O's Summary    30 Dec 2020 07:01  -  31 Dec 2020 07:00  --------------------------------------------------------  IN: 120 mL / OUT: 100 mL / NET: 20 mL        PHYSICAL EXAM:  Vital Signs Last 24 Hrs  T(C): 36.6 (31 Dec 2020 04:34), Max: 37.1 (30 Dec 2020 20:24)  T(F): 97.9 (31 Dec 2020 04:34), Max: 98.8 (31 Dec 2020 00:31)  HR: 103 (31 Dec 2020 06:25) (100 - 114)  BP: 166/103 (31 Dec 2020 04:34) (146/89 - 166/103)  BP(mean): --  RR: 19 (31 Dec 2020 06:25) (18 - 23)  SpO2: 98% (31 Dec 2020 06:25) (93% - 100%)    General: NAD; awake and alert with bipap on   HEENT: PERRL grossly, clear conjunctiva  Neck: no jvd, no lymphadenopathy   Respiratory: CTA b/l, air movement improved, no wheezes on bipap; respiratory excursion equal bilaterally   Cardiovascular: +S1/S2; RRR  Abdomen: soft, NT/ND; +BS x4  Extremities: WWP, 2+ peripheral pulses b/l; no LE edema  Skin: normal color and turgor; no rash  Neurological: Alert and oriented; CN II-XII grossly intact; 5/5 strength in bilateral upper extremities; 0/5 strength in hip flexors, thighs, calves; able to wiggle toes; sensation intact to proprioception and hot/cold; coordination grossly intact; patellar reflexes not elicited     LABS:                        8.6    23.00 )-----------( 421      ( 31 Dec 2020 06:34 )             28.6     12-31    138  |  94<L>  |  35<H>  ----------------------------<  137<H>  4.9   |  39<H>  |  0.47<L>    Ca    9.6      31 Dec 2020 06:34  Phos  2.9     12-31  Mg     1.8     12-31    TPro  5.4<L>  /  Alb  3.5  /  TBili  0.4  /  DBili  x   /  AST  20  /  ALT  29  /  AlkPhos  79  12-31              Culture - Blood (collected 29 Dec 2020 16:29)  Source: .Blood Blood-Venous  Preliminary Report (30 Dec 2020 17:02):    No growth to date.    Culture - Blood (collected 29 Dec 2020 16:28)  Source: .Blood Blood-Peripheral  Preliminary Report (30 Dec 2020 17:02):    No growth to date.        RADIOLOGY & ADDITIONAL TESTS:  Results Reviewed:   Imaging Personally Reviewed:  Electrocardiogram Personally Reviewed:    COORDINATION OF CARE:  Care Discussed with Consultants/Other Providers [Y/N]:  Prior or Outpatient Records Reviewed [Y/N]:

## 2020-12-31 NOTE — PROGRESS NOTE ADULT - ASSESSMENT
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

## 2020-12-31 NOTE — PROGRESS NOTE ADULT - ASSESSMENT
INCOMPLETE    SUBJECTIVE:     INTERVAL HISTORY:    PAST MEDICAL & SURGICAL HISTORY:  Anxiety    COPD, severe    History of repair of hip fracture      FAMILY HISTORY:  No pertinent family history in first degree relatives      SOCIAL HISTORY:   T/E/D:   Occupation:   Lives with:     MEDICATIONS (HOME):  Home Medications:  ALPRAZolam 0.25 mg oral tablet: 1 tab(s) orally 3 times a day, As Needed (29 Dec 2020 10:49)  amLODIPine 5 mg oral tablet: 1 tab(s) orally once a day (29 Dec 2020 10:49)  Breo Ellipta 100 mcg-25 mcg/inh inhalation powder: 1 puff(s) inhaled once a day (29 Dec 2020 10:49)  Incruse Ellipta 62.5 mcg/inh inhalation powder: 1 puff(s) inhaled every 24 hours (29 Dec 2020 10:49)  isosorbide dinitrate 10 mg oral tablet: 1 tab(s) orally 2 times a day (29 Dec 2020 10:49)  levalbuterol 0.31 mg/3 mL inhalation solution: 3 milliliter(s) inhaled 3 times a day, As Needed (29 Dec 2020 10:49)  pantoprazole 40 mg oral delayed release tablet: 1 tab(s) orally once a day (29 Dec 2020 10:49)    MEDICATIONS  (STANDING):  amLODIPine   Tablet 5 milliGRAM(s) Oral daily  azithromycin  IVPB 500 milliGRAM(s) IV Intermittent every 24 hours  budesonide  80 MICROgram(s)/formoterol 4.5 MICROgram(s) Inhaler 2 Puff(s) Inhalation two times a day  dextrose 40% Gel 15 Gram(s) Oral once  dextrose 5%. 1000 milliLiter(s) (50 mL/Hr) IV Continuous <Continuous>  dextrose 5%. 1000 milliLiter(s) (100 mL/Hr) IV Continuous <Continuous>  dextrose 50% Injectable 25 Gram(s) IV Push once  dextrose 50% Injectable 12.5 Gram(s) IV Push once  dextrose 50% Injectable 25 Gram(s) IV Push once  enoxaparin Injectable 40 milliGRAM(s) SubCutaneous daily  glucagon  Injectable 1 milliGRAM(s) IntraMuscular once  insulin lispro (ADMELOG) corrective regimen sliding scale   SubCutaneous every 6 hours  levalbuterol Inhalation 0.31 milliGRAM(s) Inhalation every 6 hours  methylPREDNISolone sodium succinate Injectable 40 milliGRAM(s) IV Push every 8 hours  pantoprazole  Injectable 40 milliGRAM(s) IV Push daily  tiotropium 18 MICROgram(s) Capsule 1 Capsule(s) Inhalation daily    MEDICATIONS  (PRN):    ALLERGIES/INTOLERANCES:  Allergies  albuterol (Other)    Intolerances    VITALS & EXAMINATION:  Vital Signs Last 24 Hrs  T(C): 37.2 (31 Dec 2020 11:13), Max: 37.2 (31 Dec 2020 11:13)  T(F): 99 (31 Dec 2020 11:13), Max: 99 (31 Dec 2020 11:13)  HR: 124 (31 Dec 2020 12:47) (100 - 124)  BP: 148/83 (31 Dec 2020 12:47) (146/89 - 166/103)  BP(mean): --  RR: 18 (31 Dec 2020 11:13) (18 - 23)  SpO2: 94% (31 Dec 2020 12:47) (94% - 100%)    INCOMPLETE  General:  Constitutional: Obese Female, appears stated age, in no apparent distress including pain  Head: Normocephalic & atraumatic.  ENT: Patent ear canals, intact TM, mucus membranes moist & pink, neck supple, no lymphadenopathy.   Respiratory: Patent airway. All lung fields are clear to auscultation bilaterally.  Extremities: No cyanosis, clubbing, or edema.  Skin: No rashes, bruising, or discoloration.    Cardiovascular (>2): RRR no murmurs. Carotid pulsations symmetric, no bruits. Normal capillary beds refill, 1-2 seconds or less.     Neurological (>12):  MS: Awake, alert, oriented to person, place, situation, time. Normal affect. Follows all commands.    Language: Speech is clear, fluent with good repetition & comprehension (able to name objects___)    CNs: PERRLA (R = 3mm, L = 3mm). VFF. EOMI no nystagmus, no diplopia. V1-3 intact to LT/pinprick, well developed masseter muscles b/l. No facial asymmetry b/l, full eye closure strength b/l. Hearing grossly normal (rubbing fingers) b/l. Symmetric palate elevation in midline. Gag reflex deferred. Head turning & shoulder shrug intact b/l. Tongue midline, normal movements, no atrophy.    Fundoscopic: pale w/ sharp discs margins No vascular changes.      Motor: Normal muscle bulk & tone. No noticeable tremor or seizure. No pronator drift.              Deltoid	Biceps	Triceps	Wrist	Finger ABd	   R	5	5	5	5	5		5 	  L	5	5	5	5	5		5    	H-Flex	H-Ext	H-ABd	H-ADd	K-Flex	K-Ext	D-Flex	P-Flex  R	5	5	5	5	5	5	5	5 	   L	5	5	5	5	5	5	5	5	     Sensation: Intact to LT/PP/Temp/Vibration/Position b/l throughout.     Cortical: Extinction on DSS (neglect): none    Reflexes:              Biceps(C5)       BR(C6)     Triceps(C7)               Patellar(L4)    Achilles(S1)    Plantar Resp  R	2	          2	             2		        2		    2		Down   L	2	          2	             2		        2		    2		Down     Coordination: intact rapid-alt movements. No dysmetria to FTN/HTS    Gait: Normal Romberg. No postural instability. Normal stance and tandem gait.     LABORATORY:  CBC                       8.6    23.00 )-----------( 421      ( 31 Dec 2020 06:34 )             28.6     Chem 12-31    138  |  94<L>  |  35<H>  ----------------------------<  137<H>  4.9   |  39<H>  |  0.47<L>    Ca    9.6      31 Dec 2020 06:34  Phos  2.9     12-31  Mg     1.8     12-31    TPro  5.4<L>  /  Alb  3.5  /  TBili  0.4  /  DBili  x   /  AST  20  /  ALT  29  /  AlkPhos  79  12-31    LFTs LIVER FUNCTIONS - ( 31 Dec 2020 06:34 )  Alb: 3.5 g/dL / Pro: 5.4 g/dL / ALK PHOS: 79 U/L / ALT: 29 U/L / AST: 20 U/L / GGT: x           Coagulopathy   Lipid Panel 12-29 Chol 250<H> LDL -- HDL 76 Trig 184<H>  A1c   Cardiac enzymes     U/A   CSF  Immunological  Other    STUDIES & IMAGING:  Studies (EKG, EEG, EMG, etc):     Radiology (XR, CT, MR, U/S, TTE/MARYAM):  < from: MR Head w/wo IV Cont (12.31.20 @ 10:34) >  IMPRESSION: Nonspecific enhancement of the globus pallidus and dentate nucleus which may be due to metabolic abnormality.  Moderate atrophy. Small vessel white matter ischemic changes. No acute infarcts.    < end of copied text >  < from: MR Thoracic and Lumbar Spine w/wo IV Cont (12.31.20 @ 10:34) >  IMPRESSION: Cord expansion from T8 through the conus subtle enhancement and enhancement of the cauda equina nerve roots is nonspecific and may be related to infectious, inflammatory or neoplastic process.    < end of copied text >   Assessment: 76 yo female with PMH ?COPD initially admitted at [facility] who had been intubated for respiratory arrest, and upon extubation was noted to be flaccid in the legs with decreased sensation and transferred here for evaluation for possible spinal cord compression. Labs significant for WBC of 23, hemoglobin of 8.6, plt of 421, and bicarbonate of 39. Exam significant for sensory level at about T10 and 0/5 lower extremity strength, except for 1/5 at toes on left. MRI brain w/ and w/o showed symmetric globus pallidus and dentate nucleus enhancement. MR T/L spine w/ and w/o showed cord expansion from T8 through the conus and cauda equina nerve root enhancement.    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 Symmetric enhancement in the GP/dentate nuclei likely metabolic in etiology (?carbon monoxide)    Plan:  Recommend lumbar puncture  Possible LP on 1/1. Please hold lovenox for now.    CSF analysis should include cell count, protein, glucose, oligoclonal bands, IgG index, MBP, PCR, gram stain, Lyme, bacterial/fungal cultures, paraneoplastic panel, flow cytometry, cytopathology.  Will consider starting IV solumedrol 500 mg BID depending on LP results  Serum carboxyhemoglobin level  Folate 17.2, B12 1319  TSH 0.19, T3 56, T4 6.5  PT/OT    Case discussed with neurology attending, Dr. Melendrez   Assessment: 74 yo female with PMH ?COPD initially admitted at [place] who had been intubated for respiratory arrest, and upon extubation was noted to be flaccid in the legs with decreased sensation and transferred here for evaluation for possible spinal cord compression. Labs significant for WBC of 23, hemoglobin of 8.6, plt of 421, and bicarbonate of 39. Exam significant for sensory level at about T10 and 0/5 lower extremity strength, except for 1/5 at toes on left. MRI brain w/ and w/o showed symmetric globus pallidus and dentate nucleus enhancement. MR T/L spine w/ and w/o showed cord expansion from T8 through the conus and cauda equina nerve root enhancement.    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 Symmetric enhancement in the GP/dentate nuclei likely metabolic in etiology (?carbon monoxide)    Plan:  Recommend lumbar puncture  Possible LP on 1/1. Please hold lovenox for now.    CSF analysis should include cell count, protein, glucose, oligoclonal bands, IgG index, MBP, PCR, gram stain, Lyme, bacterial/fungal cultures, paraneoplastic panel, flow cytometry, cytopathology.  Will consider starting IV solumedrol 500 mg BID depending on LP results  Serum carboxyhemoglobin level  Folate 17.2, B12 1319  TSH 0.19, T3 56, T4 6.5  Ceruloplasmin 26  SPEP, homocysteine, vitamin E, MMA  PT/OT    Case discussed with neurology attending, Dr. Melendrez

## 2020-12-31 NOTE — PROGRESS NOTE ADULT - PROBLEM SELECTOR PLAN 3
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 hiv, lyme, tb, ebv, hep b/c), inflammatory etiology (acute demyelating inflammatory polyneuropathy/gbs), toxin-induced (b12, folate, copper deficiency; lead poisoning), para-neoplastic syndrome, syringomyelia, spinal infarction   -Motor deficits only noted on neurologic exam (sensory exam intact per limited exam); this is most consistent with diagnosis of anterior spinal cord infarction (less likely as no bowel or bladder incontinence) or guillan-barre syndrome; less consistent with infectious or toxic etiologies, which would likely affect peripheral nerve and cause sensory deficits in addition   -Will send B12, folate, and copper titers; will order mri/mra of spine and mri of brain   -If above workup is negative, patient will require lumbar puncture to look for protein-cytologic dissociation characteristic of gbs  -Also possible that weakness could be explained by ICU-associated deconditioning; however, would not explain lack of evident upper extremity weakness

## 2020-12-31 NOTE — PROGRESS NOTE ADULT - SUBJECTIVE AND OBJECTIVE BOX
SUBJECTIVE:     INTERVAL HISTORY:    PAST MEDICAL & SURGICAL HISTORY:  Anxiety    COPD, severe    History of repair of hip fracture      FAMILY HISTORY:  No pertinent family history in first degree relatives      SOCIAL HISTORY:   T/E/D:   Occupation:   Lives with:     MEDICATIONS (HOME):  Home Medications:  ALPRAZolam 0.25 mg oral tablet: 1 tab(s) orally 3 times a day, As Needed (29 Dec 2020 10:49)  amLODIPine 5 mg oral tablet: 1 tab(s) orally once a day (29 Dec 2020 10:49)  Breo Ellipta 100 mcg-25 mcg/inh inhalation powder: 1 puff(s) inhaled once a day (29 Dec 2020 10:49)  Incruse Ellipta 62.5 mcg/inh inhalation powder: 1 puff(s) inhaled every 24 hours (29 Dec 2020 10:49)  isosorbide dinitrate 10 mg oral tablet: 1 tab(s) orally 2 times a day (29 Dec 2020 10:49)  levalbuterol 0.31 mg/3 mL inhalation solution: 3 milliliter(s) inhaled 3 times a day, As Needed (29 Dec 2020 10:49)  pantoprazole 40 mg oral delayed release tablet: 1 tab(s) orally once a day (29 Dec 2020 10:49)    MEDICATIONS  (STANDING):  amLODIPine   Tablet 5 milliGRAM(s) Oral daily  azithromycin  IVPB 500 milliGRAM(s) IV Intermittent every 24 hours  budesonide  80 MICROgram(s)/formoterol 4.5 MICROgram(s) Inhaler 2 Puff(s) Inhalation two times a day  dextrose 40% Gel 15 Gram(s) Oral once  dextrose 5%. 1000 milliLiter(s) (50 mL/Hr) IV Continuous <Continuous>  dextrose 5%. 1000 milliLiter(s) (100 mL/Hr) IV Continuous <Continuous>  dextrose 50% Injectable 25 Gram(s) IV Push once  dextrose 50% Injectable 12.5 Gram(s) IV Push once  dextrose 50% Injectable 25 Gram(s) IV Push once  enoxaparin Injectable 40 milliGRAM(s) SubCutaneous daily  glucagon  Injectable 1 milliGRAM(s) IntraMuscular once  insulin lispro (ADMELOG) corrective regimen sliding scale   SubCutaneous every 6 hours  levalbuterol Inhalation 0.31 milliGRAM(s) Inhalation every 6 hours  methylPREDNISolone sodium succinate Injectable 40 milliGRAM(s) IV Push every 8 hours  pantoprazole  Injectable 40 milliGRAM(s) IV Push daily  tiotropium 18 MICROgram(s) Capsule 1 Capsule(s) Inhalation daily    MEDICATIONS  (PRN):    ALLERGIES/INTOLERANCES:  Allergies  albuterol (Other)    Intolerances    VITALS & EXAMINATION:  Vital Signs Last 24 Hrs  T(C): 37.2 (31 Dec 2020 11:13), Max: 37.2 (31 Dec 2020 11:13)  T(F): 99 (31 Dec 2020 11:13), Max: 99 (31 Dec 2020 11:13)  HR: 124 (31 Dec 2020 12:47) (100 - 124)  BP: 148/83 (31 Dec 2020 12:47) (146/89 - 166/103)  BP(mean): --  RR: 18 (31 Dec 2020 11:13) (18 - 23)  SpO2: 94% (31 Dec 2020 12:47) (94% - 100%)    General:  Constitutional: Obese Female, appears stated age, in no apparent distress including pain  Head: Normocephalic & atraumatic.  ENT: Patent ear canals, intact TM, mucus membranes moist & pink, neck supple, no lymphadenopathy.   Respiratory: Patent airway. All lung fields are clear to auscultation bilaterally.  Extremities: No cyanosis, clubbing, or edema.  Skin: No rashes, bruising, or discoloration.    Cardiovascular (>2): RRR no murmurs. Carotid pulsations symmetric, no bruits. Normal capillary beds refill, 1-2 seconds or less.     Neurological (>12):  MS: Awake, alert, oriented to person, place, situation, time. Normal affect. Follows all commands.    Language: Speech is clear, fluent with good repetition & comprehension (able to name objects___)    CNs: PERRLA (R = 3mm, L = 3mm). VFF. EOMI no nystagmus, no diplopia. V1-3 intact to LT/pinprick, well developed masseter muscles b/l. No facial asymmetry b/l, full eye closure strength b/l. Hearing grossly normal (rubbing fingers) b/l. Symmetric palate elevation in midline. Gag reflex deferred. Head turning & shoulder shrug intact b/l. Tongue midline, normal movements, no atrophy.    Fundoscopic: pale w/ sharp discs margins No vascular changes.      Motor: Normal muscle bulk & tone. No noticeable tremor or seizure. No pronator drift.              Deltoid	Biceps	Triceps	Wrist	Finger ABd	   R	5	5	5	5	5		5 	  L	5	5	5	5	5		5    	H-Flex	H-Ext	H-ABd	H-ADd	K-Flex	K-Ext	D-Flex	P-Flex  R	5	5	5	5	5	5	5	5 	   L	5	5	5	5	5	5	5	5	     Sensation: Intact to LT/PP/Temp/Vibration/Position b/l throughout.     Cortical: Extinction on DSS (neglect): none    Reflexes:              Biceps(C5)       BR(C6)     Triceps(C7)               Patellar(L4)    Achilles(S1)    Plantar Resp  R	2	          2	             2		        2		    2		Down   L	2	          2	             2		        2		    2		Down     Coordination: intact rapid-alt movements. No dysmetria to FTN/HTS    Gait: Normal Romberg. No postural instability. Normal stance and tandem gait.     LABORATORY:  CBC                       8.6    23.00 )-----------( 421      ( 31 Dec 2020 06:34 )             28.6     Chem 12-31    138  |  94<L>  |  35<H>  ----------------------------<  137<H>  4.9   |  39<H>  |  0.47<L>    Ca    9.6      31 Dec 2020 06:34  Phos  2.9     12-31  Mg     1.8     12-31    TPro  5.4<L>  /  Alb  3.5  /  TBili  0.4  /  DBili  x   /  AST  20  /  ALT  29  /  AlkPhos  79  12-31    LFTs LIVER FUNCTIONS - ( 31 Dec 2020 06:34 )  Alb: 3.5 g/dL / Pro: 5.4 g/dL / ALK PHOS: 79 U/L / ALT: 29 U/L / AST: 20 U/L / GGT: x           Coagulopathy   Lipid Panel 12-29 Chol 250<H> LDL -- HDL 76 Trig 184<H>  A1c   Cardiac enzymes     U/A   CSF  Immunological  Other    STUDIES & IMAGING:  Studies (EKG, EEG, EMG, etc):     Radiology (XR, CT, MR, U/S, TTE/MARYAM): SUBJECTIVE: Patient seen and examined at the bedside. Patient denies any improvement in lower extremity weakness or her sensory loss. Patient denies any worsening of her dyspnea.    INTERVAL HISTORY:    PAST MEDICAL & SURGICAL HISTORY:  Anxiety    COPD, severe    History of repair of hip fracture      FAMILY HISTORY:  No pertinent family history in first degree relatives      SOCIAL HISTORY:   T/E/D:   Occupation:   Lives with:     MEDICATIONS (HOME):  Home Medications:  ALPRAZolam 0.25 mg oral tablet: 1 tab(s) orally 3 times a day, As Needed (29 Dec 2020 10:49)  amLODIPine 5 mg oral tablet: 1 tab(s) orally once a day (29 Dec 2020 10:49)  Breo Ellipta 100 mcg-25 mcg/inh inhalation powder: 1 puff(s) inhaled once a day (29 Dec 2020 10:49)  Incruse Ellipta 62.5 mcg/inh inhalation powder: 1 puff(s) inhaled every 24 hours (29 Dec 2020 10:49)  isosorbide dinitrate 10 mg oral tablet: 1 tab(s) orally 2 times a day (29 Dec 2020 10:49)  levalbuterol 0.31 mg/3 mL inhalation solution: 3 milliliter(s) inhaled 3 times a day, As Needed (29 Dec 2020 10:49)  pantoprazole 40 mg oral delayed release tablet: 1 tab(s) orally once a day (29 Dec 2020 10:49)    MEDICATIONS  (STANDING):  amLODIPine   Tablet 5 milliGRAM(s) Oral daily  azithromycin  IVPB 500 milliGRAM(s) IV Intermittent every 24 hours  budesonide  80 MICROgram(s)/formoterol 4.5 MICROgram(s) Inhaler 2 Puff(s) Inhalation two times a day  dextrose 40% Gel 15 Gram(s) Oral once  dextrose 5%. 1000 milliLiter(s) (50 mL/Hr) IV Continuous <Continuous>  dextrose 5%. 1000 milliLiter(s) (100 mL/Hr) IV Continuous <Continuous>  dextrose 50% Injectable 25 Gram(s) IV Push once  dextrose 50% Injectable 12.5 Gram(s) IV Push once  dextrose 50% Injectable 25 Gram(s) IV Push once  enoxaparin Injectable 40 milliGRAM(s) SubCutaneous daily  glucagon  Injectable 1 milliGRAM(s) IntraMuscular once  insulin lispro (ADMELOG) corrective regimen sliding scale   SubCutaneous every 6 hours  levalbuterol Inhalation 0.31 milliGRAM(s) Inhalation every 6 hours  methylPREDNISolone sodium succinate Injectable 40 milliGRAM(s) IV Push every 8 hours  pantoprazole  Injectable 40 milliGRAM(s) IV Push daily  tiotropium 18 MICROgram(s) Capsule 1 Capsule(s) Inhalation daily    MEDICATIONS  (PRN):    ALLERGIES/INTOLERANCES:  Allergies  albuterol (Other)    Intolerances    VITALS & EXAMINATION:  Vital Signs Last 24 Hrs  T(C): 37.2 (31 Dec 2020 11:13), Max: 37.2 (31 Dec 2020 11:13)  T(F): 99 (31 Dec 2020 11:13), Max: 99 (31 Dec 2020 11:13)  HR: 124 (31 Dec 2020 12:47) (100 - 124)  BP: 148/83 (31 Dec 2020 12:47) (146/89 - 166/103)  BP(mean): --  RR: 18 (31 Dec 2020 11:13) (18 - 23)  SpO2: 94% (31 Dec 2020 12:47) (94% - 100%)    General: Elderly Female, appears stated age, on BiPAP    Neurological (>12):  MS: Eyes closed initially, opens eyes to voice, somnolent. Follows commands.     Language: BiPAP in place    CNs: Unable to evaluate pupils due to forceful eye closure. No gross facial asymmetry b/l. Head turning intact b/l. Tongue midline.    Fundoscopic: deferred    Motor: Decreased tone in lower extremities.   RUE at least 4/5   LUE at least 4/5  RLE 0/5 throughout  LLE 0/5 throughout, except 1/5 movement at toes	     Sensation: Absent to PP/LT below umbilicus, intact in upper extremities.     Reflexes:              Biceps(C5)       BR(C6)     Triceps(C7)               Patellar(L4)    Achilles(S1)    Plantar Resp  R	2	          2	             		        0		    		  L	2	          2	             		        0		    		     Coordination: patient did not participate    Gait: patient could not participate    LABORATORY:  CBC                       8.6    23.00 )-----------( 421      ( 31 Dec 2020 06:34 )             28.6     Chem 12-31    138  |  94<L>  |  35<H>  ----------------------------<  137<H>  4.9   |  39<H>  |  0.47<L>    Ca    9.6      31 Dec 2020 06:34  Phos  2.9     12-31  Mg     1.8     12-31    TPro  5.4<L>  /  Alb  3.5  /  TBili  0.4  /  DBili  x   /  AST  20  /  ALT  29  /  AlkPhos  79  12-31    LFTs LIVER FUNCTIONS - ( 31 Dec 2020 06:34 )  Alb: 3.5 g/dL / Pro: 5.4 g/dL / ALK PHOS: 79 U/L / ALT: 29 U/L / AST: 20 U/L / GGT: x           Coagulopathy   Lipid Panel 12-29 Chol 250<H> LDL -- HDL 76 Trig 184<H>  A1c   Cardiac enzymes     U/A   CSF  Immunological  Other    STUDIES & IMAGING:  Studies (EKG, EEG, EMG, etc):     Radiology (XR, CT, MR, U/S, TTE/MARYAM):  < from: MR Head w/wo IV Cont (12.31.20 @ 10:34) >  IMPRESSION: Nonspecific enhancement of the globus pallidus and dentate nucleus which may be due to metabolic abnormality.  Moderate atrophy. Small vessel white matter ischemic changes. No acute infarcts.    < end of copied text >  < from: MR Thoracic and Lumbar Spine w/wo IV Cont (12.31.20 @ 10:34) >  IMPRESSION: Cord expansion from T8 through the conus subtle enhancement and enhancement of the cauda equina nerve roots is nonspecific and may be related to infectious, inflammatory or neoplastic process.    < end of copied text >

## 2020-12-31 NOTE — PROGRESS NOTE ADULT - PROBLEM SELECTOR PLAN 2
-In the emergency department, the patient was noted to have an acute desaturation to 86% while managed on 4L nasal cannula (saturating at 100% previously while being monitored in the ED on 4L nasal cannula)   -Etiology of acute respiratory failure with hypoxia most likely is respiratory muscle collapse, which may represent insufficient respiratory muscle reserve in setting of great degree of carbon dioxide retention; may also represent loss of respiratory drive in setting of oxygen saturations of 100% in ED   -Managing acute exacerbation of COPD as above; titrate oxygen saturation on pulse oximetry to 88-92%

## 2020-12-31 NOTE — PROGRESS NOTE ADULT - PROBLEM SELECTOR PLAN 1
-In the emergency department, the patient was noted to have an acute desaturation to 86% while managed on 4L nasal cannula (saturating at 100% previously while being monitored in the ED on 4L nasal cannula)   -Etiology of acute respiratory failure with hypoxia most likely is respiratory muscle collapse, which may represent insufficient respiratory muscle reserve in the setting of such great carbon dioxide retention  -Etiology of acute exacerbation of COPD uncertain; no documentation in [place] of acute viral prodrome; no changes in medication management; no evidence of missed doses   -Of note, the patient was previously saturating at 100% on 4L nasal cannula; may also represent Haldane effect, and sudden loss of respiratory drive   -Arterial blood gas demonstrates marginally less carbon dioxide retention   -Will manage as COPD exacerbation; will administer Xopenex q6 hours  -Will administer 40 mg of intravenous methylprednisolone every six hours (and space as needed)   -Will administer five days of azithromycin   -Will manage with bilevel positive airway pressure (currently at inspiratory pressure of 14 and expiratory pressure of 5), titrate oxygen saturation on pulse oximetry to 88-92%  -Will administer home doses of LAMA, LABA, and inhaled corticosteroid   -Repeat arterial blood gas later today; space administration of intravenous steroid and inhaled short-acting bronchodilator as necessary; down-titrate respiratory support from bipap to nasal cannula as possible

## 2020-12-31 NOTE — PROGRESS NOTE ADULT - PROBLEM SELECTOR PLAN 7
-Will administer lovenox 40 for dvt prophylaxis   -Pureed diet -Will administer lovenox 40 for dvt prophylaxis   -Pureed diet  - Multiple attempts made to call daughter Echo on 12/31 (number on file) however no answer.

## 2021-01-01 ENCOUNTER — RESULT REVIEW (OUTPATIENT)
Age: 76
End: 2021-01-01

## 2021-01-01 LAB
ALBUMIN SERPL ELPH-MCNC: 3.3 G/DL — SIGNIFICANT CHANGE UP (ref 3.3–5)
ALP SERPL-CCNC: 74 U/L — SIGNIFICANT CHANGE UP (ref 40–120)
ALT FLD-CCNC: 29 U/L — SIGNIFICANT CHANGE UP (ref 10–45)
ANION GAP SERPL CALC-SCNC: 1 MMOL/L — LOW (ref 5–17)
APPEARANCE CSF: CLEAR — SIGNIFICANT CHANGE UP
APPEARANCE CSF: CLEAR — SIGNIFICANT CHANGE UP
AST SERPL-CCNC: 18 U/L — SIGNIFICANT CHANGE UP (ref 10–40)
BILIRUB SERPL-MCNC: 0.2 MG/DL — SIGNIFICANT CHANGE UP (ref 0.2–1.2)
BLOOD GAS SOURCE: SIGNIFICANT CHANGE UP
BUN SERPL-MCNC: 32 MG/DL — HIGH (ref 7–23)
CALCIUM SERPL-MCNC: 9.5 MG/DL — SIGNIFICANT CHANGE UP (ref 8.4–10.5)
CHLORIDE SERPL-SCNC: 96 MMOL/L — SIGNIFICANT CHANGE UP (ref 96–108)
CO2 SERPL-SCNC: 43 MMOL/L — HIGH (ref 22–31)
COHGB MFR BLDV: 1.4 % — SIGNIFICANT CHANGE UP (ref 0–1.5)
COLOR CSF: SIGNIFICANT CHANGE UP
COLOR CSF: SIGNIFICANT CHANGE UP
COPPER SERPL-MCNC: 107 UG/DL — SIGNIFICANT CHANGE UP (ref 72–166)
CREAT SERPL-MCNC: 0.45 MG/DL — LOW (ref 0.5–1.3)
CSF PCR RESULT: DETECTED
GAS PNL BLDA: SIGNIFICANT CHANGE UP
GLUCOSE BLDC GLUCOMTR-MCNC: 128 MG/DL — HIGH (ref 70–99)
GLUCOSE BLDC GLUCOMTR-MCNC: 137 MG/DL — HIGH (ref 70–99)
GLUCOSE BLDC GLUCOMTR-MCNC: 162 MG/DL — HIGH (ref 70–99)
GLUCOSE BLDC GLUCOMTR-MCNC: 171 MG/DL — HIGH (ref 70–99)
GLUCOSE BLDC GLUCOMTR-MCNC: 175 MG/DL — HIGH (ref 70–99)
GLUCOSE BLDC GLUCOMTR-MCNC: 235 MG/DL — HIGH (ref 70–99)
GLUCOSE CSF-MCNC: 106 MG/DL — HIGH (ref 40–70)
GLUCOSE SERPL-MCNC: 126 MG/DL — HIGH (ref 70–99)
GRAM STN FLD: SIGNIFICANT CHANGE UP
HCT VFR BLD CALC: 26.8 % — LOW (ref 34.5–45)
HCYS SERPL-MCNC: 6.9 UMOL/L — SIGNIFICANT CHANGE UP
HGB BLD CALC-MCNC: 8.3 G/DL — LOW (ref 11.5–15.5)
HGB BLD-MCNC: 8.2 G/DL — LOW (ref 11.5–15.5)
HSV2 DNA CSF QL NAA+PROBE: DETECTED
LYMPHOCYTES # CSF: 24 % — LOW (ref 40–80)
MAGNESIUM SERPL-MCNC: 1.9 MG/DL — SIGNIFICANT CHANGE UP (ref 1.6–2.6)
MCHC RBC-ENTMCNC: 30.6 GM/DL — LOW (ref 32–36)
MCHC RBC-ENTMCNC: 30.6 PG — SIGNIFICANT CHANGE UP (ref 27–34)
MCV RBC AUTO: 100 FL — SIGNIFICANT CHANGE UP (ref 80–100)
MONOS+MACROS NFR CSF: 43 % — SIGNIFICANT CHANGE UP (ref 15–45)
NEUTROPHILS # CSF: 33 % — HIGH (ref 0–6)
NEUTROPHILS # CSF: SIGNIFICANT CHANGE UP (ref 0–6)
NIGHT BLUE STAIN TISS: SIGNIFICANT CHANGE UP
NRBC # BLD: 0 /100 WBCS — SIGNIFICANT CHANGE UP (ref 0–0)
NRBC NFR CSF: 1 /UL — SIGNIFICANT CHANGE UP (ref 0–5)
NRBC NFR CSF: <1 — SIGNIFICANT CHANGE UP (ref 0–5)
PHOSPHATE SERPL-MCNC: 2.3 MG/DL — LOW (ref 2.5–4.5)
PLATELET # BLD AUTO: 391 K/UL — SIGNIFICANT CHANGE UP (ref 150–400)
POTASSIUM SERPL-MCNC: 4.9 MMOL/L — SIGNIFICANT CHANGE UP (ref 3.5–5.3)
POTASSIUM SERPL-SCNC: 4.9 MMOL/L — SIGNIFICANT CHANGE UP (ref 3.5–5.3)
PROT CSF-MCNC: 43 MG/DL — SIGNIFICANT CHANGE UP (ref 15–45)
PROT SERPL-MCNC: 5.4 G/DL — LOW (ref 6–8.3)
RBC # BLD: 2.68 M/UL — LOW (ref 3.8–5.2)
RBC # CSF: 7 /UL — HIGH (ref 0–0)
RBC # CSF: 72 /UL — HIGH (ref 0–0)
RBC # FLD: 13.5 % — SIGNIFICANT CHANGE UP (ref 10.3–14.5)
SODIUM SERPL-SCNC: 140 MMOL/L — SIGNIFICANT CHANGE UP (ref 135–145)
SPECIMEN SOURCE: SIGNIFICANT CHANGE UP
SPECIMEN SOURCE: SIGNIFICANT CHANGE UP
TUBE TYPE: SIGNIFICANT CHANGE UP
TUBE TYPE: SIGNIFICANT CHANGE UP
WBC # BLD: 19.93 K/UL — HIGH (ref 3.8–10.5)
WBC # FLD AUTO: 19.93 K/UL — HIGH (ref 3.8–10.5)

## 2021-01-01 PROCEDURE — 99233 SBSQ HOSP IP/OBS HIGH 50: CPT

## 2021-01-01 PROCEDURE — 99233 SBSQ HOSP IP/OBS HIGH 50: CPT | Mod: GC

## 2021-01-01 PROCEDURE — 88108 CYTOPATH CONCENTRATE TECH: CPT | Mod: 26

## 2021-01-01 RX ORDER — ACYCLOVIR SODIUM 500 MG
600 VIAL (EA) INTRAVENOUS EVERY 8 HOURS
Refills: 0 | Status: DISCONTINUED | OUTPATIENT
Start: 2021-01-01 | End: 2021-01-01

## 2021-01-01 RX ORDER — LEVALBUTEROL 1.25 MG/.5ML
0.63 SOLUTION, CONCENTRATE RESPIRATORY (INHALATION) THREE TIMES A DAY
Refills: 0 | Status: DISCONTINUED | OUTPATIENT
Start: 2021-01-01 | End: 2021-01-23

## 2021-01-01 RX ORDER — ACYCLOVIR SODIUM 500 MG
600 VIAL (EA) INTRAVENOUS EVERY 8 HOURS
Refills: 0 | Status: DISCONTINUED | OUTPATIENT
Start: 2021-01-01 | End: 2021-01-03

## 2021-01-01 RX ADMIN — Medication 112 MILLIGRAM(S): at 20:40

## 2021-01-01 RX ADMIN — PANTOPRAZOLE SODIUM 40 MILLIGRAM(S): 20 TABLET, DELAYED RELEASE ORAL at 11:12

## 2021-01-01 RX ADMIN — ISOSORBIDE DINITRATE 10 MILLIGRAM(S): 5 TABLET ORAL at 05:10

## 2021-01-01 RX ADMIN — AMLODIPINE BESYLATE 5 MILLIGRAM(S): 2.5 TABLET ORAL at 05:10

## 2021-01-01 RX ADMIN — BUDESONIDE AND FORMOTEROL FUMARATE DIHYDRATE 2 PUFF(S): 160; 4.5 AEROSOL RESPIRATORY (INHALATION) at 05:09

## 2021-01-01 RX ADMIN — TIOTROPIUM BROMIDE 1 CAPSULE(S): 18 CAPSULE ORAL; RESPIRATORY (INHALATION) at 11:12

## 2021-01-01 RX ADMIN — LEVALBUTEROL 0.31 MILLIGRAM(S): 1.25 SOLUTION, CONCENTRATE RESPIRATORY (INHALATION) at 11:13

## 2021-01-01 RX ADMIN — LEVALBUTEROL 0.31 MILLIGRAM(S): 1.25 SOLUTION, CONCENTRATE RESPIRATORY (INHALATION) at 05:09

## 2021-01-01 RX ADMIN — BUDESONIDE AND FORMOTEROL FUMARATE DIHYDRATE 2 PUFF(S): 160; 4.5 AEROSOL RESPIRATORY (INHALATION) at 17:04

## 2021-01-01 RX ADMIN — Medication 1: at 17:04

## 2021-01-01 RX ADMIN — Medication 40 MILLIGRAM(S): at 13:03

## 2021-01-01 RX ADMIN — Medication 1: at 12:05

## 2021-01-01 RX ADMIN — Medication 40 MILLIGRAM(S): at 21:00

## 2021-01-01 RX ADMIN — ISOSORBIDE DINITRATE 10 MILLIGRAM(S): 5 TABLET ORAL at 17:05

## 2021-01-01 RX ADMIN — Medication 40 MILLIGRAM(S): at 05:09

## 2021-01-01 RX ADMIN — LEVALBUTEROL 0.63 MILLIGRAM(S): 1.25 SOLUTION, CONCENTRATE RESPIRATORY (INHALATION) at 21:00

## 2021-01-01 RX ADMIN — AZITHROMYCIN 250 MILLIGRAM(S): 500 TABLET, FILM COATED ORAL at 13:03

## 2021-01-01 NOTE — CHART NOTE - NSCHARTNOTEFT_GEN_A_CORE
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 Started on ideal body weight acyclovir q8 for coverage. Spoke with neurology, recs to consult ID in AM to weigh in on coverage (acyclovir vs valacyclovir) / dosing and steroids (currently on solumedrol for COPD, should this be continued vs. held).

## 2021-01-01 NOTE — PROGRESS NOTE ADULT - PROBLEM SELECTOR PLAN 2
-In the emergency department, the patient was noted to have an acute desaturation to 86% while managed on 4L nasal cannula (saturating at 100% previously while being monitored in the ED on 4L nasal cannula)   -Etiology of acute respiratory failure with hypoxia most likely is respiratory muscle collapse, which may represent insufficient respiratory muscle reserve in setting of great degree of carbon dioxide retention; may also represent loss of respiratory drive in setting of oxygen saturations of 100% in ED   -Managing acute exacerbation of COPD as above; titrate oxygen saturation on pulse oximetry to 88-92% -Etiology of acute respiratory failure with hypoxia most likely is respiratory muscle collapse, which may represent insufficient respiratory muscle reserve in setting of great degree of carbon dioxide retention; may also represent loss of respiratory drive in setting of oxygen saturations of 100% in ED   -Managing acute exacerbation of COPD as above; titrate oxygen saturation on pulse oximetry to 88-92%

## 2021-01-01 NOTE — PROGRESS NOTE ADULT - PROBLEM SELECTOR PLAN 1
-In the emergency department, the patient was noted to have an acute desaturation to 86% while managed on 4L nasal cannula (saturating at 100% previously while being monitored in the ED on 4L nasal cannula)   -Etiology of acute respiratory failure with hypoxia most likely is respiratory muscle collapse, which may represent insufficient respiratory muscle reserve in the setting of such great carbon dioxide retention  -Etiology of acute exacerbation of COPD uncertain; no documentation in [place] of acute viral prodrome; no changes in medication management; no evidence of missed doses   -Of note, the patient was previously saturating at 100% on 4L nasal cannula; may also represent Haldane effect, and sudden loss of respiratory drive   -Arterial blood gas demonstrates marginally less carbon dioxide retention   -Will manage as COPD exacerbation; will administer Xopenex q6 hours  -Will administer 40 mg of intravenous methylprednisolone every six hours (and space as needed)   -Will administer five days of azithromycin   -Will manage with bilevel positive airway pressure (currently at inspiratory pressure of 14 and expiratory pressure of 5), titrate oxygen saturation on pulse oximetry to 88-92%  -Will administer home doses of LAMA, LABA, and inhaled corticosteroid   -Repeat arterial blood gas later today; space administration of intravenous steroid and inhaled short-acting bronchodilator as necessary; down-titrate respiratory support from bipap to nasal cannula as possible -In the emergency department, the patient was noted to have an acute desaturation to 86% while managed on 4L nasal cannula (saturating at 100% previously while being monitored in the ED on 4L nasal cannula)   -Etiology of acute respiratory failure with hypoxia most likely is respiratory muscle collapse, which may represent insufficient respiratory muscle reserve in the setting of such great carbon dioxide retention  -Etiology of acute exacerbation of COPD uncertain; no documentation in [place] of acute viral prodrome; no changes in medication management; no evidence of missed doses   -Of note, the patient was previously saturating at 100% on 4L nasal cannula; may also represent Haldane effect, and sudden loss of respiratory drive   -Arterial blood gas demonstrates marginally less carbon dioxide retention   -Will manage as COPD exacerbation; will administer Xopenex q8 hours  -Will administer 40 mg of intravenous methylprednisolone every eight hours (and space as needed)   -Will administer five days of azithromycin   -Will manage with bilevel positive airway pressure (currently at inspiratory pressure of 14 and expiratory pressure of 5), titrate oxygen saturation on pulse oximetry to 88-92%  -Will administer home doses of LAMA, LABA, and inhaled corticosteroid   -Repeat arterial blood gas later today; space administration of intravenous steroid and inhaled short-acting bronchodilator as necessary; down-titrate respiratory support from bipap to nasal cannula as possible

## 2021-01-01 NOTE — PROGRESS NOTE ADULT - SUBJECTIVE AND OBJECTIVE BOX
Steven Tse pgy1  [phone]    INTERVAL HPI/OVERNIGHT EVENTS:    SUBJECTIVE: Patient seen and examined at bedside.     CONSTITUTIONAL: No weakness, fevers or chills  EYES/ENT: No visual changes;  No vertigo or throat pain   NECK: No pain or stiffness  RESPIRATORY: No cough, wheezing, hemoptysis; No shortness of breath  CARDIOVASCULAR: No chest pain or palpitations  GASTROINTESTINAL: No abdominal or epigastric pain. No nausea, vomiting, or hematemesis; No diarrhea or constipation. No melena or hematochezia.  GENITOURINARY: No dysuria, frequency or hematuria  NEUROLOGICAL: No numbness or weakness  SKIN: No itching, rashes    OBJECTIVE:    VITAL SIGNS:  T(C): 36.9 (01 Jan 2021 05:07), Max: 37.2 (31 Dec 2020 11:13)  T(F): 98.5 (01 Jan 2021 05:07), Max: 99 (31 Dec 2020 11:13)  HR: 102 (01 Jan 2021 06:53) (88 - 124)  BP: 149/89 (01 Jan 2021 05:07) (142/86 - 159/80)  BP(mean): --  ABP: --  ABP(mean): --  RR: 18 (01 Jan 2021 05:07) (18 - 18)  SpO2: 97% (01 Jan 2021 06:53) (94% - 100%)        12-31 @ 07:01  -  01-01 @ 07:00  --------------------------------------------------------  IN: 236 mL / OUT: 850 mL / NET: -614 mL      CAPILLARY BLOOD GLUCOSE      POCT Blood Glucose.: 137 mg/dL (01 Jan 2021 05:29)      PHYSICAL EXAM:    General: NAD  HEENT: NC/AT; PERRL, clear conjunctiva  Neck: supple  Respiratory: CTA b/l  Cardiovascular: +S1/S2; RRR  Abdomen: soft, NT/ND; +BS x4  Extremities: WWP, 2+ peripheral pulses b/l; no LE edema  Skin: normal color and turgor; no rash  Neurological:    MEDICATIONS:  MEDICATIONS  (STANDING):  amLODIPine   Tablet 5 milliGRAM(s) Oral daily  azithromycin  IVPB 500 milliGRAM(s) IV Intermittent every 24 hours  budesonide  80 MICROgram(s)/formoterol 4.5 MICROgram(s) Inhaler 2 Puff(s) Inhalation two times a day  dextrose 40% Gel 15 Gram(s) Oral once  dextrose 5%. 1000 milliLiter(s) (50 mL/Hr) IV Continuous <Continuous>  dextrose 5%. 1000 milliLiter(s) (100 mL/Hr) IV Continuous <Continuous>  dextrose 50% Injectable 25 Gram(s) IV Push once  dextrose 50% Injectable 12.5 Gram(s) IV Push once  dextrose 50% Injectable 25 Gram(s) IV Push once  glucagon  Injectable 1 milliGRAM(s) IntraMuscular once  insulin lispro (ADMELOG) corrective regimen sliding scale   SubCutaneous every 6 hours  isosorbide   dinitrate Tablet (ISORDIL) 10 milliGRAM(s) Oral two times a day  levalbuterol Inhalation 0.31 milliGRAM(s) Inhalation every 6 hours  methylPREDNISolone sodium succinate Injectable 40 milliGRAM(s) IV Push every 8 hours  pantoprazole  Injectable 40 milliGRAM(s) IV Push daily  tiotropium 18 MICROgram(s) Capsule 1 Capsule(s) Inhalation daily    MEDICATIONS  (PRN):      ALLERGIES:  Allergies    albuterol (Other)    Intolerances        LABS:                        8.2    19.93 )-----------( 391      ( 01 Jan 2021 05:42 )             26.8     01-01    140  |  96  |  32<H>  ----------------------------<  126<H>  4.9   |  43<H>  |  0.45<L>    Ca    9.5      01 Jan 2021 05:42  Phos  2.3     01-01  Mg     1.9     01-01    TPro  5.4<L>  /  Alb  3.3  /  TBili  0.2  /  DBili  x   /  AST  18  /  ALT  29  /  AlkPhos  74  01-01          RADIOLOGY & ADDITIONAL TESTS: Reviewed. Steven Tse pgy1  [phone]    INTERVAL HPI/OVERNIGHT EVENTS: The patient underwent lumbar puncture with neurology this morning for assessment of the etiology of her bilateral lower extremity weakness. Improvement noted in hypoxic/hypercapnic respiratory failure. Taken off of bipap during the day.     SUBJECTIVE: Patient seen and examined at bedside.     CONSTITUTIONAL: No weakness, fevers or chills  EYES/ENT: No visual changes, no blurry vision, no double vision; no dizziness   NECK: No pain, no stiffness  RESPIRATORY: No cough, no shortness of breath  CARDIOVASCULAR: No chest pain, no palpitations  GASTROINTESTINAL: No abdominal or epigastric pain. No nausea. Cannot recall last bowel movement.   GENITOURINARY: No dysuria, frequency or hematuria  NEUROLOGICAL: Weakness noted in bilateral lower extremities; numbness also appreciated   SKIN: No itching, rashes    OBJECTIVE:    VITAL SIGNS:  T(C): 36.9 (01 Jan 2021 05:07), Max: 37.2 (31 Dec 2020 11:13)  T(F): 98.5 (01 Jan 2021 05:07), Max: 99 (31 Dec 2020 11:13)  HR: 102 (01 Jan 2021 06:53) (88 - 124)  BP: 149/89 (01 Jan 2021 05:07) (142/86 - 159/80)  BP(mean): --  ABP: --  ABP(mean): --  RR: 18 (01 Jan 2021 05:07) (18 - 18)  SpO2: 97% (01 Jan 2021 06:53) (94% - 100%)        12-31 @ 07:01  -  01-01 @ 07:00  --------------------------------------------------------  IN: 236 mL / OUT: 850 mL / NET: -614 mL      CAPILLARY BLOOD GLUCOSE      POCT Blood Glucose.: 137 mg/dL (01 Jan 2021 05:29)      PHYSICAL EXAM:    General: NAD; awake and alert   HEENT: NC/AT; PERRL grossly, clear conjunctiva  Neck: No jvd, no lymphadenopathy   Respiratory: Diffuse rales noted, no wheezes; equal respiratory excursion bilaterally   Cardiovascular: +S1/S2; RRR  Abdomen: soft, NT/ND; +BS x4  Extremities: WWP, 2+ peripheral pulses b/l; no LE edema  Skin: normal color and turgor; no rash  Neurological: 4/5 strength in bilateral upper extremities; moving ankles, proprioception and hot/cold sensation intact; coordination intact     MEDICATIONS:  MEDICATIONS  (STANDING):  amLODIPine   Tablet 5 milliGRAM(s) Oral daily  azithromycin  IVPB 500 milliGRAM(s) IV Intermittent every 24 hours  budesonide  80 MICROgram(s)/formoterol 4.5 MICROgram(s) Inhaler 2 Puff(s) Inhalation two times a day  dextrose 40% Gel 15 Gram(s) Oral once  dextrose 5%. 1000 milliLiter(s) (50 mL/Hr) IV Continuous <Continuous>  dextrose 5%. 1000 milliLiter(s) (100 mL/Hr) IV Continuous <Continuous>  dextrose 50% Injectable 25 Gram(s) IV Push once  dextrose 50% Injectable 12.5 Gram(s) IV Push once  dextrose 50% Injectable 25 Gram(s) IV Push once  glucagon  Injectable 1 milliGRAM(s) IntraMuscular once  insulin lispro (ADMELOG) corrective regimen sliding scale   SubCutaneous every 6 hours  isosorbide   dinitrate Tablet (ISORDIL) 10 milliGRAM(s) Oral two times a day  levalbuterol Inhalation 0.31 milliGRAM(s) Inhalation every 6 hours  methylPREDNISolone sodium succinate Injectable 40 milliGRAM(s) IV Push every 8 hours  pantoprazole  Injectable 40 milliGRAM(s) IV Push daily  tiotropium 18 MICROgram(s) Capsule 1 Capsule(s) Inhalation daily    MEDICATIONS  (PRN):      ALLERGIES:  Allergies    albuterol (Other)    Intolerances        LABS:                        8.2    19.93 )-----------( 391      ( 01 Jan 2021 05:42 )             26.8     01-01    140  |  96  |  32<H>  ----------------------------<  126<H>  4.9   |  43<H>  |  0.45<L>    Ca    9.5      01 Jan 2021 05:42  Phos  2.3     01-01  Mg     1.9     01-01    TPro  5.4<L>  /  Alb  3.3  /  TBili  0.2  /  DBili  x   /  AST  18  /  ALT  29  /  AlkPhos  74  01-01          RADIOLOGY & ADDITIONAL TESTS: Reviewed.

## 2021-01-01 NOTE — PROGRESS NOTE ADULT - PROBLEM SELECTOR PLAN 7
-Will administer lovenox 40 for dvt prophylaxis   -Pureed diet  - Multiple attempts made to call daughter Echo on 12/31 (number on file) however no answer. -Will administer lovenox 40 for dvt prophylaxis   -Pureed diet

## 2021-01-01 NOTE — PROGRESS NOTE ADULT - PROBLEM SELECTOR PLAN 3
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 hiv, lyme, tb, ebv, hep b/c), inflammatory etiology (acute demyelating inflammatory polyneuropathy/gbs), toxin-induced (b12, folate, copper deficiency; lead poisoning), para-neoplastic syndrome, syringomyelia, spinal infarction   -Motor deficits only noted on neurologic exam (sensory exam intact per limited exam); this is most consistent with diagnosis of anterior spinal cord infarction (less likely as no bowel or bladder incontinence) or guillan-barre syndrome; less consistent with infectious or toxic etiologies, which would likely affect peripheral nerve and cause sensory deficits in addition   -Will send B12, folate, and copper titers; will order mri/mra of spine and mri of brain   -If above workup is negative, patient will require lumbar puncture to look for protein-cytologic dissociation characteristic of gbs  -Also possible that weakness could be explained by ICU-associated deconditioning; however, would not explain lack of evident upper extremity weakness 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 hiv, lyme, tb, ebv, hep b/c), inflammatory etiology (acute demyelating inflammatory polyneuropathy/gbs), toxin-induced (b12, folate, copper deficiency; lead poisoning), para-neoplastic syndrome, syringomyelia, spinal infarction   -Motor deficits only noted on neurologic exam (sensory exam intact per limited exam); this is most consistent with diagnosis of anterior spinal cord infarction (less likely as no bowel or bladder incontinence) or guillan-barre syndrome; less consistent with infectious or toxic etiologies, which would likely affect peripheral nerve and cause sensory deficits in addition   -B12, folate, and copper titers normal; mri brain normal; mri lumbar spine demonstrates t2 enhancement of conus medullaris    -Lumbar puncture today does not demonstrate elevated protein level; may require corticosteroid administration for rule-out transverse myelitis   -Also possible that weakness could be explained by ICU-associated deconditioning; however, would not explain lack of evident upper extremity weakness

## 2021-01-01 NOTE — PROGRESS NOTE ADULT - ATTENDING COMMENTS
Pt seen and examined. Admitted for acute hypoxic/hypercapnic respiratory failure 2/2 COPD exacerbation. Also with LE weakness, r/o myelitis vs. ICU-neuropathy/myopathy. Now sating 98% on 2L ; on nocturnal Bipap. continue solumedrol 40mg q8hr and taper as tolerated  w/up LE weakness in progress ; MRI spine/brain reviewed. Neurology following. PT as tolerated. Pt seen and examined. Admitted for acute hypoxic/hypercapnic respiratory failure 2/2 COPD exacerbation. Also with LE weakness, r/o myelitis vs. ICU-neuropathy/myopathy. Now sating 98% on 2L ; on nocturnal Bipap. continue solumedrol 40mg q8hr and taper as tolerated  w/up LE weakness in progress ; s/p LP this AM. MRI spine/brain reviewed. Neurology following. PT as tolerated.

## 2021-01-01 NOTE — PROGRESS NOTE ADULT - ASSESSMENT
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

## 2021-01-02 LAB
ALBUMIN SERPL ELPH-MCNC: 3.3 G/DL — SIGNIFICANT CHANGE UP (ref 3.3–5)
ALP SERPL-CCNC: 82 U/L — SIGNIFICANT CHANGE UP (ref 40–120)
ALT FLD-CCNC: 32 U/L — SIGNIFICANT CHANGE UP (ref 10–45)
ANION GAP SERPL CALC-SCNC: 3 MMOL/L — LOW (ref 5–17)
AST SERPL-CCNC: 24 U/L — SIGNIFICANT CHANGE UP (ref 10–40)
BILIRUB SERPL-MCNC: 0.4 MG/DL — SIGNIFICANT CHANGE UP (ref 0.2–1.2)
BUN SERPL-MCNC: 30 MG/DL — HIGH (ref 7–23)
CALCIUM SERPL-MCNC: 9.4 MG/DL — SIGNIFICANT CHANGE UP (ref 8.4–10.5)
CHLORIDE SERPL-SCNC: 90 MMOL/L — LOW (ref 96–108)
CO2 SERPL-SCNC: 42 MMOL/L — HIGH (ref 22–31)
CREAT SERPL-MCNC: 0.48 MG/DL — LOW (ref 0.5–1.3)
GLUCOSE BLDC GLUCOMTR-MCNC: 142 MG/DL — HIGH (ref 70–99)
GLUCOSE BLDC GLUCOMTR-MCNC: 151 MG/DL — HIGH (ref 70–99)
GLUCOSE BLDC GLUCOMTR-MCNC: 185 MG/DL — HIGH (ref 70–99)
GLUCOSE BLDC GLUCOMTR-MCNC: 188 MG/DL — HIGH (ref 70–99)
GLUCOSE SERPL-MCNC: 175 MG/DL — HIGH (ref 70–99)
HCT VFR BLD CALC: 26.3 % — LOW (ref 34.5–45)
HGB BLD-MCNC: 8.1 G/DL — LOW (ref 11.5–15.5)
MAGNESIUM SERPL-MCNC: 1.8 MG/DL — SIGNIFICANT CHANGE UP (ref 1.6–2.6)
MCHC RBC-ENTMCNC: 30.2 PG — SIGNIFICANT CHANGE UP (ref 27–34)
MCHC RBC-ENTMCNC: 30.8 GM/DL — LOW (ref 32–36)
MCV RBC AUTO: 98.1 FL — SIGNIFICANT CHANGE UP (ref 80–100)
NRBC # BLD: 0 /100 WBCS — SIGNIFICANT CHANGE UP (ref 0–0)
PHOSPHATE SERPL-MCNC: 2.8 MG/DL — SIGNIFICANT CHANGE UP (ref 2.5–4.5)
PLATELET # BLD AUTO: 394 K/UL — SIGNIFICANT CHANGE UP (ref 150–400)
POTASSIUM SERPL-MCNC: 4.8 MMOL/L — SIGNIFICANT CHANGE UP (ref 3.5–5.3)
POTASSIUM SERPL-SCNC: 4.8 MMOL/L — SIGNIFICANT CHANGE UP (ref 3.5–5.3)
PROT CSF-MCNC: 52 MG/DL — HIGH (ref 15–45)
PROT SERPL-MCNC: 5 G/DL — LOW (ref 6–8.3)
PROT SERPL-MCNC: 5 G/DL — LOW (ref 6–8.3)
PROT SERPL-MCNC: 5.3 G/DL — LOW (ref 6–8.3)
RBC # BLD: 2.68 M/UL — LOW (ref 3.8–5.2)
RBC # FLD: 13.5 % — SIGNIFICANT CHANGE UP (ref 10.3–14.5)
SODIUM SERPL-SCNC: 134 MMOL/L — LOW (ref 135–145)
WBC # BLD: 23.19 K/UL — HIGH (ref 3.8–10.5)
WBC # FLD AUTO: 23.19 K/UL — HIGH (ref 3.8–10.5)

## 2021-01-02 PROCEDURE — 88188 FLOWCYTOMETRY/READ 9-15: CPT

## 2021-01-02 PROCEDURE — 99223 1ST HOSP IP/OBS HIGH 75: CPT | Mod: GC

## 2021-01-02 PROCEDURE — 99233 SBSQ HOSP IP/OBS HIGH 50: CPT | Mod: GC

## 2021-01-02 RX ORDER — DEXTROSE 50 % IN WATER 50 %
15 SYRINGE (ML) INTRAVENOUS ONCE
Refills: 0 | Status: DISCONTINUED | OUTPATIENT
Start: 2021-01-02 | End: 2021-01-23

## 2021-01-02 RX ORDER — SODIUM CHLORIDE 9 MG/ML
1000 INJECTION, SOLUTION INTRAVENOUS
Refills: 0 | Status: DISCONTINUED | OUTPATIENT
Start: 2021-01-02 | End: 2021-01-23

## 2021-01-02 RX ORDER — INSULIN LISPRO 100/ML
VIAL (ML) SUBCUTANEOUS
Refills: 0 | Status: DISCONTINUED | OUTPATIENT
Start: 2021-01-02 | End: 2021-01-23

## 2021-01-02 RX ORDER — DEXTROSE 50 % IN WATER 50 %
25 SYRINGE (ML) INTRAVENOUS ONCE
Refills: 0 | Status: DISCONTINUED | OUTPATIENT
Start: 2021-01-02 | End: 2021-01-23

## 2021-01-02 RX ORDER — DEXTROSE 50 % IN WATER 50 %
12.5 SYRINGE (ML) INTRAVENOUS ONCE
Refills: 0 | Status: DISCONTINUED | OUTPATIENT
Start: 2021-01-02 | End: 2021-01-23

## 2021-01-02 RX ORDER — GLUCAGON INJECTION, SOLUTION 0.5 MG/.1ML
1 INJECTION, SOLUTION SUBCUTANEOUS ONCE
Refills: 0 | Status: DISCONTINUED | OUTPATIENT
Start: 2021-01-02 | End: 2021-01-23

## 2021-01-02 RX ORDER — INSULIN LISPRO 100/ML
VIAL (ML) SUBCUTANEOUS AT BEDTIME
Refills: 0 | Status: DISCONTINUED | OUTPATIENT
Start: 2021-01-02 | End: 2021-01-23

## 2021-01-02 RX ADMIN — Medication 1: at 12:30

## 2021-01-02 RX ADMIN — Medication 40 MILLIGRAM(S): at 05:07

## 2021-01-02 RX ADMIN — LEVALBUTEROL 0.63 MILLIGRAM(S): 1.25 SOLUTION, CONCENTRATE RESPIRATORY (INHALATION) at 11:19

## 2021-01-02 RX ADMIN — AZITHROMYCIN 250 MILLIGRAM(S): 500 TABLET, FILM COATED ORAL at 11:20

## 2021-01-02 RX ADMIN — BUDESONIDE AND FORMOTEROL FUMARATE DIHYDRATE 2 PUFF(S): 160; 4.5 AEROSOL RESPIRATORY (INHALATION) at 05:07

## 2021-01-02 RX ADMIN — Medication 40 MILLIGRAM(S): at 11:19

## 2021-01-02 RX ADMIN — LEVALBUTEROL 0.63 MILLIGRAM(S): 1.25 SOLUTION, CONCENTRATE RESPIRATORY (INHALATION) at 21:48

## 2021-01-02 RX ADMIN — TIOTROPIUM BROMIDE 1 CAPSULE(S): 18 CAPSULE ORAL; RESPIRATORY (INHALATION) at 09:06

## 2021-01-02 RX ADMIN — Medication 40 MILLIGRAM(S): at 21:47

## 2021-01-02 RX ADMIN — Medication 112 MILLIGRAM(S): at 05:08

## 2021-01-02 RX ADMIN — AMLODIPINE BESYLATE 5 MILLIGRAM(S): 2.5 TABLET ORAL at 05:07

## 2021-01-02 RX ADMIN — ISOSORBIDE DINITRATE 10 MILLIGRAM(S): 5 TABLET ORAL at 21:47

## 2021-01-02 RX ADMIN — Medication 112 MILLIGRAM(S): at 16:47

## 2021-01-02 RX ADMIN — PANTOPRAZOLE SODIUM 40 MILLIGRAM(S): 20 TABLET, DELAYED RELEASE ORAL at 09:06

## 2021-01-02 RX ADMIN — ISOSORBIDE DINITRATE 10 MILLIGRAM(S): 5 TABLET ORAL at 05:07

## 2021-01-02 RX ADMIN — Medication 1: at 16:47

## 2021-01-02 RX ADMIN — Medication 1: at 00:00

## 2021-01-02 RX ADMIN — Medication 1: at 06:09

## 2021-01-02 RX ADMIN — LEVALBUTEROL 0.63 MILLIGRAM(S): 1.25 SOLUTION, CONCENTRATE RESPIRATORY (INHALATION) at 05:07

## 2021-01-02 RX ADMIN — BUDESONIDE AND FORMOTEROL FUMARATE DIHYDRATE 2 PUFF(S): 160; 4.5 AEROSOL RESPIRATORY (INHALATION) at 16:52

## 2021-01-02 RX ADMIN — Medication 112 MILLIGRAM(S): at 21:47

## 2021-01-02 NOTE — CONSULT NOTE ADULT - SUBJECTIVE AND OBJECTIVE BOX
Patient is a 75y old  Female who presents with a chief complaint of acute respiratory failure (02 Jan 2021 07:00)    HPI:  76 y/o F PMHx of severe COPD on home 02 was transferred from OSH for LE weakness. Patient is oriented partially to place (hospital) and to date, but poor historian otherwise, history obtained partially from chart review. She was admitted originally to [facility] for hip fracture and sent to rehab 12/18, after which she was sent to [facility] for AMS thought 2/2 carbon dioxide retention from COPD exacerbation requiring intubation. She was treated with steroids and azithromycin and was succesfuly extubated and transferred to rehab on 12/28, but was found to have ?new LE weakness prompting transfer to [facility].    Pt has been afebrile, significant leukocytosis while on steroids. MRI of brain, thoracic and L-spine showed questionable findings including enhancement of globus palidus and dentate nucleus without leptomeningeal enhancement, cord expansion from T8 through conus medularis with cauda equina enhancement. LP was performed, only 1 nucleated cell however CSF PCR positive for HSV2.    Pt was started on acyclovir. She denies fevers, chills, HA, neck pain or stiffness, dysuria, diarrhea. Denies cough and productive cough. States LE weakness has been present for weeks.    prior hospital charts reviewed [ x ]  primary team notes reviewed [x  ]  other consultant notes reviewed [ x ]  PAST MEDICAL & SURGICAL HISTORY:  Anxiety    COPD, severe    History of repair of hip fracture      Allergies  albuterol (Other)    ANTIMICROBIALS (past 90 days)  MEDICATIONS  (STANDING):    acyclovir IVPB   112 mL/Hr IV Intermittent (01-02-21 @ 05:08)   112 mL/Hr IV Intermittent (01-01-21 @ 20:40)    azithromycin  IVPB   250 mL/Hr IV Intermittent (01-02-21 @ 11:20)   250 mL/Hr IV Intermittent (01-01-21 @ 13:03)   250 mL/Hr IV Intermittent (12-31-20 @ 13:50)   250 mL/Hr IV Intermittent (12-30-20 @ 12:56)   250 mL/Hr IV Intermittent (12-29-20 @ 13:31)      ANTIMICROBIALS:    acyclovir IVPB 600 every 8 hours    OTHER MEDS: MEDICATIONS  (STANDING):  amLODIPine   Tablet 5 daily  budesonide  80 MICROgram(s)/formoterol 4.5 MICROgram(s) Inhaler 2 two times a day  dextrose 40% Gel 15 once  dextrose 50% Injectable 25 once  dextrose 50% Injectable 12.5 once  dextrose 50% Injectable 25 once  glucagon  Injectable 1 once  insulin lispro (ADMELOG) corrective regimen sliding scale  three times a day before meals  insulin lispro (ADMELOG) corrective regimen sliding scale  at bedtime  isosorbide   dinitrate Tablet (ISORDIL) 10 two times a day  levalbuterol Inhalation 0.63 three times a day  methylPREDNISolone sodium succinate Injectable 40 every 8 hours  pantoprazole  Injectable 40 daily  tiotropium 18 MICROgram(s) Capsule 1 daily    SOCIAL HISTORY:   former smoker, no alcohol use    FAMILY HISTORY:  pt unable to provide this history      REVIEW OF SYSTEMS  [  ] ROS unobtainable because:    [ x ] All other systems negative except as noted below:	    Constitutional:  [ ] fever [ ] chills  [ ] weight loss  [ ] weakness  Skin:  [ ] rash [ ] phlebitis	  Eyes: [ ] icterus [ ] pain  [ ] discharge	  ENMT: [ ] sore throat  [ ] thrush [ ] ulcers [ ] exudates  Respiratory: [ ] dyspnea [ ] hemoptysis [ ] cough [ ] sputum	  Cardiovascular:  [ ] chest pain [ ] palpitations [ ] edema	  Gastrointestinal:  [ ] nausea [ ] vomiting [ ] diarrhea [ ] constipation [ ] pain	  Genitourinary:  [ ] dysuria [ ] frequency [ ] hematuria [ ] discharge [ ] flank pain  [ ] incontinence  Musculoskeletal:  [ ] myalgias [ ] arthralgias [ ] arthritis  [ ] back pain  Neurological:  [ ] headache [ ] seizures  [ ] confusion/altered mental status  Psychiatric:  [ ] anxiety [ ] depression	  Hematology/Lymphatics:  [ ] lymphadenopathy  Endocrine:  [ ] adrenal [ ] thyroid  Allergic/Immunologic:	 [ ] transplant [ ] seasonal    Vital Signs Last 24 Hrs  T(F): 97.9 (01-02-21 @ 12:30), Max: 99 (12-31-20 @ 11:13)  Vital Signs Last 24 Hrs  HR: 124 (01-02-21 @ 12:30) (102 - 126)  BP: 132/78 (01-02-21 @ 12:30) (127/80 - 150/77)  RR: 22 (01-02-21 @ 12:30)  SpO2: 97% (01-02-21 @ 12:30) (96% - 99%)  Wt(kg): --    PHYSICAL EXAM:  Constitutional: non-toxic, BIPAP, AAOx2-3  HEAD/EYES: anicteric, no conjunctival injection  ENT:  no nuchal rigidity  Cardiovascular:   normal S1, S2, no murmur, no edema  Respiratory:  decreased breath sounds througout  GI:  soft, non-tender, normal bowel sounds  :  no chiu, no CVA tenderness  Musculoskeletal:  no synovitis, normal ROM  Neurologic: Pt unable to raise LEs  Skin:  no rash, no erythema, no phlebitis  Heme/Onc: no lymphadenopathy   Psychiatric:  AAOx2-3                            8.1    23.19 )-----------( 394      ( 02 Jan 2021 06:56 )             26.3     01-02    134<L>  |  90<L>  |  30<H>  ----------------------------<  175<H>  4.8   |  42<H>  |  0.48<L>    Ca    9.4      02 Jan 2021 06:55  Phos  2.8     01-02  Mg     1.8     01-02    TPro  5.3<L>  /  Alb  3.3  /  TBili  0.4  /  DBili  x   /  AST  24  /  ALT  32  /  AlkPhos  82  01-02    MICROBIOLOGY:  Culture - Acid Fast - CSF (collected 01 Jan 2021 16:16)  Source: .CSF CSF    Culture - CSF with Gram Stain (collected 01 Jan 2021 16:16)  Source: .CSF CSF  Gram Stain (01 Jan 2021 17:25):    polymorphonuclear leukocytes seen per low power field    No organisms seen per oil power field    by cytocentrifuge    Culture - Blood (collected 29 Dec 2020 16:29)  Source: .Blood Blood-Venous  Preliminary Report (30 Dec 2020 17:02):    No growth to date.    Culture - Blood (collected 29 Dec 2020 16:28)  Source: .Blood Blood-Peripheral  Preliminary Report (30 Dec 2020 17:02):    No growth to date.        CSF PCR Panel (01.01.21 @ 13:39)   CSF PCR Result: Detected: The meningitis/encephalitis (ME) panel (CSFPCR) is a PCR based assay that   screens for: Escherichia coli; Haemophilus influenzae; Listeria   monocytogenes; Neisseria meningitidis; Streptococcus agalactiae;   Streptococcus pneumoniae; CMV; Enterovirus; HSV-1; HSV-2; Human   herpesvirus 6; Parechovirus; VZV and Cryptococcus. Result should be   interpreted in context of clinical presentation, imaging and other lab   tests. Positive predictive value may be lower in patients with normal CSF   chemistry and cell count.   Herpes simplex virus 2: Detected           RADIOLOGY:  < from: MR Lumbar Spine w/ IV Cont (12.31.20 @ 10:34) >  IMPRESSION: Cord expansion from T8 through the conus subtle enhancement and enhancement of the cauda equina nerve roots is nonspecific and may be related to infectious, inflammatory or neoplastic process.      < end of copied text >  < from: MR Head w/wo IV Cont (12.31.20 @ 10:34) >  IMPRESSION: Nonspecific enhancement of the globus pallidus and dentate nucleus which may be due to metabolic abnormality.  Moderate atrophy. Small vessel white matter ischemic changes. No acute infarcts.    < end of copied text >  < from: Xray Chest 1 View- PORTABLE-Urgent (Xray Chest 1 View- PORTABLE-Urgent .) (12.29.20 @ 07:39) >  Impression:    The heart is normal in size. Platelike atelectasis right lower lobe. Otherwise the lungs appear to be clear. No pleural effusion. No pneumothorax.           Patient is a 75y old  Female who presents with a chief complaint of acute respiratory failure (02 Jan 2021 07:00)    HPI:  76 y/o F PMHx of severe COPD on home 02 was transferred from OSH for LE weakness. Patient is oriented partially to place (hospital) and to date, but poor historian otherwise, history obtained partially from chart review. She was admitted originally to [facility] for hip fracture and sent to rehab 12/18, after which she was sent to [facility] for AMS thought 2/2 carbon dioxide retention from COPD exacerbation requiring intubation. She was treated with steroids and azithromycin and was succesfuly extubated and transferred to rehab on 12/28, but was found to have ?new LE weakness prompting transfer to [facility].    Pt has been afebrile, significant leukocytosis while on steroids. MRI of brain, thoracic and L-spine showed questionable findings including enhancement of globus palidus and dentate nucleus without leptomeningeal enhancement, cord expansion from T8 through conus medularis with cauda equina enhancement. LP was performed, only 1 nucleated cell however CSF PCR positive for HSV2.    Pt was started on acyclovir. She denies fevers, chills, HA, neck pain or stiffness, dysuria, diarrhea. + cough and SOB. States LE weakness has been present for weeks.    prior hospital charts reviewed [ x ]  primary team notes reviewed [x  ]  other consultant notes reviewed [ x ]  PAST MEDICAL & SURGICAL HISTORY:  Anxiety    COPD, severe    History of repair of hip fracture      Allergies  albuterol (Other)    ANTIMICROBIALS (past 90 days)  MEDICATIONS  (STANDING):    acyclovir IVPB   112 mL/Hr IV Intermittent (01-02-21 @ 05:08)   112 mL/Hr IV Intermittent (01-01-21 @ 20:40)    azithromycin  IVPB   250 mL/Hr IV Intermittent (01-02-21 @ 11:20)   250 mL/Hr IV Intermittent (01-01-21 @ 13:03)   250 mL/Hr IV Intermittent (12-31-20 @ 13:50)   250 mL/Hr IV Intermittent (12-30-20 @ 12:56)   250 mL/Hr IV Intermittent (12-29-20 @ 13:31)      ANTIMICROBIALS:    acyclovir IVPB 600 every 8 hours    OTHER MEDS: MEDICATIONS  (STANDING):  amLODIPine   Tablet 5 daily  budesonide  80 MICROgram(s)/formoterol 4.5 MICROgram(s) Inhaler 2 two times a day  dextrose 40% Gel 15 once  dextrose 50% Injectable 25 once  dextrose 50% Injectable 12.5 once  dextrose 50% Injectable 25 once  glucagon  Injectable 1 once  insulin lispro (ADMELOG) corrective regimen sliding scale  three times a day before meals  insulin lispro (ADMELOG) corrective regimen sliding scale  at bedtime  isosorbide   dinitrate Tablet (ISORDIL) 10 two times a day  levalbuterol Inhalation 0.63 three times a day  methylPREDNISolone sodium succinate Injectable 40 every 8 hours  pantoprazole  Injectable 40 daily  tiotropium 18 MICROgram(s) Capsule 1 daily    SOCIAL HISTORY:   former smoker, no alcohol use    FAMILY HISTORY:  pt unable to provide this history      REVIEW OF SYSTEMS  [  ] ROS unobtainable because:    [ x ] All other systems negative except as noted below:	    Constitutional:  [ ] fever [ ] chills  [ ] weight loss  [ ] weakness  Skin:  [ ] rash [ ] phlebitis	  Eyes: [ ] icterus [ ] pain  [ ] discharge	  ENMT: [ ] sore throat  [ ] thrush [ ] ulcers [ ] exudates  Respiratory: [ ] dyspnea [ ] hemoptysis [ ] cough [ ] sputum	  Cardiovascular:  [ ] chest pain [ ] palpitations [ ] edema	  Gastrointestinal:  [ ] nausea [ ] vomiting [ ] diarrhea [ ] constipation [ ] pain	  Genitourinary:  [ ] dysuria [ ] frequency [ ] hematuria [ ] discharge [ ] flank pain  [ ] incontinence  Musculoskeletal:  [ ] myalgias [ ] arthralgias [ ] arthritis  [ ] back pain  Neurological:  [ ] headache [ ] seizures  [ ] confusion/altered mental status  Psychiatric:  [ ] anxiety [ ] depression	  Endocrine:  [ ] adrenal [ ] thyroid  Allergic/Immunologic:	 [ ] transplant [ ] seasonal      Vital Signs Last 24 Hrs  T(F): 97.9 (01-02-21 @ 12:30), Max: 99 (12-31-20 @ 11:13)  Vital Signs Last 24 Hrs  HR: 124 (01-02-21 @ 12:30) (102 - 126)  BP: 132/78 (01-02-21 @ 12:30) (127/80 - 150/77)  RR: 22 (01-02-21 @ 12:30)  SpO2: 97% (01-02-21 @ 12:30) (96% - 99%)  Wt(kg): --    PHYSICAL EXAM:  Constitutional: non-toxic, BIPAP, AAOx2-3  HEAD/EYES: anicteric, no conjunctival injection  ENT:  no nuchal rigidity  Cardiovascular:   normal S1, S2, tachy   Respiratory:  decreased breath sounds throughout   GI:  soft, non-tender, normal bowel sounds  :  no chiu,   Musculoskeletal:  no joint swelling   Neurologic: Pt unable to raise LEs  Skin:  no rash, no erythema, no phlebitis  Vascualr: palpable pulses   Psychiatric:  AAOx2-3                            8.1    23.19 )-----------( 394      ( 02 Jan 2021 06:56 )             26.3     01-02    134<L>  |  90<L>  |  30<H>  ----------------------------<  175<H>  4.8   |  42<H>  |  0.48<L>    Ca    9.4      02 Jan 2021 06:55  Phos  2.8     01-02  Mg     1.8     01-02    TPro  5.3<L>  /  Alb  3.3  /  TBili  0.4  /  DBili  x   /  AST  24  /  ALT  32  /  AlkPhos  82  01-02    MICROBIOLOGY:  Culture - Acid Fast - CSF (collected 01 Jan 2021 16:16)  Source: .CSF CSF    Culture - CSF with Gram Stain (collected 01 Jan 2021 16:16)  Source: .CSF CSF  Gram Stain (01 Jan 2021 17:25):    polymorphonuclear leukocytes seen per low power field    No organisms seen per oil power field    by cytocentrifuge    Culture - Blood (collected 29 Dec 2020 16:29)  Source: .Blood Blood-Venous  Preliminary Report (30 Dec 2020 17:02):    No growth to date.    Culture - Blood (collected 29 Dec 2020 16:28)  Source: .Blood Blood-Peripheral  Preliminary Report (30 Dec 2020 17:02):    No growth to date.        CSF PCR Panel (01.01.21 @ 13:39)   CSF PCR Result: Detected: The meningitis/encephalitis (ME) panel (CSFPCR) is a PCR based assay that   screens for: Escherichia coli; Haemophilus influenzae; Listeria   monocytogenes; Neisseria meningitidis; Streptococcus agalactiae;   Streptococcus pneumoniae; CMV; Enterovirus; HSV-1; HSV-2; Human   herpesvirus 6; Parechovirus; VZV and Cryptococcus. Result should be   interpreted in context of clinical presentation, imaging and other lab   tests. Positive predictive value may be lower in patients with normal CSF   chemistry and cell count.   Herpes simplex virus 2: Detected           RADIOLOGY:  < from: MR Lumbar Spine w/ IV Cont (12.31.20 @ 10:34) >  IMPRESSION: Cord expansion from T8 through the conus subtle enhancement and enhancement of the cauda equina nerve roots is nonspecific and may be related to infectious, inflammatory or neoplastic process.        < from: MR Head w/wo IV Cont (12.31.20 @ 10:34) >  IMPRESSION: Nonspecific enhancement of the globus pallidus and dentate nucleus which may be due to metabolic abnormality.  Moderate atrophy. Small vessel white matter ischemic changes. No acute infarcts.      < from: Xray Chest 1 View- PORTABLE-Urgent (Xray Chest 1 View- PORTABLE-Urgent .) (12.29.20 @ 07:39) >  Impression:    The heart is normal in size. Platelike atelectasis right lower lobe. Otherwise the lungs appear to be clear. No pleural effusion. No pneumothorax.

## 2021-01-02 NOTE — PROGRESS NOTE ADULT - PROBLEM SELECTOR PLAN 1
-In the emergency department, the patient was noted to have an acute desaturation to 86% while managed on 4L nasal cannula (saturating at 100% previously while being monitored in the ED on 4L nasal cannula)   -Etiology of acute respiratory failure with hypoxia most likely is respiratory muscle collapse, which may represent insufficient respiratory muscle reserve in the setting of such great carbon dioxide retention  -Etiology of acute exacerbation of COPD uncertain; no documentation in [place] of acute viral prodrome; no changes in medication management; no evidence of missed doses   -Of note, the patient was previously saturating at 100% on 4L nasal cannula; may also represent Haldane effect, and sudden loss of respiratory drive   -Arterial blood gas demonstrates marginally less carbon dioxide retention   -Will manage as COPD exacerbation; will administer Xopenex q8 hours  -Will administer 40 mg of intravenous methylprednisolone every eight hours (and space as needed)   -Will administer five days of azithromycin   -Will manage with bilevel positive airway pressure (currently at inspiratory pressure of 14 and expiratory pressure of 5), titrate oxygen saturation on pulse oximetry to 88-92%  -Will administer home doses of LAMA, LABA, and inhaled corticosteroid   -Repeat arterial blood gas later today; space administration of intravenous steroid and inhaled short-acting bronchodilator as necessary; down-titrate respiratory support from bipap to nasal cannula as possible -In the emergency department, the patient was noted to have an acute desaturation to 86% while managed on 4L nasal cannula (saturating at 100% previously while being monitored in the ED on 4L nasal cannula)   -Etiology of acute respiratory failure with hypoxia most likely is respiratory muscle collapse, which may represent insufficient respiratory muscle reserve in the setting of such great carbon dioxide retention  -Etiology of acute exacerbation of COPD uncertain; no documentation in [place] of acute viral prodrome; no changes in medication management; no evidence of missed doses   -Of note, the patient was previously saturating at 100% on 4L nasal cannula; may also represent Haldane effect, and sudden loss of respiratory drive   -Arterial blood gas demonstrates marginally less carbon dioxide retention   -Will manage as COPD exacerbation; will administer Xopenex q8 hours  -Will administer 40 mg of intravenous methylprednisolone every eight hours (and space as needed)   -Will administer five days of azithromycin (day 3/5)   -Will manage with bilevel positive airway pressure (currently at inspiratory pressure of 14 and expiratory pressure of 5), titrate oxygen saturation on pulse oximetry to 88-92%  -Will administer home doses of LAMA, LABA, and inhaled corticosteroid   support from bipap to nasal cannula as possible

## 2021-01-02 NOTE — PROGRESS NOTE ADULT - ATTENDING COMMENTS
Pt seen and examined. Admitted for acute hypoxic/hypercapnic respiratory failure 2/2 COPD exacerbation. Also with LE weakness, r/o myelitis vs. ICU-neuropathy/myopathy. Now sating 98% on 2L ; on intremitetnt Bipap. continue solumedrol 40mg q8hr and taper as tolerated.  w/up LE weakness in progress ; s/p LP ; fluid analysis HSV2+ ; pt started on Acyclovir. ID consulted. CSF fluid also shows elevated protein, elevated neutrophil count , decreased lymphocyte count ?albuminocytologic dissociation ?Guillame Eas[place] syndrome.  Neurology following. PT as tolerated.

## 2021-01-02 NOTE — PROGRESS NOTE ADULT - ASSESSMENT
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

## 2021-01-02 NOTE — PROGRESS NOTE ADULT - PROBLEM SELECTOR PLAN 3
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 hiv, lyme, tb, ebv, hep b/c), inflammatory etiology (acute demyelating inflammatory polyneuropathy/gbs), toxin-induced (b12, folate, copper deficiency; lead poisoning), para-neoplastic syndrome, syringomyelia, spinal infarction   -Motor deficits only noted on neurologic exam (sensory exam intact per limited exam); this is most consistent with diagnosis of anterior spinal cord infarction (less likely as no bowel or bladder incontinence) or guillan-barre syndrome; less consistent with infectious or toxic etiologies, which would likely affect peripheral nerve and cause sensory deficits in addition   -B12, folate, and copper titers normal; mri brain normal; mri lumbar spine demonstrates t2 enhancement of conus medullaris    -Lumbar puncture today does not demonstrate elevated protein level; may require corticosteroid administration for rule-out transverse myelitis   -Also possible that weakness could be explained by ICU-associated deconditioning; however, would not explain lack of evident upper extremity weakness 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 hiv, lyme, tb, ebv, hep b/c), inflammatory etiology (acute demyelating inflammatory polyneuropathy/gbs), toxin-induced (b12, folate, copper deficiency; lead poisoning), para-neoplastic syndrome, syringomyelia, spinal infarction   -Motor deficits only noted on neurologic exam (sensory exam intact per limited exam); this is most consistent with diagnosis of anterior spinal cord infarction (less likely as no bowel or bladder incontinence) or guillan-barre syndrome; less consistent with infectious or toxic etiologies, which would likely affect peripheral nerve and cause sensory deficits in addition   -B12, folate, and copper titers normal; mri brain normal; mri lumbar spine demonstrates t2 enhancement of conus medullaris    -Lumbar puncture demonstrates mildly elevated protein level (about 50); bloody tap, first bottle demonstrates cell count of 1, less than 1 on final bottle; 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; treating with acyclovir in case of viral transverse myelitis  -Per neurology, csf findings are inconsistent with guillane-barre; will not treat with intravenous immunoglobulin at this time; asymmetric weakness between upper and lower extremities less consistent with weakness of deconditioning

## 2021-01-02 NOTE — PROGRESS NOTE ADULT - SUBJECTIVE AND OBJECTIVE BOX
Steven Tse [id number]  [phone]    INTERVAL HPI/OVERNIGHT EVENTS:    SUBJECTIVE: Patient seen and examined at bedside.     CONSTITUTIONAL: No weakness, fevers or chills  EYES/ENT: No visual changes;  No vertigo or throat pain   NECK: No pain or stiffness  RESPIRATORY: No cough, wheezing, hemoptysis; No shortness of breath  CARDIOVASCULAR: No chest pain or palpitations  GASTROINTESTINAL: No abdominal or epigastric pain. No nausea, vomiting, or hematemesis; No diarrhea or constipation. No melena or hematochezia.  GENITOURINARY: No dysuria, frequency or hematuria  NEUROLOGICAL: No numbness or weakness  SKIN: No itching, rashes    OBJECTIVE:    VITAL SIGNS:  T(C): 37 (02 Jan 2021 05:45), Max: 37 (02 Jan 2021 05:45)  T(F): 98.6 (02 Jan 2021 05:45), Max: 98.6 (02 Jan 2021 05:45)  HR: 126 (02 Jan 2021 05:45) (102 - 126)  BP: 144/62 (02 Jan 2021 05:45) (127/80 - 150/77)  BP(mean): --  ABP: --  ABP(mean): --  RR: 18 (02 Jan 2021 05:45) (18 - 18)  SpO2: 99% (02 Jan 2021 05:45) (96% - 99%)        01-01 @ 07:01  -  01-02 @ 07:00  --------------------------------------------------------  IN: 240 mL / OUT: 350 mL / NET: -110 mL      CAPILLARY BLOOD GLUCOSE      POCT Blood Glucose.: 188 mg/dL (02 Jan 2021 06:05)      PHYSICAL EXAM:    General: NAD  HEENT: NC/AT; PERRL, clear conjunctiva  Neck: supple  Respiratory: CTA b/l  Cardiovascular: +S1/S2; RRR  Abdomen: soft, NT/ND; +BS x4  Extremities: WWP, 2+ peripheral pulses b/l; no LE edema  Skin: normal color and turgor; no rash  Neurological:    MEDICATIONS:  MEDICATIONS  (STANDING):  acyclovir IVPB 600 milliGRAM(s) IV Intermittent every 8 hours  amLODIPine   Tablet 5 milliGRAM(s) Oral daily  azithromycin  IVPB 500 milliGRAM(s) IV Intermittent every 24 hours  budesonide  80 MICROgram(s)/formoterol 4.5 MICROgram(s) Inhaler 2 Puff(s) Inhalation two times a day  dextrose 40% Gel 15 Gram(s) Oral once  dextrose 5%. 1000 milliLiter(s) (50 mL/Hr) IV Continuous <Continuous>  dextrose 5%. 1000 milliLiter(s) (100 mL/Hr) IV Continuous <Continuous>  dextrose 50% Injectable 25 Gram(s) IV Push once  dextrose 50% Injectable 12.5 Gram(s) IV Push once  dextrose 50% Injectable 25 Gram(s) IV Push once  glucagon  Injectable 1 milliGRAM(s) IntraMuscular once  insulin lispro (ADMELOG) corrective regimen sliding scale   SubCutaneous every 6 hours  isosorbide   dinitrate Tablet (ISORDIL) 10 milliGRAM(s) Oral two times a day  levalbuterol Inhalation 0.63 milliGRAM(s) Inhalation three times a day  methylPREDNISolone sodium succinate Injectable 40 milliGRAM(s) IV Push every 8 hours  pantoprazole  Injectable 40 milliGRAM(s) IV Push daily  tiotropium 18 MICROgram(s) Capsule 1 Capsule(s) Inhalation daily    MEDICATIONS  (PRN):      ALLERGIES:  Allergies    albuterol (Other)    Intolerances        LABS:                        8.2    19.93 )-----------( 391      ( 01 Jan 2021 05:42 )             26.8     01-01    140  |  96  |  32<H>  ----------------------------<  126<H>  4.9   |  43<H>  |  0.45<L>    Ca    9.5      01 Jan 2021 05:42  Phos  2.3     01-01  Mg     1.9     01-01    TPro  5.0<L>  /  Alb  x   /  TBili  x   /  DBili  x   /  AST  x   /  ALT  x   /  AlkPhos  x   01-01          RADIOLOGY & ADDITIONAL TESTS: Reviewed. 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     SUBJECTIVE: Patient seen and examined at bedside.     CONSTITUTIONAL: No weakness, fevers or chills  EYES/ENT: No visual changes, no blurry vision, no double vision; no dizziness  NECK: No pain, no stiffness  RESPIRATORY: No cough; difficulty with breathing noted while on bipap   CARDIOVASCULAR: No chest pain, no palpitations  GASTROINTESTINAL: No abdominal or epigastric pain; no nausea, no vomiting; cannot recall last bowel movement   GENITOURINARY: No dysuria, frequency or hematuria  NEUROLOGICAL: No numbness, bilateral lower extremity weakness noted  SKIN: No itching, no rashes    OBJECTIVE:    VITAL SIGNS:  T(C): 37 (02 Jan 2021 05:45), Max: 37 (02 Jan 2021 05:45)  T(F): 98.6 (02 Jan 2021 05:45), Max: 98.6 (02 Jan 2021 05:45)  HR: 126 (02 Jan 2021 05:45) (102 - 126)  BP: 144/62 (02 Jan 2021 05:45) (127/80 - 150/77)  BP(mean): --  ABP: --  ABP(mean): --  RR: 18 (02 Jan 2021 05:45) (18 - 18)  SpO2: 99% (02 Jan 2021 05:45) (96% - 99%)        01-01 @ 07:01  -  01-02 @ 07:00  --------------------------------------------------------  IN: 240 mL / OUT: 350 mL / NET: -110 mL      CAPILLARY BLOOD GLUCOSE      POCT Blood Glucose.: 188 mg/dL (02 Jan 2021 06:05)      PHYSICAL EXAM:    General: NAD; awake and alert; bipap in place   HEENT: PERRL grossly, clear conjunctiva  Neck: No jvd, no lymphadenopathy   Respiratory: CTA b/l; no rales, no wheezes; equal respiratory excursion on bipap   Cardiovascular: +S1/S2; RRR  Abdomen: soft, NT/ND; +BS x4  Extremities: WWP, 2+ peripheral pulses b/l; no LE edema  Skin: normal color and turgor; no rash  Neurological: Bilateral lower extremity weakness (0/5) noted; sensation intact grossly    MEDICATIONS:  MEDICATIONS  (STANDING):  acyclovir IVPB 600 milliGRAM(s) IV Intermittent every 8 hours  amLODIPine   Tablet 5 milliGRAM(s) Oral daily  azithromycin  IVPB 500 milliGRAM(s) IV Intermittent every 24 hours  budesonide  80 MICROgram(s)/formoterol 4.5 MICROgram(s) Inhaler 2 Puff(s) Inhalation two times a day  dextrose 40% Gel 15 Gram(s) Oral once  dextrose 5%. 1000 milliLiter(s) (50 mL/Hr) IV Continuous <Continuous>  dextrose 5%. 1000 milliLiter(s) (100 mL/Hr) IV Continuous <Continuous>  dextrose 50% Injectable 25 Gram(s) IV Push once  dextrose 50% Injectable 12.5 Gram(s) IV Push once  dextrose 50% Injectable 25 Gram(s) IV Push once  glucagon  Injectable 1 milliGRAM(s) IntraMuscular once  insulin lispro (ADMELOG) corrective regimen sliding scale   SubCutaneous every 6 hours  isosorbide   dinitrate Tablet (ISORDIL) 10 milliGRAM(s) Oral two times a day  levalbuterol Inhalation 0.63 milliGRAM(s) Inhalation three times a day  methylPREDNISolone sodium succinate Injectable 40 milliGRAM(s) IV Push every 8 hours  pantoprazole  Injectable 40 milliGRAM(s) IV Push daily  tiotropium 18 MICROgram(s) Capsule 1 Capsule(s) Inhalation daily    MEDICATIONS  (PRN):      ALLERGIES:  Allergies    albuterol (Other)    Intolerances        LABS:                        8.2    19.93 )-----------( 391      ( 01 Jan 2021 05:42 )             26.8     01-01    140  |  96  |  32<H>  ----------------------------<  126<H>  4.9   |  43<H>  |  0.45<L>    Ca    9.5      01 Jan 2021 05:42  Phos  2.3     01-01  Mg     1.9     01-01    TPro  5.0<L>  /  Alb  x   /  TBili  x   /  DBili  x   /  AST  x   /  ALT  x   /  AlkPhos  x   01-01          RADIOLOGY & ADDITIONAL TESTS: Reviewed.

## 2021-01-02 NOTE — PROGRESS NOTE ADULT - PROBLEM SELECTOR PLAN 2
-Etiology of acute respiratory failure with hypoxia most likely is respiratory muscle collapse, which may represent insufficient respiratory muscle reserve in setting of great degree of carbon dioxide retention; may also represent loss of respiratory drive in setting of oxygen saturations of 100% in ED   -Managing acute exacerbation of COPD as above; titrate oxygen saturation on pulse oximetry to 88-92%

## 2021-01-02 NOTE — CONSULT NOTE ADULT - ATTENDING COMMENTS
Flaccid paralysis with some preserved sensation, no UMN signs, with unclear onset. MRI shows T2 signal within the conus without any compressive etiology. Of note, there are no T2 axial sequences within the lower thoracic spine. Possible cord infarct or transverse myelitis. No surgical intervention.
I personally interviewed, examined, and participated in the care of this patient, on rounds 12/29/20 with the neurology consult service team.  Reviewed findings and management plan with the team.  History as reported above.      On examination I note in particular, or in addition or instead:    On high flow O2 via face mask.  Weak voice.  Profound shoulder girdle muscle wasting.  Marked weakness UEs without grossly evident laterality.  Trace wiggling of L toes, otherwise no evident LE voluntary movements.      Reflex                           Right    Left   Comment    Scapulohumeral               0         0  Pectoralis                        1+       3 low amplitude  Biceps                             2         2  Triceps                            0         1  Vizcarra                    absent   absent  Patellar                           0          0  Gastroc                          0          0  Plantar                        mute    mute     Does not differentiate P and LT on the R side from T10 level down; does not differentiate on the L side from T11 or T12 downward (bandage covers T11 dermatomal area).      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  The conus lesion noted on MRI does not account for the UE findings.      RECOMMENDATIONS    As above with in addition:    Folate, homocysteine, TSH, SPEP.  C-spine MR wo/w contrast.
75 F with COPD who had recent admission for acute on chronic hypoxemic / hypercapnic respiratory failure who is here for bilateral LE weakness. Found to be altered with acute on chronic hypercapnic respiratory failure. Improved with use of BiPAP. Seen in ED. Settings on BiPAP modified to augment ventilation. Patient is lethargic but mentating. pCO2 continues to improve. Would continue use of BiPAP, particularly at night. Check serial ABG. Agree with steroids for treatment of acute exacerbation of COPD. No need for ICU at this time.
[place]  Pager: [phone]. If no response or past 5 pm call [phone].

## 2021-01-02 NOTE — CONSULT NOTE ADULT - ASSESSMENT
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    HSV-2 meningoencephalitis?  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      HSV-2 meningoencephalitis, abnormal MRI   -CSF cell count with only 1 nucleated cell is unusual, ( due to steroids?) however pt has MRI findings and neurologic symptoms, would treat HSV with 700 mg q 8  -c/w hydration to prevent crystaluria if feasible   - neuro following, does HSV explain the finding on the spine MRI     Hypoxemic respiratory failure  -CXR appreciated, would obtain CT Chest  -while leukocytosis is likely related to steroids, wound send procalcitonin   -trend CBC, if rising leucocytosis consider repeat blood cx

## 2021-01-03 ENCOUNTER — TRANSCRIPTION ENCOUNTER (OUTPATIENT)
Age: 76
End: 2021-01-03

## 2021-01-03 LAB
A-TOCOPHEROL VIT E SERPL-MCNC: 18.9 MG/L — SIGNIFICANT CHANGE UP (ref 9–29)
ALBUMIN SERPL ELPH-MCNC: 3 G/DL — LOW (ref 3.3–5)
ALP SERPL-CCNC: 75 U/L — SIGNIFICANT CHANGE UP (ref 40–120)
ALT FLD-CCNC: 33 U/L — SIGNIFICANT CHANGE UP (ref 10–45)
ANION GAP SERPL CALC-SCNC: 7 MMOL/L — SIGNIFICANT CHANGE UP (ref 5–17)
AST SERPL-CCNC: 24 U/L — SIGNIFICANT CHANGE UP (ref 10–40)
BETA+GAMMA TOCOPHEROL SERPL-MCNC: 0.4 MG/L — LOW (ref 0.5–4.9)
BILIRUB SERPL-MCNC: 0.3 MG/DL — SIGNIFICANT CHANGE UP (ref 0.2–1.2)
BUN SERPL-MCNC: 30 MG/DL — HIGH (ref 7–23)
CALCIUM SERPL-MCNC: 9.2 MG/DL — SIGNIFICANT CHANGE UP (ref 8.4–10.5)
CHLORIDE SERPL-SCNC: 92 MMOL/L — LOW (ref 96–108)
CO2 SERPL-SCNC: 40 MMOL/L — HIGH (ref 22–31)
CREAT SERPL-MCNC: 0.45 MG/DL — LOW (ref 0.5–1.3)
CULTURE RESULTS: SIGNIFICANT CHANGE UP
CULTURE RESULTS: SIGNIFICANT CHANGE UP
GAS PNL BLDV: SIGNIFICANT CHANGE UP
GLUCOSE BLDC GLUCOMTR-MCNC: 159 MG/DL — HIGH (ref 70–99)
GLUCOSE BLDC GLUCOMTR-MCNC: 177 MG/DL — HIGH (ref 70–99)
GLUCOSE BLDC GLUCOMTR-MCNC: 179 MG/DL — HIGH (ref 70–99)
GLUCOSE BLDC GLUCOMTR-MCNC: 193 MG/DL — HIGH (ref 70–99)
GLUCOSE SERPL-MCNC: 123 MG/DL — HIGH (ref 70–99)
HCT VFR BLD CALC: 25.8 % — LOW (ref 34.5–45)
HGB BLD-MCNC: 8 G/DL — LOW (ref 11.5–15.5)
MAGNESIUM SERPL-MCNC: 2 MG/DL — SIGNIFICANT CHANGE UP (ref 1.6–2.6)
MCHC RBC-ENTMCNC: 30.4 PG — SIGNIFICANT CHANGE UP (ref 27–34)
MCHC RBC-ENTMCNC: 31 GM/DL — LOW (ref 32–36)
MCV RBC AUTO: 98.1 FL — SIGNIFICANT CHANGE UP (ref 80–100)
NRBC # BLD: 0 /100 WBCS — SIGNIFICANT CHANGE UP (ref 0–0)
PHOSPHATE SERPL-MCNC: 3.3 MG/DL — SIGNIFICANT CHANGE UP (ref 2.5–4.5)
PLATELET # BLD AUTO: 367 K/UL — SIGNIFICANT CHANGE UP (ref 150–400)
POTASSIUM SERPL-MCNC: 4.9 MMOL/L — SIGNIFICANT CHANGE UP (ref 3.5–5.3)
POTASSIUM SERPL-SCNC: 4.9 MMOL/L — SIGNIFICANT CHANGE UP (ref 3.5–5.3)
PROCALCITONIN SERPL-MCNC: 0.08 NG/ML — SIGNIFICANT CHANGE UP (ref 0.02–0.1)
PROT SERPL-MCNC: 5.3 G/DL — LOW (ref 6–8.3)
RBC # BLD: 2.63 M/UL — LOW (ref 3.8–5.2)
RBC # FLD: 13.7 % — SIGNIFICANT CHANGE UP (ref 10.3–14.5)
SODIUM SERPL-SCNC: 139 MMOL/L — SIGNIFICANT CHANGE UP (ref 135–145)
SPECIMEN SOURCE: SIGNIFICANT CHANGE UP
SPECIMEN SOURCE: SIGNIFICANT CHANGE UP
WBC # BLD: 21.75 K/UL — HIGH (ref 3.8–10.5)
WBC # FLD AUTO: 21.75 K/UL — HIGH (ref 3.8–10.5)

## 2021-01-03 PROCEDURE — 99233 SBSQ HOSP IP/OBS HIGH 50: CPT | Mod: GC

## 2021-01-03 PROCEDURE — 99233 SBSQ HOSP IP/OBS HIGH 50: CPT

## 2021-01-03 PROCEDURE — 71250 CT THORAX DX C-: CPT | Mod: 26

## 2021-01-03 PROCEDURE — 99232 SBSQ HOSP IP/OBS MODERATE 35: CPT

## 2021-01-03 RX ORDER — ACYCLOVIR SODIUM 500 MG
700 VIAL (EA) INTRAVENOUS EVERY 8 HOURS
Refills: 0 | Status: COMPLETED | OUTPATIENT
Start: 2021-01-03 | End: 2021-01-14

## 2021-01-03 RX ORDER — SODIUM CHLORIDE 9 MG/ML
1000 INJECTION, SOLUTION INTRAVENOUS
Refills: 0 | Status: DISCONTINUED | OUTPATIENT
Start: 2021-01-03 | End: 2021-01-07

## 2021-01-03 RX ADMIN — Medication 112 MILLIGRAM(S): at 13:43

## 2021-01-03 RX ADMIN — Medication 1: at 11:37

## 2021-01-03 RX ADMIN — LEVALBUTEROL 0.63 MILLIGRAM(S): 1.25 SOLUTION, CONCENTRATE RESPIRATORY (INHALATION) at 21:27

## 2021-01-03 RX ADMIN — Medication 112 MILLIGRAM(S): at 05:49

## 2021-01-03 RX ADMIN — AMLODIPINE BESYLATE 5 MILLIGRAM(S): 2.5 TABLET ORAL at 05:50

## 2021-01-03 RX ADMIN — LEVALBUTEROL 0.63 MILLIGRAM(S): 1.25 SOLUTION, CONCENTRATE RESPIRATORY (INHALATION) at 05:50

## 2021-01-03 RX ADMIN — PANTOPRAZOLE SODIUM 40 MILLIGRAM(S): 20 TABLET, DELAYED RELEASE ORAL at 11:36

## 2021-01-03 RX ADMIN — Medication 264 MILLIGRAM(S): at 21:27

## 2021-01-03 RX ADMIN — ISOSORBIDE DINITRATE 10 MILLIGRAM(S): 5 TABLET ORAL at 16:57

## 2021-01-03 RX ADMIN — Medication 40 MILLIGRAM(S): at 13:43

## 2021-01-03 RX ADMIN — SODIUM CHLORIDE 75 MILLILITER(S): 9 INJECTION, SOLUTION INTRAVENOUS at 16:57

## 2021-01-03 RX ADMIN — Medication 1: at 07:57

## 2021-01-03 RX ADMIN — TIOTROPIUM BROMIDE 1 CAPSULE(S): 18 CAPSULE ORAL; RESPIRATORY (INHALATION) at 11:36

## 2021-01-03 RX ADMIN — BUDESONIDE AND FORMOTEROL FUMARATE DIHYDRATE 2 PUFF(S): 160; 4.5 AEROSOL RESPIRATORY (INHALATION) at 16:56

## 2021-01-03 RX ADMIN — BUDESONIDE AND FORMOTEROL FUMARATE DIHYDRATE 2 PUFF(S): 160; 4.5 AEROSOL RESPIRATORY (INHALATION) at 05:50

## 2021-01-03 RX ADMIN — LEVALBUTEROL 0.63 MILLIGRAM(S): 1.25 SOLUTION, CONCENTRATE RESPIRATORY (INHALATION) at 13:43

## 2021-01-03 RX ADMIN — Medication 40 MILLIGRAM(S): at 21:27

## 2021-01-03 RX ADMIN — Medication 40 MILLIGRAM(S): at 05:49

## 2021-01-03 RX ADMIN — ISOSORBIDE DINITRATE 10 MILLIGRAM(S): 5 TABLET ORAL at 05:50

## 2021-01-03 RX ADMIN — Medication 1: at 16:56

## 2021-01-03 NOTE — PROGRESS NOTE ADULT - ASSESSMENT
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      HSV-2 meningoencephalitis, abnormal MRI   -CSF cell count with only 1 nucleated cell is unusual, ( due to steroids?) however pt has MRI findings and neurologic symptoms, would treat HSV with 700 mg q 8  -c/w hydration to prevent crystaluria if feasible   - neuro following, called to follow up, myelitis due to HSV 2 is very rare and one of the main stay of treatment is steroids.     Hypoxemic respiratory failure  -CXR appreciated, CT Chest performed pending read   -leukocytosis is likely related to steroids, procalcitonin normal   -trend CBC, if rising leucocytosis consider repeat blood cx

## 2021-01-03 NOTE — PROGRESS NOTE ADULT - SUBJECTIVE AND OBJECTIVE BOX
75y old  Female who presents with a chief complaint of acute respiratory failure (03 Jan 2021 06:56)      Interval history:  Afebrile, some cough, still feels SOB, on NC 4 L, no abdominal pain, unable to move lower extremities.       Allergies:   albuterol (Other)      Antimicrobials:  acyclovir IVPB 600 milliGRAM(s) IV Intermittent every 8 hours      REVIEW OF SYSTEMS:  No chest pain  No N/V  No dysuria  No rash.       Vital Signs Last 24 Hrs  T(C): 37.1 (01-03-21 @ 11:47), Max: 37.4 (01-02-21 @ 20:16)  T(F): 98.8 (01-03-21 @ 11:47), Max: 99.4 (01-02-21 @ 20:16)  HR: 112 (01-03-21 @ 11:47) (94 - 125)  BP: 117/72 (01-03-21 @ 11:47) (117/72 - 152/84)  BP(mean): --  RR: 18 (01-03-21 @ 11:47) (18 - 18)  SpO2: 100% (01-03-21 @ 11:47) (97% - 100%)    PHYSICAL EXAM:  Patient in no acute distress. Alert, awake   Cardiovascular: S1S2 normal, tachy   Lungs: poor air entry B/L lung fields.  Gastrointestinal: soft, nontender, nondistended.  Extremities: no edema.  no obvious genital ulcers   IV sites not inflamed.                           8.0    21.75 )-----------( 367      ( 03 Jan 2021 07:09 )             25.8   01-03    139  |  92<L>  |  30<H>  ----------------------------<  123<H>  4.9   |  40<H>  |  0.45<L>    Ca    9.2      03 Jan 2021 07:09  Phos  3.3     01-03  Mg     2.0     01-03    TPro  5.3<L>  /  Alb  3.0<L>  /  TBili  0.3  /  DBili  x   /  AST  24  /  ALT  33  /  AlkPhos  75  01-03      LIVER FUNCTIONS - ( 03 Jan 2021 07:09 )  Alb: 3.0 g/dL / Pro: 5.3 g/dL / ALK PHOS: 75 U/L / ALT: 33 U/L / AST: 24 U/L / GGT: x               Culture - Acid Fast - CSF (collected 01 Jan 2021 16:16)  Source: .CSF CSF    Culture - CSF with Gram Stain (collected 01 Jan 2021 16:16)  Source: .CSF CSF  Gram Stain (01 Jan 2021 17:25):    polymorphonuclear leukocytes seen per low power field    No organisms seen per oil power field    by cytocentrifuge  Preliminary Report (03 Jan 2021 07:19):    No growth

## 2021-01-03 NOTE — DISCHARGE NOTE PROVIDER - NSDCCPCAREPLAN_GEN_ALL_CORE_FT
PRINCIPAL DISCHARGE DIAGNOSIS  Diagnosis: COPD exacerbation  Assessment and Plan of Treatment: When you first came to the hospital, we noticed that you were having a lot of difficulty breathing. We think that this difficutly breathing was most likely because of a new worsening of your chornic lung disease, which you have from years of smoking. We are not sure exactly what caused your lung problem to get worse all-of-a-sudden when you came to our hospital. We wonder if it may have been because you had a virus or because you may have accidentally missed a few doses of your regularly scheduled medications. For that reason, we gave you a face mask, which supplied pressure to your airway, to try to keep your airways open to help you breathe better. We also gave you some additional medications to try to open up your airways. At this time _____      SECONDARY DISCHARGE DIAGNOSES  Diagnosis: Weakness of both lower extremities  Assessment and Plan of Treatment: When you first came to the hospital, you were sent from another hospital because they noticed that you had new weakness in your legs. Your doctors were unsure what might have been causing your weakness. To try to figure out what may have been causing your weakness, we did a procedure called a lumbar puncture, where we sampled the fluid surrounding your spinal cord. We found evidence in that fluid that you may have an infection. This infection may have been causing you to have weakness in your legs. For that reason, we treated your infection with an anti-viral medication called acyclovir. At this time _____     PRINCIPAL DISCHARGE DIAGNOSIS  Diagnosis: COPD exacerbation  Assessment and Plan of Treatment: When you first came to the hospital, we noticed that you were having a lot of difficulty breathing. We think that this difficutly breathing was most likely because of a new worsening of your chornic lung disease, which you have from years of smoking. We are not sure exactly what caused your lung problem to get worse all-of-a-sudden when you came to our hospital. We wonder if it may have been because you had a virus or because you may have accidentally missed a few doses of your regularly scheduled medications. For that reason, we gave you a face mask, which supplied pressure to your airway, to try to keep your airways open to help you breathe better. We also gave you some additional medications to try to open up your airways. At this time you will require the BiPAP overnight and nasal cannula during the day. Please continue to take your medications as prescribed. If you have any worsening shortness of breath please return to the ED. Otherwise please continue to follow regularly with your PCP.      SECONDARY DISCHARGE DIAGNOSES  Diagnosis: Weakness of both lower extremities  Assessment and Plan of Treatment: When you first came to the hospital, you were sent from another hospital because they noticed that you had new weakness in your legs. Your doctors were unsure what might have been causing your weakness. To try to figure out what may have been causing your weakness, we did a procedure called a lumbar puncture, where we sampled the fluid surrounding your spinal cord. We found evidence in that fluid that you may have an infection. This infection may have been causing you to have weakness in your legs. For that reason, we treated your infection with an anti-viral medication called acyclovir. At this time the weakness of your legs hasn't improved despite treatment and extensive workup. We recommend you go to rehab to try to recover some function and follow up with neurology outpatient.     PRINCIPAL DISCHARGE DIAGNOSIS  Diagnosis: COPD exacerbation  Assessment and Plan of Treatment: When you first came to the hospital, we noticed that you were having a lot of difficulty breathing. We think that this difficutly breathing was most likely because of a new worsening of your chornic lung disease, which you have from years of smoking. You were treated with steroids for a COPD exacerbation. You required bipap at night and intermittent as needed during the AM hours for work of breathing related to severe COPD (Settings: 16/5, FIO2 30% back up rate 15 bpm). Please continue to take your medications as prescribed. If you have any worsening shortness of breath please return to the ED. Otherwise please continue to follow regularly with your PCP.      SECONDARY DISCHARGE DIAGNOSES  Diagnosis: Weakness of both lower extremities  Assessment and Plan of Treatment: When you first came to the hospital, you were sent from another hospital because they noticed that you had new weakness in your legs. Your doctors were unsure what might have been causing your weakness. To try to figure out what may have been causing your weakness, we did a procedure called a lumbar puncture, where we sampled the fluid surrounding your spinal cord. We found evidence in that fluid that you may have an infection. This infection may have been causing you to have weakness in your legs. For that reason, we treated your infection with an anti-viral medication called acyclovir. At this time the weakness of your legs hasn't improved despite treatment and extensive workup. We recommend you go to rehab to try to recover some function and follow up with neurology outpatient.     PRINCIPAL DISCHARGE DIAGNOSIS  Diagnosis: COPD exacerbation  Assessment and Plan of Treatment: When you first came to the hospital, we noticed that you were having a lot of difficulty breathing. We think that this difficutly breathing was most likely because of a new worsening of your chornic lung disease, which you have from years of smoking. You were treated with steroids for a COPD exacerbation. You required bipap at night and intermittent as needed during the AM hours for work of breathing related to severe COPD (Trial 1 hr of bipap for tachypena >30 breaths per minute PRN and qhs from 10 pm to 8am daily; Settings: 16/5, FIO2 30% back up rate 15 bpm). Please continue to take your medications as prescribed. If you have any worsening shortness of breath please return to the ED. Otherwise please continue to follow regularly with your PCP.      SECONDARY DISCHARGE DIAGNOSES  Diagnosis: Weakness of both lower extremities  Assessment and Plan of Treatment: When you first came to the hospital, you were sent from another hospital because they noticed that you had new weakness in your legs. Your doctors were unsure what might have been causing your weakness. To try to figure out what may have been causing your weakness, we did a procedure called a lumbar puncture, where we sampled the fluid surrounding your spinal cord. We found evidence in that fluid that you may have an infection. This infection may have been causing you to have weakness in your legs. For that reason, we treated your infection with an anti-viral medication called acyclovir. At this time the weakness of your legs hasn't improved despite treatment and extensive workup. We recommend you go to rehab to try to recover some function and follow up with neurology outpatient.     PRINCIPAL DISCHARGE DIAGNOSIS  Diagnosis: COPD exacerbation  Assessment and Plan of Treatment: When you first came to the hospital, we noticed that you were having a lot of difficulty breathing. We think that this difficutly breathing was most likely because of a new worsening of your chornic lung disease, which you have from years of smoking. You were treated with steroids for a COPD exacerbation. You required bipap at night and intermittent as needed during the AM hours for work of breathing related to severe COPD (Trial 1 hr of bipap for tachypena >30 breaths per minute PRN and qhs from 10 pm to 8am daily; Settings: 16/5, FIO2 30% back up rate 15 bpm). Please continue to take your medications as prescribed, this included prednisone 40 mg for two additonal days (Stop 1/25/2020). If you have any worsening shortness of breath please return to the ED. Otherwise please continue to follow regularly with your PCP.      SECONDARY DISCHARGE DIAGNOSES  Diagnosis: Weakness of both lower extremities  Assessment and Plan of Treatment: When you first came to the hospital, you were sent from another hospital because they noticed that you had new weakness in your legs. Your doctors were unsure what might have been causing your weakness. To try to figure out what may have been causing your weakness, we did a procedure called a lumbar puncture, where we sampled the fluid surrounding your spinal cord. We found evidence in that fluid that you may have an infection with HSV. This infection may have been causing you to have weakness in your legs. For that reason, we treated your infection with an anti-viral medication called acyclovir. At this time the weakness of your legs hasn't improved despite treatment and extensive workup. We recommend you go to rehab to try to recover some function and follow up with neurology outpatient.     PRINCIPAL DISCHARGE DIAGNOSIS  Diagnosis: COPD exacerbation  Assessment and Plan of Treatment: When you first came to the hospital, we noticed that you were having a lot of difficulty breathing. We think that this difficutly breathing was most likely because of a new worsening of your chornic lung disease, which you have from years of smoking. You were treated with steroids for a COPD exacerbation. You required bipap at night and intermittent as needed during the AM hours for work of breathing related to severe COPD (Trial 1 hr of bipap for tachypena >30 breaths per minute PRN and qhs from 10 pm to 8am daily; Settings: 16/5, FIO2 30% back up rate 15 bpm). Please continue to take your medications as prescribed, this included prednisone 40 mg for two additonal days (Stop 1/25/2020). If you have any worsening shortness of breath please return to the ED. Otherwise please continue to follow regularly with your PCP.  Please avoid over sedation on home xanax and take only as needed.      SECONDARY DISCHARGE DIAGNOSES  Diagnosis: Weakness of both lower extremities  Assessment and Plan of Treatment: When you first came to the hospital, you were sent from another hospital because they noticed that you had new weakness in your legs. Your doctors were unsure what might have been causing your weakness. To try to figure out what may have been causing your weakness, we did a procedure called a lumbar puncture, where we sampled the fluid surrounding your spinal cord. We found evidence in that fluid that you may have an infection with HSV. This infection may have been causing you to have weakness in your legs. For that reason, we treated your infection with an anti-viral medication called acyclovir. At this time the weakness of your legs hasn't improved despite treatment and extensive workup. We recommend you go to rehab to try to recover some function and follow up with neurology outpatient.

## 2021-01-03 NOTE — DISCHARGE NOTE PROVIDER - NSDCCAREPROVSEEN_GEN_ALL_CORE_FT
[facility] Team 2   [facility] Team 2    Attending: Dr. Escalante [facility] Team 2    Attending: Dr. Ledbetter

## 2021-01-03 NOTE — PROGRESS NOTE ADULT - ASSESSMENT
Assessment: 76 yo female with PMH ?COPD initially admitted at [place] who had been intubated for respiratory arrest, and upon extubation was noted to be flaccid in the legs with decreased sensation and transferred here for evaluation for possible spinal cord compression. Labs significant for WBC of 23, hemoglobin of 8.6, plt of 421, and bicarbonate of 39. Exam significant for sensory level at about T10 and 0/5 lower extremity strength, except for 1/5 at toes on left. MRI brain w/ and w/o showed symmetric globus pallidus and dentate nucleus enhancement. MR T/L spine w/ and w/o showed cord expansion from T8 through the conus and cauda equina nerve root enhancement. 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   - PT/OT  - ID recs appreciated   - Rest of management per primary team     Case discussed with neurology attending Dr. Melendrez  Assessment: 74 yo female with PMH ?COPD initially admitted at [place] who had been intubated for respiratory arrest, and upon extubation was noted to be flaccid in the legs with decreased sensation and transferred here for evaluation for possible spinal cord compression. Labs significant for WBC of 23, hemoglobin of 8.6, plt of 421, and bicarbonate of 39. Exam significant for sensory level at about T10 and 0/5 lower extremity strength, except for 1/5 at toes on left. MRI brain w/ and w/o showed symmetric globus pallidus and dentate nucleus enhancement. MR T/L spine w/ and w/o showed cord expansion from T8 through the conus and subtle cauda equina nerve root enhancement. 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   - PT/OT  - ID recs appreciated   - Rest of management per primary team     Case discussed with neurology attending Dr. Melendrez

## 2021-01-03 NOTE — DISCHARGE NOTE PROVIDER - DETAILS OF MALNUTRITION DIAGNOSIS/DIAGNOSES
This patient has been assessed with a concern for Malnutrition and was treated during this hospitalization for the following Nutrition diagnosis/diagnoses:     -  01/15/2021: Moderate protein-calorie malnutrition   This patient has been assessed with a concern for Malnutrition and was treated during this hospitalization for the following Nutrition diagnosis/diagnoses:     -  01/15/2021: Moderate protein-calorie malnutrition    This patient has been assessed with a concern for Malnutrition and was treated during this hospitalization for the following Nutrition diagnosis/diagnoses:     -  01/15/2021: Moderate protein-calorie malnutrition   This patient has been assessed with a concern for Malnutrition and was treated during this hospitalization for the following Nutrition diagnosis/diagnoses:     -  01/15/2021: Moderate protein-calorie malnutrition    This patient has been assessed with a concern for Malnutrition and was treated during this hospitalization for the following Nutrition diagnosis/diagnoses:     -  01/15/2021: Moderate protein-calorie malnutrition    This patient has been assessed with a concern for Malnutrition and was treated during this hospitalization for the following Nutrition diagnosis/diagnoses:     -  01/15/2021: Moderate protein-calorie malnutrition   This patient has been assessed with a concern for Malnutrition and was treated during this hospitalization for the following Nutrition diagnosis/diagnoses:     -  01/15/2021: Moderate protein-calorie malnutrition    This patient has been assessed with a concern for Malnutrition and was treated during this hospitalization for the following Nutrition diagnosis/diagnoses:     -  01/15/2021: Moderate protein-calorie malnutrition    This patient has been assessed with a concern for Malnutrition and was treated during this hospitalization for the following Nutrition diagnosis/diagnoses:     -  01/15/2021: Moderate protein-calorie malnutrition    This patient has been assessed with a concern for Malnutrition and was treated during this hospitalization for the following Nutrition diagnosis/diagnoses:     -  01/15/2021: Moderate protein-calorie malnutrition    This patient has been assessed with a concern for Malnutrition and was treated during this hospitalization for the following Nutrition diagnosis/diagnoses:     -  01/15/2021: Moderate protein-calorie malnutrition   This patient has been assessed with a concern for Malnutrition and was treated during this hospitalization for the following Nutrition diagnosis/diagnoses:     -  01/15/2021: Moderate protein-calorie malnutrition    This patient has been assessed with a concern for Malnutrition and was treated during this hospitalization for the following Nutrition diagnosis/diagnoses:     -  01/15/2021: Moderate protein-calorie malnutrition    This patient has been assessed with a concern for Malnutrition and was treated during this hospitalization for the following Nutrition diagnosis/diagnoses:     -  01/15/2021: Moderate protein-calorie malnutrition    This patient has been assessed with a concern for Malnutrition and was treated during this hospitalization for the following Nutrition diagnosis/diagnoses:     -  01/15/2021: Moderate protein-calorie malnutrition    This patient has been assessed with a concern for Malnutrition and was treated during this hospitalization for the following Nutrition diagnosis/diagnoses:     -  01/15/2021: Moderate protein-calorie malnutrition    This patient has been assessed with a concern for Malnutrition and was treated during this hospitalization for the following Nutrition diagnosis/diagnoses:     -  01/15/2021: Moderate protein-calorie malnutrition   This patient has been assessed with a concern for Malnutrition and was treated during this hospitalization for the following Nutrition diagnosis/diagnoses:     -  01/15/2021: Moderate protein-calorie malnutrition    This patient has been assessed with a concern for Malnutrition and was treated during this hospitalization for the following Nutrition diagnosis/diagnoses:     -  01/15/2021: Moderate protein-calorie malnutrition    This patient has been assessed with a concern for Malnutrition and was treated during this hospitalization for the following Nutrition diagnosis/diagnoses:     -  01/15/2021: Moderate protein-calorie malnutrition    This patient has been assessed with a concern for Malnutrition and was treated during this hospitalization for the following Nutrition diagnosis/diagnoses:     -  01/15/2021: Moderate protein-calorie malnutrition    This patient has been assessed with a concern for Malnutrition and was treated during this hospitalization for the following Nutrition diagnosis/diagnoses:     -  01/15/2021: Moderate protein-calorie malnutrition    This patient has been assessed with a concern for Malnutrition and was treated during this hospitalization for the following Nutrition diagnosis/diagnoses:     -  01/15/2021: Moderate protein-calorie malnutrition    This patient has been assessed with a concern for Malnutrition and was treated during this hospitalization for the following Nutrition diagnosis/diagnoses:     -  01/15/2021: Moderate protein-calorie malnutrition   This patient has been assessed with a concern for Malnutrition and was treated during this hospitalization for the following Nutrition diagnosis/diagnoses:     -  01/15/2021: Moderate protein-calorie malnutrition    This patient has been assessed with a concern for Malnutrition and was treated during this hospitalization for the following Nutrition diagnosis/diagnoses:     -  01/15/2021: Moderate protein-calorie malnutrition    This patient has been assessed with a concern for Malnutrition and was treated during this hospitalization for the following Nutrition diagnosis/diagnoses:     -  01/15/2021: Moderate protein-calorie malnutrition    This patient has been assessed with a concern for Malnutrition and was treated during this hospitalization for the following Nutrition diagnosis/diagnoses:     -  01/15/2021: Moderate protein-calorie malnutrition    This patient has been assessed with a concern for Malnutrition and was treated during this hospitalization for the following Nutrition diagnosis/diagnoses:     -  01/15/2021: Moderate protein-calorie malnutrition    This patient has been assessed with a concern for Malnutrition and was treated during this hospitalization for the following Nutrition diagnosis/diagnoses:     -  01/15/2021: Moderate protein-calorie malnutrition    This patient has been assessed with a concern for Malnutrition and was treated during this hospitalization for the following Nutrition diagnosis/diagnoses:     -  01/15/2021: Moderate protein-calorie malnutrition    This patient has been assessed with a concern for Malnutrition and was treated during this hospitalization for the following Nutrition diagnosis/diagnoses:     -  01/15/2021: Moderate protein-calorie malnutrition   This patient has been assessed with a concern for Malnutrition and was treated during this hospitalization for the following Nutrition diagnosis/diagnoses:     -  01/15/2021: Moderate protein-calorie malnutrition    This patient has been assessed with a concern for Malnutrition and was treated during this hospitalization for the following Nutrition diagnosis/diagnoses:     -  01/15/2021: Moderate protein-calorie malnutrition    This patient has been assessed with a concern for Malnutrition and was treated during this hospitalization for the following Nutrition diagnosis/diagnoses:     -  01/15/2021: Moderate protein-calorie malnutrition    This patient has been assessed with a concern for Malnutrition and was treated during this hospitalization for the following Nutrition diagnosis/diagnoses:     -  01/15/2021: Moderate protein-calorie malnutrition    This patient has been assessed with a concern for Malnutrition and was treated during this hospitalization for the following Nutrition diagnosis/diagnoses:     -  01/15/2021: Moderate protein-calorie malnutrition    This patient has been assessed with a concern for Malnutrition and was treated during this hospitalization for the following Nutrition diagnosis/diagnoses:     -  01/15/2021: Moderate protein-calorie malnutrition    This patient has been assessed with a concern for Malnutrition and was treated during this hospitalization for the following Nutrition diagnosis/diagnoses:     -  01/15/2021: Moderate protein-calorie malnutrition    This patient has been assessed with a concern for Malnutrition and was treated during this hospitalization for the following Nutrition diagnosis/diagnoses:     -  01/15/2021: Moderate protein-calorie malnutrition    This patient has been assessed with a concern for Malnutrition and was treated during this hospitalization for the following Nutrition diagnosis/diagnoses:     -  01/15/2021: Moderate protein-calorie malnutrition   This patient has been assessed with a concern for Malnutrition and was treated during this hospitalization for the following Nutrition diagnosis/diagnoses:     -  01/15/2021: Moderate protein-calorie malnutrition    This patient has been assessed with a concern for Malnutrition and was treated during this hospitalization for the following Nutrition diagnosis/diagnoses:     -  01/15/2021: Moderate protein-calorie malnutrition    This patient has been assessed with a concern for Malnutrition and was treated during this hospitalization for the following Nutrition diagnosis/diagnoses:     -  01/15/2021: Moderate protein-calorie malnutrition    This patient has been assessed with a concern for Malnutrition and was treated during this hospitalization for the following Nutrition diagnosis/diagnoses:     -  01/15/2021: Moderate protein-calorie malnutrition    This patient has been assessed with a concern for Malnutrition and was treated during this hospitalization for the following Nutrition diagnosis/diagnoses:     -  01/15/2021: Moderate protein-calorie malnutrition    This patient has been assessed with a concern for Malnutrition and was treated during this hospitalization for the following Nutrition diagnosis/diagnoses:     -  01/15/2021: Moderate protein-calorie malnutrition    This patient has been assessed with a concern for Malnutrition and was treated during this hospitalization for the following Nutrition diagnosis/diagnoses:     -  01/15/2021: Moderate protein-calorie malnutrition    This patient has been assessed with a concern for Malnutrition and was treated during this hospitalization for the following Nutrition diagnosis/diagnoses:     -  01/15/2021: Moderate protein-calorie malnutrition    This patient has been assessed with a concern for Malnutrition and was treated during this hospitalization for the following Nutrition diagnosis/diagnoses:     -  01/15/2021: Moderate protein-calorie malnutrition    This patient has been assessed with a concern for Malnutrition and was treated during this hospitalization for the following Nutrition diagnosis/diagnoses:     -  01/15/2021: Moderate protein-calorie malnutrition   This patient has been assessed with a concern for Malnutrition and was treated during this hospitalization for the following Nutrition diagnosis/diagnoses:     -  01/15/2021: Moderate protein-calorie malnutrition    This patient has been assessed with a concern for Malnutrition and was treated during this hospitalization for the following Nutrition diagnosis/diagnoses:     -  01/15/2021: Moderate protein-calorie malnutrition    This patient has been assessed with a concern for Malnutrition and was treated during this hospitalization for the following Nutrition diagnosis/diagnoses:     -  01/15/2021: Moderate protein-calorie malnutrition    This patient has been assessed with a concern for Malnutrition and was treated during this hospitalization for the following Nutrition diagnosis/diagnoses:     -  01/15/2021: Moderate protein-calorie malnutrition    This patient has been assessed with a concern for Malnutrition and was treated during this hospitalization for the following Nutrition diagnosis/diagnoses:     -  01/15/2021: Moderate protein-calorie malnutrition    This patient has been assessed with a concern for Malnutrition and was treated during this hospitalization for the following Nutrition diagnosis/diagnoses:     -  01/15/2021: Moderate protein-calorie malnutrition    This patient has been assessed with a concern for Malnutrition and was treated during this hospitalization for the following Nutrition diagnosis/diagnoses:     -  01/15/2021: Moderate protein-calorie malnutrition    This patient has been assessed with a concern for Malnutrition and was treated during this hospitalization for the following Nutrition diagnosis/diagnoses:     -  01/15/2021: Moderate protein-calorie malnutrition    This patient has been assessed with a concern for Malnutrition and was treated during this hospitalization for the following Nutrition diagnosis/diagnoses:     -  01/15/2021: Moderate protein-calorie malnutrition    This patient has been assessed with a concern for Malnutrition and was treated during this hospitalization for the following Nutrition diagnosis/diagnoses:     -  01/15/2021: Moderate protein-calorie malnutrition    This patient has been assessed with a concern for Malnutrition and was treated during this hospitalization for the following Nutrition diagnosis/diagnoses:     -  01/15/2021: Moderate protein-calorie malnutrition

## 2021-01-03 NOTE — PROGRESS NOTE ADULT - PROBLEM SELECTOR PLAN 3
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 hiv, lyme, tb, ebv, hep b/c), inflammatory etiology (acute demyelating inflammatory polyneuropathy/gbs), toxin-induced (b12, folate, copper deficiency; lead poisoning), para-neoplastic syndrome, syringomyelia, spinal infarction   -Motor deficits only noted on neurologic exam (sensory exam intact per limited exam); this is most consistent with diagnosis of anterior spinal cord infarction (less likely as no bowel or bladder incontinence) or guillan-barre syndrome; less consistent with infectious or toxic etiologies, which would likely affect peripheral nerve and cause sensory deficits in addition   -B12, folate, and copper titers normal; mri brain normal; mri lumbar spine demonstrates t2 enhancement of conus medullaris    -Lumbar puncture demonstrates mildly elevated protein level (about 50); bloody tap, first bottle demonstrates cell count of 1, less than 1 on final bottle; 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; treating with acyclovir in case of viral transverse myelitis  -Per neurology, csf findings are inconsistent with guillane-barre; will not treat with intravenous immunoglobulin at this time; asymmetric weakness between upper and lower extremities less consistent with weakness of deconditioning 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 hiv, lyme, tb, ebv, hep b/c), inflammatory etiology (acute demyelating inflammatory polyneuropathy/gbs), toxin-induced (b12, folate, copper deficiency; lead poisoning), para-neoplastic syndrome, syringomyelia, spinal infarction   -Motor deficits only noted on neurologic exam (sensory exam intact per limited exam); this is most consistent with diagnosis of anterior spinal cord infarction (less likely as no bowel or bladder incontinence and temperature sensation intact) or guillan-barre syndrome; less consistent with infectious or toxic etiologies, which would likely affect peripheral nerve and cause sensory deficits in addition   -B12, folate, and copper titers normal; mri brain normal; mri lumbar spine demonstrates t2 enhancement of conus medullaris    -Lumbar puncture demonstrates mildly elevated protein level (about 50); bloody tap, first bottle demonstrates cell count of 1, less than 1 on final bottle; 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

## 2021-01-03 NOTE — DISCHARGE NOTE PROVIDER - NSDCFUADDAPPT_GEN_ALL_CORE_FT
[facility] [place] @ [address] [place] [address], [address] [place]; [phone]     Please follow up with your PCP, neurology and PMR, 1-2 weeks after discharge

## 2021-01-03 NOTE — PROGRESS NOTE ADULT - SUBJECTIVE AND OBJECTIVE BOX
Steven Tse pgy1  [phone]    INTERVAL HPI/OVERNIGHT EVENTS:    SUBJECTIVE: Patient seen and examined at bedside.     CONSTITUTIONAL: No weakness, fevers or chills  EYES/ENT: No visual changes;  No vertigo or throat pain   NECK: No pain or stiffness  RESPIRATORY: No cough, wheezing, hemoptysis; No shortness of breath  CARDIOVASCULAR: No chest pain or palpitations  GASTROINTESTINAL: No abdominal or epigastric pain. No nausea, vomiting, or hematemesis; No diarrhea or constipation. No melena or hematochezia.  GENITOURINARY: No dysuria, frequency or hematuria  NEUROLOGICAL: No numbness or weakness  SKIN: No itching, rashes    OBJECTIVE:    VITAL SIGNS:  ICU Vital Signs Last 24 Hrs  T(C): 36.7 (03 Jan 2021 04:57), Max: 37.4 (02 Jan 2021 20:16)  T(F): 98 (03 Jan 2021 04:57), Max: 99.4 (02 Jan 2021 20:16)  HR: 97 (03 Jan 2021 05:35) (94 - 124)  BP: 119/75 (03 Jan 2021 04:57) (119/75 - 136/82)  BP(mean): --  ABP: --  ABP(mean): --  RR: 18 (03 Jan 2021 04:57) (18 - 22)  SpO2: 97% (03 Jan 2021 05:35) (96% - 99%)        01-01 @ 07:01  -  01-02 @ 07:00  --------------------------------------------------------  IN: 240 mL / OUT: 350 mL / NET: -110 mL      CAPILLARY BLOOD GLUCOSE      POCT Blood Glucose.: 142 mg/dL (02 Jan 2021 21:15)      PHYSICAL EXAM:    General: NAD  HEENT: NC/AT; PERRL, clear conjunctiva  Neck: supple  Respiratory: CTA b/l  Cardiovascular: +S1/S2; RRR  Abdomen: soft, NT/ND; +BS x4  Extremities: WWP, 2+ peripheral pulses b/l; no LE edema  Skin: normal color and turgor; no rash  Neurological:    MEDICATIONS:  MEDICATIONS  (STANDING):  acyclovir IVPB 600 milliGRAM(s) IV Intermittent every 8 hours  amLODIPine   Tablet 5 milliGRAM(s) Oral daily  budesonide  80 MICROgram(s)/formoterol 4.5 MICROgram(s) Inhaler 2 Puff(s) Inhalation two times a day  dextrose 40% Gel 15 Gram(s) Oral once  dextrose 5%. 1000 milliLiter(s) (50 mL/Hr) IV Continuous <Continuous>  dextrose 5%. 1000 milliLiter(s) (100 mL/Hr) IV Continuous <Continuous>  dextrose 50% Injectable 25 Gram(s) IV Push once  dextrose 50% Injectable 12.5 Gram(s) IV Push once  dextrose 50% Injectable 25 Gram(s) IV Push once  glucagon  Injectable 1 milliGRAM(s) IntraMuscular once  insulin lispro (ADMELOG) corrective regimen sliding scale   SubCutaneous three times a day before meals  insulin lispro (ADMELOG) corrective regimen sliding scale   SubCutaneous at bedtime  isosorbide   dinitrate Tablet (ISORDIL) 10 milliGRAM(s) Oral two times a day  levalbuterol Inhalation 0.63 milliGRAM(s) Inhalation three times a day  methylPREDNISolone sodium succinate Injectable 40 milliGRAM(s) IV Push every 8 hours  pantoprazole  Injectable 40 milliGRAM(s) IV Push daily  tiotropium 18 MICROgram(s) Capsule 1 Capsule(s) Inhalation daily    MEDICATIONS  (PRN):      ALLERGIES:  Allergies    albuterol (Other)    Intolerances        LABS:                        8.1    23.19 )-----------( 394      ( 02 Jan 2021 06:56 )             26.3     01-02    134<L>  |  90<L>  |  30<H>  ----------------------------<  175<H>  4.8   |  42<H>  |  0.48<L>    Ca    9.4      02 Jan 2021 06:55  Phos  2.8     01-02  Mg     1.8     01-02    TPro  5.3<L>  /  Alb  3.3  /  TBili  0.4  /  DBili  x   /  AST  24  /  ALT  32  /  AlkPhos  82  01-02          RADIOLOGY & ADDITIONAL TESTS: Reviewed. 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 Persistent tachypnea noted when bipap removed, so placed on bipap for most of the day yesterday. Persistent sinus tachycardia noted on telemetry.    SUBJECTIVE: Patient seen and examined at bedside.     CONSTITUTIONAL: No weakness, fevers or chills  EYES/ENT: No visual changes, no double vision, no blurry vision; no dizziness  NECK: No pain, no stiffness  RESPIRATORY: Notes persistent subjective shortness of breath both on bipap and nasal cannula; no coughing   CARDIOVASCULAR: No chest pain, no palpitations  GASTROINTESTINAL: No abdominal or epigastric pain. No nausea, vomiting, or hematemesis; No diarrhea or constipation. No melena or hematochezia.  GENITOURINARY: No dysuria, frequency or hematuria  NEUROLOGICAL: No numbness, no weakness  SKIN: No itching, no rashes    OBJECTIVE:    VITAL SIGNS:  T(C): 36.7 (03 Jan 2021 04:57), Max: 37.4 (02 Jan 2021 20:16)  T(F): 98 (03 Jan 2021 04:57), Max: 99.4 (02 Jan 2021 20:16)  HR: 97 (03 Jan 2021 05:35) (94 - 124)  BP: 119/75 (03 Jan 2021 04:57) (119/75 - 136/82)  BP(mean): --  ABP: --  ABP(mean): --  RR: 18 (03 Jan 2021 04:57) (18 - 22)  SpO2: 97% (03 Jan 2021 05:35) (96% - 99%)        01-01 @ 07:01  -  01-02 @ 07:00  --------------------------------------------------------  IN: 240 mL / OUT: 350 mL / NET: -110 mL      CAPILLARY BLOOD GLUCOSE      POCT Blood Glucose.: 142 mg/dL (02 Jan 2021 21:15)      PHYSICAL EXAM:    General: NAD; awake and alert; notably tachypneic and breathing through pursed lips   HEENT: PERRL, clear conjunctiva  Neck: No jvd, no lymphadenopathy   Respiratory: Diffuse rales noted, no wheezes  Cardiovascular: +S1/S2; RRR  Abdomen: soft, NT/ND; +BS x4  Extremities: WWP, 2+ peripheral pulses b/l; no LE edema  Skin: normal color and turgor; no rash  Neurological: Weakness in bilateral lower extremities improving (able to move toes mildly); less numbness noted; sensation intact grossly to my exam    MEDICATIONS:  MEDICATIONS  (STANDING):  acyclovir IVPB 600 milliGRAM(s) IV Intermittent every 8 hours  amLODIPine   Tablet 5 milliGRAM(s) Oral daily  budesonide  80 MICROgram(s)/formoterol 4.5 MICROgram(s) Inhaler 2 Puff(s) Inhalation two times a day  dextrose 40% Gel 15 Gram(s) Oral once  dextrose 5%. 1000 milliLiter(s) (50 mL/Hr) IV Continuous <Continuous>  dextrose 5%. 1000 milliLiter(s) (100 mL/Hr) IV Continuous <Continuous>  dextrose 50% Injectable 25 Gram(s) IV Push once  dextrose 50% Injectable 12.5 Gram(s) IV Push once  dextrose 50% Injectable 25 Gram(s) IV Push once  glucagon  Injectable 1 milliGRAM(s) IntraMuscular once  insulin lispro (ADMELOG) corrective regimen sliding scale   SubCutaneous three times a day before meals  insulin lispro (ADMELOG) corrective regimen sliding scale   SubCutaneous at bedtime  isosorbide   dinitrate Tablet (ISORDIL) 10 milliGRAM(s) Oral two times a day  levalbuterol Inhalation 0.63 milliGRAM(s) Inhalation three times a day  methylPREDNISolone sodium succinate Injectable 40 milliGRAM(s) IV Push every 8 hours  pantoprazole  Injectable 40 milliGRAM(s) IV Push daily  tiotropium 18 MICROgram(s) Capsule 1 Capsule(s) Inhalation daily    MEDICATIONS  (PRN):      ALLERGIES:  Allergies    albuterol (Other)    Intolerances        LABS:                        8.1    23.19 )-----------( 394      ( 02 Jan 2021 06:56 )             26.3     01-02    134<L>  |  90<L>  |  30<H>  ----------------------------<  175<H>  4.8   |  42<H>  |  0.48<L>    Ca    9.4      02 Jan 2021 06:55  Phos  2.8     01-02  Mg     1.8     01-02    TPro  5.3<L>  /  Alb  3.3  /  TBili  0.4  /  DBili  x   /  AST  24  /  ALT  32  /  AlkPhos  82  01-02          RADIOLOGY & ADDITIONAL TESTS: Reviewed.

## 2021-01-03 NOTE — DISCHARGE NOTE PROVIDER - PROVIDER TOKENS
PROVIDER:[TOKEN:[[id number]],FOLLOWUP:[1 week]],PROVIDER:[TOKEN:[[id number]],FOLLOWUP:[1 week]]

## 2021-01-03 NOTE — DISCHARGE NOTE PROVIDER - NSDCMRMEDTOKEN_GEN_ALL_CORE_FT
ALPRAZolam 0.25 mg oral tablet: 1 tab(s) orally 3 times a day, As Needed  amLODIPine 5 mg oral tablet: 1 tab(s) orally once a day  Breo Ellipta 100 mcg-25 mcg/inh inhalation powder: 1 puff(s) inhaled once a day  Incruse Ellipta 62.5 mcg/inh inhalation powder: 1 puff(s) inhaled every 24 hours  isosorbide dinitrate 10 mg oral tablet: 1 tab(s) orally 2 times a day  levalbuterol 0.31 mg/3 mL inhalation solution: 3 milliliter(s) inhaled 3 times a day, As Needed  pantoprazole 40 mg oral delayed release tablet: 1 tab(s) orally once a day   ALPRAZolam 0.25 mg oral tablet: 1 tab(s) orally 3 times a day, As Needed  ALPRAZolam 0.25 mg oral tablet: 1 tab(s) orally 2 times a day, As Needed  amLODIPine 5 mg oral tablet: 1 tab(s) orally once a day  amLODIPine 5 mg oral tablet: 1 tab(s) orally once a day  Breo Ellipta 100 mcg-25 mcg/inh inhalation powder: 1 puff(s) inhaled once a day  Breo Ellipta 100 mcg-25 mcg/inh inhalation powder: 1 puff(s) inhaled once a day  budesonide: 0.5 milligram(s) inhaled 2 times a day  enoxaparin: 40 milligram(s) subcutaneous once a day  Incruse Ellipta 62.5 mcg/inh inhalation powder: 1 puff(s) inhaled every 24 hours  Incruse Ellipta 62.5 mcg/inh inhalation powder: 1 puff(s) inhaled every 24 hours  isosorbide dinitrate 10 mg oral tablet: 1 tab(s) orally 2 times a day  isosorbide dinitrate 10 mg oral tablet: 1 tab(s) orally 3 times a day  levalbuterol 0.31 mg/3 mL inhalation solution: 3 milliliter(s) inhaled 3 times a day, As Needed  levalbuterol 1.25 mg/3 mL inhalation solution: 3 milliliter(s) inhaled every 6 hours  methylPREDNISolone: 40 milligram(s) injectable every 8 hours  pantoprazole 40 mg oral delayed release tablet: 1 tab(s) orally once a day  pantoprazole 40 mg oral granule, delayed release:  orally    acetaminophen 325 mg oral tablet: 2 tab(s) orally every 6 hours, As needed, Mild Pain (1 - 3), Moderate Pain (4 - 6)  ALPRAZolam 0.25 mg oral tablet: 1 tab(s) orally 2 times a day, As Needed  ALPRAZolam 0.25 mg oral tablet: 1 tab(s) orally 3 times a day, As Needed  amLODIPine 5 mg oral tablet: 1 tab(s) orally once a day  amLODIPine 5 mg oral tablet: 1 tab(s) orally once a day  Breo Ellipta 100 mcg-25 mcg/inh inhalation powder: 1 puff(s) inhaled once a day  Breo Ellipta 100 mcg-25 mcg/inh inhalation powder: 1 puff(s) inhaled once a day  budesonide: 0.5 milligram(s) inhaled 2 times a day  enoxaparin: 40 milligram(s) subcutaneous once a day  Incruse Ellipta 62.5 mcg/inh inhalation powder: 1 puff(s) inhaled every 24 hours  Incruse Ellipta 62.5 mcg/inh inhalation powder: 1 puff(s) inhaled every 24 hours  isosorbide dinitrate 10 mg oral tablet: 1 tab(s) orally 2 times a day  levalbuterol 0.31 mg/3 mL inhalation solution: 3 milliliter(s) inhaled 3 times a day, As Needed  levalbuterol 1.25 mg/3 mL inhalation solution: 3 milliliter(s) inhaled every 6 hours  pantoprazole 40 mg oral delayed release tablet: 1 tab(s) orally once a day  pantoprazole 40 mg oral granule, delayed release:  orally   predniSONE 20 mg oral tablet: 2 tab(s) orally once a day   acetaminophen 325 mg oral tablet: 2 tab(s) orally every 6 hours, As needed, Mild Pain (1 - 3), Moderate Pain (4 - 6)  ALPRAZolam 0.25 mg oral tablet: 1 tab(s) orally 3 times a day, As Needed  amLODIPine 5 mg oral tablet: 1 tab(s) orally once a day  budesonide-formoterol 80 mcg-4.5 mcg/inh inhalation aerosol:  inhaled   Incruse Ellipta 62.5 mcg/inh inhalation powder: 1 puff(s) inhaled every 24 hours  isosorbide dinitrate 10 mg oral tablet: 1 tab(s) orally 2 times a day  levalbuterol 0.63 mg/3 mL inhalation solution: 3 milliliter(s) inhaled 3 times a day  melatonin 3 mg oral tablet: 2 tab(s) orally once a day (at bedtime)  Multiple Vitamins oral tablet: 1 tab(s) orally once a day  pantoprazole 40 mg oral delayed release tablet: 1 tab(s) orally once a day  predniSONE 20 mg oral tablet: 2 tab(s) orally once a day  tiotropium 18 mcg inhalation capsule: 1 cap(s) inhaled once a day

## 2021-01-03 NOTE — PROGRESS NOTE ADULT - PROBLEM SELECTOR PLAN 7
-Will administer lovenox 40 for dvt prophylaxis   -Pureed diet -Will administer lovenox 40 for dvt prophylaxis   -Pureed diet  -Ongoing goals of care discussions with her patient and with her niece; patient today tells me that she feels uncomfortable, questions the utility of treatments, and wonders if she may want to transition to a more palliative treatment option

## 2021-01-03 NOTE — PROGRESS NOTE ADULT - ATTENDING COMMENTS
Pt seen and examined. Admitted for acute hypoxic/hypercapnic respiratory failure 2/2 COPD exacerbation. Also with b/l LE weakness, r/o myelitis vs. ICU-neuropathy/myopathy. Now sating 98% on 2L ; on intermittent Bipap. continue solumedrol 40mg q8hr and taper as tolerated.  w/up LE weakness in progress ; s/p LP ; fluid analysis HSV2+ ; pt started on Acyclovir. ID following. CSF fluid also shows elevated protein, elevated neutrophil count , decreased lymphocyte count ?albuminocytologic dissociation ?Gu[place] syndrome.  Neurology following. PT as tolerated.

## 2021-01-03 NOTE — PROGRESS NOTE ADULT - PROBLEM SELECTOR PLAN 1
-In the emergency department, the patient was noted to have an acute desaturation to 86% while managed on 4L nasal cannula (saturating at 100% previously while being monitored in the ED on 4L nasal cannula)   -Etiology of acute respiratory failure with hypoxia most likely is respiratory muscle collapse, which may represent insufficient respiratory muscle reserve in the setting of such great carbon dioxide retention  -Etiology of acute exacerbation of COPD uncertain; no documentation in [facility] [place] of acute viral prodrome; no changes in medication management; no evidence of missed doses   -Of note, the patient was previously saturating at 100% on 4L nasal cannula; may also represent Haldane effect, and sudden loss of respiratory drive   -Arterial blood gas demonstrates marginally less carbon dioxide retention   -Will manage as COPD exacerbation; will administer Xopenex q8 hours  -Will administer 40 mg of intravenous methylprednisolone every eight hours (and space as needed)   -Will administer five days of azithromycin (day 3/5)   -Will manage with bilevel positive airway pressure (currently at inspiratory pressure of 14 and expiratory pressure of 5), titrate oxygen saturation on pulse oximetry to 88-92%  -Will administer home doses of LAMA, LABA, and inhaled corticosteroid   support from bipap to nasal cannula as possible -In the emergency department, the patient was noted to have an acute desaturation to 86% while managed on 4L nasal cannula (saturating at 100% previously while being monitored in the ED on 4L nasal cannula)   -Etiology of acute respiratory failure with hypoxia most likely is respiratory muscle collapse, which may represent insufficient respiratory muscle reserve in the setting of such great carbon dioxide retention  -Etiology of acute exacerbation of COPD uncertain; no documentation in [facility] [place] of acute viral prodrome; no changes in medication management; no evidence of missed doses   -Of note, the patient was previously saturating at 100% on 4L nasal cannula; may also represent Haldane effect, and sudden loss of respiratory drive   -Arterial blood gas demonstrates marginally less carbon dioxide retention   -Will manage as COPD exacerbation; will administer Xopenex q8 hours  -Will administer 40 mg of intravenous methylprednisolone every eight hours (and space as needed); given patient's persistent subjective shortness of breath, hesitant to space steroid and levalbuterol treatments  -Will administer five days of azithromycin (day 4/5)   -Will manage with bilevel positive airway pressure (currently at inspiratory pressure of 14 and expiratory pressure of 5) at night and transition to nasal cannula 2L during the day; titrate oxygen saturation on pulse oximetry to 88-92%  -Will administer home doses of LAMA, LABA, and inhaled corticosteroid

## 2021-01-03 NOTE — DISCHARGE NOTE PROVIDER - HOSPITAL COURSE
The patient is a 75-year-old woman with a history of severe chronic obstructive pulmonary disease who presented as a transfer from an outside hospital for workup and management of bilateral lower extremity weakness of uncertain etiology. Per records, at [place], the patient was noted to have a severe exacerbation of her chronic obstructive pulmonary disease requiring intubation and mechanical ventilation. Subsequent to transition back to nasal cannula, the patient's care was transitioned to the rehabilitation facility at [facility]; however, the patient was noted to have bilateral lower extremity weakness of uncertain etiology and was transferred to [facility] for further management. In the emergency department, the patient was noted on arterial blood gas to have a partial pressure of carbon dioxide greater than 100, and she was noted to have an acute oxygen desaturation as measured on pulse oximetry. Taken together, her clinical syndrome was concerning for an acute exacerbation of her COPD of uncertain etiology. The patient was managed with bilevel positive airway pressure and with intravenous methylprednisolone, inhaled levalbuterol, and her home doses of tiotropium, budesonide, and formoterol. Subsequently, her respiratory status, and her obstructive physiology, were noted to have improved significantly. Her medications were spaced, and she was transitioned to nasal cannula. 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 Subsequently, the patient's weakness was noted to have improved... The patient is a 75-year-old woman with a history of severe chronic obstructive pulmonary disease who presented as a transfer from an outside hospital for workup and management of bilateral lower extremity weakness of uncertain etiology. Per records, at [place], the patient was noted to have a severe exacerbation of her chronic obstructive pulmonary disease requiring intubation and mechanical ventilation. Subsequent to transition back to nasal cannula, the patient's care was transitioned to the rehabilitation facility at [facility]; however, the patient was noted to have bilateral lower extremity weakness of uncertain etiology and was transferred to [facility] for further management. In the emergency department, the patient was noted on arterial blood gas to have a partial pressure of carbon dioxide greater than 100, and she was noted to have an acute oxygen desaturation as measured on pulse oximetry. Taken together, her clinical syndrome was concerning for an acute exacerbation of her COPD of uncertain etiology. The patient was managed with bilevel positive airway pressure and with intravenous methylprednisolone, inhaled levalbuterol, and her home doses of tiotropium, budesonide, and formoterol. Subsequently, her respiratory status, and her obstructive physiology, were noted to have improved but continued to require BiPAP at night and nasal cannula during the day. Her medications were continued and she was weaned off steroids. 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 She was also pulsed steroids but that didn't improve her LE extremity weakness. She underwent a spinal angiogram to try and identify a spinal AVM as the cause of her LE paralysis, but was not able to identify that. 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 She was stable for discharge to rehab and then transition to long term care facility. She needs to follow up with her PCP, neurologist, and PMR in 1-2 weeks. The patient is a 75-year-old woman with a history of severe chronic obstructive pulmonary disease who presented as a transfer from an outside hospital for workup and management of bilateral lower extremity weakness of uncertain etiology. Per records, at [place], the patient was noted to have a severe exacerbation of her chronic obstructive pulmonary disease requiring intubation and mechanical ventilation. Subsequent to transition back to nasal cannula, the patient's care was transitioned to the rehabilitation facility at [facility]; however, the patient was noted to have bilateral lower extremity weakness of uncertain etiology and was transferred to [facility] for further management. In the emergency department, the patient was noted on arterial blood gas to have a partial pressure of carbon dioxide greater than 100, and she was noted to have an acute oxygen desaturation as measured on pulse oximetry. Taken together, her clinical syndrome was concerning for an acute exacerbation of her COPD of uncertain etiology. The patient was managed with bilevel positive airway pressure and with intravenous methylprednisolone, inhaled levalbuterol, and her home doses of tiotropium, budesonide, and formoterol. Subsequently, her respiratory status, and her obstructive physiology, were noted to have improved but continued to require BiPAP at night and nasal cannula during the day. Her medications were continued and she was weaned off steroids. 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 She was also pulsed steroids but that didn't improve her LE extremity weakness. She underwent a spinal angiogram to try and identify a spinal AVM as the cause of her LE paralysis, but was not able to identify that. 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 She was stable for discharge to rehab. She needs to follow up with her PCP, neurologist, and PMR in 1-2 weeks. The patient is a 75-year-old woman with a history of severe chronic obstructive pulmonary disease who presented as a transfer from an outside hospital for workup and management of bilateral lower extremity weakness of uncertain etiology. Per records, at [place], the patient was noted to have a severe exacerbation of her chronic obstructive pulmonary disease requiring intubation and mechanical ventilation. Subsequent to transition back to nasal cannula, the patient's care was transitioned to the rehabilitation facility at [facility]; however, the patient was noted to have bilateral lower extremity weakness of uncertain etiology and was transferred to [facility] for further management. In the emergency department, the patient was noted on arterial blood gas to have a partial pressure of carbon dioxide greater than 100, and she was noted to have an acute oxygen desaturation as measured on pulse oximetry. Taken together, her clinical syndrome was concerning for an acute exacerbation of her COPD of uncertain etiology. The patient was managed with bilevel positive airway pressure and with intravenous methylprednisolone, inhaled levalbuterol, and her home doses of tiotropium, budesonide, and formoterol. Subsequently, her respiratory status, and her obstructive physiology, were noted to have improved but continued to require BiPAP at night and nasal cannula during the day. Her medications were continued and she was weaned off steroids. 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 She was also pulsed steroids but that didn't improve her LE extremity weakness. She underwent a spinal angiogram to try and identify a spinal AVM as the cause of her LE paralysis, but was not able to identify that. 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 She was stable for discharge to rehab with intermittent bipap during the day PRN and nocturnal bipap for severe COPD (Settings: 16/5, FIO2 30% back up rate 15 bpm). She needs to follow up with her PCP, neurologist, and PMR in 1-2 weeks. The patient is a 75-year-old woman with a history of severe chronic obstructive pulmonary disease who presented as a transfer from an outside hospital for workup and management of bilateral lower extremity weakness of uncertain etiology. Per records, at [place], the patient was noted to have a severe exacerbation of her chronic obstructive pulmonary disease requiring intubation and mechanical ventilation. Subsequent to transition back to nasal cannula, the patient's care was transitioned to the rehabilitation facility at [facility]; however, the patient was noted to have bilateral lower extremity weakness of uncertain etiology and was transferred to [facility] for further management. In the emergency department, the patient was noted on arterial blood gas to have a partial pressure of carbon dioxide greater than 100, and she was noted to have an acute oxygen desaturation as measured on pulse oximetry. Taken together, her clinical syndrome was concerning for an acute exacerbation of her COPD of uncertain etiology. The patient was managed with bilevel positive airway pressure and with intravenous methylprednisolone, inhaled levalbuterol, and her home doses of tiotropium, budesonide, and formoterol. Subsequently, her respiratory status, and her obstructive physiology, were noted to have improved but continued to require BiPAP at night and nasal cannula during the day. Her medications were continued and she was weaned off steroids. 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 She was also pulsed steroids but that didn't improve her LE extremity weakness. She underwent a spinal angiogram to try and identify a spinal AVM as the cause of her LE paralysis, but was not able to identify that. 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 She was stable for discharge to rehab with intermittent bipap during the day PRN for 1 hour for tachypnea >30 breaths per minute and nocturnal bipap for severe COPD (Settings: 16/5, FIO2 30% back up rate 15 bpm). She needs to follow up with her PCP, neurologist, and PMR in 1-2 weeks. The patient is a 75-year-old woman with a history of severe chronic obstructive pulmonary disease who presented as a transfer from an outside hospital for workup and management of bilateral lower extremity weakness of uncertain etiology. Per records, at [place], the patient was noted to have a severe exacerbation of her chronic obstructive pulmonary disease requiring intubation and mechanical ventilation. Subsequent to transition back to nasal cannula, the patient's care was transitioned to the rehabilitation facility at [facility]; however, the patient was noted to have bilateral lower extremity weakness of uncertain etiology and was transferred to [facility] for further management. In the emergency department, the patient was noted on arterial blood gas to have a partial pressure of carbon dioxide greater than 100, and she was noted to have an acute oxygen desaturation as measured on pulse oximetry. Taken together, her clinical syndrome was concerning for an acute exacerbation of her COPD of uncertain etiology. The patient was managed with bilevel positive airway pressure and with intravenous methylprednisolone, inhaled levalbuterol, and her home doses of tiotropium, budesonide, and formoterol. Subsequently, her respiratory status, and her obstructive physiology, were noted to have improved but continued to require BiPAP at night and nasal cannula during the day. Her medications were continued and she was weaned off steroids. 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 She was also pulsed steroids but that didn't improve her LE extremity weakness. She underwent a spinal angiogram to try and identify a spinal AVM as the cause of her LE paralysis, but was not able to identify that. 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 She was stable for discharge to rehab with intermittent bipap during the day and qhs for severe COPD (bipap for 1 hr during the day PRN tachypnea >30 breaths per minute settings and qhs 10pm to 8AM; Settings 16/5, FIO2 30% back up rate 15 bpm). She needs to follow up with her PCP, neurologist, and PMR in 1-2 weeks. The patient is a 75-year-old woman with a history of severe chronic obstructive pulmonary disease who presented as a transfer from an outside hospital for workup and management of bilateral lower extremity weakness of uncertain etiology. Per records, at [place], the patient was noted to have a severe exacerbation of her chronic obstructive pulmonary disease requiring intubation and mechanical ventilation. Subsequent to transition back to nasal cannula, the patient's care was transitioned to the rehabilitation facility at [facility]; however, the patient was noted to have bilateral lower extremity weakness of uncertain etiology and was transferred to [facility] for further management. In the emergency department, the patient was noted on arterial blood gas to have a partial pressure of carbon dioxide greater than 100, and she was noted to have an acute oxygen desaturation as measured on pulse oximetry. Taken together, her clinical syndrome was concerning for an acute exacerbation of her COPD of uncertain etiology. The patient was managed with bilevel positive airway pressure and with intravenous methylprednisolone, inhaled levalbuterol, and her home doses of tiotropium, budesonide, and formoterol. Subsequently, her respiratory status, and her obstructive physiology, were noted to have improved but continued to require BiPAP at night and nasal cannula during the day with intermittent bipap during th day. Her medications were continued and she was weaned off steroids. 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 She was also pulsed steroids but that didn't improve her LE extremity weakness. She underwent a spinal angiogram to try and identify a spinal AVM as the cause of her LE paralysis, but was not able to identify that. 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 She was stable for discharge to rehab with intermittent bipap during the day and qhs for severe COPD (bipap for 1 hr during the day PRN tachypnea >30 breaths per minute settings and qhs 10pm to 8AM; Settings 16/5, FIO2 30% back up rate 15 bpm). She needs to follow up with her PCP, neurologist, and PMR in 1-2 weeks. The patient is a 75-year-old woman with a history of severe chronic obstructive pulmonary disease who presented as a transfer from an outside hospital for workup and management of bilateral lower extremity weakness of uncertain etiology. Per records, at [place], the patient was noted to have a severe exacerbation of her chronic obstructive pulmonary disease requiring intubation and mechanical ventilation. Subsequent to transition back to nasal cannula, the patient's care was transitioned to the rehabilitation facility at [facility]; however, the patient was noted to have bilateral lower extremity weakness of uncertain etiology and was transferred to [facility] for further management. In the emergency department, the patient was noted on arterial blood gas to have a partial pressure of carbon dioxide greater than 100, and she was noted to have an acute oxygen desaturation as measured on pulse oximetry. Taken together, her clinical syndrome was concerning for an acute exacerbation of her COPD of uncertain etiology. The patient was managed with bilevel positive airway pressure and with intravenous methylprednisolone, inhaled levalbuterol, and her home doses of tiotropium, budesonide, and formoterol. Subsequently, her respiratory status, and her obstructive physiology, were noted to have improved but continued to require BiPAP at night and nasal cannula during the day with intermittent bipap during th day. 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 She was also pulsed steroids but that didn't improve her LE extremity weakness. She underwent a spinal angiogram to try and identify a spinal AVM as the cause of her LE paralysis, but was not able to identify that. 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 She was stable for discharge to rehab with intermittent bipap during the day and qhs for severe COPD (bipap for 1 hr during the day PRN tachypnea >30 breaths per minute settings and qhs 10pm to 8AM; Settings 16/5, FIO2 30% back up rate 15 bpm). Patient to continue 2 additional days of prednisone 40 mg for COPD. She needs to follow up with her PCP, neurologist, and PMR in 1-2 weeks.

## 2021-01-03 NOTE — PROGRESS NOTE ADULT - ATTENDING COMMENTS
I personally interviewed, examined, and participated in the care of this patient, on rounds 1/3/21 with the neurology consult service team.  Reviewed findings and management plan with the team.  History as reported above.      On examination I again note that the only evident voluntary movement in the LEs is trace wiggling of the toes, both feet (when first seen in neurologic consultation had movement only of the L toes).  Over the course of hospitalization at [facility] her sensory "level" has been slowly dropping (improving).  When I last examined her (on 1/1/21; Attestation entered in the resident progress note dated 12/31/20):    "Sensory levels to pin (not perceived below the indicated levels) ~ L3 on the R and T12 on the L (a small improvement vs. 12 29 when the level on the R was T10). "    Today Pt perceives pin stimulation almost to the knee on the R (partial L4), and in the upper medial aspect of the L thigh (partial L2).      I agree with the above assessment and recommendations.

## 2021-01-03 NOTE — PROGRESS NOTE ADULT - SUBJECTIVE AND OBJECTIVE BOX
Interval History:  Patient seen and examined at the bedside. More awake. Responding. Does not recall how/why she is at the hospital. Does understand that she had cancer.     MEDICATIONS  (STANDING):  acyclovir IVPB 700 milliGRAM(s) IV Intermittent every 8 hours  amLODIPine   Tablet 5 milliGRAM(s) Oral daily  budesonide  80 MICROgram(s)/formoterol 4.5 MICROgram(s) Inhaler 2 Puff(s) Inhalation two times a day  dextrose 40% Gel 15 Gram(s) Oral once  dextrose 5%. 1000 milliLiter(s) (50 mL/Hr) IV Continuous <Continuous>  dextrose 5%. 1000 milliLiter(s) (100 mL/Hr) IV Continuous <Continuous>  dextrose 50% Injectable 25 Gram(s) IV Push once  dextrose 50% Injectable 12.5 Gram(s) IV Push once  dextrose 50% Injectable 25 Gram(s) IV Push once  glucagon  Injectable 1 milliGRAM(s) IntraMuscular once  insulin lispro (ADMELOG) corrective regimen sliding scale   SubCutaneous three times a day before meals  insulin lispro (ADMELOG) corrective regimen sliding scale   SubCutaneous at bedtime  isosorbide   dinitrate Tablet (ISORDIL) 10 milliGRAM(s) Oral two times a day  lactated ringers. 1000 milliLiter(s) (75 mL/Hr) IV Continuous <Continuous>  levalbuterol Inhalation 0.63 milliGRAM(s) Inhalation three times a day  methylPREDNISolone sodium succinate Injectable 40 milliGRAM(s) IV Push every 8 hours  pantoprazole  Injectable 40 milliGRAM(s) IV Push daily  tiotropium 18 MICROgram(s) Capsule 1 Capsule(s) Inhalation daily    MEDICATIONS  (PRN):    Allergies  albuterol (Other)    Intolerances      ROS: Pertinent positives in HPI, all other ROS were reviewed and are negative.      Vital Signs Last 24 Hrs  T(C): 37.1 (03 Jan 2021 11:47), Max: 37.4 (02 Jan 2021 20:16)  T(F): 98.8 (03 Jan 2021 11:47), Max: 99.4 (02 Jan 2021 20:16)  HR: 112 (03 Jan 2021 11:47) (94 - 125)  BP: 117/72 (03 Jan 2021 11:47) (117/72 - 152/84)  BP(mean): --  RR: 18 (03 Jan 2021 11:47) (18 - 18)  SpO2: 100% (03 Jan 2021 11:47) (97% - 100%)    NEUROLOGICAL EXAM:  Awake, alert, oriented to person, place and time. b/l UEs antigravity. Flaccid b/l LEs. EOMI. No facial asymmetry noted    Labs:   cbc                      8.0    21.75 )-----------( 367      ( 03 Jan 2021 07:09 )             25.8     [id number]    139  |  92<L>  |  30<H>  ----------------------------<  123<H>  4.9   |  40<H>  |  0.45<L>    Ca    9.2      03 Jan 2021 07:09  Phos  3.3     01-03  Mg     2.0     01-03    TPro  5.3<L>  /  Alb  3.0<L>  /  TBili  0.3  /  DBili  x   /  AST  24  /  ALT  33  /  AlkPhos  75  01-03    Coags  [id number] Chol 250<H> LDL -- HDL 76 Trig 184<H>  A1C  CardiacMarkers    LFTsLIVER FUNCTIONS - ( 03 Jan 2021 07:09 )  Alb: 3.0 g/dL / Pro: 5.3 g/dL / ALK PHOS: 75 U/L / ALT: 33 U/L / AST: 24 U/L / GGT: x           UA

## 2021-01-03 NOTE — PROGRESS NOTE ADULT - ASSESSMENT
The patient is a 75-year-old woman with a past medical history of severe COPD, on home oxygen, and with a history of a recent COPD exacerbation requiring endotracheal intubation and management in the intensive care unit at [facility] who presented to 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

## 2021-01-03 NOTE — DISCHARGE NOTE PROVIDER - CARE PROVIDER_API CALL
Lesa Ang (NP; RN)  NP in Family Health  [address], [address]  [place] [address]  Phone: [phone]  Fax: [phone]  Follow Up Time: 1 week    Fidel Bethea (DO)  PhysicalRehab Medicine; Spinal Cord Injury Medicine  [address] [place] [address]  Phone: [phone]  Fax: [phone]  Follow Up Time: 1 week

## 2021-01-04 LAB
% ALBUMIN: 58.7 % — SIGNIFICANT CHANGE UP
% ALPHA 1: 5.6 % — SIGNIFICANT CHANGE UP
% ALPHA 2: 12 % — SIGNIFICANT CHANGE UP
% BETA: 13.1 % — SIGNIFICANT CHANGE UP
% GAMMA: 10.6 % — SIGNIFICANT CHANGE UP
ALBUMIN SERPL ELPH-MCNC: 2.7 G/DL — LOW (ref 3.3–5)
ALBUMIN SERPL ELPH-MCNC: 2.9 G/DL — LOW (ref 3.6–5.5)
ALBUMIN/GLOB SERPL ELPH: 1.4 RATIO — SIGNIFICANT CHANGE UP
ALP SERPL-CCNC: 73 U/L — SIGNIFICANT CHANGE UP (ref 40–120)
ALPHA1 GLOB SERPL ELPH-MCNC: 0.3 G/DL — SIGNIFICANT CHANGE UP (ref 0.1–0.4)
ALPHA2 GLOB SERPL ELPH-MCNC: 0.6 G/DL — SIGNIFICANT CHANGE UP (ref 0.5–1)
ALT FLD-CCNC: 34 U/L — SIGNIFICANT CHANGE UP (ref 10–45)
ANION GAP SERPL CALC-SCNC: 4 MMOL/L — LOW (ref 5–17)
AST SERPL-CCNC: 26 U/L — SIGNIFICANT CHANGE UP (ref 10–40)
B-GLOBULIN SERPL ELPH-MCNC: 0.7 G/DL — SIGNIFICANT CHANGE UP (ref 0.5–1)
BILIRUB SERPL-MCNC: 0.2 MG/DL — SIGNIFICANT CHANGE UP (ref 0.2–1.2)
BUN SERPL-MCNC: 28 MG/DL — HIGH (ref 7–23)
CALCIUM SERPL-MCNC: 9.3 MG/DL — SIGNIFICANT CHANGE UP (ref 8.4–10.5)
CHLORIDE SERPL-SCNC: 93 MMOL/L — LOW (ref 96–108)
CO2 SERPL-SCNC: 40 MMOL/L — HIGH (ref 22–31)
CREAT SERPL-MCNC: 0.4 MG/DL — LOW (ref 0.5–1.3)
GAMMA GLOBULIN: 0.5 G/DL — LOW (ref 0.6–1.6)
GAS PNL BLDV: SIGNIFICANT CHANGE UP
GLUCOSE BLDC GLUCOMTR-MCNC: 150 MG/DL — HIGH (ref 70–99)
GLUCOSE BLDC GLUCOMTR-MCNC: 175 MG/DL — HIGH (ref 70–99)
GLUCOSE BLDC GLUCOMTR-MCNC: 190 MG/DL — HIGH (ref 70–99)
GLUCOSE BLDC GLUCOMTR-MCNC: 194 MG/DL — HIGH (ref 70–99)
GLUCOSE SERPL-MCNC: 146 MG/DL — HIGH (ref 70–99)
HCT VFR BLD CALC: 24.2 % — LOW (ref 34.5–45)
HGB BLD-MCNC: 7.4 G/DL — LOW (ref 11.5–15.5)
INTERPRETATION SERPL IFE-IMP: SIGNIFICANT CHANGE UP
MAGNESIUM SERPL-MCNC: 2 MG/DL — SIGNIFICANT CHANGE UP (ref 1.6–2.6)
MCHC RBC-ENTMCNC: 30.2 PG — SIGNIFICANT CHANGE UP (ref 27–34)
MCHC RBC-ENTMCNC: 30.6 GM/DL — LOW (ref 32–36)
MCV RBC AUTO: 98.8 FL — SIGNIFICANT CHANGE UP (ref 80–100)
NRBC # BLD: 0 /100 WBCS — SIGNIFICANT CHANGE UP (ref 0–0)
PHOSPHATE SERPL-MCNC: 3 MG/DL — SIGNIFICANT CHANGE UP (ref 2.5–4.5)
PLATELET # BLD AUTO: 323 K/UL — SIGNIFICANT CHANGE UP (ref 150–400)
POTASSIUM SERPL-MCNC: 4.6 MMOL/L — SIGNIFICANT CHANGE UP (ref 3.5–5.3)
POTASSIUM SERPL-SCNC: 4.6 MMOL/L — SIGNIFICANT CHANGE UP (ref 3.5–5.3)
PROT PATTERN SERPL ELPH-IMP: SIGNIFICANT CHANGE UP
PROT SERPL-MCNC: 5.1 G/DL — LOW (ref 6–8.3)
RBC # BLD: 2.45 M/UL — LOW (ref 3.8–5.2)
RBC # FLD: 13.7 % — SIGNIFICANT CHANGE UP (ref 10.3–14.5)
SODIUM SERPL-SCNC: 137 MMOL/L — SIGNIFICANT CHANGE UP (ref 135–145)
WBC # BLD: 23.06 K/UL — HIGH (ref 3.8–10.5)
WBC # FLD AUTO: 23.06 K/UL — HIGH (ref 3.8–10.5)

## 2021-01-04 PROCEDURE — 99233 SBSQ HOSP IP/OBS HIGH 50: CPT | Mod: GC

## 2021-01-04 PROCEDURE — 99232 SBSQ HOSP IP/OBS MODERATE 35: CPT

## 2021-01-04 RX ORDER — PANTOPRAZOLE SODIUM 20 MG/1
40 TABLET, DELAYED RELEASE ORAL
Refills: 0 | Status: DISCONTINUED | OUTPATIENT
Start: 2021-01-04 | End: 2021-01-23

## 2021-01-04 RX ORDER — LABETALOL HCL 100 MG
5 TABLET ORAL ONCE
Refills: 0 | Status: COMPLETED | OUTPATIENT
Start: 2021-01-04 | End: 2021-01-04

## 2021-01-04 RX ADMIN — LEVALBUTEROL 0.63 MILLIGRAM(S): 1.25 SOLUTION, CONCENTRATE RESPIRATORY (INHALATION) at 05:56

## 2021-01-04 RX ADMIN — Medication 40 MILLIGRAM(S): at 17:04

## 2021-01-04 RX ADMIN — LEVALBUTEROL 0.63 MILLIGRAM(S): 1.25 SOLUTION, CONCENTRATE RESPIRATORY (INHALATION) at 21:03

## 2021-01-04 RX ADMIN — Medication 40 MILLIGRAM(S): at 05:55

## 2021-01-04 RX ADMIN — LEVALBUTEROL 0.63 MILLIGRAM(S): 1.25 SOLUTION, CONCENTRATE RESPIRATORY (INHALATION) at 14:26

## 2021-01-04 RX ADMIN — AMLODIPINE BESYLATE 5 MILLIGRAM(S): 2.5 TABLET ORAL at 05:55

## 2021-01-04 RX ADMIN — ISOSORBIDE DINITRATE 10 MILLIGRAM(S): 5 TABLET ORAL at 05:55

## 2021-01-04 RX ADMIN — BUDESONIDE AND FORMOTEROL FUMARATE DIHYDRATE 2 PUFF(S): 160; 4.5 AEROSOL RESPIRATORY (INHALATION) at 05:55

## 2021-01-04 RX ADMIN — Medication 264 MILLIGRAM(S): at 05:57

## 2021-01-04 RX ADMIN — Medication 1: at 17:02

## 2021-01-04 RX ADMIN — Medication 264 MILLIGRAM(S): at 21:04

## 2021-01-04 RX ADMIN — TIOTROPIUM BROMIDE 1 CAPSULE(S): 18 CAPSULE ORAL; RESPIRATORY (INHALATION) at 14:27

## 2021-01-04 RX ADMIN — BUDESONIDE AND FORMOTEROL FUMARATE DIHYDRATE 2 PUFF(S): 160; 4.5 AEROSOL RESPIRATORY (INHALATION) at 17:04

## 2021-01-04 RX ADMIN — Medication 5 MILLIGRAM(S): at 00:45

## 2021-01-04 RX ADMIN — Medication 1: at 08:05

## 2021-01-04 RX ADMIN — SODIUM CHLORIDE 75 MILLILITER(S): 9 INJECTION, SOLUTION INTRAVENOUS at 05:54

## 2021-01-04 RX ADMIN — PANTOPRAZOLE SODIUM 40 MILLIGRAM(S): 20 TABLET, DELAYED RELEASE ORAL at 14:26

## 2021-01-04 RX ADMIN — ISOSORBIDE DINITRATE 10 MILLIGRAM(S): 5 TABLET ORAL at 17:04

## 2021-01-04 RX ADMIN — Medication 264 MILLIGRAM(S): at 14:26

## 2021-01-04 NOTE — PROGRESS NOTE ADULT - PROBLEM SELECTOR PLAN 1
-In the emergency department, the patient was noted to have an acute desaturation to 86% while managed on 4L nasal cannula (saturating at 100% previously while being monitored in the ED on 4L nasal cannula)   -Etiology of acute respiratory failure with hypoxia most likely is respiratory muscle collapse, which may represent insufficient respiratory muscle reserve in the setting of such great carbon dioxide retention  -Etiology of acute exacerbation of COPD uncertain; no documentation in [facility] of acute viral prodrome; no changes in medication management; no evidence of missed doses   -Of note, the patient was previously saturating at 100% on 4L nasal cannula; may also represent Haldane effect, and sudden loss of respiratory drive   -Arterial blood gas demonstrates marginally less carbon dioxide retention   -Will manage as COPD exacerbation; will administer Xopenex q8 hours  -Will administer 40 mg of intravenous methylprednisolone every eight hours (and space as needed); given patient's persistent subjective shortness of breath, hesitant to space steroid and levalbuterol treatments  -Will administer five days of azithromycin (day 4/5)   -Will manage with bilevel positive airway pressure (currently at inspiratory pressure of 14 and expiratory pressure of 5) at night and transition to nasal cannula 2L during the day; titrate oxygen saturation on pulse oximetry to 88-92%  -Will administer home doses of LAMA, LABA, and inhaled corticosteroid -In the emergency department, the patient was noted to have an acute desaturation to 86% while managed on 4L nasal cannula (saturating at 100% previously while being monitored in the ED on 4L nasal cannula)   -Etiology of acute respiratory failure with hypoxia most likely is respiratory muscle collapse, which may represent insufficient respiratory muscle reserve in the setting of such great carbon dioxide retention  -Etiology of acute exacerbation of COPD uncertain; no [facility] in [facility] of acute viral prodrome; no changes in medication management; no evidence of missed doses   -Of note, the patient was previously saturating at 100% on 4L nasal cannula; may also represent Haldane effect, and sudden loss of respiratory drive   -Arterial blood gas demonstrates marginally less carbon dioxide retention   -Will manage as COPD exacerbation; will administer Xopenex q8 hours  -Will administer 40 mg of intravenous methylprednisolone q12 hours (and space as needed); given patient's persistent subjective shortness of breath  -s/p five days of azithromycin  -Will manage with bilevel positive airway pressure (currently at inspiratory pressure of 14 and expiratory pressure of 5) at night and transition to nasal cannula 2L during the day; titrate oxygen saturation on pulse oximetry to 88-92%  -Will administer home doses of LAMA, LABA, and inhaled corticosteroid

## 2021-01-04 NOTE — PROGRESS NOTE ADULT - ASSESSMENT
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      HSV-2 meningoencephalitis, abnormal MRI   -CSF cell count with only 1 nucleated cell is unusual, ( due to steroids?) however pt has MRI findings and neurologic symptoms, would treat HSV with 700 mg q 8  - plan for 1 days of treatment, with spinal cord involvement with HSV neurological recovery even with antiviral and steroids might not be complete.   -c/w hydration to prevent crystaluria if feasible   - neuro following, on steroids.     Hypoxemic respiratory failure  -CXR appreciated, CT Chest performed pending read   -leukocytosis is likely related to steroids, procalcitonin normal   -trend CBC, given rising leucocytosis consider repeat blood cx

## 2021-01-04 NOTE — PROGRESS NOTE ADULT - ASSESSMENT
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

## 2021-01-04 NOTE — PROGRESS NOTE ADULT - ATTENDING COMMENTS
Pt seen and examined. Agree with above with additional findings:    # acute hypoxic/hypercapeneic respiratory failure  # bilateral LE weakness  # post-ICU neuropathy/myopathy  # HSV2 meningomyelitis    - pt now on NC, mentating well; continue nocturnal BIPAP  - continue to wean off solumedrol for COPD exacerbation  - started on acyclovir over the weekend; pt reports no improvement in LE weakness  - appreciate ID recs on further regimen  - appreciate neuro recs  - PT consult/OOB to chair    Karen Mcconnell MD  Division of Hospital Medicine  Cell: [phone]  Office: [phone]

## 2021-01-04 NOTE — PROGRESS NOTE ADULT - PROBLEM SELECTOR PLAN 3
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 hiv, lyme, tb, ebv, hep b/c), inflammatory etiology (acute demyelating inflammatory polyneuropathy/gbs), toxin-induced (b12, folate, copper deficiency; lead poisoning), para-neoplastic syndrome, syringomyelia, spinal infarction   -Motor deficits only noted on neurologic exam (sensory exam intact per limited exam); this is most consistent with diagnosis of anterior spinal cord infarction (less likely as no bowel or bladder incontinence and temperature sensation intact) or guillan-barre syndrome; less consistent with infectious or toxic etiologies, which would likely affect peripheral nerve and cause sensory deficits in addition   -B12, folate, and copper titers normal; mri brain normal; mri lumbar spine demonstrates t2 enhancement of conus medullaris    -Lumbar puncture demonstrates mildly elevated protein level (about 50); bloody tap, first bottle demonstrates cell count of 1, less than 1 on final bottle; 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

## 2021-01-04 NOTE — PROGRESS NOTE ADULT - SUBJECTIVE AND OBJECTIVE BOX
Killian Rocha MD   Internal Medicine PGY-1  Pager: NS: [phone] [facility]: [id number]    INCOMPLETE NOTE   Killian Rocha MD   Internal Medicine PGY-1  Pager: NS: [phone] [facility]: [id number]    Patient is a 75y old  Female who presents with a chief complaint of acute respiratory failure (04 Jan 2021 07:29)    SUBJECTIVE / OVERNIGHT EVENTS:  No acute events overnight. Patient complained of headache received Tylenol overnight. Pt states she is still having lower extremity weakness, not able to move legs. Does have sensation when I pressed on her ankles. Did discuss GOC with her, and patient stated that she would want to be DNR/DNI, understands the risks and benefits of not performing this. She denies any fevers, chills, n/v, chest pain, cough, abdominal pain, does not know if she is having urinary/bowel movements. Does endorse some SOB. No other complaints.     MEDICATIONS  (STANDING):  acyclovir IVPB 700 milliGRAM(s) IV Intermittent every 8 hours  amLODIPine   Tablet 5 milliGRAM(s) Oral daily  budesonide  80 MICROgram(s)/formoterol 4.5 MICROgram(s) Inhaler 2 Puff(s) Inhalation two times a day  dextrose 40% Gel 15 Gram(s) Oral once  dextrose 5%. 1000 milliLiter(s) (50 mL/Hr) IV Continuous <Continuous>  dextrose 5%. 1000 milliLiter(s) (100 mL/Hr) IV Continuous <Continuous>  dextrose 50% Injectable 25 Gram(s) IV Push once  dextrose 50% Injectable 12.5 Gram(s) IV Push once  dextrose 50% Injectable 25 Gram(s) IV Push once  glucagon  Injectable 1 milliGRAM(s) IntraMuscular once  insulin lispro (ADMELOG) corrective regimen sliding scale   SubCutaneous three times a day before meals  insulin lispro (ADMELOG) corrective regimen sliding scale   SubCutaneous at bedtime  isosorbide   dinitrate Tablet (ISORDIL) 10 milliGRAM(s) Oral two times a day  lactated ringers. 1000 milliLiter(s) (75 mL/Hr) IV Continuous <Continuous>  levalbuterol Inhalation 0.63 milliGRAM(s) Inhalation three times a day  methylPREDNISolone sodium succinate Injectable 40 milliGRAM(s) IV Push every 12 hours  pantoprazole  Injectable 40 milliGRAM(s) IV Push daily  tiotropium 18 MICROgram(s) Capsule 1 Capsule(s) Inhalation daily    MEDICATIONS  (PRN):    T(C): 36.4 (01-04-21 @ 11:44), Max: 36.8 (01-03-21 @ 19:39)  HR: 110 (01-04-21 @ 14:38) (102 - 114)  BP: 147/87 (01-04-21 @ 14:38) (123/71 - 147/87)  RR: 18 (01-04-21 @ 11:44) (18 - 18)  SpO2: 96% (01-04-21 @ 14:38) (93% - 99%)    CAPILLARY BLOOD GLUCOSE  POCT Blood Glucose.: 150 mg/dL (04 Jan 2021 11:30)  POCT Blood Glucose.: 190 mg/dL (04 Jan 2021 07:14)  POCT Blood Glucose.: 179 mg/dL (03 Jan 2021 21:13)  POCT Blood Glucose.: 177 mg/dL (03 Jan 2021 16:26)    I&O's Summary    04 Jan 2021 07:01  -  04 Jan 2021 14:41  --------------------------------------------------------  IN: 240 mL / OUT: 850 mL / NET: -610 mL    REVIEW OF SYSTEMS    General: No fevers/chills  Skin/Breast: No rash  Ophthalmologic: No vision changes  ENMT: No dysphagia or odynophagia  Respiratory and Thorax: + mild SOB, no wheezing, cough  Cardiovascular: No chest pain or palpitations  Gastrointestinal: No n/v/d  Genitourinary:  No dysuria, No change in urinary frequency  Musculoskeletal: No joint or muscle pains.  Neurological: No focal deficits, No headache    PHYSICAL EXAM:  GENERAL: NAD, well-developed  HEAD: Atraumatic, Normocephalic  EYES: EOMI, conjunctiva and sclera clear  NECK: Supple, No JVD  CHEST/LUNG: Clear to auscultation bilaterally; No wheeze or crackles   HEART: Regular rate and rhythm; No murmurs, rubs, or gallops  ABDOMEN: Soft, Nontender, Nondistended; Bowel sounds present  EXTREMITIES: 2+ Peripheral Pulses, No clubbing, cyanosis, or edema  PSYCH: AAOx3  NEUROLOGY: unable to move lower extremities, sensation grossly intact in LE.   SKIN: No rashes or lesions    LABS:                        7.4    23.06 )-----------( 323      ( 04 Jan 2021 07:02 )             24.2     01-04    137  |  93<L>  |  28<H>  ----------------------------<  146<H>  4.6   |  40<H>  |  0.40<L>    Ca    9.3      04 Jan 2021 07:00  Phos  3.0     01-04  Mg     2.0     01-04    TPro  5.1<L>  /  Alb  2.7<L>  /  TBili  0.2  /  DBili  x   /  AST  26  /  ALT  34  /  AlkPhos  73  01-04    RADIOLOGY & ADDITIONAL TESTS:    Imaging Personally Reviewed:  < from: CT Chest No Cont (01.03.21 @ 09:34) >  EXAM:  CT CHEST                          PROCEDURE DATE:  01/03/2021      INTERPRETATION:  CLINICAL INFORMATION: Respiratory failure. COPD exacerbation.    COMPARISON: None.    PROCEDURE:  CT of the Chest was performed without intravenous contrast.  Sagittal and coronal reformats were performed.    FINDINGS:    LUNGS AND AIRWAYS: Patent central airways.  Emphysema. No pneumonia. 3 mm right minor fissural lymph node (3:86) and 5 mm left lower lobe groundglass pulmonary nodule (3:121). 2 mm left upper lobe pulmonary nodule (3:86). Small right Bochdalek hernia. A few bilateral minimal areas of linear atelectasis.  PLEURA: No pleural effusion. No pneumothorax.  MEDIASTINUM AND JAMAR: No lymphadenopathy.  VESSELS: Aortic and coronary artery calcifications. Aortic root at the sinuses of Valsalva appears enlarged measuring about 4.2 cm given the limitations of the study due to lack of intravenous contrast. Ascending aorta measures up to 3.6 cm and the descending thoracic aorta measures about 2.5 cm.  HEART: Heart size is normal. No pericardial effusion.  CHEST WALL AND LOWER NECK: 2.4 cm right thyroid hypodense nodule. Left breast nodularity with the largest nodule measuring about 1.7 cm x 8 mm.  VISUALIZED UPPER ABDOMEN: 3.1 cm left hepatic lobe cyst. Other subcentimeter hepatic hypodensities which are too small to characterize. Question of partially imaged trace perihepatic ascites. Left upper pole renal cyst. Left adrenal gland thickening.  BONES: Within normal limits.    IMPRESSION:  No pneumonia.    Emphysema.    A few bilateral 2 or 3 mm pulmonary nodules. A 1 year follow-up noncontrast chest CT is recommended to ensure stability.    Aortic root enlargement measuring about 4.2 cm.    2.4 cm right thyroid nodule. Recommend comparison to prior imaging studies. If none exists, recommend thyroid ultrasound.    Left breast nodules. Correlation with mammography and/or ultrasound is recommended if not recently performed.    RAMIRO REDDY MD; Resident Radiology  This document has been electronically signed.  MARYA DUNLAP MD; Attending Radiologist  This document has been electronically signed. Danielito  3 2021  2:24PM    < end of copied text >    Consultant(s) Notes Reviewed: Neurology, ID     Care Discussed with Consultants/Other Providers: Neurology, ID

## 2021-01-04 NOTE — PROGRESS NOTE ADULT - PROBLEM SELECTOR PLAN 7
-Will administer lovenox 40 for dvt prophylaxis   -Pureed diet  -Ongoing goals of care discussions with her patient and with her niece; patient today tells me that she feels uncomfortable, questions the utility of treatments, and wonders if she may want to transition to a more palliative treatment option -Will administer lovenox 40 for dvt prophylaxis   -Pureed diet  -Ongoing goals of care discussions with her patient states she would want to be DNR/DNI and questions the utility of treatments, and wonders if she may want to transition to a more palliative treatment option

## 2021-01-05 DIAGNOSIS — R52 PAIN, UNSPECIFIED: ICD-10-CM

## 2021-01-05 DIAGNOSIS — Z51.5 ENCOUNTER FOR PALLIATIVE CARE: ICD-10-CM

## 2021-01-05 DIAGNOSIS — R06.00 DYSPNEA, UNSPECIFIED: ICD-10-CM

## 2021-01-05 DIAGNOSIS — R29.898 OTHER SYMPTOMS AND SIGNS INVOLVING THE MUSCULOSKELETAL SYSTEM: ICD-10-CM

## 2021-01-05 LAB
ALBUMIN CSF-MCNC: 26.8 MG/DL — HIGH (ref 14–25)
ALBUMIN SERPL ELPH-MCNC: 2754 MG/DL — LOW (ref 3500–5200)
ANION GAP SERPL CALC-SCNC: 6 MMOL/L — SIGNIFICANT CHANGE UP (ref 5–17)
BASE EXCESS BLDV CALC-SCNC: 16.9 MMOL/L — HIGH (ref -2–2)
BASE EXCESS BLDV CALC-SCNC: 7.5 MMOL/L — HIGH (ref -2–2)
BUN SERPL-MCNC: 22 MG/DL — SIGNIFICANT CHANGE UP (ref 7–23)
CA-I SERPL-SCNC: 0.97 MMOL/L — LOW (ref 1.12–1.3)
CA-I SERPL-SCNC: 1.24 MMOL/L — SIGNIFICANT CHANGE UP (ref 1.12–1.3)
CALCIUM SERPL-MCNC: 9.2 MG/DL — SIGNIFICANT CHANGE UP (ref 8.4–10.5)
CHLORIDE BLDV-SCNC: 71 MMOL/L — LOW (ref 96–108)
CHLORIDE BLDV-SCNC: 93 MMOL/L — LOW (ref 96–108)
CHLORIDE SERPL-SCNC: 92 MMOL/L — LOW (ref 96–108)
CO2 BLDV-SCNC: 33 MMOL/L — HIGH (ref 22–30)
CO2 BLDV-SCNC: 48 MMOL/L — HIGH (ref 22–30)
CO2 SERPL-SCNC: 38 MMOL/L — HIGH (ref 22–31)
CREAT SERPL-MCNC: 0.38 MG/DL — LOW (ref 0.5–1.3)
GAS PNL BLDV: 103 MMOL/L — CRITICAL LOW (ref 135–145)
GAS PNL BLDV: 134 MMOL/L — LOW (ref 135–145)
GAS PNL BLDV: SIGNIFICANT CHANGE UP
GLUCOSE BLDC GLUCOMTR-MCNC: 136 MG/DL — HIGH (ref 70–99)
GLUCOSE BLDC GLUCOMTR-MCNC: 144 MG/DL — HIGH (ref 70–99)
GLUCOSE BLDC GLUCOMTR-MCNC: 157 MG/DL — HIGH (ref 70–99)
GLUCOSE BLDC GLUCOMTR-MCNC: 182 MG/DL — HIGH (ref 70–99)
GLUCOSE BLDV-MCNC: 169 MG/DL — HIGH (ref 70–99)
GLUCOSE BLDV-MCNC: >900 MG/DL — CRITICAL HIGH (ref 70–99)
GLUCOSE SERPL-MCNC: 110 MG/DL — HIGH (ref 70–99)
HCO3 BLDV-SCNC: 32 MMOL/L — HIGH (ref 21–29)
HCO3 BLDV-SCNC: 45 MMOL/L — HIGH (ref 21–29)
HCT VFR BLD CALC: 25.9 % — LOW (ref 34.5–45)
HCT VFR BLDA CALC: 18 % — CRITICAL LOW (ref 39–50)
HCT VFR BLDA CALC: 25 % — LOW (ref 39–50)
HGB BLD CALC-MCNC: 5.6 G/DL — CRITICAL LOW (ref 11.5–15.5)
HGB BLD CALC-MCNC: 8.1 G/DL — LOW (ref 11.5–15.5)
HGB BLD-MCNC: 8 G/DL — LOW (ref 11.5–15.5)
IGG CSF-MCNC: 4.1 MG/DL — HIGH
IGG FLD-MCNC: 510 MG/DL — LOW (ref 610–1660)
IGG SYNTH RATE SER+CSF CALC-MRATE: 7.7 MG/DAY — SIGNIFICANT CHANGE UP
IGG/ALB CLEAR SER+CSF-RTO: 0.8 — HIGH
IGG/ALB CSF: 0.15 RATIO — SIGNIFICANT CHANGE UP
IGG/ALB SER: 0.19 RATIO — SIGNIFICANT CHANGE UP
LACTATE BLDV-MCNC: 2.3 MMOL/L — HIGH (ref 0.7–2)
LACTATE BLDV-MCNC: 3.1 MMOL/L — HIGH (ref 0.7–2)
MAGNESIUM SERPL-MCNC: 1.9 MG/DL — SIGNIFICANT CHANGE UP (ref 1.6–2.6)
MCHC RBC-ENTMCNC: 30.5 PG — SIGNIFICANT CHANGE UP (ref 27–34)
MCHC RBC-ENTMCNC: 30.9 GM/DL — LOW (ref 32–36)
MCV RBC AUTO: 98.9 FL — SIGNIFICANT CHANGE UP (ref 80–100)
NON-GYNECOLOGICAL CYTOLOGY STUDY: SIGNIFICANT CHANGE UP
NRBC # BLD: 0 /100 WBCS — SIGNIFICANT CHANGE UP (ref 0–0)
OTHER CELLS CSF MANUAL: 8 ML/DL — LOW (ref 18–22)
OTHER CELLS CSF MANUAL: 9 ML/DL — LOW (ref 18–22)
PCO2 BLDV: 45 MMHG — SIGNIFICANT CHANGE UP (ref 35–50)
PCO2 BLDV: 91 MMHG — HIGH (ref 35–50)
PH BLDV: 7.32 — LOW (ref 7.35–7.45)
PH BLDV: 7.46 — HIGH (ref 7.35–7.45)
PHOSPHATE SERPL-MCNC: 2.6 MG/DL — SIGNIFICANT CHANGE UP (ref 2.5–4.5)
PLATELET # BLD AUTO: 336 K/UL — SIGNIFICANT CHANGE UP (ref 150–400)
PO2 BLDV: 108 MMHG — HIGH (ref 25–45)
PO2 BLDV: 48 MMHG — HIGH (ref 25–45)
POTASSIUM BLDV-SCNC: 3.3 MMOL/L — LOW (ref 3.5–5.3)
POTASSIUM BLDV-SCNC: 4.5 MMOL/L — SIGNIFICANT CHANGE UP (ref 3.5–5.3)
POTASSIUM SERPL-MCNC: 4.6 MMOL/L — SIGNIFICANT CHANGE UP (ref 3.5–5.3)
POTASSIUM SERPL-SCNC: 4.6 MMOL/L — SIGNIFICANT CHANGE UP (ref 3.5–5.3)
RBC # BLD: 2.62 M/UL — LOW (ref 3.8–5.2)
RBC # FLD: 13.6 % — SIGNIFICANT CHANGE UP (ref 10.3–14.5)
SAO2 % BLDV: 79 % — SIGNIFICANT CHANGE UP (ref 67–88)
SAO2 % BLDV: 99 % — HIGH (ref 67–88)
SODIUM SERPL-SCNC: 136 MMOL/L — SIGNIFICANT CHANGE UP (ref 135–145)
TM INTERPRETATION: SIGNIFICANT CHANGE UP
WBC # BLD: 24.33 K/UL — HIGH (ref 3.8–10.5)
WBC # FLD AUTO: 24.33 K/UL — HIGH (ref 3.8–10.5)

## 2021-01-05 PROCEDURE — 99232 SBSQ HOSP IP/OBS MODERATE 35: CPT

## 2021-01-05 PROCEDURE — 99223 1ST HOSP IP/OBS HIGH 75: CPT

## 2021-01-05 PROCEDURE — 99232 SBSQ HOSP IP/OBS MODERATE 35: CPT | Mod: GC

## 2021-01-05 PROCEDURE — 99233 SBSQ HOSP IP/OBS HIGH 50: CPT | Mod: GC

## 2021-01-05 RX ORDER — ACETAMINOPHEN 500 MG
650 TABLET ORAL EVERY 6 HOURS
Refills: 0 | Status: DISCONTINUED | OUTPATIENT
Start: 2021-01-05 | End: 2021-01-23

## 2021-01-05 RX ADMIN — LEVALBUTEROL 0.63 MILLIGRAM(S): 1.25 SOLUTION, CONCENTRATE RESPIRATORY (INHALATION) at 13:55

## 2021-01-05 RX ADMIN — Medication 264 MILLIGRAM(S): at 13:56

## 2021-01-05 RX ADMIN — TIOTROPIUM BROMIDE 1 CAPSULE(S): 18 CAPSULE ORAL; RESPIRATORY (INHALATION) at 11:41

## 2021-01-05 RX ADMIN — AMLODIPINE BESYLATE 5 MILLIGRAM(S): 2.5 TABLET ORAL at 05:12

## 2021-01-05 RX ADMIN — Medication 264 MILLIGRAM(S): at 21:27

## 2021-01-05 RX ADMIN — LEVALBUTEROL 0.63 MILLIGRAM(S): 1.25 SOLUTION, CONCENTRATE RESPIRATORY (INHALATION) at 05:13

## 2021-01-05 RX ADMIN — Medication 1: at 11:41

## 2021-01-05 RX ADMIN — Medication 264 MILLIGRAM(S): at 05:12

## 2021-01-05 RX ADMIN — BUDESONIDE AND FORMOTEROL FUMARATE DIHYDRATE 2 PUFF(S): 160; 4.5 AEROSOL RESPIRATORY (INHALATION) at 05:13

## 2021-01-05 RX ADMIN — Medication 1: at 17:00

## 2021-01-05 RX ADMIN — ISOSORBIDE DINITRATE 10 MILLIGRAM(S): 5 TABLET ORAL at 17:02

## 2021-01-05 RX ADMIN — SODIUM CHLORIDE 75 MILLILITER(S): 9 INJECTION, SOLUTION INTRAVENOUS at 05:12

## 2021-01-05 RX ADMIN — BUDESONIDE AND FORMOTEROL FUMARATE DIHYDRATE 2 PUFF(S): 160; 4.5 AEROSOL RESPIRATORY (INHALATION) at 17:02

## 2021-01-05 RX ADMIN — Medication 40 MILLIGRAM(S): at 05:12

## 2021-01-05 RX ADMIN — PANTOPRAZOLE SODIUM 40 MILLIGRAM(S): 20 TABLET, DELAYED RELEASE ORAL at 07:41

## 2021-01-05 RX ADMIN — ISOSORBIDE DINITRATE 10 MILLIGRAM(S): 5 TABLET ORAL at 05:12

## 2021-01-05 RX ADMIN — LEVALBUTEROL 0.63 MILLIGRAM(S): 1.25 SOLUTION, CONCENTRATE RESPIRATORY (INHALATION) at 21:27

## 2021-01-05 RX ADMIN — Medication 40 MILLIGRAM(S): at 17:02

## 2021-01-05 NOTE — DIETITIAN INITIAL EVALUATION ADULT. - PROBLEM SELECTOR PLAN 3
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 hiv, lyme, tb, ebv, hep b/c), inflammatory etiology (acute demyelating inflammatory polyneuropathy/gbs), toxin-induced (b12, folate, copper deficiency; lead poisoning), para-neoplastic syndrome, syringomyelia, spinal infarction   -Motor deficits only noted on neurologic exam (sensory exam intact per limited exam); this is most consistent with diagnosis of anterior spinal cord infarction (less likely as no bowel or bladder incontinence) or guillan-barre syndrome; less consistent with infectious or toxic etiologies, which would likely affect peripheral nerve and cause sensory deficits in addition   -Will send B12, folate, and copper titers; will order mri/mra of spine and mri of brain   -If above workup is negative, patient will require lumbar puncture to look for protein-cytologic dissociation characteristic of gbs

## 2021-01-05 NOTE — GOALS OF CARE CONVERSATION - ADVANCED CARE PLANNING - CONVERSATION DETAILS
Discussed in detail with the patient about being DNR and DNI, patient states that those were her wishes prior to being hospitalized and was documented with a . Patient agreed to sign MOLST (in chart) is currently DNR/DNI. Palliative care also consulted to discuss further GOC and possible services available to her.

## 2021-01-05 NOTE — DIETITIAN INITIAL EVALUATION ADULT. - ORAL INTAKE PTA/DIET HISTORY
Pt with limited interaction with RD at time of visit, answering only short answers. Shortly into RD interview, pt c/o SOB and RN presents at bedside. It appears it is difficult for pt to answer long answers with SOB.  Pt reports no therapeutic diet followed PTA and states she was eating well. NKFA reported.

## 2021-01-05 NOTE — PROGRESS NOTE ADULT - ATTENDING COMMENTS
76 y/o woman hx of COPD, obesity, recent respiratory arrest, followed by flaccid paraplegia three weeks ago. Thoracic cord T2 lesion extending to the conus was found and subsequent CSF studies positive for HSV. Pt on course of acyclovir and Iv steroids (125mg solumedrol daily). Pt's exam today is consistent with imaging, there has been minimal to no real improvement since initiation of tx. There is still suspicion for spinal cord infarct.   Plan as above.

## 2021-01-05 NOTE — PROGRESS NOTE ADULT - ATTENDING COMMENTS
Pt seen and examined. Agree with above with additional findings:    # acute hypoxic/hypercapeneic respiratory failure  # bilateral LE weakness  # post-ICU neuropathy/myopathy  # HSV2 meningomyelitis    - pt now on NC, mentating well; continue nocturnal BIPAP  - continue to wean off solumedrol for COPD exacerbation  - started on acyclovir over the weekend; pt reports no improvement in LE weakness  - appreciate ID recs on further regimen  - appreciate neuro recs: unclear if HSV infection explains LE weakness all by itself  - PT consult/OOB to chair    Karen Mcconnell MD  Division of Hospital Medicine  Cell: [phone]  Office: [phone]. Pt seen and examined. Agree with above with additional findings:    # acute hypoxic/hypercapeneic respiratory failure  # bilateral LE weakness  # post-ICU neuropathy/myopathy  # HSV2 meningomyelitis    - pt now on NC, mentating well; continue nocturnal BIPAP  - continue to wean off solumedrol for COPD exacerbation  - started on acyclovir over the weekend; pt reports no improvement in LE weakness  - appreciate ID recs on further regimen  - appreciate neuro recs: unclear if HSV infection explains LE weakness all by itself; care discussed with Dr. Rutherford: agree with MRA with [facility]I to rule out spinal cord infarct  - PT consult/OOB to chair    Karen Mcconnell MD  Division of Hospital Medicine  Cell: [phone]  Office: [phone].

## 2021-01-05 NOTE — DIETITIAN INITIAL EVALUATION ADULT. - PHYSCIAL ASSESSMENT
Pt appears on the thin side, Nutrition focused physical exam deferred as pt c/o SOB. Recommend Nutrition focused physical exam on follow up./other (specify)

## 2021-01-05 NOTE — PROGRESS NOTE ADULT - SUBJECTIVE AND OBJECTIVE BOX
Neurology Progress Note    Patient is a 75y old  Female who presents with a chief complaint of acute respiratory failure (05 Jan 2021 06:41)      Interval History - No events overnight    Subjective:    Objective:   Vital Signs Last 24 Hrs  T(C): 36.3 (05 Jan 2021 04:00), Max: 36.7 (04 Jan 2021 16:06)  T(F): 97.4 (05 Jan 2021 04:00), Max: 98 (04 Jan 2021 16:06)  HR: 79 (05 Jan 2021 05:13) (73 - 112)  BP: 130/74 (05 Jan 2021 04:00) (129/74 - 147/87)  BP(mean): --  RR: 18 (05 Jan 2021 04:00) (18 - 18)  SpO2: 97% (05 Jan 2021 05:13) (92% - 100%)    General Exam:     NEURO EXAM:      Other:    01-05    136  |  92<L>  |  22  ----------------------------<  110<H>  4.6   |  38<H>  |  0.38<L>    Ca    9.2      05 Jan 2021 07:24  Phos  2.6     01-05  Mg     1.9     01-05    TPro  5.1<L>  /  Alb  2.7<L>  /  TBili  0.2  /  DBili  x   /  AST  26  /  ALT  34  /  AlkPhos  73  01-04    01-05    136  |  92<L>  |  22  ----------------------------<  110<H>  4.6   |  38<H>  |  0.38<L>    Ca    9.2      05 Jan 2021 07:24  Phos  2.6     01-05  Mg     1.9     01-05    TPro  5.1<L>  /  Alb  2.7<L>  /  TBili  0.2  /  DBili  x   /  AST  26  /  ALT  34  /  AlkPhos  73  01-04    LIVER FUNCTIONS - ( 04 Jan 2021 07:00 )  Alb: 2.7 g/dL / Pro: 5.1 g/dL / ALK PHOS: 73 U/L / ALT: 34 U/L / AST: 26 U/L / GGT: x                                 8.0    24.33 )-----------( 336      ( 05 Jan 2021 07:24 )             25.9     Radiology  < from: CT Chest No Cont (01.03.21 @ 09:34) >  IMPRESSION:  No pneumonia.    Emphysema.    A few bilateral 2 or 3 mm pulmonary nodules. A 1 year follow-up noncontrast chest CT is recommended to ensure stability.    Aortic root enlargement measuring about 4.2 cm.    2.4 cm right thyroid nodule. Recommend comparison to prior imaging studies. If none exists, recommend thyroid ultrasound.    Left breast nodules. Correlation with mammography and/or ultrasound is recommended if not recently performed.    < end of copied text >        MEDICATIONS  (STANDING):  acyclovir IVPB 700 milliGRAM(s) IV Intermittent every 8 hours  amLODIPine   Tablet 5 milliGRAM(s) Oral daily  budesonide  80 MICROgram(s)/formoterol 4.5 MICROgram(s) Inhaler 2 Puff(s) Inhalation two times a day  dextrose 40% Gel 15 Gram(s) Oral once  dextrose 5%. 1000 milliLiter(s) (50 mL/Hr) IV Continuous <Continuous>  dextrose 5%. 1000 milliLiter(s) (100 mL/Hr) IV Continuous <Continuous>  dextrose 50% Injectable 25 Gram(s) IV Push once  dextrose 50% Injectable 12.5 Gram(s) IV Push once  dextrose 50% Injectable 25 Gram(s) IV Push once  glucagon  Injectable 1 milliGRAM(s) IntraMuscular once  insulin lispro (ADMELOG) corrective regimen sliding scale   SubCutaneous three times a day before meals  insulin lispro (ADMELOG) corrective regimen sliding scale   SubCutaneous at bedtime  isosorbide   dinitrate Tablet (ISORDIL) 10 milliGRAM(s) Oral two times a day  lactated ringers. 1000 milliLiter(s) (75 mL/Hr) IV Continuous <Continuous>  levalbuterol Inhalation 0.63 milliGRAM(s) Inhalation three times a day  methylPREDNISolone sodium succinate Injectable 40 milliGRAM(s) IV Push every 12 hours  pantoprazole    Tablet 40 milliGRAM(s) Oral before breakfast  tiotropium 18 MICROgram(s) Capsule 1 Capsule(s) Inhalation daily    MEDICATIONS  (PRN):            Neurology Progress Note    Interval History - No events overnight    Subjective:  Pt was seen and examined at bedside. She reported numbness and sensory loss in lower extremities have not improved.     Objective:   Vital Signs Last 24 Hrs  T(C): 36.3 (05 Jan 2021 04:00), Max: 36.7 (04 Jan 2021 16:06)  T(F): 97.4 (05 Jan 2021 04:00), Max: 98 (04 Jan 2021 16:06)  HR: 79 (05 Jan 2021 05:13) (73 - 112)  BP: 130/74 (05 Jan 2021 04:00) (129/74 - 147/87)  BP(mean): --  RR: 18 (05 Jan 2021 04:00) (18 - 18)  SpO2: 97% (05 Jan 2021 05:13) (92% - 100%)    General Exam:   General: Elderly Female, appears stated age, on nasal cannula    Neurological (>12):  MS: Alert, oriented to person, place, time, situation. Follows commands.     Language: Fluent, no dysarthria    CNs: PERRL. No gross facial asymmetry b/l. Head turning intact b/l.     Motor: Decreased tone in lower extremities. Atrophy notable in b/l pectoralis muscles and RLE>LLE  RUE and LUE at least 4/5 with good effort  RLE 0/5 throughout, except 1/5 at toes  LLE 0/5 throughout, except 1/5 at toes	     Sensation: Diminished to PP/LT below level of T12, intact in upper extremities.     Other:    01-05    136  |  92<L>  |  22  ----------------------------<  110<H>  4.6   |  38<H>  |  0.38<L>    Ca    9.2      05 Jan 2021 07:24  Phos  2.6     01-05  Mg     1.9     01-05    TPro  5.1<L>  /  Alb  2.7<L>  /  TBili  0.2  /  DBili  x   /  AST  26  /  ALT  34  /  AlkPhos  73  01-04 01-05    136  |  92<L>  |  22  ----------------------------<  110<H>  4.6   |  38<H>  |  0.38<L>    Ca    9.2      05 Jan 2021 07:24  Phos  2.6     01-05  Mg     1.9     01-05    TPro  5.1<L>  /  Alb  2.7<L>  /  TBili  0.2  /  DBili  x   /  AST  26  /  ALT  34  /  AlkPhos  73  01-04    LIVER FUNCTIONS - ( 04 Jan 2021 07:00 )  Alb: 2.7 g/dL / Pro: 5.1 g/dL / ALK PHOS: 73 U/L / ALT: 34 U/L / AST: 26 U/L / GGT: x                                 8.0    24.33 )-----------( 336      ( 05 Jan 2021 07:24 )             25.9     Radiology  CT Chest No Cont (01.03.21 @ 09:34) >  IMPRESSION:  No pneumonia.  Emphysema.  A few bilateral 2 or 3 mm pulmonary nodules. A 1 year follow-up noncontrast chest CT is recommended to ensure stability.  Aortic root enlargement measuring about 4.2 cm.  2.4 cm right thyroid nodule. Recommend comparison to prior imaging studies. If none exists, recommend thyroid ultrasound.  Left breast nodules. Correlation with mammography and/or ultrasound is recommended if not recently performed.    MEDICATIONS  (STANDING):  acyclovir IVPB 700 milliGRAM(s) IV Intermittent every 8 hours  amLODIPine   Tablet 5 milliGRAM(s) Oral daily  budesonide  80 MICROgram(s)/formoterol 4.5 MICROgram(s) Inhaler 2 Puff(s) Inhalation two times a day  dextrose 40% Gel 15 Gram(s) Oral once  dextrose 5%. 1000 milliLiter(s) (50 mL/Hr) IV Continuous <Continuous>  dextrose 5%. 1000 milliLiter(s) (100 mL/Hr) IV Continuous <Continuous>  dextrose 50% Injectable 25 Gram(s) IV Push once  dextrose 50% Injectable 12.5 Gram(s) IV Push once  dextrose 50% Injectable 25 Gram(s) IV Push once  glucagon  Injectable 1 milliGRAM(s) IntraMuscular once  insulin lispro (ADMELOG) corrective regimen sliding scale   SubCutaneous three times a day before meals  insulin lispro (ADMELOG) corrective regimen sliding scale   SubCutaneous at bedtime  isosorbide   dinitrate Tablet (ISORDIL) 10 milliGRAM(s) Oral two times a day  lactated ringers. 1000 milliLiter(s) (75 mL/Hr) IV Continuous <Continuous>  levalbuterol Inhalation 0.63 milliGRAM(s) Inhalation three times a day  methylPREDNISolone sodium succinate Injectable 40 milliGRAM(s) IV Push every 12 hours  pantoprazole    Tablet 40 milliGRAM(s) Oral before breakfast  tiotropium 18 MICROgram(s) Capsule 1 Capsule(s) Inhalation daily

## 2021-01-05 NOTE — PROGRESS NOTE ADULT - ASSESSMENT
74 yo female with PMH ?COPD initially admitted at [place] who had been intubated for respiratory arrest, and upon extubation was noted to be flaccid in the legs with decreased sensation and transferred here for evaluation for possible spinal cord compression. Labs significant for leukocytosis to WBC 24. Exam significant for sensory level at about T12 and 0/5 lower extremity strength, except for 1/5 at toes bilaterally. MRI brain w/ and w/o showed symmetric globus pallidus and dentate nucleus enhancement. MR T/L spine w/ and w/o showed cord expansion from T8 through the conus and subtle cauda equina nerve root enhancement.    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 Rutherford.    Shara Leonard DO  PGY-2  Neurology Resident 76 yo female with PMH ?COPD initially admitted at [place] who had been intubated for respiratory arrest, and upon extubation was noted to be flaccid in the legs with decreased sensation and transferred here for evaluation for possible spinal cord compression. Labs significant for leukocytosis to WBC 24. Exam significant for sensory level at about T12 and 0/5 lower extremity strength, except for 1/5 at toes bilaterally. MRI brain w/ and w/o showed symmetric globus pallidus and dentate nucleus enhancement. MR T/L spine w/ and w/o showed cord expansion from T8 through the conus and subtle cauda equina nerve root enhancement.    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   - PT/OT  - ID recs appreciated   - Rest of management per primary team     Case discussed with neurology attending, Dr. Rutherford.    Shara Leonard,   PGY-2  Neurology Resident

## 2021-01-05 NOTE — PROGRESS NOTE ADULT - PROBLEM SELECTOR PLAN 1
-In the emergency department, the patient was noted to have an acute desaturation to 86% while managed on 4L nasal cannula (saturating at 100% previously while being monitored in the ED on 4L nasal cannula)   -Etiology of acute respiratory failure with hypoxia most likely is respiratory muscle collapse, which may represent insufficient respiratory muscle reserve in the setting of such great carbon dioxide retention  -Etiology of acute exacerbation of COPD uncertain; no documentation in [facility] of acute viral prodrome; no changes in medication management; no evidence of missed doses   -Of note, the patient was previously saturating at 100% on 4L nasal cannula; may also represent Haldane effect, and sudden loss of respiratory drive   -Arterial blood gas demonstrates marginally less carbon dioxide retention   -Will manage as COPD exacerbation; will administer Xopenex q8 hours  -Will administer 40 mg of intravenous methylprednisolone q12 hours (and space as needed); given patient's persistent subjective shortness of breath  -s/p five days of azithromycin  -Will manage with bilevel positive airway pressure (currently at inspiratory pressure of 14 and expiratory pressure of 5) at night and transition to nasal cannula 2L during the day; titrate oxygen saturation on pulse oximetry to 88-92%  -Will administer home doses of LAMA, LABA, and inhaled corticosteroid -In the emergency department, the patient was noted to have an acute desaturation to 86% while managed on 4L nasal cannula (saturating at 100% previously while being monitored in the ED on 4L nasal cannula)   -Etiology of acute respiratory failure with hypoxia most likely is respiratory muscle collapse, which may represent insufficient respiratory muscle reserve in the setting of such great carbon dioxide retention  -Etiology of acute exacerbation of COPD uncertain; no documentation in [facility] [place] of acute viral prodrome; no changes in medication management; no evidence of missed doses   -Arterial blood gas demonstrates marginally less carbon dioxide retention   -Will manage as COPD exacerbation; will administer Xopenex q8 hours  -Will administer 40 mg of intravenous methylprednisolone q12 hours (and space as needed); given patient's persistent subjective shortness of breath  -s/p five days of azithromycin  -Will manage with bilevel positive airway pressure (currently at inspiratory pressure of 14 and expiratory pressure of 5) at night and transition to nasal cannula 2L during the day; titrate oxygen saturation on pulse oximetry to 88-92%  -Will administer home doses of LAMA, LABA, and inhaled corticosteroid

## 2021-01-05 NOTE — CONSULT NOTE ADULT - PROBLEM SELECTOR RECOMMENDATION 5
- DNR/DNI after d/w primary team  - will continue to follow  - further GOC based on neurological workup

## 2021-01-05 NOTE — PROGRESS NOTE ADULT - PROBLEM SELECTOR PLAN 3
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 hiv, lyme, tb, ebv, hep b/c), inflammatory etiology (acute demyelating inflammatory polyneuropathy/gbs), toxin-induced (b12, folate, copper deficiency; lead poisoning), para-neoplastic syndrome, syringomyelia, spinal infarction   -Motor deficits only noted on neurologic exam (sensory exam intact per limited exam); this is most consistent with diagnosis of anterior spinal cord infarction (less likely as no bowel or bladder incontinence and temperature sensation intact) or guillan-barre syndrome; less consistent with infectious or toxic etiologies, which would likely affect peripheral nerve and cause sensory deficits in addition   -B12, folate, and copper titers normal; mri brain normal; mri lumbar spine demonstrates t2 enhancement of conus medullaris    -Lumbar puncture demonstrates mildly elevated protein level (about 50); bloody tap, first bottle demonstrates cell count of 1, less than 1 on final bottle; 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 hiv, lyme, tb, ebv, hep b/c), inflammatory etiology (acute demyelating inflammatory polyneuropathy/gbs), toxin-induced (b12, folate, copper deficiency; lead poisoning), para-neoplastic syndrome, syringomyelia, spinal infarction   -Motor deficits only noted on neurologic exam (sensory exam intact per limited exam); this is most consistent with diagnosis of anterior spinal cord infarction (less likely as no bowel or bladder incontinence and temperature sensation intact) or guillan-barre syndrome; less consistent with infectious or toxic etiologies, which would likely affect peripheral nerve and cause sensory deficits in addition   -B12, folate, and copper titers normal; mri brain normal; mri lumbar spine demonstrates t2 enhancement of conus medullaris    -Lumbar puncture demonstrates mildly elevated protein level (about 50); bloody tap, first bottle demonstrates cell count of 1, less than 1 on final bottle; 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  -will check MR spine to evaluate for infarction

## 2021-01-05 NOTE — PROGRESS NOTE ADULT - ASSESSMENT
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

## 2021-01-05 NOTE — PROGRESS NOTE ADULT - ASSESSMENT
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      HSV-2 meningoencephalitis, abnormal MRI   -CSF cell count with only 1 nucleated cell is unusual, ( due to steroids?) however pt has MRI findings and neurologic symptoms, would treat HSV with 700 mg q 8  - plan for 1 days of treatment, with spinal cord involvement with HSV neurological recovery even with antiviral and steroids might not be complete.   -c/w hydration to prevent crystaluria if feasible   - neuro following, on steroids.     Hypoxemic respiratory failure, Leucocytosis:   -CXR appreciated, CT Chest with no superimposed pneumonia   -leukocytosis is likely related to steroids, procalcitonin normal   -trend CBC, given rising leucocytosis  -blood cx in lab

## 2021-01-05 NOTE — CONSULT NOTE ADULT - PROBLEM SELECTOR RECOMMENDATION 9
Full consult to follow shortly. Please page [phone] with any acute concerns. - patient has 6/10 back pain, which she states preceded this admission  - trial tylenol PRN, patient states it works for her at home

## 2021-01-05 NOTE — DIETITIAN INITIAL EVALUATION ADULT. - PERTINENT MEDS FT
MEDICATIONS  (STANDING):  acyclovir IVPB 700 milliGRAM(s) IV Intermittent every 8 hours  amLODIPine   Tablet 5 milliGRAM(s) Oral daily  budesonide  80 MICROgram(s)/formoterol 4.5 MICROgram(s) Inhaler 2 Puff(s) Inhalation two times a day  dextrose 40% Gel 15 Gram(s) Oral once  dextrose 5%. 1000 milliLiter(s) (50 mL/Hr) IV Continuous <Continuous>  dextrose 5%. 1000 milliLiter(s) (100 mL/Hr) IV Continuous <Continuous>  dextrose 50% Injectable 25 Gram(s) IV Push once  dextrose 50% Injectable 12.5 Gram(s) IV Push once  dextrose 50% Injectable 25 Gram(s) IV Push once  glucagon  Injectable 1 milliGRAM(s) IntraMuscular once  insulin lispro (ADMELOG) corrective regimen sliding scale   SubCutaneous three times a day before meals  insulin lispro (ADMELOG) corrective regimen sliding scale   SubCutaneous at bedtime  isosorbide   dinitrate Tablet (ISORDIL) 10 milliGRAM(s) Oral two times a day  lactated ringers. 1000 milliLiter(s) (75 mL/Hr) IV Continuous <Continuous>  levalbuterol Inhalation 0.63 milliGRAM(s) Inhalation three times a day  methylPREDNISolone sodium succinate Injectable 40 milliGRAM(s) IV Push every 12 hours  pantoprazole    Tablet 40 milliGRAM(s) Oral before breakfast  tiotropium 18 MICROgram(s) Capsule 1 Capsule(s) Inhalation daily

## 2021-01-05 NOTE — CONSULT NOTE ADULT - SUBJECTIVE AND OBJECTIVE BOX
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 Desaturated in ED at [facility], suspected cause being respiratory muscle collapse, requiring BiPAP and O2 NC, alongside her home COPD medications. Patient has bilateral LE weakness with intact bowel and bladder continence. 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 Patient was placed on steroids, and MR angio ordered to rule out a spinal cord infarct. Acyclovir started by ID. Patient made herself DNR/DNI, and palliative care consulted for further GOC.    PERTINENT PM/SXH:   Anxiety    COPD, severe      History of repair of hip fracture      FAMILY HISTORY:  No pertinent family history in first degree relatives      ITEMS NOT CHECKED ARE NOT PRESENT    SOCIAL HISTORY:   Significant other/partner[ ]  Children[ ]  Yarsani/Spirituality:  Substance hx:  [ ]   Tobacco hx:  [ ]   Alcohol hx: [ ]   Home Opioid hx:  [ ] I-Stop Reference No:  Living Situation: [X]Home  [ ]Long term care  [ ]Rehab [ ]Other  Home Services: [ ] HHA [ ] Visting RN [ ] Hospice    ADVANCE DIRECTIVES:    DNR  Yes    MOLST  [ ]  Living Will  [ ]   DECISION MAKER(s):  [ ] Health Care Proxy(s)  [ ] Surrogate(s)  [ ] Guardian           Name(s): Phone Number(s): Charline Patel (niece) [phone],    BASELINE (I)ADL(s) (prior to admission):  [place]: [X]Total  [ ] Moderate [ ]Dependent    Allergies    albuterol (Other)    Intolerances    MEDICATIONS  (STANDING):  acyclovir IVPB 700 milliGRAM(s) IV Intermittent every 8 hours  amLODIPine   Tablet 5 milliGRAM(s) Oral daily  budesonide  80 MICROgram(s)/formoterol 4.5 MICROgram(s) Inhaler 2 Puff(s) Inhalation two times a day  dextrose 40% Gel 15 Gram(s) Oral once  dextrose 5%. 1000 milliLiter(s) (50 mL/Hr) IV Continuous <Continuous>  dextrose 5%. 1000 milliLiter(s) (100 mL/Hr) IV Continuous <Continuous>  dextrose 50% Injectable 25 Gram(s) IV Push once  dextrose 50% Injectable 12.5 Gram(s) IV Push once  dextrose 50% Injectable 25 Gram(s) IV Push once  glucagon  Injectable 1 milliGRAM(s) IntraMuscular once  insulin lispro (ADMELOG) corrective regimen sliding scale   SubCutaneous three times a day before meals  insulin lispro (ADMELOG) corrective regimen sliding scale   SubCutaneous at bedtime  isosorbide   dinitrate Tablet (ISORDIL) 10 milliGRAM(s) Oral two times a day  lactated ringers. 1000 milliLiter(s) (75 mL/Hr) IV Continuous <Continuous>  levalbuterol Inhalation 0.63 milliGRAM(s) Inhalation three times a day  methylPREDNISolone sodium succinate Injectable 40 milliGRAM(s) IV Push every 12 hours  pantoprazole    Tablet 40 milliGRAM(s) Oral before breakfast  tiotropium 18 MICROgram(s) Capsule 1 Capsule(s) Inhalation daily    MEDICATIONS  (PRN):    PRESENT SYMPTOMS: [ ]Unable to obtain due to poor mentation   Source if other than patient:  [ ]Family   [ ]Team     Pain: [ ]yes [ ]no  QOL impact -   Location -                    Aggravating factors -  Quality -  Radiation -  Timing-  Severity (0-10 scale):  Minimal acceptable level (0-10 scale):     CPOT:    https://www.[facility].org/getattachment/[email or web address]/Critical-Care-Pain-Observation-Tool-(CPOT)      PAIN AD Score:     http://geriatrictoolkit.[email or web address]/cog/painad.pdf (press ctrl +  left click to view)    Dyspnea:                           [ ]Mild [ ]Moderate [ ]Severe  Anxiety:                             [ ]Mild [ ]Moderate [ ]Severe  Fatigue:                             [ ]Mild [ ]Moderate [ ]Severe  Nausea:                             [ ]Mild [ ]Moderate [ ]Severe  Loss of appetite:              [ ]Mild [ ]Moderate [ ]Severe  Constipation:                    [ ]Mild [ ]Moderate [ ]Severe    Other Symptoms:  [ ]All other review of systems negative     Palliative Performance Status Version 2:         %    http://npcrc.org/files/news/palliative_performance_scale_ppsv2.pdf  PHYSICAL EXAM:  Vital Signs Last 24 Hrs  T(C): 36.6 (05 Jan 2021 11:51), Max: 36.7 (04 Jan 2021 16:06)  T(F): 97.8 (05 Jan 2021 11:51), Max: 98 (04 Jan 2021 16:06)  HR: 113 (05 Jan 2021 11:51) (73 - 113)  BP: 136/81 (05 Jan 2021 11:51) (129/74 - 137/80)  BP(mean): --  RR: 18 (05 Jan 2021 11:51) (18 - 18)  SpO2: 93% (05 Jan 2021 11:51) (92% - 100%) I&O's Summary    04 Jan 2021 07:01  -  05 Jan 2021 07:00  --------------------------------------------------------  IN: 460 mL / OUT: 2150 mL / NET: -1690 mL    05 Jan 2021 07:01  -  05 Jan 2021 15:19  --------------------------------------------------------  IN: 0 mL / OUT: 650 mL / NET: -650 mL      GENERAL:  [ ]Alert  [ ]Oriented x   [ ]Lethargic  [ ]Cachexia  [ ]Unarousable  [ ]Verbal  [ ]Non-Verbal  Behavioral:   [ ] Anxiety  [ ] Delirium [ ] Agitation [ ] Other  HEENT:  [ ]Normal   [ ]Dry mouth   [ ]ET Tube/Trach  [ ]Oral lesions  PULMONARY:   [ ]Clear [ ]Tachypnea  [ ]Audible excessive secretions   [ ]Rhonchi        [ ]Right [ ]Left [ ]Bilateral  [ ]Crackles        [ ]Right [ ]Left [ ]Bilateral  [ ]Wheezing     [ ]Right [ ]Left [ ]Bilateral  [ ]Diminished breath sounds [ ]right [ ]left [ ]bilateral  CARDIOVASCULAR:    [ ]Regular [ ]Irregular [ ]Tachy  [ ]Lance [ ]Murmur [ ]Other  GASTROINTESTINAL:  [ ]Soft  [ ]Distended   [ ]+BS  [ ]Non tender [ ]Tender  [ ]PEG [ ]OGT/ NGT  Last BM:     GENITOURINARY:  [ ]Normal [ ] Incontinent   [ ]Oliguria/Anuria   [ ][place]  MUSCULOSKELETAL:   [ ]Normal   [ ]Weakness  [ ]Bed/Wheelchair bound [ ]Edema  NEUROLOGIC:   [ ]No focal deficits  [ ]Cognitive impairment  [ ]Dysphagia [ ]Dysarthria [ ]Paresis [ ]Other   SKIN:   [ ]Normal    [ ]Rash  [ ]Pressure ulcer(s)       Present on admission [ ]y [ ]n    CRITICAL CARE:  [ ] Shock Present  [ ]Septic [ ]Cardiogenic [ ]Neurologic [ ]Hypovolemic  [ ]  Vasopressors [ ]  Inotropes   [ ]Respiratory failure present [ ]Mechanical ventilation [ ]Non-invasive ventilatory support [ ]High flow  [ ]Acute  [ ]Chronic [ ]Hypoxic  [ ]Hypercarbic [ ]Other  [ ]Other organ failure     LABS: reviewed                        8.0    24.33 )-----------( 336      ( 05 Jan 2021 07:24 )             25.9   01-05    136  |  92<L>  |  22  ----------------------------<  110<H>  4.6   |  38<H>  |  0.38<L>    Ca    9.2      05 Jan 2021 07:24  Phos  2.6     01-05  Mg     1.9     01-05    TPro  5.1<L>  /  Alb  2.7<L>  /  TBili  0.2  /  DBili  x   /  AST  26  /  ALT  34  /  AlkPhos  73  01-04        RADIOLOGY & ADDITIONAL STUDIES:    CT chest 1/3/21    No pneumonia.    Emphysema.    A few bilateral 2 or 3 mm pulmonary nodules. A 1 year follow-up noncontrast chest CT is recommended to ensure stability.    Aortic root enlargement measuring about 4.2 cm.    2.4 cm right thyroid nodule. Recommend comparison to prior imaging studies. If none exists, recommend thyroid ultrasound.    Left breast nodules. Correlation with mammography and/or ultrasound is recommended if not recently performed.    PROTEIN CALORIE MALNUTRITION PRESENT: [ ]mild [ ]moderate [ ]severe [ ]underweight [ ]morbid obesity  https://www.andeal.org/vault/2580/web/files/ONC/Table_Clinical%20Characteristics%20to%20Document%20Malnutrition-White%20JV%20et%20al%202012.pdf    Height (cm): 167.6 (12-28-20 @ 17:32)  Weight (kg): 74.8 (12-28-20 @ 17:32)  BMI (kg/m2): 26.6 (12-28-20 @ 17:32)    [ ]PPSV2 < or = to 30% [ ]significant weight loss  [ ]poor nutritional intake  [ ]anasarca     Albumin, Serum: 2.7 g/dL (01-04-21 @ 07:00)   [ ]Artificial Nutrition      REFERRALS:   [ ]Chaplaincy  [ ]Hospice  [ ]Child Life  [ ]Social Work  [ ]Case management [ ]Holistic Therapy     Goals of Care Document:     ______________  Dick Sy MD   of Geriatric and Palliative Medicine  [facility]     Please page the following number for clinical matters between the hours of 9AM and 5PM   from Monday through Friday : [phone]    After 5PM and on weekends, please page: [phone]. The Geriatric and Palliative Medicine consult service has 24/7 coverage for medical recommendations, including for symptom management needs.     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 Desaturated in ED at [facility], suspected cause being respiratory muscle collapse, requiring BiPAP and O2 NC, alongside her home COPD medications. Patient has bilateral LE weakness with intact bowel and bladder continence. 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 Patient was placed on steroids, and MR angio ordered to rule out a spinal cord infarct. Acyclovir started by ID. Patient made herself DNR/DNI, and palliative care consulted for further GOC.    PERTINENT PM/SXH:   Anxiety    COPD, severe      History of repair of hip fracture      FAMILY HISTORY:  No pertinent family history in first degree relatives      ITEMS NOT CHECKED ARE NOT PRESENT    SOCIAL HISTORY:   Significant other/partner[ ]  Children[ ]  Taoist/Spirituality:  Substance hx:  [ ]   Tobacco hx:  [ ]   Alcohol hx: [ ]   Home Opioid hx:  [ ] I-Stop Reference No: [id number]  Living Situation: [X]Home  [ ]Long term care  [ ]Rehab [ ]Other  Home Services: [ ] HHA [ ] Visting RN [ ] Hospice    ADVANCE DIRECTIVES:    DNR  Yes    MOLST  [X]  Living Will  [ ]   DECISION MAKER(s):  [X] Health Care Proxy(s)  [ ] Surrogate(s)  [ ] Guardian           Name(s): Phone Number(s): Charline Patel (niece) [phone],    BASELINE (I)ADL(s) (prior to admission):  [place]: [X]Total  [ ] Moderate [ ]Dependent    Allergies    albuterol (Other)    Intolerances    MEDICATIONS  (STANDING):  acyclovir IVPB 700 milliGRAM(s) IV Intermittent every 8 hours  amLODIPine   Tablet 5 milliGRAM(s) Oral daily  budesonide  80 MICROgram(s)/formoterol 4.5 MICROgram(s) Inhaler 2 Puff(s) Inhalation two times a day  dextrose 40% Gel 15 Gram(s) Oral once  dextrose 5%. 1000 milliLiter(s) (50 mL/Hr) IV Continuous <Continuous>  dextrose 5%. 1000 milliLiter(s) (100 mL/Hr) IV Continuous <Continuous>  dextrose 50% Injectable 25 Gram(s) IV Push once  dextrose 50% Injectable 12.5 Gram(s) IV Push once  dextrose 50% Injectable 25 Gram(s) IV Push once  glucagon  Injectable 1 milliGRAM(s) IntraMuscular once  insulin lispro (ADMELOG) corrective regimen sliding scale   SubCutaneous three times a day before meals  insulin lispro (ADMELOG) corrective regimen sliding scale   SubCutaneous at bedtime  isosorbide   dinitrate Tablet (ISORDIL) 10 milliGRAM(s) Oral two times a day  lactated ringers. 1000 milliLiter(s) (75 mL/Hr) IV Continuous <Continuous>  levalbuterol Inhalation 0.63 milliGRAM(s) Inhalation three times a day  methylPREDNISolone sodium succinate Injectable 40 milliGRAM(s) IV Push every 12 hours  pantoprazole    Tablet 40 milliGRAM(s) Oral before breakfast  tiotropium 18 MICROgram(s) Capsule 1 Capsule(s) Inhalation daily    MEDICATIONS  (PRN):    PRESENT SYMPTOMS: [ ]Unable to obtain due to poor mentation   Source if other than patient:  [ ]Family   [ ]Team     Pain: [ X]yes [ ]no  QOL impact -   Location -    back                Aggravating factors -  Quality - aching  Radiation -  Timing-  Severity (0-10 scale): 6/10  Minimal acceptable level (0-10 scale): 5/10    CPOT:    https://www.[facility].org/getattachment/[email or web address]/Critical-Care-Pain-Observation-Tool-(CPOT)      PAIN AD Score:     http://geriatrictoolkit.[place].[facility]/cog/painad.pdf (press ctrl +  left click to view)    Dyspnea:                           [ ]Mild [ ]Moderate [ ]Severe  Anxiety:                             [ ]Mild [ ]Moderate [ ]Severe  Fatigue:                             [ ]Mild [ ]Moderate [ ]Severe  Nausea:                             [ ]Mild [ ]Moderate [ ]Severe  Loss of appetite:              [ ]Mild [ ]Moderate [ ]Severe  Constipation:                    [ ]Mild [ ]Moderate [ ]Severe    Other Symptoms:  [ ]All other review of systems negative     Palliative Performance Status Version 2:         %    http://[facility].org/files/news/palliative_performance_scale_ppsv2.pdf    PHYSICAL EXAM:  Vital Signs Last 24 Hrs  T(C): 36.6 (05 Jan 2021 11:51), Max: 36.7 (04 Jan 2021 16:06)  T(F): 97.8 (05 Jan 2021 11:51), Max: 98 (04 Jan 2021 16:06)  HR: 113 (05 Jan 2021 11:51) (73 - 113)  BP: 136/81 (05 Jan 2021 11:51) (129/74 - 137/80)  BP(mean): --  RR: 18 (05 Jan 2021 11:51) (18 - 18)  SpO2: 93% (05 Jan 2021 11:51) (92% - 100%)    GENERAL:  [X]Alert  [ ]Oriented x   [ ]Lethargic  [ ]Cachexia  [ ]Unarousable  [X ]Verbal  [ ]Non-Verbal  Behavioral:   [ ] Anxiety  [ ] Delirium [ ] Agitation [ ] Other  HEENT:  [ X]Normal   [ ]Dry mouth   [ ]ET Tube/Trach  [ ]Oral lesions  PULMONARY:   [X ]Clear [ ]Tachypnea  [ ]Audible excessive secretions   [ ]Rhonchi        [ ]Right [ ]Left [ ]Bilateral  [ ]Crackles        [ ]Right [ ]Left [ ]Bilateral  [ ]Wheezing     [ ]Right [ ]Left [ ]Bilateral  [ ]Diminished breath sounds [ ]right [ ]left [ ]bilateral  CARDIOVASCULAR:    [X ]Regular [ ]Irregular [ ]Tachy  [ ]Lance [ ]Murmur [ ]Other  GASTROINTESTINAL:  [ X]Soft  [ ]Distended   [ X]+BS  [ X]Non tender [ ]Tender  [ ]PEG [ ]OGT/ NGT  Last BM:     GENITOURINARY:  [ ]Normal [ ] Incontinent   [ ]Oliguria/Anuria   [ ][place]  MUSCULOSKELETAL:   [ ]Normal   [ ]Weakness  [ ]Bed/Wheelchair bound [ ]Edema  NEUROLOGIC:   [ ]No focal deficits  [ ]Cognitive impairment  [ ]Dysphagia [ ]Dysarthria [ ]Paresis [ ]Other   SKIN:   [ ]Normal    [ ]Rash  [ ]Pressure ulcer(s)       Present on admission [ ]y [ ]n    CRITICAL CARE:  [ ] Shock Present  [ ]Septic [ ]Cardiogenic [ ]Neurologic [ ]Hypovolemic  [ ]  Vasopressors [ ]  Inotropes   [ ]Respiratory failure present [ ]Mechanical ventilation [ ]Non-invasive ventilatory support [ ]High flow  [ ]Acute  [ ]Chronic [ ]Hypoxic  [ ]Hypercarbic [ ]Other  [ ]Other organ failure     LABS: reviewed                        8.0    24.33 )-----------( 336      ( 05 Jan 2021 07:24 )             25.9   01-05    136  |  92<L>  |  22  ----------------------------<  110<H>  4.6   |  38<H>  |  0.38<L>    Ca    9.2      05 Jan 2021 07:24  Phos  2.6     01-05  Mg     1.9     01-05    TPro  5.1<L>  /  Alb  2.7<L>  /  TBili  0.2  /  DBili  x   /  AST  26  /  ALT  34  /  AlkPhos  73  01-04        RADIOLOGY & ADDITIONAL STUDIES:    CT chest 1/3/21    No pneumonia.    Emphysema.    A few bilateral 2 or 3 mm pulmonary nodules. A 1 year follow-up noncontrast chest CT is recommended to ensure stability.    Aortic root enlargement measuring about 4.2 cm.    2.4 cm right thyroid nodule. Recommend comparison to prior imaging studies. If none exists, recommend thyroid ultrasound.    Left breast nodules. Correlation with mammography and/or ultrasound is recommended if not recently performed.    PROTEIN CALORIE MALNUTRITION PRESENT: [ ]mild [ ]moderate [ ]severe [ ]underweight [ ]morbid obesity  https://www.andeal.org/vault/2440/web/files/ONC/Table_Clinical%20Characteristics%20to%20Document%20Malnutrition-White%20JV%20et%20al%202012.pdf    Height (cm): 167.6 (12-28-20 @ 17:32)  Weight (kg): 74.8 (12-28-20 @ 17:32)  BMI (kg/m2): 26.6 (12-28-20 @ 17:32)    [ ]PPSV2 < or = to 30% [ ]significant weight loss  [ ]poor nutritional intake  [ ]anasarca     Albumin, Serum: 2.7 g/dL (01-04-21 @ 07:00)   [ ]Artificial Nutrition      REFERRALS:   [ ]Chaplaincy  [ ]Hospice  [ ]Child Life  [ ]Social Work  [ ]Case management [ ]Holistic Therapy     Goals of Care Document:     ______________  Dick Sy MD   of Geriatric and Palliative Medicine  [facility]     Please page the following number for clinical matters between the hours of 9AM and 5PM   from Monday through Friday : [phone]    After 5PM and on weekends, please page: [phone]. The Geriatric and Palliative Medicine consult service has 24/7 coverage for medical recommendations, including for symptom management needs.

## 2021-01-05 NOTE — PROGRESS NOTE ADULT - SUBJECTIVE AND OBJECTIVE BOX
75y old  Female who presents with a chief complaint of acute respiratory failure (05 Jan 2021 14:54)      Interval history:  Afebrile, still on oxygen. weakness in the legs persists.       Allergies:   albuterol (Other)      Antimicrobials:  acyclovir IVPB 700 milliGRAM(s) IV Intermittent every 8 hours      REVIEW OF SYSTEMS:  No chest pain  No N/V  No rash.     Vital Signs Last 24 Hrs  T(C): 36.6 (01-05-21 @ 11:51), Max: 36.7 (01-04-21 @ 16:06)  T(F): 97.8 (01-05-21 @ 11:51), Max: 98 (01-04-21 @ 16:06)  HR: 113 (01-05-21 @ 11:51) (73 - 113)  BP: 136/81 (01-05-21 @ 11:51) (129/74 - 137/80)  BP(mean): --  RR: 18 (01-05-21 @ 11:51) (18 - 18)  SpO2: 93% (01-05-21 @ 11:51) (92% - 100%)    PHYSICAL EXAM:  Patient in no acute distress. Alert, awake, talking on the phone   Cardiovascular: S1S2 normal, tachy   Lungs: poor air entry B/L lung fields.  Gastrointestinal: soft, nontender, nondistended.  Extremities: b/l upper extremity edema   no obvious genital ulcers                             8.0    24.33 )-----------( 336      ( 05 Jan 2021 07:24 )             25.9   01-05    136  |  92<L>  |  22  ----------------------------<  110<H>  4.6   |  38<H>  |  0.38<L>    Ca    9.2      05 Jan 2021 07:24  Phos  2.6     01-05  Mg     1.9     01-05    TPro  5.1<L>  /  Alb  2.7<L>  /  TBili  0.2  /  DBili  x   /  AST  26  /  ALT  34  /  AlkPhos  73  01-04      LIVER FUNCTIONS - ( 04 Jan 2021 07:00 )  Alb: 2.7 g/dL / Pro: 5.1 g/dL / ALK PHOS: 73 U/L / ALT: 34 U/L / AST: 26 U/L / GGT: x

## 2021-01-05 NOTE — DIETITIAN INITIAL EVALUATION ADULT. - OTHER INFO
Nutrition Supplements PTA: multivitamin    Pt UBW: ~125 lbs per pt. Pt reports no recent wt changes that she knows of.   Pt dosing wt noted as 164.9 lbs, question accuracy as visually, pt appears closer to stated UBW.    Pt reports good appetite and po intake in-patient. Per RN, pt ate well this AM at breakfast, but <50% at lunch this afternoon- pt has stated she disliked the food. Pt noted as consuming 0-100% of meals in-patient per nursing flow sheet.  Pt edentulous, states she does not have her dentures here. She is agreeable to remaining on pureed diet to promote tolerance at this time. Pt denies chewing/swallowing difficulties at baseline.  Pt has no c/o nausea, vomiting, diarrhea, or constipation.

## 2021-01-05 NOTE — DIETITIAN INITIAL EVALUATION ADULT. - ETIOLOGY
respiratory distress, increased work of breathing in setting of COPD and increased physiological demand for nutrients secondary to the same

## 2021-01-05 NOTE — DIETITIAN INITIAL EVALUATION ADULT. - PERTINENT LABORATORY DATA
01-05 @ 07:24: Na 136, BUN 22, Cr 0.38<L>, <H>, K+ 4.6, Phos 2.6, Mg 1.9  12-29: HbA1c 5.4%    CAPILLARY BLOOD GLUCOSE  POCT Blood Glucose.: 182 mg/dL (05 Jan 2021 11:04)  POCT Blood Glucose.: 144 mg/dL (05 Jan 2021 07:36)  POCT Blood Glucose.: 175 mg/dL (04 Jan 2021 21:00)  POCT Blood Glucose.: 194 mg/dL (04 Jan 2021 16:40)

## 2021-01-05 NOTE — CONSULT NOTE ADULT - ASSESSMENT
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 Desaturated in ED at [facility], suspected cause being respiratory muscle collapse, requiring BiPAP and O2 NC, alongside her home COPD medications. Patient has bilateral LE weakness with intact bowel and bladder continence. 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 Patient was placed on steroids, and MR angio ordered to rule out a spinal cord infarct. Acyclovir started by ID. Patient made herself DNR/DNI, and palliative care consulted for further GOC.

## 2021-01-05 NOTE — PROGRESS NOTE ADULT - PROBLEM SELECTOR PLAN 7
-Will administer lovenox 40 for dvt prophylaxis   -Pureed diet  -Ongoing goals of care discussions with her patient states she would want to be DNR/DNI and questions the utility of treatments, and wonders if she may want to transition to a more palliative treatment option -Will administer lovenox 40 for dvt prophylaxis   -Pureed diet  -Ongoing goals of care discussions currently DNR/DNI and questions the utility of treatments, and wonders if she may want to transition to a more palliative treatment option, palliative care consulted.

## 2021-01-05 NOTE — PROGRESS NOTE ADULT - ATTENDING COMMENTS
[place]  Pager: [phone]. If no response or past 5 pm call [phone].    My colleague will cover 1/6

## 2021-01-05 NOTE — DIETITIAN INITIAL EVALUATION ADULT. - CHIEF COMPLAINT
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

## 2021-01-05 NOTE — PROGRESS NOTE ADULT - SUBJECTIVE AND OBJECTIVE BOX
Killian Rocha MD   Internal Medicine PGY-1  Pager: NS: [phone] [facility]: [id number]    INCOMPLETE NOTE Killian Rocha MD   Internal Medicine PGY-1  Pager: NS: [phone] [facility]: [id number]    Patient is a 75y old  Female who presents with a chief complaint of acute respiratory failure (05 Jan 2021 14:54)    SUBJECTIVE / OVERNIGHT EVENTS:  No acute events overnight. Pt still states her wishes of DNR/DNI, molst placed in chart. She was compliant with BIPAP overnight. Does endorse some SOB, but appears comfortable this AM. She denies any fevers, chills, n/v, abdominal pain, still having urinary and bowel movements. No other complaints. Tolerating diet. Still with extreme lower extremity weakness. Having sensation in lower extremities.      MEDICATIONS  (STANDING):  acyclovir IVPB 700 milliGRAM(s) IV Intermittent every 8 hours  amLODIPine   Tablet 5 milliGRAM(s) Oral daily  budesonide  80 MICROgram(s)/formoterol 4.5 MICROgram(s) Inhaler 2 Puff(s) Inhalation two times a day  dextrose 40% Gel 15 Gram(s) Oral once  dextrose 5%. 1000 milliLiter(s) (50 mL/Hr) IV Continuous <Continuous>  dextrose 5%. 1000 milliLiter(s) (100 mL/Hr) IV Continuous <Continuous>  dextrose 50% Injectable 25 Gram(s) IV Push once  dextrose 50% Injectable 12.5 Gram(s) IV Push once  dextrose 50% Injectable 25 Gram(s) IV Push once  glucagon  Injectable 1 milliGRAM(s) IntraMuscular once  insulin lispro (ADMELOG) corrective regimen sliding scale   SubCutaneous three times a day before meals  insulin lispro (ADMELOG) corrective regimen sliding scale   SubCutaneous at bedtime  isosorbide   dinitrate Tablet (ISORDIL) 10 milliGRAM(s) Oral two times a day  lactated ringers. 1000 milliLiter(s) (75 mL/Hr) IV Continuous <Continuous>  levalbuterol Inhalation 0.63 milliGRAM(s) Inhalation three times a day  methylPREDNISolone sodium succinate Injectable 40 milliGRAM(s) IV Push every 12 hours  pantoprazole    Tablet 40 milliGRAM(s) Oral before breakfast  tiotropium 18 MICROgram(s) Capsule 1 Capsule(s) Inhalation daily    MEDICATIONS  (PRN):    T(C): 36.6 (01-05-21 @ 11:51), Max: 36.7 (01-04-21 @ 16:06)  HR: 113 (01-05-21 @ 11:51) (73 - 113)  BP: 136/81 (01-05-21 @ 11:51) (129/74 - 137/80)  RR: 18 (01-05-21 @ 11:51) (18 - 18)  SpO2: 93% (01-05-21 @ 11:51) (92% - 100%)    CAPILLARY BLOOD GLUCOSE  POCT Blood Glucose.: 182 mg/dL (05 Jan 2021 11:04)  POCT Blood Glucose.: 144 mg/dL (05 Jan 2021 07:36)  POCT Blood Glucose.: 175 mg/dL (04 Jan 2021 21:00)  POCT Blood Glucose.: 194 mg/dL (04 Jan 2021 16:40)    I&O's Summary    04 Jan 2021 07:01  -  05 Jan 2021 07:00  --------------------------------------------------------  IN: 460 mL / OUT: 2150 mL / NET: -1690 mL    05 Jan 2021 07:01  -  05 Jan 2021 15:08  --------------------------------------------------------  IN: 0 mL / OUT: 650 mL / NET: -650 mL    PHYSICAL EXAM:  GENERAL: NAD, well-developed, lying in bed  HEAD:  Atraumatic, Normocephalic, poor dentition  EYES: EOMI, conjunctiva and sclera clear  NECK: Supple, No JVD  CHEST/LUNG: Clear to auscultation bilaterally; No wheeze or crackles  HEART: Regular rate and rhythm; No murmurs, rubs, or gallops  ABDOMEN: Soft, Nontender, Nondistended; Bowel sounds present  EXTREMITIES:  2+ Peripheral Pulses, No clubbing, cyanosis, or edema  PSYCH: AAOx3  NEUROLOGY: unable to move lower extremities, sensation grossly intact in LE  SKIN: No rashes or lesions    LABS:                        8.0    24.33 )-----------( 336      ( 05 Jan 2021 07:24 )             25.9     01-05    136  |  92<L>  |  22  ----------------------------<  110<H>  4.6   |  38<H>  |  0.38<L>    Ca    9.2      05 Jan 2021 07:24  Phos  2.6     01-05  Mg     1.9     01-05    TPro  5.1<L>  /  Alb  2.7<L>  /  TBili  0.2  /  DBili  x   /  AST  26  /  ALT  34  /  AlkPhos  73  01-04    RADIOLOGY & ADDITIONAL TESTS:    Imaging Personally Reviewed: Reviewed    Consultant(s) Notes Reviewed: Neurology, ID    Care Discussed with Consultants/Other Providers: Neurology, ID

## 2021-01-05 NOTE — DIETITIAN INITIAL EVALUATION ADULT. - REASON INDICATOR FOR ASSESSMENT
Pt seen for routine length of stay  Information obtained from: pt (A&Ox4, confused at times), RN, electronic medical record

## 2021-01-05 NOTE — DIETITIAN INITIAL EVALUATION ADULT. - ADD RECOMMEND
1. Continue to provide current diet order, no therapeutic diet restrictions indicated at this time. Texture per team. 2. Provide 3 mighty health shakes daily to promote adequate po intake and monitor for pt acceptance 3. Will continue to monitor nutrient intake, wt, labs, f/u per protocol

## 2021-01-06 LAB
A-TOCOPHEROL VIT E SERPL-MCNC: 12.3 MG/L — SIGNIFICANT CHANGE UP (ref 9–29)
ANION GAP SERPL CALC-SCNC: 4 MMOL/L — LOW (ref 5–17)
BETA+GAMMA TOCOPHEROL SERPL-MCNC: 0.3 MG/L — LOW (ref 0.5–4.9)
BUN SERPL-MCNC: 22 MG/DL — SIGNIFICANT CHANGE UP (ref 7–23)
CALCIUM SERPL-MCNC: 9.5 MG/DL — SIGNIFICANT CHANGE UP (ref 8.4–10.5)
CHLORIDE SERPL-SCNC: 93 MMOL/L — LOW (ref 96–108)
CO2 SERPL-SCNC: 41 MMOL/L — HIGH (ref 22–31)
CREAT SERPL-MCNC: 0.36 MG/DL — LOW (ref 0.5–1.3)
GLUCOSE BLDC GLUCOMTR-MCNC: 127 MG/DL — HIGH (ref 70–99)
GLUCOSE BLDC GLUCOMTR-MCNC: 140 MG/DL — HIGH (ref 70–99)
GLUCOSE BLDC GLUCOMTR-MCNC: 152 MG/DL — HIGH (ref 70–99)
GLUCOSE BLDC GLUCOMTR-MCNC: 188 MG/DL — HIGH (ref 70–99)
GLUCOSE SERPL-MCNC: 100 MG/DL — HIGH (ref 70–99)
HCT VFR BLD CALC: 26 % — LOW (ref 34.5–45)
HGB BLD-MCNC: 7.7 G/DL — LOW (ref 11.5–15.5)
JCPYV DNA # CSF NAA+PROBE: SIGNIFICANT CHANGE UP COPIES/ML
MAGNESIUM SERPL-MCNC: 1.9 MG/DL — SIGNIFICANT CHANGE UP (ref 1.6–2.6)
MBP CSF-MCNC: 41.6 NG/ML — HIGH (ref 0–5.6)
MCHC RBC-ENTMCNC: 29.6 GM/DL — LOW (ref 32–36)
MCHC RBC-ENTMCNC: 29.8 PG — SIGNIFICANT CHANGE UP (ref 27–34)
MCV RBC AUTO: 100.8 FL — HIGH (ref 80–100)
MOG AB CSF QL CBA IFA: NEGATIVE — SIGNIFICANT CHANGE UP
NRBC # BLD: 0 /100 WBCS — SIGNIFICANT CHANGE UP (ref 0–0)
OLIGOCLONAL BANDS CSF ELPH-IMP: SIGNIFICANT CHANGE UP
OLIGOCLONAL BANDS CSF ELPH-IMP: SIGNIFICANT CHANGE UP
PHOSPHATE SERPL-MCNC: 3 MG/DL — SIGNIFICANT CHANGE UP (ref 2.5–4.5)
PLATELET # BLD AUTO: 279 K/UL — SIGNIFICANT CHANGE UP (ref 150–400)
POTASSIUM SERPL-MCNC: 4.5 MMOL/L — SIGNIFICANT CHANGE UP (ref 3.5–5.3)
POTASSIUM SERPL-SCNC: 4.5 MMOL/L — SIGNIFICANT CHANGE UP (ref 3.5–5.3)
RBC # BLD: 2.58 M/UL — LOW (ref 3.8–5.2)
RBC # FLD: 13.7 % — SIGNIFICANT CHANGE UP (ref 10.3–14.5)
SODIUM SERPL-SCNC: 138 MMOL/L — SIGNIFICANT CHANGE UP (ref 135–145)
WBC # BLD: 19.83 K/UL — HIGH (ref 3.8–10.5)
WBC # FLD AUTO: 19.83 K/UL — HIGH (ref 3.8–10.5)

## 2021-01-06 PROCEDURE — 72159 MR ANGIO SPINE W/O&W/DYE: CPT | Mod: 26

## 2021-01-06 PROCEDURE — 99223 1ST HOSP IP/OBS HIGH 75: CPT

## 2021-01-06 PROCEDURE — 99233 SBSQ HOSP IP/OBS HIGH 50: CPT

## 2021-01-06 PROCEDURE — 99233 SBSQ HOSP IP/OBS HIGH 50: CPT | Mod: GC

## 2021-01-06 RX ADMIN — Medication 264 MILLIGRAM(S): at 05:17

## 2021-01-06 RX ADMIN — LEVALBUTEROL 0.63 MILLIGRAM(S): 1.25 SOLUTION, CONCENTRATE RESPIRATORY (INHALATION) at 17:32

## 2021-01-06 RX ADMIN — ISOSORBIDE DINITRATE 10 MILLIGRAM(S): 5 TABLET ORAL at 17:32

## 2021-01-06 RX ADMIN — AMLODIPINE BESYLATE 5 MILLIGRAM(S): 2.5 TABLET ORAL at 05:17

## 2021-01-06 RX ADMIN — Medication 264 MILLIGRAM(S): at 14:43

## 2021-01-06 RX ADMIN — ISOSORBIDE DINITRATE 10 MILLIGRAM(S): 5 TABLET ORAL at 05:17

## 2021-01-06 RX ADMIN — TIOTROPIUM BROMIDE 1 CAPSULE(S): 18 CAPSULE ORAL; RESPIRATORY (INHALATION) at 11:58

## 2021-01-06 RX ADMIN — Medication 1: at 08:29

## 2021-01-06 RX ADMIN — PANTOPRAZOLE SODIUM 40 MILLIGRAM(S): 20 TABLET, DELAYED RELEASE ORAL at 05:17

## 2021-01-06 RX ADMIN — LEVALBUTEROL 0.63 MILLIGRAM(S): 1.25 SOLUTION, CONCENTRATE RESPIRATORY (INHALATION) at 05:16

## 2021-01-06 RX ADMIN — LEVALBUTEROL 0.63 MILLIGRAM(S): 1.25 SOLUTION, CONCENTRATE RESPIRATORY (INHALATION) at 21:20

## 2021-01-06 RX ADMIN — Medication 264 MILLIGRAM(S): at 21:18

## 2021-01-06 RX ADMIN — BUDESONIDE AND FORMOTEROL FUMARATE DIHYDRATE 2 PUFF(S): 160; 4.5 AEROSOL RESPIRATORY (INHALATION) at 05:16

## 2021-01-06 RX ADMIN — BUDESONIDE AND FORMOTEROL FUMARATE DIHYDRATE 2 PUFF(S): 160; 4.5 AEROSOL RESPIRATORY (INHALATION) at 17:32

## 2021-01-06 NOTE — PROGRESS NOTE ADULT - ASSESSMENT
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 Desaturated in ED at [facility], suspected cause being respiratory muscle collapse, requiring BiPAP and O2 NC, alongside her home COPD medications. Patient has bilateral LE weakness with intact bowel and bladder continence. 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 Patient was placed on steroids, and MR angio ordered to rule out a spinal cord infarct. Acyclovir started by ID. Patient made herself DNR/DNI, and palliative care consulted for further GOC.

## 2021-01-06 NOTE — PROGRESS NOTE ADULT - SUBJECTIVE AND OBJECTIVE BOX
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 Desaturated in ED at [facility], suspected cause being respiratory muscle collapse, requiring BiPAP and O2 NC, alongside her home COPD medications. Patient has bilateral LE weakness with intact bowel and bladder continence. 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 Patient was placed on steroids, and MR angio ordered to rule out a spinal cord infarct. Acyclovir started by ID. Patient made herself DNR/DNI, and palliative care consulted for further GOC.    ADVANCE DIRECTIVES:    DNR  Yes    MOLST  [X]  Living Will  [ ]   DECISION MAKER(s):  [X] Health Care Proxy(s)  [ ] Surrogate(s)  [ ] Guardian           Name(s): Phone Number(s): Charline Patel (niece) [phone],    BASELINE (I)ADL(s) (prior to admission):  [place]: [X]Total  [ ] Moderate [ ]Dependent    Allergies    albuterol (Other)    Intolerances    MEDICATIONS  (STANDING):  acyclovir IVPB 700 milliGRAM(s) IV Intermittent every 8 hours  amLODIPine   Tablet 5 milliGRAM(s) Oral daily  budesonide  80 MICROgram(s)/formoterol 4.5 MICROgram(s) Inhaler 2 Puff(s) Inhalation two times a day  dextrose 40% Gel 15 Gram(s) Oral once  dextrose 5%. 1000 milliLiter(s) (50 mL/Hr) IV Continuous <Continuous>  dextrose 5%. 1000 milliLiter(s) (100 mL/Hr) IV Continuous <Continuous>  dextrose 50% Injectable 25 Gram(s) IV Push once  dextrose 50% Injectable 12.5 Gram(s) IV Push once  dextrose 50% Injectable 25 Gram(s) IV Push once  glucagon  Injectable 1 milliGRAM(s) IntraMuscular once  insulin lispro (ADMELOG) corrective regimen sliding scale   SubCutaneous three times a day before meals  insulin lispro (ADMELOG) corrective regimen sliding scale   SubCutaneous at bedtime  isosorbide   dinitrate Tablet (ISORDIL) 10 milliGRAM(s) Oral two times a day  lactated ringers. 1000 milliLiter(s) (75 mL/Hr) IV Continuous <Continuous>  levalbuterol Inhalation 0.63 milliGRAM(s) Inhalation three times a day  methylPREDNISolone sodium succinate IVPB 500 milliGRAM(s) IV Intermittent daily  pantoprazole    Tablet 40 milliGRAM(s) Oral before breakfast  tiotropium 18 MICROgram(s) Capsule 1 Capsule(s) Inhalation daily    MEDICATIONS  (PRN):  acetaminophen   Tablet .. 650 milliGRAM(s) Oral every 6 hours PRN Mild Pain (1 - 3), Moderate Pain (4 - 6)    PRESENT SYMPTOMS: [ ]Unable to obtain due to poor mentation   Source if other than patient:  [ ]Family   [ ]Team     Pain: [ X]yes [ ]no  QOL impact -   Location -    back                Aggravating factors -  Quality - aching  Radiation -  Timing-  Severity (0-10 scale): 6/10  Minimal acceptable level (0-10 scale): 5/10    CPOT:    https://www.[email or web address]/getattachment/[email or web address]-[email or web address]-[email or web address]-[email or web address]/Critical-Care-Pain-Observation-Tool-(CPOT)      PAIN AD Score:     http://geriatrictoolkit.[email or web address]/painad.pdf (press ctrl +  left click to view)    Dyspnea:                           [ ]Mild [ ]Moderate [ ]Severe  Anxiety:                             [ ]Mild [ ]Moderate [ ]Severe  Fatigue:                             [ ]Mild [ ]Moderate [ ]Severe  Nausea:                             [ ]Mild [ ]Moderate [ ]Severe  Loss of appetite:              [ ]Mild [ ]Moderate [ ]Severe  Constipation:                    [ ]Mild [ ]Moderate [ ]Severe    Other Symptoms:  [ ]All other review of systems negative     Palliative Performance Status Version 2:         %    http://npcrc.org/files/news/palliative_performance_scale_ppsv2.pdf    PHYSICAL EXAM:  Vital Signs Last 24 Hrs  T(C): 36.6 (06 Jan 2021 11:45), Max: 36.8 (05 Jan 2021 16:06)  T(F): 97.9 (06 Jan 2021 11:45), Max: 98.2 (05 Jan 2021 16:06)  HR: 112 (06 Jan 2021 11:45) (10 - 117)  BP: 144/86 (06 Jan 2021 11:45) (136/68 - 156/80)  BP(mean): --  RR: 19 (06 Jan 2021 11:45) (18 - 182)  SpO2: 98% (06 Jan 2021 11:45) (94% - 100%)    Limited exam for patient comfort  GENERAL:  [X]Alert  [ ]Oriented x   [ ]Lethargic  [ ]Cachexia  [ ]Unarousable  [X ]Verbal  [ ]Non-Verbal  Behavioral:   [ ] Anxiety  [ ] Delirium [ ] Agitation [ ] Other  HEENT:  [ X]Normal   [ ]Dry mouth   [ ]ET Tube/Trach  [ ]Oral lesions  PULMONARY:   [ ]Clear [ ]Tachypnea  [ ]Audible excessive secretions   [ ]Rhonchi        [ ]Right [ ]Left [ ]Bilateral  [ ]Crackles        [ ]Right [ ]Left [ ]Bilateral  [ ]Wheezing     [ ]Right [ ]Left [ ]Bilateral  [ ]Diminished breath sounds [ ]right [ ]left [ ]bilateral  CARDIOVASCULAR:    [ ]Regular [ ]Irregular [ ]Tachy  [ ]Lance [ ]Murmur [ ]Other  GASTROINTESTINAL:  [ ]Soft  [ ]Distended   [ ]+BS  [ ]Non tender [ ]Tender  [ ]PEG [ ]OGT/ NGT  Last BM:     GENITOURINARY:  [ ]Normal [ ] Incontinent   [ ]Oliguria/Anuria   [ ][place]  MUSCULOSKELETAL:   [ ]Normal   [ ]Weakness  [ ]Bed/Wheelchair bound [ ]Edema  NEUROLOGIC:   [ ]No focal deficits  [ ]Cognitive impairment  [ ]Dysphagia [ ]Dysarthria [ ]Paresis [ ]Other   SKIN:   [ ]Normal    [ ]Rash  [ ]Pressure ulcer(s)       Present on admission [ ]y [ ]n    CRITICAL CARE:  [ ] Shock Present  [ ]Septic [ ]Cardiogenic [ ]Neurologic [ ]Hypovolemic  [ ]  Vasopressors [ ]  Inotropes   [ ]Respiratory failure present [ ]Mechanical ventilation [ ]Non-invasive ventilatory support [ ]High flow  [ ]Acute  [ ]Chronic [ ]Hypoxic  [ ]Hypercarbic [ ]Other  [ ]Other organ failure     LABS: reviewed                                   7.7    19.83 )-----------( 279      ( 06 Jan 2021 06:33 )             26.0     01-06    138  |  93<L>  |  22  ----------------------------<  100<H>  4.5   |  41<H>  |  0.36<L>    Ca    9.5      06 Jan 2021 06:33  Phos  3.0     01-06  Mg     1.9     01-06    RADIOLOGY & ADDITIONAL STUDIES:    CT chest 1/3/21    No pneumonia.    Emphysema.    A few bilateral 2 or 3 mm pulmonary nodules. A 1 year follow-up noncontrast chest CT is recommended to ensure stability.    Aortic root enlargement measuring about 4.2 cm.    2.4 cm right thyroid nodule. Recommend comparison to prior imaging studies. If none exists, recommend thyroid ultrasound.    Left breast nodules. Correlation with mammography and/or ultrasound is recommended if not recently performed.    PROTEIN CALORIE MALNUTRITION PRESENT: [ ]mild [ ]moderate [ ]severe [ ]underweight [ ]morbid obesity  https://www.andeal.org/vault/2440/web/files/ONC/Table_Clinical%20Characteristics%20to%20Document%20Malnutrition-White%20JV%20et%20al%202012.pdf    Height (cm): 167.6 (12-28-20 @ 17:32)  Weight (kg): 74.8 (12-28-20 @ 17:32)  BMI (kg/m2): 26.6 (12-28-20 @ 17:32)    [ ]PPSV2 < or = to 30% [ ]significant weight loss  [ ]poor nutritional intake  [ ]anasarca     Albumin, Serum: 2.7 g/dL (01-04-21 @ 07:00)   [ ]Artificial Nutrition      REFERRALS:   [ ]Chaplaincy  [ ]Hospice  [ ]Child Life  [ ]Social Work  [ ]Case management [ ]Holistic Therapy     Goals of Care Document:     ______________  Dick Sy MD   of Geriatric and Palliative Medicine  [facility]     Please page the following number for clinical matters between the hours of 9AM and 5PM   from Monday through Friday : [phone]    After 5PM and on weekends, please page: [phone]. The Geriatric and Palliative Medicine consult service has 24/7 coverage for medical recommendations, including for symptom management needs.

## 2021-01-06 NOTE — PROGRESS NOTE ADULT - ASSESSMENT
The patient is a 75-year-old woman with a past medical history of severe COPD, on home oxygen, and with a history of a recent COPD exacerbation requiring endotracheal intubation and management in the intensive care unit at [facility] who presented to 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

## 2021-01-06 NOTE — PROGRESS NOTE ADULT - ATTENDING COMMENTS
Pt seen and examined. Agree with above with additional findings:    # acute hypoxic/hypercapeneic respiratory failure  # bilateral LE weakness  # post-ICU neuropathy/myopathy  # HSV2 meningomyelitis    - pt now on NC, mentating well; continue nocturnal BIPAP  - started on acyclovir over the weekend; pt reports no improvement in LE weakness; appreciate ID recs on further regimen  - appreciate neuro recs: unclear if HSV infection explains LE weakness all by itself;   - care discussed with Dr. Rutherford: agree with MRA with [facility]I to rule out spinal cord infarct and pulse steroid  - PT/OT/PMNR consult/OOB to chair    Karen Mcconnell MD  Division of Hospital Medicine  Cell: [phone]  Office: [phone].

## 2021-01-06 NOTE — PROGRESS NOTE ADULT - PROBLEM SELECTOR PLAN 3
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 hiv, lyme, tb, ebv, hep b/c), inflammatory etiology (acute demyelating inflammatory polyneuropathy/gbs), toxin-induced (b12, folate, copper deficiency; lead poisoning), para-neoplastic syndrome, syringomyelia, spinal infarction   -Motor deficits only noted on neurologic exam (sensory exam intact per limited exam); this is most consistent with diagnosis of anterior spinal cord infarction (less likely as no bowel or bladder incontinence and temperature sensation intact) or guillan-barre syndrome; less consistent with infectious or toxic etiologies, which would likely affect peripheral nerve and cause sensory deficits in addition   -B12, folate, and copper titers normal; mri brain normal; mri lumbar spine demonstrates t2 enhancement of conus medullaris    -Lumbar puncture demonstrates mildly elevated protein level (about 50); bloody tap, first bottle demonstrates cell count of 1, less than 1 on final bottle; 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  -will check MR spine to evaluate for infarction 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 hiv, lyme, tb, ebv, hep b/c), inflammatory etiology (acute demyelating inflammatory polyneuropathy/gbs), toxin-induced (b12, folate, copper deficiency; lead poisoning), para-neoplastic syndrome, syringomyelia, spinal infarction   -Motor deficits only noted on neurologic exam (sensory exam intact per limited exam); this is most consistent with diagnosis of anterior spinal cord infarction (less likely as no bowel or bladder incontinence and temperature sensation intact) or guillan-barre syndrome; less consistent with infectious or toxic etiologies, which would likely affect peripheral nerve and cause sensory deficits in addition   -B12, folate, and copper titers normal; mri brain normal; mri lumbar spine demonstrates t2 enhancement of conus medullaris    -Lumbar puncture demonstrates mildly elevated protein level (about 50); bloody tap, first bottle demonstrates cell count of 1, less than 1 on final bottle; 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  -will check MR spine to evaluate for infarction  -will start 500mg IV methylprenisolone for 3 days (1/6-1/8) per neurology

## 2021-01-06 NOTE — PROGRESS NOTE ADULT - PROBLEM SELECTOR PLAN 7
-Will administer lovenox 40 for dvt prophylaxis   -Pureed diet  -Ongoing goals of care discussions currently DNR/DNI and questions the utility of treatments, and wonders if she may want to transition to a more palliative treatment option, palliative care consulted.

## 2021-01-06 NOTE — PROGRESS NOTE ADULT - PROBLEM SELECTOR PLAN 1
-In the emergency department, the patient was noted to have an acute desaturation to 86% while managed on 4L nasal cannula (saturating at 100% previously while being monitored in the ED on 4L nasal cannula)   -Etiology of acute respiratory failure with hypoxia most likely is respiratory muscle collapse, which may represent insufficient respiratory muscle reserve in the setting of such great carbon dioxide retention  -Etiology of acute exacerbation of COPD uncertain; no documentation in [place] of acute viral prodrome; no changes in medication management; no evidence of missed doses   -Arterial blood gas demonstrates marginally less carbon dioxide retention   -Will manage as COPD exacerbation; will administer Xopenex q8 hours  -Will administer 40 mg of intravenous methylprednisolone q12 hours (and space as needed); given patient's persistent subjective shortness of breath  -s/p five days of azithromycin  -Will manage with bilevel positive airway pressure (currently at inspiratory pressure of 14 and expiratory pressure of 5) at night and transition to nasal cannula 2L during the day; titrate oxygen saturation on pulse oximetry to 88-92%  -Will administer home doses of LAMA, LABA, and inhaled corticosteroid -In the emergency department, the patient was noted to have an acute desaturation to 86% while managed on 4L nasal cannula (saturating at 100% previously while being monitored in the ED on 4L nasal cannula)   -Etiology of acute respiratory failure with hypoxia most likely is respiratory muscle collapse, which may represent insufficient respiratory muscle reserve in the setting of such great carbon dioxide retention  -Etiology of acute exacerbation of COPD uncertain; no documentation in [place] of acute viral prodrome; no changes in medication management; no evidence of missed doses   -Arterial blood gas demonstrates marginally less carbon dioxide retention   -Will manage as COPD exacerbation; will administer Xopenex q8 hours  -s/p 40 mg of intravenous methylprednisolone q12 hours (and space as needed); given patient's persistent subjective shortness of breath  -will now pulse steroids with 500mg IV methylprednisolone for 3 days per neurology  -s/p five days of azithromycin  -Will manage with bilevel positive airway pressure (currently at inspiratory pressure of 14 and expiratory pressure of 5) at night and transition to nasal cannula 2L during the day; titrate oxygen saturation on pulse oximetry to 88-92%  -Will administer home doses of LAMA, LABA, and inhaled corticosteroid

## 2021-01-06 NOTE — CONSULT NOTE ADULT - CONSULT REASON
BLE weakness
Evaluate Rehabilitation Needs
HSV-2 encephalitis?
bilat leg weakness
hypercapnic respiratory failure
GOC

## 2021-01-06 NOTE — PROGRESS NOTE ADULT - SUBJECTIVE AND OBJECTIVE BOX
Killian Rocha MD   Internal Medicine PGY-1  Pager: NS: [phone] [facility]: [phone]    incomplete note Killian Rocha MD   Internal Medicine PGY-1  Pager: NS: [phone] [facility]: [phone]    Patient is a 75y old  Female who presents with a chief complaint of acute respiratory failure (06 Jan 2021 07:29)      SUBJECTIVE / OVERNIGHT EVENTS:    MEDICATIONS  (STANDING):  acyclovir IVPB 700 milliGRAM(s) IV Intermittent every 8 hours  amLODIPine   Tablet 5 milliGRAM(s) Oral daily  budesonide  80 MICROgram(s)/formoterol 4.5 MICROgram(s) Inhaler 2 Puff(s) Inhalation two times a day  dextrose 40% Gel 15 Gram(s) Oral once  dextrose 5%. 1000 milliLiter(s) (50 mL/Hr) IV Continuous <Continuous>  dextrose 5%. 1000 milliLiter(s) (100 mL/Hr) IV Continuous <Continuous>  dextrose 50% Injectable 25 Gram(s) IV Push once  dextrose 50% Injectable 12.5 Gram(s) IV Push once  dextrose 50% Injectable 25 Gram(s) IV Push once  glucagon  Injectable 1 milliGRAM(s) IntraMuscular once  insulin lispro (ADMELOG) corrective regimen sliding scale   SubCutaneous three times a day before meals  insulin lispro (ADMELOG) corrective regimen sliding scale   SubCutaneous at bedtime  isosorbide   dinitrate Tablet (ISORDIL) 10 milliGRAM(s) Oral two times a day  lactated ringers. 1000 milliLiter(s) (75 mL/Hr) IV Continuous <Continuous>  levalbuterol Inhalation 0.63 milliGRAM(s) Inhalation three times a day  methylPREDNISolone sodium succinate IVPB 500 milliGRAM(s) IV Intermittent daily  pantoprazole    Tablet 40 milliGRAM(s) Oral before breakfast  tiotropium 18 MICROgram(s) Capsule 1 Capsule(s) Inhalation daily    MEDICATIONS  (PRN):  acetaminophen   Tablet .. 650 milliGRAM(s) Oral every 6 hours PRN Mild Pain (1 - 3), Moderate Pain (4 - 6)      T(C): 36.6 (01-06-21 @ 08:35), Max: 36.8 (01-05-21 @ 16:06)  HR: 10 (01-06-21 @ 10:18) (10 - 117)  BP: 138/80 (01-06-21 @ 08:35) (136/68 - 156/80)  RR: 20 (01-06-21 @ 10:18) (18 - 182)  SpO2: 97% (01-06-21 @ 10:18) (93% - 100%)  CAPILLARY BLOOD GLUCOSE      POCT Blood Glucose.: 152 mg/dL (06 Jan 2021 07:33)  POCT Blood Glucose.: 136 mg/dL (05 Jan 2021 21:15)  POCT Blood Glucose.: 157 mg/dL (05 Jan 2021 16:31)    I&O's Summary    05 Jan 2021 07:01  -  06 Jan 2021 07:00  --------------------------------------------------------  IN: 1380 mL / OUT: 1350 mL / NET: 30 mL        REVIEW OF SYSTEMS    General: No fevers/chills  Skin/Breast: No rash  Ophthalmologic: No vision changes  ENMT:No dysphagia or odynophagia  Respiratory and Thorax: No SOB, wheezing, cough  Cardiovascular: No chest pain or palpitations  Gastrointestinal: No n/v/d, No melena, No Hematochezia  Genitourinary:  No dysuria, No change in urinary frequency  Musculoskeletal: No joint or muscle pains.  Neurological: No focal deficits, No headache    PHYSICAL EXAM:  GENERAL: NAD, well-developed  HEAD:  Atraumatic, Normocephalic  EYES: EOMI, PERRLA, conjunctiva and sclera clear  NECK: Supple, No JVD  CHEST/LUNG: Clear to auscultation bilaterally; No wheeze  HEART: Regular rate and rhythm; No murmurs, rubs, or gallops  ABDOMEN: Soft, Nontender, Nondistended; Bowel sounds present  EXTREMITIES:  2+ Peripheral Pulses, No clubbing, cyanosis, or edema  PSYCH: AAOx3  NEUROLOGY: non-focal  SKIN: No rashes or lesions    LABS:                        7.7    19.83 )-----------( 279      ( 06 Jan 2021 06:33 )             26.0     01-06    138  |  93<L>  |  22  ----------------------------<  100<H>  4.5   |  41<H>  |  0.36<L>    Ca    9.5      06 Jan 2021 06:33  Phos  3.0     01-06  Mg     1.9     01-06                RADIOLOGY & ADDITIONAL TESTS:    Imaging Personally Reviewed:    Consultant(s) Notes Reviewed:      Care Discussed with Consultants/Other Providers:   Killian Rocha MD   Internal Medicine PGY-1  Pager: NS: [phone] [facility]: [phone]    Patient is a 75y old  Female who presents with a chief complaint of acute respiratory failure (06 Jan 2021 07:29)    SUBJECTIVE / OVERNIGHT EVENTS:  No acute events overnight. Pt did not use Bipap overnight. Looks more SOB this morning, but saturating well. Still having lower extremity weakness, not able to move LE at all, does have some sensation intact. She denies any fevers, chills, n/v, chest pain, abdominal pain, still having urinary and bowel movements. No other complaints.     MEDICATIONS  (STANDING):  acyclovir IVPB 700 milliGRAM(s) IV Intermittent every 8 hours  amLODIPine   Tablet 5 milliGRAM(s) Oral daily  budesonide  80 MICROgram(s)/formoterol 4.5 MICROgram(s) Inhaler 2 Puff(s) Inhalation two times a day  dextrose 40% Gel 15 Gram(s) Oral once  dextrose 5%. 1000 milliLiter(s) (50 mL/Hr) IV Continuous <Continuous>  dextrose 5%. 1000 milliLiter(s) (100 mL/Hr) IV Continuous <Continuous>  dextrose 50% Injectable 25 Gram(s) IV Push once  dextrose 50% Injectable 12.5 Gram(s) IV Push once  dextrose 50% Injectable 25 Gram(s) IV Push once  glucagon  Injectable 1 milliGRAM(s) IntraMuscular once  insulin lispro (ADMELOG) corrective regimen sliding scale   SubCutaneous three times a day before meals  insulin lispro (ADMELOG) corrective regimen sliding scale   SubCutaneous at bedtime  isosorbide   dinitrate Tablet (ISORDIL) 10 milliGRAM(s) Oral two times a day  lactated ringers. 1000 milliLiter(s) (75 mL/Hr) IV Continuous <Continuous>  levalbuterol Inhalation 0.63 milliGRAM(s) Inhalation three times a day  methylPREDNISolone sodium succinate IVPB 500 milliGRAM(s) IV Intermittent daily  pantoprazole    Tablet 40 milliGRAM(s) Oral before breakfast  tiotropium 18 MICROgram(s) Capsule 1 Capsule(s) Inhalation daily    MEDICATIONS  (PRN):  acetaminophen   Tablet .. 650 milliGRAM(s) Oral every 6 hours PRN Mild Pain (1 - 3), Moderate Pain (4 - 6)    T(C): 36.6 (01-06-21 @ 08:35), Max: 36.8 (01-05-21 @ 16:06)  HR: 10 (01-06-21 @ 10:18) (10 - 117)  BP: 138/80 (01-06-21 @ 08:35) (136/68 - 156/80)  RR: 20 (01-06-21 @ 10:18) (18 - 182)  SpO2: 97% (01-06-21 @ 10:18) (93% - 100%)    CAPILLARY BLOOD GLUCOSE  POCT Blood Glucose.: 152 mg/dL (06 Jan 2021 07:33)  POCT Blood Glucose.: 136 mg/dL (05 Jan 2021 21:15)  POCT Blood Glucose.: 157 mg/dL (05 Jan 2021 16:31)    I&O's Summary    05 Jan 2021 07:01  -  06 Jan 2021 07:00  --------------------------------------------------------  IN: 1380 mL / OUT: 1350 mL / NET: 30 mL    PHYSICAL EXAM:  GENERAL: NAD, well-developed, lying in bed  HEAD:  Atraumatic, Normocephalic, poor dentition  EYES: EOMI, conjunctiva and sclera clear  NECK: Supple, No JVD  CHEST/LUNG: Clear to auscultation bilaterally; No wheeze or crackles  HEART: Regular rate and rhythm; No murmurs, rubs, or gallops  ABDOMEN: Soft, Nontender, Nondistended; Bowel sounds present  EXTREMITIES:  2+ Peripheral Pulses, No clubbing, cyanosis, or edema  PSYCH: AAOx3  NEUROLOGY: unable to move lower extremities, sensation grossly intact in LE  SKIN: No rashes or lesions    LABS:                        7.7    19.83 )-----------( 279      ( 06 Jan 2021 06:33 )             26.0     01-06    138  |  93<L>  |  22  ----------------------------<  100<H>  4.5   |  41<H>  |  0.36<L>    Ca    9.5      06 Jan 2021 06:33  Phos  3.0     01-06  Mg     1.9     01-06    RADIOLOGY & ADDITIONAL TESTS:    Imaging Personally Reviewed: Reviewed    Consultant(s) Notes Reviewed: Neurology and ID    Care Discussed with Consultants/Other Providers: Neurology and ID

## 2021-01-06 NOTE — CONSULT NOTE ADULT - SUBJECTIVE AND OBJECTIVE BOX
Patient is a 75y old  Female who presents with a chief complaint of acute respiratory failure (06 Jan 2021 07:29)    Admission HPI:  The patient is a 75-year-old woman with a past medical history of severe chronic obstructive pulmonary disease, managed at home with home oxygen therapy, who presented to the emergency department overnight as a transfer from [facility] for management of unexplained bilateral lower extremity weakness. Per chart review, the patient had a recent admission to [facility] for management of a left hip fracture and was sent to rehab on 12/18. However, shortly thereafter, the patient was transferred to [facility] for workup and management of an acute alteration in mental status, found to be secondary to acute retention of carbon dioxide, most likely secondary to an acute exacerbation, of uncertain etiology, of her chronic obstructive pulmonary disease. The patient was sedated and intubated and was managed in the intensive care unit at [facility] with standing methylprednisolone, azithromycin, and albuterol in conjunction with her outpatient long-acting beta agonist, long-acting muscarinic antagonist, and inhaled corticosteroid. On 12/24, the patient was successfully extubated and transitioned to management with bilevel positive airway pressure, and ultimately to oxygen by nasal cannula. On the morning of 12/28, the patient's care was transitioned to the rehabilitation facility at [facility], and it was noted that she had--presumably new--weakness in her bilateral lower extremities, so she was transferred to [facility] for further workup and management.     While, in the emergency department, the patient underwent evaluation with neurosurgery, which included an emergent MRI of the spinal cord to rule out a spinal cord compression. None was identified; however, enhancement on T2 of the distal lumbar cord and conus medullaris was evident. Also while the patient was in the emergency department, she was noted to have an acute oxygen desaturation while managed on 4L nasal cannula (from 100% spO2 to about 84%), so she was transitioned to management with bilevel positive airway pressure. Her vital signs did not demonstrate any signs of fever or hypotension but showed evident tachycardia (sinus on EKG). An arterial blood gas drawn and demonstrated evidence of acute-on-chronic retention of carbon dioxide.  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  Imaging of the spine on 12/31/20 noted "Cord expansion from T8 through the conus subtle enhancement and enhancement of the cauda equina nerve roots is nonspecific and may be related to infectious, inflammatory or neoplastic process."    REVIEW OF SYSTEMS: No chest pain, shortness of breath, nausea, vomiting or diarhea; other ROS neg     PAST MEDICAL & SURGICAL HISTORY  Anxiety  COPD, severe  History of repair of hip fracture    FUNCTIONAL HISTORY:   Lives alone- was Independent  Had been at [facility] post hip fx    CURRENT FUNCTIONAL STATUS:  Max A bed mob    FAMILY HISTORY   No pertinent family history in first degree relatives    MEDICATIONS   acetaminophen   Tablet .. 650 milliGRAM(s) Oral every 6 hours PRN  acyclovir IVPB 700 milliGRAM(s) IV Intermittent every 8 hours  amLODIPine   Tablet 5 milliGRAM(s) Oral daily  budesonide  80 MICROgram(s)/formoterol 4.5 MICROgram(s) Inhaler 2 Puff(s) Inhalation two times a day  dextrose 40% Gel 15 Gram(s) Oral once  dextrose 5%. 1000 milliLiter(s) IV Continuous <Continuous>  dextrose 5%. 1000 milliLiter(s) IV Continuous <Continuous>  dextrose 50% Injectable 25 Gram(s) IV Push once  dextrose 50% Injectable 12.5 Gram(s) IV Push once  dextrose 50% Injectable 25 Gram(s) IV Push once  glucagon  Injectable 1 milliGRAM(s) IntraMuscular once  insulin lispro (ADMELOG) corrective regimen sliding scale   SubCutaneous three times a day before meals  insulin lispro (ADMELOG) corrective regimen sliding scale   SubCutaneous at bedtime  isosorbide   dinitrate Tablet (ISORDIL) 10 milliGRAM(s) Oral two times a day  lactated ringers. 1000 milliLiter(s) IV Continuous <Continuous>  levalbuterol Inhalation 0.63 milliGRAM(s) Inhalation three times a day  methylPREDNISolone sodium succinate IVPB 500 milliGRAM(s) IV Intermittent daily  pantoprazole    Tablet 40 milliGRAM(s) Oral before breakfast  tiotropium 18 MICROgram(s) Capsule 1 Capsule(s) Inhalation daily    ALLERGIES  albuterol (Other)    VITALS  T(C): 36.6 (01-06-21 @ 11:45), Max: 36.8 (01-05-21 @ 16:06)  HR: 112 (01-06-21 @ 11:45) (10 - 117)  BP: 144/86 (01-06-21 @ 11:45) (136/68 - 156/80)  RR: 19 (01-06-21 @ 11:45) (18 - 182)  SpO2: 98% (01-06-21 @ 11:45) (94% - 100%)  Wt(kg): --    PHYSICAL EXAM  Constitutional - NAD, Comfortable  HEENT - NCAT, EOMI  Neck - Supple, No limited ROM  Chest - CTA bilaterally, No wheeze, No rhonchi, No crackles  Cardiovascular - Tachy, S1S2, No murmurs  Abdomen - BS+, Soft, NTND  Extremities - No C/C/E, No calf tenderness   Neurologic Exam -                    Cognitive - Awake, Alert, AAO to self, place, date, year, situation     Communication - Fluent, No dysarthria, no aphasia     Cranial Nerves - CN 2-12 intact     Motor - No focal deficits      Sensory - Intact to LT     Reflexes - DTR Intact, No primitive reflexive  Psychiatric - Mood stable, Affect WNL    RECENT LABS/IMAGING  CBC Full  -  ( 06 Jan 2021 06:33 )  WBC Count : 19.83 K/uL  RBC Count : 2.58 M/uL  Hemoglobin : 7.7 g/dL  Hematocrit : 26.0 %  Platelet Count - Automated : 279 K/uL  Mean Cell Volume : 100.8 fl  Mean Cell Hemoglobin : 29.8 pg  Mean Cell Hemoglobin Concentration : 29.6 gm/dL  Auto Neutrophil # : x  Auto Lymphocyte # : x  Auto Monocyte # : x  Auto Eosinophil # : x  Auto Basophil # : x  Auto Neutrophil % : x  Auto Lymphocyte % : x  Auto Monocyte % : x  Auto Eosinophil % : x  Auto Basophil % : x    01-06    138  |  93<L>  |  22  ----------------------------<  100<H>  4.5   |  41<H>  |  0.36<L>    Ca    9.5      06 Jan 2021 06:33  Phos  3.0     01-06  Mg     1.9     01-06    Impression:   76 yo with functional deficits secondary to diagnosis of paraplegia    Plan:  PT- ROM, Bed Mob, Transfers, Amb w AD and bracing as needed  OT- ADLs, bracing  Prec- Falls, Cardiac, Pulm  DVT Prophylaxis- sCDs, chemopx when cleared by primary team.   Skin- Turn q2 h  Dispo-  Patient is a 75y old  Female who presents with a chief complaint of acute respiratory failure (06 Jan 2021 07:29)    Admission HPI:  The patient is a 75-year-old woman with a past medical history of severe chronic obstructive pulmonary disease, managed at home with home oxygen therapy, who presented to the emergency department overnight as a transfer from [facility] for management of unexplained bilateral lower extremity weakness. Per chart review, the patient had a recent admission to [facility] for management of a left hip fracture and was sent to rehab on 12/18. However, shortly thereafter, the patient was transferred to [facility] for workup and management of an acute alteration in mental status, found to be secondary to acute retention of carbon dioxide, most likely secondary to an acute exacerbation, of uncertain etiology, of her chronic obstructive pulmonary disease. The patient was sedated and intubated and was managed in the intensive care unit at [facility] with standing methylprednisolone, azithromycin, and albuterol in conjunction with her outpatient long-acting beta agonist, long-acting muscarinic antagonist, and inhaled corticosteroid. On 12/24, the patient was successfully extubated and transitioned to management with bilevel positive airway pressure, and ultimately to oxygen by nasal cannula. On the morning of 12/28, the patient's care was transitioned to the rehabilitation facility at [facility], and it was noted that she had--presumably new--weakness in her bilateral lower extremities, so she was transferred to [facility] for further workup and management.     While, in the emergency department, the patient underwent evaluation with neurosurgery, which included an emergent MRI of the spinal cord to rule out a spinal cord compression. None was identified; however, enhancement on T2 of the distal lumbar cord and conus medullaris was evident. Also while the patient was in the emergency department, she was noted to have an acute oxygen desaturation while managed on 4L nasal cannula (from 100% spO2 to about 84%), so she was transitioned to management with bilevel positive airway pressure. Her vital signs did not demonstrate any signs of fever or hypotension but showed evident tachycardia (sinus on EKG). An arterial blood gas drawn and demonstrated evidence of acute-on-chronic retention of carbon dioxide.  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  Imaging of the spine on 12/31/20 noted "Cord expansion from T8 through the conus subtle enhancement and enhancement of the cauda equina nerve roots is nonspecific and may be related to infectious, inflammatory or neoplastic process."    REVIEW OF SYSTEMS: + weakness of LEs, + incontinence of bowel, denies limb pain, No chest pain, shortness of breath, nausea, vomiting or diarhea; other ROS neg     PAST MEDICAL & SURGICAL HISTORY  Anxiety  COPD, severe  History of repair of hip fracture    FUNCTIONAL HISTORY:   Lives alone- was Independent  Had been at [facility] post hip fx    CURRENT FUNCTIONAL STATUS:  Max A bed mob    FAMILY HISTORY   No pertinent family history in first degree relatives    MEDICATIONS   acetaminophen   Tablet .. 650 milliGRAM(s) Oral every 6 hours PRN  acyclovir IVPB 700 milliGRAM(s) IV Intermittent every 8 hours  amLODIPine   Tablet 5 milliGRAM(s) Oral daily  budesonide  80 MICROgram(s)/formoterol 4.5 MICROgram(s) Inhaler 2 Puff(s) Inhalation two times a day  dextrose 40% Gel 15 Gram(s) Oral once  dextrose 5%. 1000 milliLiter(s) IV Continuous <Continuous>  dextrose 5%. 1000 milliLiter(s) IV Continuous <Continuous>  dextrose 50% Injectable 25 Gram(s) IV Push once  dextrose 50% Injectable 12.5 Gram(s) IV Push once  dextrose 50% Injectable 25 Gram(s) IV Push once  glucagon  Injectable 1 milliGRAM(s) IntraMuscular once  insulin lispro (ADMELOG) corrective regimen sliding scale   SubCutaneous three times a day before meals  insulin lispro (ADMELOG) corrective regimen sliding scale   SubCutaneous at bedtime  isosorbide   dinitrate Tablet (ISORDIL) 10 milliGRAM(s) Oral two times a day  lactated ringers. 1000 milliLiter(s) IV Continuous <Continuous>  levalbuterol Inhalation 0.63 milliGRAM(s) Inhalation three times a day  methylPREDNISolone sodium succinate IVPB 500 milliGRAM(s) IV Intermittent daily  pantoprazole    Tablet 40 milliGRAM(s) Oral before breakfast  tiotropium 18 MICROgram(s) Capsule 1 Capsule(s) Inhalation daily    ALLERGIES  albuterol (Other)    VITALS  T(C): 36.6 (01-06-21 @ 11:45), Max: 36.8 (01-05-21 @ 16:06)  HR: 112 (01-06-21 @ 11:45) (10 - 117)  BP: 144/86 (01-06-21 @ 11:45) (136/68 - 156/80)  RR: 19 (01-06-21 @ 11:45) (18 - 182)  SpO2: 98% (01-06-21 @ 11:45) (94% - 100%)  Wt(kg): --    PHYSICAL EXAM  Constitutional - NAD, Comfortable  HEENT - NCAT, EOMI, poor dentition  Neck - Supple, No limited ROM  Chest - CTA bilaterally, No wheeze, No rhonchi, No crackles  Cardiovascular - Tachy, S1S2, No murmurs  Abdomen - BS+, Soft, NTND  Extremities - No C/C/E, No calf tenderness   Neurologic Exam -                 AAO x 3  Speech appropriate  Motor 5/5 bl UE, 0/5 bl LEs  Sensation intact of UE and LEs  No clonus    Psychiatric - Mood stable, Affect WNL    RECENT LABS/IMAGING  CBC Full  -  ( 06 Jan 2021 06:33 )  WBC Count : 19.83 K/uL  RBC Count : 2.58 M/uL  Hemoglobin : 7.7 g/dL  Hematocrit : 26.0 %  Platelet Count - Automated : 279 K/uL  Mean Cell Volume : 100.8 fl  Mean Cell Hemoglobin : 29.8 pg  Mean Cell Hemoglobin Concentration : 29.6 gm/dL  Auto Neutrophil # : x  Auto Lymphocyte # : x  Auto Monocyte # : x  Auto Eosinophil # : x  Auto Basophil # : x  Auto Neutrophil % : x  Auto Lymphocyte % : x  Auto Monocyte % : x  Auto Eosinophil % : x  Auto Basophil % : x    01-06    138  |  93<L>  |  22  ----------------------------<  100<H>  4.5   |  41<H>  |  0.36<L>    Ca    9.5      06 Jan 2021 06:33  Phos  3.0     01-06  Mg     1.9     01-06    Impression:   74 yo with functional deficits secondary to diagnosis of paraplegia    Plan:  PT- ROM, Bed Mob, Transfers, Amb w AD and bracing as needed  OT- ADLs, bracing  Prec- Falls, Cardiac, Pulm  Monitor anemia and wbc ct  Bowel- consider senna and colace  Bladder- Monitor PVRs  DVT Prophylaxis- sCDs, chemopx when cleared by primary team.   Skin- Turn q2 h  Dispo- Acute SCI Rehab- can tolerate 3h/d of therapies and requires daily physician visits

## 2021-01-06 NOTE — CONSULT NOTE ADULT - CONSULT REQUESTED DATE/TIME
06-Jan-2021 12:44
28-Dec-2020 18:02
29-Dec-2020 03:20
29-Dec-2020 08:00
05-Jan-2021
02-Jan-2021 12:49

## 2021-01-06 NOTE — CONSULT NOTE ADULT - REASON FOR ADMISSION
acute respiratory failure
b/l LE weakness c/b hypercapnic respiratory failure
acute respiratory failure
acute respiratory failure

## 2021-01-07 PROBLEM — I10 ESSENTIAL (PRIMARY) HYPERTENSION: Chronic | Status: ACTIVE | Noted: 2020-12-19

## 2021-01-07 PROBLEM — F41.9 ANXIETY DISORDER, UNSPECIFIED: Chronic | Status: ACTIVE | Noted: 2020-12-19

## 2021-01-07 PROBLEM — J44.9 CHRONIC OBSTRUCTIVE PULMONARY DISEASE, UNSPECIFIED: Chronic | Status: ACTIVE | Noted: 2020-12-19

## 2021-01-07 LAB
AMPA-R AB CBA, CSF: NEGATIVE — SIGNIFICANT CHANGE UP
AMPHIPHYSIN AB TITR CSF: NEGATIVE TITER — SIGNIFICANT CHANGE UP
ANION GAP SERPL CALC-SCNC: 5 MMOL/L — SIGNIFICANT CHANGE UP (ref 5–17)
APTT BLD: 23.5 SEC — LOW (ref 27.5–35.5)
AQP4 H2O CHANNEL IGG CSF QL: NEGATIVE — SIGNIFICANT CHANGE UP
BASE EXCESS BLDV CALC-SCNC: 17.2 MMOL/L — HIGH (ref -2–2)
BLD GP AB SCN SERPL QL: NEGATIVE — SIGNIFICANT CHANGE UP
BUN SERPL-MCNC: 20 MG/DL — SIGNIFICANT CHANGE UP (ref 7–23)
CA-I SERPL-SCNC: 1.25 MMOL/L — SIGNIFICANT CHANGE UP (ref 1.12–1.3)
CALCIUM SERPL-MCNC: 9 MG/DL — SIGNIFICANT CHANGE UP (ref 8.4–10.5)
CASPR2-IGG CBA, CSF: NEGATIVE — SIGNIFICANT CHANGE UP
CHLORIDE BLDV-SCNC: 90 MMOL/L — LOW (ref 96–108)
CHLORIDE SERPL-SCNC: 91 MMOL/L — LOW (ref 96–108)
CO2 BLDV-SCNC: 48 MMOL/L — HIGH (ref 22–30)
CO2 SERPL-SCNC: 41 MMOL/L — HIGH (ref 22–31)
CREAT SERPL-MCNC: 0.35 MG/DL — LOW (ref 0.5–1.3)
CV2 IGG TITR CSF: NEGATIVE TITER — SIGNIFICANT CHANGE UP
DPPX ANTIBODY IFA, CSF: NEGATIVE — SIGNIFICANT CHANGE UP
GABA-B-R AB CBA, CSF: NEGATIVE — SIGNIFICANT CHANGE UP
GAD65 AB CSF-SCNC: 0 NMOL/L — SIGNIFICANT CHANGE UP
GAS PNL BLDV: 135 MMOL/L — SIGNIFICANT CHANGE UP (ref 135–145)
GAS PNL BLDV: SIGNIFICANT CHANGE UP
GFAP IFA, CSF: NEGATIVE — SIGNIFICANT CHANGE UP
GLIAL NUC TYPE 1 AB TITR CSF: NEGATIVE TITER — SIGNIFICANT CHANGE UP
GLUCOSE BLDC GLUCOMTR-MCNC: 151 MG/DL — HIGH (ref 70–99)
GLUCOSE BLDC GLUCOMTR-MCNC: 160 MG/DL — HIGH (ref 70–99)
GLUCOSE BLDC GLUCOMTR-MCNC: 200 MG/DL — HIGH (ref 70–99)
GLUCOSE BLDC GLUCOMTR-MCNC: 275 MG/DL — HIGH (ref 70–99)
GLUCOSE BLDV-MCNC: 74 MG/DL — SIGNIFICANT CHANGE UP (ref 70–99)
GLUCOSE SERPL-MCNC: 78 MG/DL — SIGNIFICANT CHANGE UP (ref 70–99)
HCO3 BLDV-SCNC: 45 MMOL/L — HIGH (ref 21–29)
HCT VFR BLD CALC: 25.5 % — LOW (ref 34.5–45)
HCT VFR BLDA CALC: 24 % — LOW (ref 39–50)
HGB BLD CALC-MCNC: 7.7 G/DL — LOW (ref 11.5–15.5)
HGB BLD-MCNC: 7.5 G/DL — LOW (ref 11.5–15.5)
HU1 AB TITR CSF IF: NEGATIVE TITER — SIGNIFICANT CHANGE UP
HU2 AB TITR CSF IF: NEGATIVE TITER — SIGNIFICANT CHANGE UP
HU3 AB TITR CSF: NEGATIVE TITER — SIGNIFICANT CHANGE UP
IGLON5 IFA, CSF: NEGATIVE — SIGNIFICANT CHANGE UP
IMMUNOLOGIST REVIEW: SIGNIFICANT CHANGE UP
INR BLD: 0.85 RATIO — LOW (ref 0.88–1.16)
LACTATE BLDV-MCNC: 1 MMOL/L — SIGNIFICANT CHANGE UP (ref 0.7–2)
LGI1-IGG CBA, CSF: NEGATIVE — SIGNIFICANT CHANGE UP
MAGNESIUM SERPL-MCNC: 1.8 MG/DL — SIGNIFICANT CHANGE UP (ref 1.6–2.6)
MCHC RBC-ENTMCNC: 29.4 GM/DL — LOW (ref 32–36)
MCHC RBC-ENTMCNC: 29.9 PG — SIGNIFICANT CHANGE UP (ref 27–34)
MCV RBC AUTO: 101.6 FL — HIGH (ref 80–100)
MGLUR1 AB IFA, CSF: NEGATIVE — SIGNIFICANT CHANGE UP
NIF IFA, CSF: NEGATIVE — SIGNIFICANT CHANGE UP
NMDA-R AB CBA, CSF: NEGATIVE — SIGNIFICANT CHANGE UP
NRBC # BLD: 0 /100 WBCS — SIGNIFICANT CHANGE UP (ref 0–0)
OTHER CELLS CSF MANUAL: 10 ML/DL — LOW (ref 18–22)
PCA-TR AB TITR CSF: NEGATIVE TITER — SIGNIFICANT CHANGE UP
PCO2 BLDV: 91 MMHG — HIGH (ref 35–50)
PH BLDV: 7.32 — LOW (ref 7.35–7.45)
PHOSPHATE SERPL-MCNC: 2.7 MG/DL — SIGNIFICANT CHANGE UP (ref 2.5–4.5)
PLATELET # BLD AUTO: 270 K/UL — SIGNIFICANT CHANGE UP (ref 150–400)
PO2 BLDV: 80 MMHG — HIGH (ref 25–45)
POTASSIUM BLDV-SCNC: 4.2 MMOL/L — SIGNIFICANT CHANGE UP (ref 3.5–5.3)
POTASSIUM SERPL-MCNC: 4.4 MMOL/L — SIGNIFICANT CHANGE UP (ref 3.5–5.3)
POTASSIUM SERPL-SCNC: 4.4 MMOL/L — SIGNIFICANT CHANGE UP (ref 3.5–5.3)
PROTHROM AB SERPL-ACNC: 10.3 SEC — LOW (ref 10.6–13.6)
PURKINJE CELL CYTOPLASMIC AB TYPE 2: NEGATIVE TITER — SIGNIFICANT CHANGE UP
PURKINJE CELLS AB TITR CSF IF: NEGATIVE TITER — SIGNIFICANT CHANGE UP
RBC # BLD: 2.51 M/UL — LOW (ref 3.8–5.2)
RBC # FLD: 13.6 % — SIGNIFICANT CHANGE UP (ref 10.3–14.5)
REFLEX ADDED: SIGNIFICANT CHANGE UP
RH IG SCN BLD-IMP: POSITIVE — SIGNIFICANT CHANGE UP
SAO2 % BLDV: 97 % — HIGH (ref 67–88)
SARS-COV-2 RNA SPEC QL NAA+PROBE: SIGNIFICANT CHANGE UP
SODIUM SERPL-SCNC: 137 MMOL/L — SIGNIFICANT CHANGE UP (ref 135–145)
WBC # BLD: 15.89 K/UL — HIGH (ref 3.8–10.5)
WBC # FLD AUTO: 15.89 K/UL — HIGH (ref 3.8–10.5)

## 2021-01-07 PROCEDURE — 71045 X-RAY EXAM CHEST 1 VIEW: CPT | Mod: 26

## 2021-01-07 PROCEDURE — 99233 SBSQ HOSP IP/OBS HIGH 50: CPT | Mod: GC

## 2021-01-07 PROCEDURE — 99232 SBSQ HOSP IP/OBS MODERATE 35: CPT | Mod: GC

## 2021-01-07 PROCEDURE — 99232 SBSQ HOSP IP/OBS MODERATE 35: CPT

## 2021-01-07 PROCEDURE — 99231 SBSQ HOSP IP/OBS SF/LOW 25: CPT | Mod: GC

## 2021-01-07 PROCEDURE — 99233 SBSQ HOSP IP/OBS HIGH 50: CPT

## 2021-01-07 RX ORDER — SODIUM CHLORIDE 9 MG/ML
1000 INJECTION INTRAMUSCULAR; INTRAVENOUS; SUBCUTANEOUS
Refills: 0 | Status: DISCONTINUED | OUTPATIENT
Start: 2021-01-07 | End: 2021-01-08

## 2021-01-07 RX ADMIN — AMLODIPINE BESYLATE 5 MILLIGRAM(S): 2.5 TABLET ORAL at 05:48

## 2021-01-07 RX ADMIN — Medication 264 MILLIGRAM(S): at 06:27

## 2021-01-07 RX ADMIN — BUDESONIDE AND FORMOTEROL FUMARATE DIHYDRATE 2 PUFF(S): 160; 4.5 AEROSOL RESPIRATORY (INHALATION) at 05:49

## 2021-01-07 RX ADMIN — SODIUM CHLORIDE 65 MILLILITER(S): 9 INJECTION INTRAMUSCULAR; INTRAVENOUS; SUBCUTANEOUS at 23:46

## 2021-01-07 RX ADMIN — ISOSORBIDE DINITRATE 10 MILLIGRAM(S): 5 TABLET ORAL at 05:48

## 2021-01-07 RX ADMIN — Medication 1: at 11:59

## 2021-01-07 RX ADMIN — Medication 100 MILLIGRAM(S): at 05:49

## 2021-01-07 RX ADMIN — LEVALBUTEROL 0.63 MILLIGRAM(S): 1.25 SOLUTION, CONCENTRATE RESPIRATORY (INHALATION) at 05:49

## 2021-01-07 RX ADMIN — LEVALBUTEROL 0.63 MILLIGRAM(S): 1.25 SOLUTION, CONCENTRATE RESPIRATORY (INHALATION) at 21:00

## 2021-01-07 RX ADMIN — ISOSORBIDE DINITRATE 10 MILLIGRAM(S): 5 TABLET ORAL at 18:23

## 2021-01-07 RX ADMIN — TIOTROPIUM BROMIDE 1 CAPSULE(S): 18 CAPSULE ORAL; RESPIRATORY (INHALATION) at 11:58

## 2021-01-07 RX ADMIN — Medication 3: at 16:51

## 2021-01-07 RX ADMIN — BUDESONIDE AND FORMOTEROL FUMARATE DIHYDRATE 2 PUFF(S): 160; 4.5 AEROSOL RESPIRATORY (INHALATION) at 18:23

## 2021-01-07 RX ADMIN — Medication 264 MILLIGRAM(S): at 14:18

## 2021-01-07 RX ADMIN — PANTOPRAZOLE SODIUM 40 MILLIGRAM(S): 20 TABLET, DELAYED RELEASE ORAL at 05:48

## 2021-01-07 RX ADMIN — LEVALBUTEROL 0.63 MILLIGRAM(S): 1.25 SOLUTION, CONCENTRATE RESPIRATORY (INHALATION) at 13:16

## 2021-01-07 RX ADMIN — Medication 264 MILLIGRAM(S): at 21:00

## 2021-01-07 NOTE — PROGRESS NOTE ADULT - PROBLEM SELECTOR PLAN 3
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 hiv, lyme, tb, ebv, hep b/c), inflammatory etiology (acute demyelating inflammatory polyneuropathy/gbs), toxin-induced (b12, folate, copper deficiency; lead poisoning), para-neoplastic syndrome, syringomyelia, spinal infarction   -Motor deficits only noted on neurologic exam (sensory exam intact per limited exam); this is most consistent with diagnosis of anterior spinal cord infarction (less likely as no bowel or bladder incontinence and temperature sensation intact) or guillan-barre syndrome; less consistent with infectious or toxic etiologies, which would likely affect peripheral nerve and cause sensory deficits in addition   -B12, folate, and copper titers normal; mri brain normal; mri lumbar spine demonstrates t2 enhancement of conus medullaris    -Lumbar puncture demonstrates mildly elevated protein level (about 50); bloody tap, first bottle demonstrates cell count of 1, less than 1 on final bottle; 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  -will check MR spine to evaluate for infarction  -will start 500mg IV methylprenisolone for 3 days (1/6-1/8) per neurology 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 hiv, lyme, tb, ebv, hep b/c), inflammatory etiology (acute demyelating inflammatory polyneuropathy/gbs), toxin-induced (b12, folate, copper deficiency; lead poisoning), para-neoplastic syndrome, syringomyelia, spinal infarction   -Motor deficits only noted on neurologic exam (sensory exam intact per limited exam); this is most consistent with diagnosis of anterior spinal cord infarction (less likely as no bowel or bladder incontinence and temperature sensation intact) or guillan-barre syndrome; less consistent with infectious or toxic etiologies, which would likely affect peripheral nerve and cause sensory deficits in addition   -B12, folate, and copper titers normal; mri brain normal; mri lumbar spine demonstrates t2 enhancement of conus medullaris    -Lumbar puncture demonstrates mildly elevated protein level (about 50); bloody tap, first bottle demonstrates cell count of 1, less than 1 on final bottle; 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  -MR spine- can't r/o acute to subacute infarct, and with possible subdural AV fistula, may need neurointerventional input  -will start 500mg IV methylprednisolone for 3 days (1/-1/8) per neurology 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 hiv, lyme, tb, ebv, hep b/c), inflammatory etiology (acute demyelating inflammatory polyneuropathy/gbs), toxin-induced (b12, folate, copper deficiency; lead poisoning), para-neoplastic syndrome, syringomyelia, spinal infarction   -Motor deficits only noted on neurologic exam (sensory exam intact per limited exam); this is most consistent with diagnosis of anterior spinal cord infarction (less likely as no bowel or bladder incontinence and temperature sensation intact) or guillan-barre syndrome; less consistent with infectious or toxic etiologies, which would likely affect peripheral nerve and cause sensory deficits in addition   -B12, folate, and copper titers normal; mri brain normal; mri lumbar spine demonstrates t2 enhancement of conus medullaris    -Lumbar puncture demonstrates mildly elevated protein level (about 50); bloody tap, first bottle demonstrates cell count of 1, less than 1 on final bottle; 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  -MR spine- can't r/o acute to subacute infarct, and with possible subdural AV fistula, may need neurointerventional input  -c/w 500mg IV methylprednisolone for 3 days (1/7-1/9) per neurology

## 2021-01-07 NOTE — PROGRESS NOTE ADULT - ATTENDING COMMENTS
[place]  Pager: [phone]. If no response or past 5 pm call [phone].     My colleague will cover 1/8-1/10

## 2021-01-07 NOTE — PROGRESS NOTE ADULT - SUBJECTIVE AND OBJECTIVE BOX
Patient seen and examined at bedside.    --Anticoagulation--    T(C): 36.4 (01-07-21 @ 16:15), Max: 37.1 (01-06-21 @ 20:31)  HR: 123 (01-07-21 @ 18:29) (100 - 125)  BP: 133/75 (01-07-21 @ 18:29) (123/68 - 160/83)  RR: 20 (01-07-21 @ 16:15) (18 - 20)  SpO2: 91% (01-07-21 @ 16:15) (91% - 100%)  Wt(kg): --    Exam:  Wide awake, AOx2, pupils 3R, EOMI, no facial, BUE 5/5, no drift, BLE flaccid, no mvmt to deep noxious,

## 2021-01-07 NOTE — PROGRESS NOTE ADULT - ASSESSMENT
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

## 2021-01-07 NOTE — PROGRESS NOTE ADULT - ATTENDING COMMENTS
Pt seen and examined. Agree with above with additional findings:    # acute hypoxic/hypercapeneic respiratory failure  # bilateral LE weakness  # post-ICU neuropathy/myopathy  # HSV2 meningomyelitis    - worsening wob this morning, pt was not on BIPAP overnight; continue BIPAP during the day and night  - MRA spinal canal with nonspecific enhancement (chronic infarct?) as well as questionable dural AV fistula  - discussed care with Dr. 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 Pt seen and examined. Agree with above with additional findings:    # acute hypoxic/hypercapeneic respiratory failure  # bilateral LE weakness  # post-ICU neuropathy/myopathy  # HSV2 meningomyelitis    - worsening wob this morning, pt was not on BIPAP overnight; continue BIPAP during the day and night  - MRA spinal canal with nonspecific enhancement (chronic infarct?) as well as questionable dural AV fistula  - discussed care with NSG: plan for angiogram tomorrow  - RCRI 3, class IV risk, 15% 30 day risk of death, MI or cardiac arrest; pt at moderate risk for intermediate risk procedure; no further testing indicated at this time before proceeding with the procedure; no objection from medicine perspectives  - discussed care with Dr. 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

## 2021-01-07 NOTE — PROGRESS NOTE ADULT - ASSESSMENT
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      HSV-2 encephalitis/myelitis, abnormal MRI   -CSF cell count with only 1 nucleated cell is unusual, ( due to steroids?) however pt has MRI findings and neurologic symptoms, would treat HSV with 700 mg q 8  - plan for 14 days of treatment, with spinal cord involvement with HSV neurological recovery even with antiviral and steroids might not be complete.   - day 7/14 of therapy   -c/w hydration to prevent crystaluria if feasible   - neuro following, on steroids.     Hypoxemic respiratory failure, Leucocytosis:   -CXR appreciated, CT Chest with no superimposed pneumonia   -leukocytosis is likely related to steroids, procalcitonin normal   -trend CBC, leucocytosis trending down.   -blood cx NTD

## 2021-01-07 NOTE — PROGRESS NOTE ADULT - PROBLEM SELECTOR PLAN 1
-In the emergency department, the patient was noted to have an acute desaturation to 86% while managed on 4L nasal cannula (saturating at 100% previously while being monitored in the ED on 4L nasal cannula)   -Etiology of acute respiratory failure with hypoxia most likely is respiratory muscle collapse, which may represent insufficient respiratory muscle reserve in the setting of such great carbon dioxide retention  -Etiology of acute exacerbation of COPD uncertain; no documentation in [place] of acute viral prodrome; no changes in medication management; no evidence of missed doses   -Arterial blood gas demonstrates marginally less carbon dioxide retention   -Will manage as COPD exacerbation; will administer Xopenex q8 hours  -s/p 40 mg of intravenous methylprednisolone q12 hours (and space as needed); given patient's persistent subjective shortness of breath  -will now pulse steroids with 500mg IV methylprednisolone for 3 days per neurology  -s/p five days of azithromycin  -Will manage with bilevel positive airway pressure (currently at inspiratory pressure of 14 and expiratory pressure of 5) at night and transition to nasal cannula 2L during the day; titrate oxygen saturation on pulse oximetry to 88-92%  -Will administer home doses of LAMA, LABA, and inhaled corticosteroid -In the emergency department, the patient was noted to have an acute desaturation to 86% while managed on 4L nasal cannula (saturating at 100% previously while being monitored in the ED on 4L nasal cannula)   -Etiology of acute respiratory failure with hypoxia most likely is respiratory muscle collapse, which may represent insufficient respiratory muscle reserve in the setting of such great carbon dioxide retention  -Etiology of acute exacerbation of COPD uncertain; no documentation in [place] of acute viral prodrome; no changes in medication management; no evidence of missed doses   -Arterial blood gas demonstrates marginally less carbon dioxide retention   -Will manage as COPD exacerbation; will administer Xopenex q8 hours  -s/p 40 mg of intravenous methylprednisolone q12 hours (and space as needed); given patient's persistent subjective shortness of breath  -will now pulse steroids with 500mg IV methylprednisolone for 3 days per neurology  -s/p five days of azithromycin  -Will manage with bilevel positive airway pressure (currently at inspiratory pressure of 16 and expiratory pressure of 5) at night and transition to nasal cannula 2L during the day; titrate oxygen saturation on pulse oximetry to 88-92%  -Will administer home doses of LAMA, LABA, and inhaled corticosteroid

## 2021-01-07 NOTE — PROGRESS NOTE ADULT - ASSESSMENT
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  -Pre-op Angio embo tmmrw  -Cleared  -Pending T&S and coags  -Consent obtained from [facility] at request of patient.  -NPO with fluids

## 2021-01-07 NOTE — PROGRESS NOTE ADULT - SUBJECTIVE AND OBJECTIVE BOX
Killian Rocha MD   Internal Medicine PGY-1  Pager: NS: [phone] [facility]: [id number]    INCOMPLETE NOTE Killian Rocha MD   Internal Medicine PGY-1  Pager: NS: [phone] [facility]: [phone]    Patient is a 75y old  Female who presents with a chief complaint of acute respiratory failure (07 Jan 2021 06:56)      SUBJECTIVE / OVERNIGHT EVENTS:    MEDICATIONS  (STANDING):  acyclovir IVPB 700 milliGRAM(s) IV Intermittent every 8 hours  amLODIPine   Tablet 5 milliGRAM(s) Oral daily  budesonide  80 MICROgram(s)/formoterol 4.5 MICROgram(s) Inhaler 2 Puff(s) Inhalation two times a day  dextrose 40% Gel 15 Gram(s) Oral once  dextrose 5%. 1000 milliLiter(s) (50 mL/Hr) IV Continuous <Continuous>  dextrose 5%. 1000 milliLiter(s) (100 mL/Hr) IV Continuous <Continuous>  dextrose 50% Injectable 25 Gram(s) IV Push once  dextrose 50% Injectable 12.5 Gram(s) IV Push once  dextrose 50% Injectable 25 Gram(s) IV Push once  glucagon  Injectable 1 milliGRAM(s) IntraMuscular once  insulin lispro (ADMELOG) corrective regimen sliding scale   SubCutaneous three times a day before meals  insulin lispro (ADMELOG) corrective regimen sliding scale   SubCutaneous at bedtime  isosorbide   dinitrate Tablet (ISORDIL) 10 milliGRAM(s) Oral two times a day  levalbuterol Inhalation 0.63 milliGRAM(s) Inhalation three times a day  methylPREDNISolone sodium succinate IVPB 500 milliGRAM(s) IV Intermittent daily  pantoprazole    Tablet 40 milliGRAM(s) Oral before breakfast  tiotropium 18 MICROgram(s) Capsule 1 Capsule(s) Inhalation daily    MEDICATIONS  (PRN):  acetaminophen   Tablet .. 650 milliGRAM(s) Oral every 6 hours PRN Mild Pain (1 - 3), Moderate Pain (4 - 6)      T(C): 36.3 (01-07-21 @ 08:54), Max: 37.1 (01-06-21 @ 20:31)  HR: 109 (01-07-21 @ 08:54) (10 - 120)  BP: 131/72 (01-07-21 @ 08:54) (123/68 - 160/83)  RR: 20 (01-07-21 @ 08:54) (18 - 20)  SpO2: 93% (01-07-21 @ 08:54) (92% - 100%)  CAPILLARY BLOOD GLUCOSE      POCT Blood Glucose.: 151 mg/dL (07 Jan 2021 07:55)  POCT Blood Glucose.: 188 mg/dL (06 Jan 2021 21:58)  POCT Blood Glucose.: 140 mg/dL (06 Jan 2021 16:51)  POCT Blood Glucose.: 127 mg/dL (06 Jan 2021 11:45)    I&O's Summary    06 Jan 2021 07:01  -  07 Jan 2021 07:00  --------------------------------------------------------  IN: 1865 mL / OUT: 600 mL / NET: 1265 mL        REVIEW OF SYSTEMS    General: No fevers/chills  Skin/Breast: No rash  Ophthalmologic: No vision changes  ENMT:No dysphagia or odynophagia  Respiratory and Thorax: No SOB, wheezing, cough  Cardiovascular: No chest pain or palpitations  Gastrointestinal: No n/v/d, No melena, No Hematochezia  Genitourinary:  No dysuria, No change in urinary frequency  Musculoskeletal: No joint or muscle pains.  Neurological: No focal deficits, No headache    PHYSICAL EXAM:  GENERAL: NAD, well-developed  HEAD:  Atraumatic, Normocephalic  EYES: EOMI, PERRLA, conjunctiva and sclera clear  NECK: Supple, No JVD  CHEST/LUNG: Clear to auscultation bilaterally; No wheeze  HEART: Regular rate and rhythm; No murmurs, rubs, or gallops  ABDOMEN: Soft, Nontender, Nondistended; Bowel sounds present  EXTREMITIES:  2+ Peripheral Pulses, No clubbing, cyanosis, or edema  PSYCH: AAOx3  NEUROLOGY: non-focal  SKIN: No rashes or lesions    LABS:                        7.5    15.89 )-----------( 270      ( 07 Jan 2021 07:10 )             25.5     01-07    137  |  91<L>  |  20  ----------------------------<  78  4.4   |  41<H>  |  0.35<L>    Ca    9.0      07 Jan 2021 07:10  Phos  2.7     01-07  Mg     1.8     01-07                RADIOLOGY & ADDITIONAL TESTS:    Imaging Personally Reviewed:    Consultant(s) Notes Reviewed:      Care Discussed with Consultants/Other Providers:   Killian Rocha MD   Internal Medicine PGY-1  Pager: NS: [phone] [facility]: [id number]    Patient is a 75y old  Female who presents with a chief complaint of acute respiratory failure (07 Jan 2021 06:56)    SUBJECTIVE / OVERNIGHT EVENTS:  No acute events overnight. Pt was more SOB and and having increased O2 requirement. Pt states she was not on BIPAP overnight. Per Orders patient should be on BIPAP every night, given severity of COPD. Placed on BIPAP during the day. She otherwise denies any fevers, chills, n/v, abdominal pain, chest pain, cough. Still having lower extremity weakness. Sensation grossly intact, but having numbness in lower extremities. Went for MRI yesterday. Will monitor.     MEDICATIONS  (STANDING):  acyclovir IVPB 700 milliGRAM(s) IV Intermittent every 8 hours  amLODIPine   Tablet 5 milliGRAM(s) Oral daily  budesonide  80 MICROgram(s)/formoterol 4.5 MICROgram(s) Inhaler 2 Puff(s) Inhalation two times a day  dextrose 40% Gel 15 Gram(s) Oral once  dextrose 5%. 1000 milliLiter(s) (50 mL/Hr) IV Continuous <Continuous>  dextrose 5%. 1000 milliLiter(s) (100 mL/Hr) IV Continuous <Continuous>  dextrose 50% Injectable 25 Gram(s) IV Push once  dextrose 50% Injectable 12.5 Gram(s) IV Push once  dextrose 50% Injectable 25 Gram(s) IV Push once  glucagon  Injectable 1 milliGRAM(s) IntraMuscular once  insulin lispro (ADMELOG) corrective regimen sliding scale   SubCutaneous three times a day before meals  insulin lispro (ADMELOG) corrective regimen sliding scale   SubCutaneous at bedtime  isosorbide   dinitrate Tablet (ISORDIL) 10 milliGRAM(s) Oral two times a day  levalbuterol Inhalation 0.63 milliGRAM(s) Inhalation three times a day  methylPREDNISolone sodium succinate IVPB 500 milliGRAM(s) IV Intermittent daily  pantoprazole    Tablet 40 milliGRAM(s) Oral before breakfast  tiotropium 18 MICROgram(s) Capsule 1 Capsule(s) Inhalation daily    MEDICATIONS  (PRN):  acetaminophen   Tablet .. 650 milliGRAM(s) Oral every 6 hours PRN Mild Pain (1 - 3), Moderate Pain (4 - 6)    T(C): 36.3 (01-07-21 @ 08:54), Max: 37.1 (01-06-21 @ 20:31)  HR: 109 (01-07-21 @ 08:54) (10 - 120)  BP: 131/72 (01-07-21 @ 08:54) (123/68 - 160/83)  RR: 20 (01-07-21 @ 08:54) (18 - 20)  SpO2: 93% (01-07-21 @ 08:54) (92% - 100%)    CAPILLARY BLOOD GLUCOSE  POCT Blood Glucose.: 151 mg/dL (07 Jan 2021 07:55)  POCT Blood Glucose.: 188 mg/dL (06 Jan 2021 21:58)  POCT Blood Glucose.: 140 mg/dL (06 Jan 2021 16:51)  POCT Blood Glucose.: 127 mg/dL (06 Jan 2021 11:45)    I&O's Summary    06 Jan 2021 07:01  -  07 Jan 2021 07:00  --------------------------------------------------------  IN: 1865 mL / OUT: 600 mL / NET: 1265 mL    REVIEW OF SYSTEMS  General: No fevers/chills  Skin/Breast: No rash  Ophthalmologic: No vision changes  ENMT: No dysphagia or odynophagia  Respiratory and Thorax: + SOB, no wheezing or cough  Cardiovascular: No chest pain or palpitations  Gastrointestinal: No n/v/d  Genitourinary:  No dysuria, No change in urinary frequency  Musculoskeletal: No joint or muscle pains.  Neurological: No focal deficits, No headache    PHYSICAL EXAM:  GENERAL: NAD, well-developed, lying in bed  HEAD:  Atraumatic, Normocephalic, poor dentition  EYES: EOMI, conjunctiva and sclera clear  NECK: Supple, No JVD  CHEST/LUNG: Clear to auscultation bilaterally; No wheeze or crackles, increased WOB, placed on BIPAP  HEART: Regular rate and rhythm; No murmurs, rubs, or gallops  ABDOMEN: Soft, Nontender, Nondistended; Bowel sounds present  EXTREMITIES:  2+ Peripheral Pulses, No clubbing, cyanosis, or edema  PSYCH: AAOx3  NEUROLOGY: unable to move lower extremities, sensation grossly intact in LE  SKIN: No rashes or lesions    LABS:                        7.5    15.89 )-----------( 270      ( 07 Jan 2021 07:10 )             25.5     01-07    137  |  91<L>  |  20  ----------------------------<  78  4.4   |  41<H>  |  0.35<L>    Ca    9.0      07 Jan 2021 07:10  Phos  2.7     01-07  Mg     1.8     01-07    RADIOLOGY & ADDITIONAL TESTS:    Imaging Personally Reviewed:  < from: MR Angio Spinal Canal w/ IV Cont (01.06.21 @ 16:59) >  EXAM:  MR ANGIO SPINAL CANAL IC                          PROCEDURE DATE:  01/06/2021      INTERPRETATION:  CLINICAL INDICATION: Acute onset paraplegia. Extensive spinal cord abnormality. Rule out spinal infarct.    TECHNIQUE: MRA of the thoracic spine was performed with intravenous administration of contrast according to standard protocol. Diffusion weighted sequence were also performed. 7.5 ml of Gadavist IV contrast was administered.    COMPARISON: MRI of the thoracic and lumbar spine12/31/2020.    FINDINGS:  Extensive motion artifact limits evaluation.    There is mild restricted diffusion throughout the thoracic cord. On ADC map, T2 shine through is suggested within the central aspect of the conus medullaris, representing edema.    Again noted is enhancement involving the central aspect of the conus medullaris as well as the ventral cauda equina roots. There is also thickening of these cauda equina roots.    There are prominent vessels along the ventral and dorsal aspect ofthe distal thoracic cord spanning T8-L1 level. In particular there is a prominent vessel dorsal to the cord at T11 level, which appears to be supplied by relatively prominent left radicular vessels at the left T12-L1 level. (See key images)    IMPRESSION:    Within limitations of this motion degraded examination:    -Mild restricted diffusion throughout the thoracic cord, without preferential increased signal involving the edematous distal thoracic cord. There is evidence for T2 shine through within the central conus medullaris, which suggests nonspecific edema with no definite acute infarct. A subacute or chronic infarct cannot be entirely ruled out. If clinical symptomatology worsens or clinical suspicion persists, repeat imaging with sedation as necessary may be considered to obtain higher quality diffusion-weighted sequence.    -Prominent dorsal and ventral cord vasculature from T8 through L1, including specifically a prominent dorsal vessel at the level of T11. (See key images). 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 Consider neuro-interventional consultation.    -Again noted enhancement within the central conus medullaris and ventral cauda equina roots. While differential of infectious/inflammatory/autoimmune processes is still considered, possibility of edema and enhancement related to venous congestion from a dural AV fistula should also be raised.    JEANETTE ROMAN M.D., ATTENDING RADIOLOGIST  This document has been electronically signed.Jan 7 2021 12:02PM    < end of copied text >    Consultant(s) Notes Reviewed: Neurology, ID       Care Discussed with Consultants/Other Providers: Neurology, ID

## 2021-01-07 NOTE — PROGRESS NOTE ADULT - SUBJECTIVE AND OBJECTIVE BOX
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 Desaturated in ED at [facility], suspected cause being respiratory muscle collapse, requiring BiPAP and O2 NC, alongside her home COPD medications. Patient has bilateral LE weakness with intact bowel and bladder continence. 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 Patient was placed on steroids, and MR angio ordered to rule out a spinal cord infarct. Acyclovir started by ID. Patient made herself DNR/DNI, and palliative care consulted for further GOC.    INTERVAL EVENTS:  1/7: patient with dyspnea this AM, on BiPAP and feeling better    ADVANCE DIRECTIVES:    DNR  Yes    MOLST  [X]  Living Will  [ ]   DECISION MAKER(s):  [X] Health Care Proxy(s)  [ ] Surrogate(s)  [ ] Guardian           Name(s): Phone Number(s): Charline Patel (niece) [phone],    BASELINE (I)ADL(s) (prior to admission):  [place]: [X]Total  [ ] Moderate [ ]Dependent    Allergies    albuterol (Other)    Intolerances    MEDICATIONS  (STANDING):  acyclovir IVPB 700 milliGRAM(s) IV Intermittent every 8 hours  amLODIPine   Tablet 5 milliGRAM(s) Oral daily  budesonide  80 MICROgram(s)/formoterol 4.5 MICROgram(s) Inhaler 2 Puff(s) Inhalation two times a day  dextrose 40% Gel 15 Gram(s) Oral once  dextrose 5%. 1000 milliLiter(s) (50 mL/Hr) IV Continuous <Continuous>  dextrose 5%. 1000 milliLiter(s) (100 mL/Hr) IV Continuous <Continuous>  dextrose 50% Injectable 25 Gram(s) IV Push once  dextrose 50% Injectable 12.5 Gram(s) IV Push once  dextrose 50% Injectable 25 Gram(s) IV Push once  glucagon  Injectable 1 milliGRAM(s) IntraMuscular once  insulin lispro (ADMELOG) corrective regimen sliding scale   SubCutaneous three times a day before meals  insulin lispro (ADMELOG) corrective regimen sliding scale   SubCutaneous at bedtime  isosorbide   dinitrate Tablet (ISORDIL) 10 milliGRAM(s) Oral two times a day  levalbuterol Inhalation 0.63 milliGRAM(s) Inhalation three times a day  methylPREDNISolone sodium succinate IVPB 500 milliGRAM(s) IV Intermittent daily  pantoprazole    Tablet 40 milliGRAM(s) Oral before breakfast  sodium chloride 0.9%. 1000 milliLiter(s) (65 mL/Hr) IV Continuous <Continuous>  tiotropium 18 MICROgram(s) Capsule 1 Capsule(s) Inhalation daily      MEDICATIONS  (PRN):  acetaminophen   Tablet .. 650 milliGRAM(s) Oral every 6 hours PRN Mild Pain (1 - 3), Moderate Pain (4 - 6)    PRESENT SYMPTOMS: [ ]Unable to obtain due to poor mentation   Source if other than patient:  [ ]Family   [ ]Team     Pain: [ X]yes [ ]no  QOL impact -   Location -    back                Aggravating factors -  Quality - aching  Radiation -  Timing-  Severity (0-10 scale): 6/10  Minimal acceptable level (0-10 scale): 5/10    CPOT:    https://www.[facility].org/getattachment/ufo69b80-1i1c-[email or web address]/Critical-Care-Pain-Observation-Tool-(CPOT)      PAIN AD Score:     http://geriatrictoolkit.[place].[facility]/cog/painad.pdf (press ctrl +  left click to view)    Dyspnea:                           [ ]Mild [ ]Moderate [ ]Severe  Anxiety:                             [ ]Mild [ ]Moderate [ ]Severe  Fatigue:                             [ ]Mild [ ]Moderate [ ]Severe  Nausea:                             [ ]Mild [ ]Moderate [ ]Severe  Loss of appetite:              [ ]Mild [ ]Moderate [ ]Severe  Constipation:                    [ ]Mild [ ]Moderate [ ]Severe    Other Symptoms:  [ ]All other review of systems negative     Palliative Performance Status Version 2:         %    http://npcrc.org/files/news/palliative_performance_scale_ppsv2.pdf    PHYSICAL EXAM:  Vital Signs Last 24 Hrs  T(C): 36.3 (07 Jan 2021 12:21), Max: 37.1 (06 Jan 2021 20:31)  T(F): 97.3 (07 Jan 2021 12:21), Max: 98.8 (06 Jan 2021 20:31)  HR: 120 (07 Jan 2021 12:21) (100 - 125)  BP: 133/81 (07 Jan 2021 12:21) (123/68 - 160/83)  BP(mean): --  RR: 20 (07 Jan 2021 12:21) (18 - 20)  SpO2: 94% (07 Jan 2021 12:21) (92% - 100%)    Limited exam for patient comfort  GENERAL:  [X]Alert  [ ]Oriented x   [ ]Lethargic  [ ]Cachexia  [ ]Unarousable  [X ]Verbal  [ ]Non-Verbal  Behavioral:   [ ] Anxiety  [ ] Delirium [ ] Agitation [ ] Other  HEENT:  [ X]Normal   [ ]Dry mouth   [ ]ET Tube/Trach  [ ]Oral lesions  PULMONARY:   [ ]Clear [ ]Tachypnea  [ ]Audible excessive secretions   [ ]Rhonchi        [ ]Right [ ]Left [ ]Bilateral  [ ]Crackles        [ ]Right [ ]Left [ ]Bilateral  [ ]Wheezing     [ ]Right [ ]Left [ ]Bilateral  [ ]Diminished breath sounds [ ]right [ ]left [ ]bilateral  CARDIOVASCULAR:    [ ]Regular [ ]Irregular [ ]Tachy  [ ]Lance [ ]Murmur [ ]Other  GASTROINTESTINAL:  [ ]Soft  [ ]Distended   [ ]+BS  [ ]Non tender [ ]Tender  [ ]PEG [ ]OGT/ NGT  Last BM:     GENITOURINARY:  [ ]Normal [ ] Incontinent   [ ]Oliguria/Anuria   [ ][place]  MUSCULOSKELETAL:   [ ]Normal   [ ]Weakness  [ ]Bed/Wheelchair bound [ ]Edema  NEUROLOGIC:   [ ]No focal deficits  [ ]Cognitive impairment  [ ]Dysphagia [ ]Dysarthria [ ]Paresis [ ]Other   SKIN:   [ ]Normal    [ ]Rash  [ ]Pressure ulcer(s)       Present on admission [ ]y [ ]n    CRITICAL CARE:  [ ] Shock Present  [ ]Septic [ ]Cardiogenic [ ]Neurologic [ ]Hypovolemic  [ ]  Vasopressors [ ]  Inotropes   [ ]Respiratory failure present [ ]Mechanical ventilation [ ]Non-invasive ventilatory support [ ]High flow  [ ]Acute  [ ]Chronic [ ]Hypoxic  [ ]Hypercarbic [ ]Other  [ ]Other organ failure     LABS: reviewed                                   7.5    15.89 )-----------( 270      ( 07 Jan 2021 07:10 )             25.5     01-07    137  |  91<L>  |  20  ----------------------------<  78  4.4   |  41<H>  |  0.35<L>    Ca    9.0      07 Jan 2021 07:10  Phos  2.7     01-07  Mg     1.8     01-07        RADIOLOGY & ADDITIONAL STUDIES:    CT chest 1/3/21    No pneumonia.    Emphysema.    A few bilateral 2 or 3 mm pulmonary nodules. A 1 year follow-up noncontrast chest CT is recommended to ensure stability.    Aortic root enlargement measuring about 4.2 cm.    2.4 cm right thyroid nodule. Recommend comparison to prior imaging studies. If none exists, recommend thyroid ultrasound.    Left breast nodules. Correlation with mammography and/or ultrasound is recommended if not recently performed.    PROTEIN CALORIE MALNUTRITION PRESENT: [ ]mild [ ]moderate [ ]severe [ ]underweight [ ]morbid obesity  https://www.andeal.org/vault/2440/web/files/ONC/Table_Clinical%20Characteristics%20to%20Document%20Malnutrition-White%20JV%20et%20al%202012.pdf    Height (cm): 167.6 (12-28-20 @ 17:32)  Weight (kg): 74.8 (12-28-20 @ 17:32)  BMI (kg/m2): 26.6 (12-28-20 @ 17:32)    [ ]PPSV2 < or = to 30% [ ]significant weight loss  [ ]poor nutritional intake  [ ]anasarca     Albumin, Serum: 2.7 g/dL (01-04-21 @ 07:00)   [ ]Artificial Nutrition      REFERRALS:   [ ]Chaplaincy  [ ]Hospice  [ ]Child Life  [ ]Social Work  [ ]Case management [ ]Holistic Therapy     Goals of Care Document:     ______________  Dick Sy MD   of Geriatric and Palliative Medicine  [facility]     Please page the following number for clinical matters between the hours of 9AM and 5PM   from Monday through Friday : [phone]    After 5PM and on weekends, please page: [phone]. The Geriatric and Palliative Medicine consult service has 24/7 coverage for medical recommendations, including for symptom management needs.

## 2021-01-07 NOTE — PROGRESS NOTE ADULT - SUBJECTIVE AND OBJECTIVE BOX
Neurology Progress Note    Interval History - No events overnight    Subjective:  Pt was seen and examined at bedside. On BiPAP. She reported numbness and sensory loss in lower extremities have not improved. Continues to be able to wiggle today.     Objective:   Vital Signs Last 24 Hrs  T(C): 36.3 (07 Jan 2021 08:54), Max: 37.1 (06 Jan 2021 20:31)  T(F): 97.3 (07 Jan 2021 08:54), Max: 98.8 (06 Jan 2021 20:31)  HR: 125 (07 Jan 2021 10:25) (100 - 125)  BP: 131/72 (07 Jan 2021 08:54) (123/68 - 160/83)  BP(mean): --  RR: 20 (07 Jan 2021 08:54) (18 - 20)  SpO2: 93% (07 Jan 2021 10:25) (92% - 100%)    General Exam:   General: Elderly Female, appears stated age, on BIPAP    Neurological (>12):  MS: Alert, though slightly more lethargic today, oriented to person, place, time, situation. Follows commands.     Language: Fluent, no dysarthria    CNs: PERRL. No gross facial asymmetry b/l. Head turning intact b/l.     Motor: Decreased tone in lower extremities. Atrophy notable in b/l pectoralis muscles and RLE>LLE  RUE and LUE at least 4/5 with good effort  RLE 0/5 throughout, except 1/5 at toes  LLE 0/5 throughout, except 1/5 at toes	     Sensation: Diminished to PP/LT below level of T12, intact in upper extremities.     Other:    Labs:  01-07    137  |  91<L>  |  20  ----------------------------<  78  4.4   |  41<H>  |  0.35<L>    Ca    9.0      07 Jan 2021 07:10  Phos  2.7     01-07  Mg     1.8     01-07                          7.5    15.89 )-----------( 270      ( 07 Jan 2021 07:10 )             25.5     Radiology  CT Chest No Cont (01.03.21 @ 09:34) >  IMPRESSION:  No pneumonia.  Emphysema.  A few bilateral 2 or 3 mm pulmonary nodules. A 1 year follow-up noncontrast chest CT is recommended to ensure stability.  Aortic root enlargement measuring about 4.2 cm.  2.4 cm right thyroid nodule. Recommend comparison to prior imaging studies. If none exists, recommend thyroid ultrasound.  Left breast nodules. Correlation with mammography and/or ultrasound is recommended if not recently performed.    MEDICATIONS  (STANDING):  acyclovir IVPB 700 milliGRAM(s) IV Intermittent every 8 hours  amLODIPine   Tablet 5 milliGRAM(s) Oral daily  budesonide  80 MICROgram(s)/formoterol 4.5 MICROgram(s) Inhaler 2 Puff(s) Inhalation two times a day  dextrose 40% Gel 15 Gram(s) Oral once  dextrose 5%. 1000 milliLiter(s) (50 mL/Hr) IV Continuous <Continuous>  dextrose 5%. 1000 milliLiter(s) (100 mL/Hr) IV Continuous <Continuous>  dextrose 50% Injectable 25 Gram(s) IV Push once  dextrose 50% Injectable 12.5 Gram(s) IV Push once  dextrose 50% Injectable 25 Gram(s) IV Push once  glucagon  Injectable 1 milliGRAM(s) IntraMuscular once  insulin lispro (ADMELOG) corrective regimen sliding scale   SubCutaneous three times a day before meals  insulin lispro (ADMELOG) corrective regimen sliding scale   SubCutaneous at bedtime  isosorbide   dinitrate Tablet (ISORDIL) 10 milliGRAM(s) Oral two times a day  levalbuterol Inhalation 0.63 milliGRAM(s) Inhalation three times a day  methylPREDNISolone sodium succinate IVPB 500 milliGRAM(s) IV Intermittent daily  pantoprazole    Tablet 40 milliGRAM(s) Oral before breakfast  tiotropium 18 MICROgram(s) Capsule 1 Capsule(s) Inhalation daily    MEDICATIONS  (PRN):  acetaminophen   Tablet .. 650 milliGRAM(s) Oral every 6 hours PRN Mild Pain (1 - 3), Moderate Pain (4 - 6)   Neurology Progress Note    Interval History - No events overnight    Subjective:  Pt was seen and examined at bedside. On BiPAP. She reported weakness in lower extremities have not improved. She stated she is able to wiggle toes b/l today.     Objective:   Vital Signs Last 24 Hrs  T(C): 36.3 (07 Jan 2021 08:54), Max: 37.1 (06 Jan 2021 20:31)  T(F): 97.3 (07 Jan 2021 08:54), Max: 98.8 (06 Jan 2021 20:31)  HR: 125 (07 Jan 2021 10:25) (100 - 125)  BP: 131/72 (07 Jan 2021 08:54) (123/68 - 160/83)  BP(mean): --  RR: 20 (07 Jan 2021 08:54) (18 - 20)  SpO2: 93% (07 Jan 2021 10:25) (92% - 100%)    General Exam:   General: Elderly Female, appears stated age, on BIPAP    Neurological (>12):  MS: Alert, though slightly more lethargic today, oriented to person, place, time, situation. Follows commands.     Language: Fluent, no dysarthria    CNs: PERRL. No gross facial asymmetry b/l. Head turning intact b/l.     Motor: Decreased tone in lower extremities. Atrophy notable in b/l pectoralis muscles and RLE>LLE  RUE and LUE at least 4/5 with good effort  RLE 0/5 throughout, except 1/5 at toes  LLE 0/5 throughout, except 1/5 at toes	     Sensation: Diminished to PP/LT below level of T12, intact in upper extremities.     Other:    Labs:  01-07    137  |  91<L>  |  20  ----------------------------<  78  4.4   |  41<H>  |  0.35<L>    Ca    9.0      07 Jan 2021 07:10  Phos  2.7     01-07  Mg     1.8     01-07                          7.5    15.89 )-----------( 270      ( 07 Jan 2021 07:10 )             25.5     Radiology  CT Chest No Cont (01.03.21 @ 09:34) >  IMPRESSION:  No pneumonia.  Emphysema.  A few bilateral 2 or 3 mm pulmonary nodules. A 1 year follow-up noncontrast chest CT is recommended to ensure stability.  Aortic root enlargement measuring about 4.2 cm.  2.4 cm right thyroid nodule. Recommend comparison to prior imaging studies. If none exists, recommend thyroid ultrasound.  Left breast nodules. Correlation with mammography and/or ultrasound is recommended if not recently performed.    MEDICATIONS  (STANDING):  acyclovir IVPB 700 milliGRAM(s) IV Intermittent every 8 hours  amLODIPine   Tablet 5 milliGRAM(s) Oral daily  budesonide  80 MICROgram(s)/formoterol 4.5 MICROgram(s) Inhaler 2 Puff(s) Inhalation two times a day  dextrose 40% Gel 15 Gram(s) Oral once  dextrose 5%. 1000 milliLiter(s) (50 mL/Hr) IV Continuous <Continuous>  dextrose 5%. 1000 milliLiter(s) (100 mL/Hr) IV Continuous <Continuous>  dextrose 50% Injectable 25 Gram(s) IV Push once  dextrose 50% Injectable 12.5 Gram(s) IV Push once  dextrose 50% Injectable 25 Gram(s) IV Push once  glucagon  Injectable 1 milliGRAM(s) IntraMuscular once  insulin lispro (ADMELOG) corrective regimen sliding scale   SubCutaneous three times a day before meals  insulin lispro (ADMELOG) corrective regimen sliding scale   SubCutaneous at bedtime  isosorbide   dinitrate Tablet (ISORDIL) 10 milliGRAM(s) Oral two times a day  levalbuterol Inhalation 0.63 milliGRAM(s) Inhalation three times a day  methylPREDNISolone sodium succinate IVPB 500 milliGRAM(s) IV Intermittent daily  pantoprazole    Tablet 40 milliGRAM(s) Oral before breakfast  tiotropium 18 MICROgram(s) Capsule 1 Capsule(s) Inhalation daily    MEDICATIONS  (PRN):  acetaminophen   Tablet .. 650 milliGRAM(s) Oral every 6 hours PRN Mild Pain (1 - 3), Moderate Pain (4 - 6)   Neurology Progress Note    Interval History - No events overnight    Subjective:  Pt was seen and examined at bedside. On BiPAP. She reported weakness in lower extremities have not improved. She stated she is able to wiggle toes b/l today.     Objective:   Vital Signs Last 24 Hrs  T(C): 36.3 (07 Jan 2021 08:54), Max: 37.1 (06 Jan 2021 20:31)  T(F): 97.3 (07 Jan 2021 08:54), Max: 98.8 (06 Jan 2021 20:31)  HR: 125 (07 Jan 2021 10:25) (100 - 125)  BP: 131/72 (07 Jan 2021 08:54) (123/68 - 160/83)  BP(mean): --  RR: 20 (07 Jan 2021 08:54) (18 - 20)  SpO2: 93% (07 Jan 2021 10:25) (92% - 100%)    General Exam:   General: Elderly Female, appears stated age, on BIPAP    Neurological (>12):  MS: Alert, though slightly more lethargic today, oriented to person, place, time, situation. Follows commands.     Language: Fluent, no dysarthria    CNs: PERRL. No gross facial asymmetry b/l. Head turning intact b/l.     Motor: Decreased tone in lower extremities. Atrophy notable in b/l pectoralis muscles and RLE>LLE  RUE and LUE at least 4/5 with good effort  RLE 0/5 throughout, except 1/5 at toes  LLE 0/5 throughout, except 1/5 at toes	     Sensation: Diminished to PP/LT below level of T12, intact in upper extremities.     Other:    Labs:  01-07    137  |  91<L>  |  20  ----------------------------<  78  4.4   |  41<H>  |  0.35<L>    Ca    9.0      07 Jan 2021 07:10  Phos  2.7     01-07  Mg     1.8     01-07                          7.5    15.89 )-----------( 270      ( 07 Jan 2021 07:10 )             25.5     Radiology  CT Chest No Cont (01.03.21 @ 09:34) >  IMPRESSION:  No pneumonia.  Emphysema.  A few bilateral 2 or 3 mm pulmonary nodules. A 1 year follow-up noncontrast chest CT is recommended to ensure stability.  Aortic root enlargement measuring about 4.2 cm.  2.4 cm right thyroid nodule. Recommend comparison to prior imaging studies. If none exists, recommend thyroid ultrasound.  Left breast nodules. Correlation with mammography and/or ultrasound is recommended if not recently performed.    MR Angio Spinal Canal w/ IV Cont (01.06.21 @ 16:59) >  IMPRESSION:  Within limitations of this motion degraded examination:    -Mild restricted diffusion throughout the thoracic cord, without preferential increased signal involving the edematous distal thoracic cord. There is evidence for T2 shine through within the central conus medullaris, which suggests nonspecific edema with no definite acute infarct. A subacute or chronic infarct cannot be entirely ruled out. If clinical symptomatology worsens or clinical suspicion persists, repeat imaging with sedation as necessary may be considered to obtain higher quality diffusion-weighted sequence.    -Prominent dorsal and ventral cord vasculature from T8 through L1, including specifically a prominent dorsal vessel at the level of T11. (See key images). 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 Consider neuro-interventional consultation.    -Again noted enhancement within the central conus medullaris and ventral cauda equina roots. While differential of infectious/inflammatory/autoimmune processes is still considered, possibility of edema and enhancement related to venous congestion from a dural AV fistula should also be raised.    MEDICATIONS  (STANDING):  acyclovir IVPB 700 milliGRAM(s) IV Intermittent every 8 hours  amLODIPine   Tablet 5 milliGRAM(s) Oral daily  budesonide  80 MICROgram(s)/formoterol 4.5 MICROgram(s) Inhaler 2 Puff(s) Inhalation two times a day  dextrose 40% Gel 15 Gram(s) Oral once  dextrose 5%. 1000 milliLiter(s) (50 mL/Hr) IV Continuous <Continuous>  dextrose 5%. 1000 milliLiter(s) (100 mL/Hr) IV Continuous <Continuous>  dextrose 50% Injectable 25 Gram(s) IV Push once  dextrose 50% Injectable 12.5 Gram(s) IV Push once  dextrose 50% Injectable 25 Gram(s) IV Push once  glucagon  Injectable 1 milliGRAM(s) IntraMuscular once  insulin lispro (ADMELOG) corrective regimen sliding scale   SubCutaneous three times a day before meals  insulin lispro (ADMELOG) corrective regimen sliding scale   SubCutaneous at bedtime  isosorbide   dinitrate Tablet (ISORDIL) 10 milliGRAM(s) Oral two times a day  levalbuterol Inhalation 0.63 milliGRAM(s) Inhalation three times a day  methylPREDNISolone sodium succinate IVPB 500 milliGRAM(s) IV Intermittent daily  pantoprazole    Tablet 40 milliGRAM(s) Oral before breakfast  tiotropium 18 MICROgram(s) Capsule 1 Capsule(s) Inhalation daily    MEDICATIONS  (PRN):  acetaminophen   Tablet .. 650 milliGRAM(s) Oral every 6 hours PRN Mild Pain (1 - 3), Moderate Pain (4 - 6)

## 2021-01-07 NOTE — PROGRESS NOTE ADULT - ATTENDING COMMENTS
MRI performed and demonstrates mild DWI signal, T2 signal with some enhancement. Spinal cord infarct not ruled out. Enlarged anterior and dorsal spinal arteries were seen from T8 through the conus, particularly at T11, which correlates with the abnormal cord signal and could signify cord congestion from a arterio-venous malformation or dural fistula. This could lead to the condition of congestive myelopathy or Fiox- Alajouanine, which could also explain her symptoms. Recommend Neurosurgery input for a possible angiography which would be diagnostic.

## 2021-01-07 NOTE — PROGRESS NOTE ADULT - ASSESSMENT
76 yo female with PMH ?COPD initially admitted at [facility] who had been intubated for respiratory arrest, and upon extubation was noted to be flaccid in the legs with decreased sensation and transferred here for evaluation for possible spinal cord compression. Labs significant for leukocytosis to WBC 24. Exam significant for sensory level at about T12 and 0/5 lower extremity strength, except for 1/5 at toes bilaterally. MRI brain w/ and w/o showed symmetric globus pallidus and dentate nucleus enhancement. MR T/L spine w/ and w/o showed cord expansion from T8 through the conus and subtle cauda equina nerve root enhancement.    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 Rutherford.    Shara Leonard DO  PGY-2  Neurology Resident 74 yo female with PMH ?COPD initially admitted at [facility] who had been intubated for respiratory arrest, and upon extubation was noted to be flaccid in the legs with decreased sensation and transferred here for evaluation for possible spinal cord compression. Labs significant for leukocytosis to WBC 24. Exam significant for sensory level at about T12 and 0/5 lower extremity strength, except for 1/5 at toes bilaterally. MRI brain w/ and w/o showed symmetric globus pallidus and dentate nucleus enhancement. MR T/L spine w/ and w/o showed cord expansion from T8 through the conus and subtle cauda equina nerve root enhancement. No significant change to motor exam today.    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 Rutherford.    Shara Leonard,   PGY-2  Neurology Resident 74 y/o female with PMHx advanced COPD on 3L home O2, former smoker, initially admitted at [facility] who had been intubated for respiratory arrest, and upon extubation was noted to be flaccid in the legs with decreased sensation and transferred here for evaluation for possible spinal cord compression. Labs significant for leukocytosis to WBC 24. Exam significant for sensory level at about T12 and 0/5 lower extremity strength, except for 1/5 at toes bilaterally. MRI brain w/ and w/o showed symmetric globus pallidus and dentate nucleus enhancement. MR T/L spine w/ and w/o showed cord expansion from T8 through the conus and subtle cauda equina nerve root enhancement. No significant change to motor exam today. 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    Impression: Paraplegia/sensory loss more likely secondary to spinal cord infarct consider acuity of event, lower suspicion for meningomyelitis 2/2 HSV-2    Recommendations:  - Solumedrol 500mg IV x 3 days  - Continue IV Acyclovir as per ID  - PT/OT  - ID recs appreciated   - Rest of management per primary team     Case discussed with neurology attending, Dr. Rutherford.    Shara Leonard DO  PGY-2  Neurology Resident

## 2021-01-07 NOTE — PROGRESS NOTE ADULT - PROBLEM SELECTOR PLAN 3
- MRI angio showing nonspecific edema, and cannot rule out a subacute or chronic infarct; also notes possibility of a dural AV fistula  - f/u neurosurgery reconsult

## 2021-01-07 NOTE — PROGRESS NOTE ADULT - PROBLEM SELECTOR PLAN 7
-Will administer lovenox 40 for dvt prophylaxis   -Pureed diet  -Ongoing goals of care discussions currently DNR/DNI and questions the utility of treatments, and wonders if she may want to transition to a more palliative treatment option, palliative care consulted. -Will administer lovenox 40 for dvt prophylaxis   -Pureed diet  -Ongoing goals of care discussions currently DNR/DNI and questions the utility of treatments, and wonders if she may want to transition to a more palliative treatment option, palliative care following

## 2021-01-07 NOTE — PROGRESS NOTE ADULT - SUBJECTIVE AND OBJECTIVE BOX
75y old  Female who presents with a chief complaint of acute respiratory failure (07 Jan 2021 15:51)      Interval history:  Afebrile, on BiPAP, still with weakness of b/l lower extremities.     Allergies:   albuterol (Other)      Antimicrobials:  acyclovir IVPB 700 milliGRAM(s) IV Intermittent every 8 hours      REVIEW OF SYSTEMS:  No chest pain   No N/V  No rash.       Vital Signs Last 24 Hrs  T(C): 36.4 (01-07-21 @ 16:15), Max: 37.1 (01-06-21 @ 20:31)  T(F): 97.5 (01-07-21 @ 16:15), Max: 98.8 (01-06-21 @ 20:31)  HR: 123 (01-07-21 @ 18:29) (100 - 125)  BP: 133/75 (01-07-21 @ 18:29) (123/68 - 160/83)  BP(mean): --  RR: 20 (01-07-21 @ 16:15) (18 - 20)  SpO2: 91% (01-07-21 @ 16:15) (91% - 100%)      PHYSICAL EXAM:  Patient in no acute distress. Alert, awake, on BiPAP   Cardiovascular: S1S2 normal, tachy   Lungs: poor air entry B/L lung fields.  Gastrointestinal: soft, nontender, nondistended.  Extremities: b/l upper extremity edema   no obvious genital ulcers                           7.5    15.89 )-----------( 270      ( 07 Jan 2021 07:10 )             25.5   01-07    137  |  91<L>  |  20  ----------------------------<  78  4.4   |  41<H>  |  0.35<L>    Ca    9.0      07 Jan 2021 07:10  Phos  2.7     01-07  Mg     1.8     01-07      Culture - Blood (collected 05 Jan 2021 14:24)  Source: .Blood Blood-Venous  Preliminary Report (06 Jan 2021 15:04):    No growth to date.    Culture - Blood (collected 05 Jan 2021 10:03)  Source: .Blood Blood-Peripheral  Preliminary Report (06 Jan 2021 11:01):    No growth to date.      Radiology:  < from: Xray Chest 1 View- PORTABLE-Urgent (Xray Chest 1 View- PORTABLE-Urgent .) (01.07.21 @ 08:56) >  IMPRESSION:   No evidence of active chest disease.      < from: MR Angio Spinal Canal w/ IV Cont (01.06.21 @ 16:59) >  IMPRESSION:    Within limitations of this motion degraded examination:    -Mild restricted diffusion throughout the thoracic cord, without preferential increased signal involving the edematous distal thoracic cord. There is evidence for T2 shine through within the central conus medullaris, which suggests nonspecific edema with no definite acute infarct. A subacute or chronic infarct cannot be entirely ruled out. If clinical symptomatology worsens or clinical suspicion persists, repeat imaging with sedation as necessary may be considered to obtain higher quality diffusion-weighted sequence.    -Prominent dorsal and ventral cord vasculature from T8 through L1, including specifically a prominent dorsal vessel at the level of T11. (See key images). 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 Consider neuro-interventional consultation.    -Again noted enhancement within the central conus medullaris and ventral cauda equina roots. While differential of infectious/inflammatory/autoimmune processes is still considered, possibility of edema and enhancement related to venous congestion from a dural AV fistula should also be raised.

## 2021-01-07 NOTE — PROGRESS NOTE ADULT - ASSESSMENT
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 Desaturated in ED at [facility], suspected cause being respiratory muscle collapse, requiring BiPAP and O2 NC, alongside her home COPD medications. Patient has bilateral LE weakness with intact bowel and bladder continence. 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 Patient was placed on steroids, and MR angio ordered to rule out a spinal cord infarct. Acyclovir started by ID. Patient made herself DNR/DNI, and palliative care consulted for further GOC.

## 2021-01-07 NOTE — PROGRESS NOTE ADULT - PROBLEM SELECTOR PLAN 1
- encouraged patient to use tylenol as needed, states no pain today  - may benefit from more frequent repositioning

## 2021-01-08 ENCOUNTER — APPOINTMENT (OUTPATIENT)
Dept: NEUROSURGERY | Facility: CLINIC | Age: 76
End: 2021-01-08

## 2021-01-08 LAB
ALBUMIN SERPL ELPH-MCNC: 3 G/DL — LOW (ref 3.3–5)
ALP SERPL-CCNC: 92 U/L — SIGNIFICANT CHANGE UP (ref 40–120)
ALT FLD-CCNC: 57 U/L — HIGH (ref 10–45)
ANION GAP SERPL CALC-SCNC: 1 MMOL/L — LOW (ref 5–17)
APTT BLD: 22.6 SEC — LOW (ref 27.5–35.5)
AST SERPL-CCNC: 44 U/L — HIGH (ref 10–40)
BASE EXCESS BLDV CALC-SCNC: 19.4 MMOL/L — HIGH (ref -2–2)
BILIRUB DIRECT SERPL-MCNC: <0.1 MG/DL — SIGNIFICANT CHANGE UP (ref 0–0.2)
BILIRUB INDIRECT FLD-MCNC: >0.3 MG/DL — SIGNIFICANT CHANGE UP (ref 0.2–1)
BILIRUB SERPL-MCNC: 0.4 MG/DL — SIGNIFICANT CHANGE UP (ref 0.2–1.2)
BLD GP AB SCN SERPL QL: NEGATIVE — SIGNIFICANT CHANGE UP
BUN SERPL-MCNC: 18 MG/DL — SIGNIFICANT CHANGE UP (ref 7–23)
CA-I SERPL-SCNC: 1.15 MMOL/L — SIGNIFICANT CHANGE UP (ref 1.12–1.3)
CALCIUM SERPL-MCNC: 9.3 MG/DL — SIGNIFICANT CHANGE UP (ref 8.4–10.5)
CHLORIDE BLDV-SCNC: 91 MMOL/L — LOW (ref 96–108)
CHLORIDE SERPL-SCNC: 90 MMOL/L — LOW (ref 96–108)
CO2 BLDV-SCNC: 48 MMOL/L — HIGH (ref 22–30)
CO2 SERPL-SCNC: 43 MMOL/L — HIGH (ref 22–31)
CREAT SERPL-MCNC: 0.35 MG/DL — LOW (ref 0.5–1.3)
GAS PNL BLDV: 133 MMOL/L — LOW (ref 135–145)
GAS PNL BLDV: SIGNIFICANT CHANGE UP
GAS PNL BLDV: SIGNIFICANT CHANGE UP
GLUCOSE BLDC GLUCOMTR-MCNC: 128 MG/DL — HIGH (ref 70–99)
GLUCOSE BLDC GLUCOMTR-MCNC: 177 MG/DL — HIGH (ref 70–99)
GLUCOSE BLDC GLUCOMTR-MCNC: 185 MG/DL — HIGH (ref 70–99)
GLUCOSE BLDC GLUCOMTR-MCNC: 199 MG/DL — HIGH (ref 70–99)
GLUCOSE BLDV-MCNC: 120 MG/DL — HIGH (ref 70–99)
GLUCOSE SERPL-MCNC: 115 MG/DL — HIGH (ref 70–99)
HCO3 BLDV-SCNC: 46 MMOL/L — HIGH (ref 21–29)
HCT VFR BLD CALC: 24.4 % — LOW (ref 34.5–45)
HCT VFR BLDA CALC: 23 % — LOW (ref 39–50)
HGB BLD CALC-MCNC: 7.4 G/DL — LOW (ref 11.5–15.5)
HGB BLD-MCNC: 7.4 G/DL — LOW (ref 11.5–15.5)
INR BLD: 0.94 RATIO — SIGNIFICANT CHANGE UP (ref 0.88–1.16)
LACTATE BLDV-MCNC: 1.1 MMOL/L — SIGNIFICANT CHANGE UP (ref 0.7–2)
MAGNESIUM SERPL-MCNC: 1.8 MG/DL — SIGNIFICANT CHANGE UP (ref 1.6–2.6)
MCHC RBC-ENTMCNC: 30 PG — SIGNIFICANT CHANGE UP (ref 27–34)
MCHC RBC-ENTMCNC: 30.3 GM/DL — LOW (ref 32–36)
MCV RBC AUTO: 98.8 FL — SIGNIFICANT CHANGE UP (ref 80–100)
METHYLMALONATE SERPL-SCNC: 411 NMOL/L — HIGH (ref 0–378)
METHYLMALONATE SERPL-SCNC: 448 NMOL/L — HIGH (ref 0–378)
MRSA PCR RESULT.: SIGNIFICANT CHANGE UP
NRBC # BLD: 0 /100 WBCS — SIGNIFICANT CHANGE UP (ref 0–0)
OTHER CELLS CSF MANUAL: 9 ML/DL — LOW (ref 18–22)
PCO2 BLDV: 66 MMHG — HIGH (ref 35–50)
PH BLDV: 7.46 — HIGH (ref 7.35–7.45)
PHOSPHATE SERPL-MCNC: 2.6 MG/DL — SIGNIFICANT CHANGE UP (ref 2.5–4.5)
PLATELET # BLD AUTO: 247 K/UL — SIGNIFICANT CHANGE UP (ref 150–400)
PO2 BLDV: 55 MMHG — HIGH (ref 25–45)
POTASSIUM BLDV-SCNC: 3.7 MMOL/L — SIGNIFICANT CHANGE UP (ref 3.5–5.3)
POTASSIUM SERPL-MCNC: 3.9 MMOL/L — SIGNIFICANT CHANGE UP (ref 3.5–5.3)
POTASSIUM SERPL-SCNC: 3.9 MMOL/L — SIGNIFICANT CHANGE UP (ref 3.5–5.3)
PROT SERPL-MCNC: 5 G/DL — LOW (ref 6–8.3)
PROTHROM AB SERPL-ACNC: 11.1 SEC — SIGNIFICANT CHANGE UP (ref 10.6–13.6)
RBC # BLD: 2.47 M/UL — LOW (ref 3.8–5.2)
RBC # FLD: 13.8 % — SIGNIFICANT CHANGE UP (ref 10.3–14.5)
RH IG SCN BLD-IMP: POSITIVE — SIGNIFICANT CHANGE UP
S AUREUS DNA NOSE QL NAA+PROBE: SIGNIFICANT CHANGE UP
SAO2 % BLDV: 92 % — HIGH (ref 67–88)
SODIUM SERPL-SCNC: 134 MMOL/L — LOW (ref 135–145)
WBC # BLD: 20.95 K/UL — HIGH (ref 3.8–10.5)
WBC # FLD AUTO: 20.95 K/UL — HIGH (ref 3.8–10.5)

## 2021-01-08 PROCEDURE — 36245 INS CATH ABD/L-EXT ART 1ST: CPT

## 2021-01-08 PROCEDURE — 36215 PLACE CATHETER IN ARTERY: CPT

## 2021-01-08 PROCEDURE — 99233 SBSQ HOSP IP/OBS HIGH 50: CPT | Mod: GC

## 2021-01-08 PROCEDURE — 99233 SBSQ HOSP IP/OBS HIGH 50: CPT

## 2021-01-08 PROCEDURE — 75705 ARTERY X-RAYS SPINE: CPT | Mod: 26

## 2021-01-08 RX ORDER — SODIUM CHLORIDE 9 MG/ML
1000 INJECTION INTRAMUSCULAR; INTRAVENOUS; SUBCUTANEOUS
Refills: 0 | Status: DISCONTINUED | OUTPATIENT
Start: 2021-01-08 | End: 2021-01-10

## 2021-01-08 RX ADMIN — Medication 264 MILLIGRAM(S): at 13:56

## 2021-01-08 RX ADMIN — LEVALBUTEROL 0.63 MILLIGRAM(S): 1.25 SOLUTION, CONCENTRATE RESPIRATORY (INHALATION) at 05:39

## 2021-01-08 RX ADMIN — LEVALBUTEROL 0.63 MILLIGRAM(S): 1.25 SOLUTION, CONCENTRATE RESPIRATORY (INHALATION) at 11:48

## 2021-01-08 RX ADMIN — ISOSORBIDE DINITRATE 10 MILLIGRAM(S): 5 TABLET ORAL at 23:01

## 2021-01-08 RX ADMIN — AMLODIPINE BESYLATE 5 MILLIGRAM(S): 2.5 TABLET ORAL at 05:38

## 2021-01-08 RX ADMIN — SODIUM CHLORIDE 65 MILLILITER(S): 9 INJECTION INTRAMUSCULAR; INTRAVENOUS; SUBCUTANEOUS at 13:56

## 2021-01-08 RX ADMIN — Medication 264 MILLIGRAM(S): at 22:56

## 2021-01-08 RX ADMIN — Medication 264 MILLIGRAM(S): at 06:06

## 2021-01-08 RX ADMIN — Medication 100 MILLIGRAM(S): at 05:37

## 2021-01-08 RX ADMIN — LEVALBUTEROL 0.63 MILLIGRAM(S): 1.25 SOLUTION, CONCENTRATE RESPIRATORY (INHALATION) at 22:58

## 2021-01-08 RX ADMIN — Medication 1: at 11:47

## 2021-01-08 RX ADMIN — ISOSORBIDE DINITRATE 10 MILLIGRAM(S): 5 TABLET ORAL at 05:38

## 2021-01-08 RX ADMIN — BUDESONIDE AND FORMOTEROL FUMARATE DIHYDRATE 2 PUFF(S): 160; 4.5 AEROSOL RESPIRATORY (INHALATION) at 22:58

## 2021-01-08 RX ADMIN — TIOTROPIUM BROMIDE 1 CAPSULE(S): 18 CAPSULE ORAL; RESPIRATORY (INHALATION) at 11:48

## 2021-01-08 RX ADMIN — BUDESONIDE AND FORMOTEROL FUMARATE DIHYDRATE 2 PUFF(S): 160; 4.5 AEROSOL RESPIRATORY (INHALATION) at 05:38

## 2021-01-08 RX ADMIN — PANTOPRAZOLE SODIUM 40 MILLIGRAM(S): 20 TABLET, DELAYED RELEASE ORAL at 05:38

## 2021-01-08 RX ADMIN — Medication 1: at 16:15

## 2021-01-08 RX ADMIN — SODIUM CHLORIDE 65 MILLILITER(S): 9 INJECTION INTRAMUSCULAR; INTRAVENOUS; SUBCUTANEOUS at 08:07

## 2021-01-08 RX ADMIN — Medication 1: at 08:07

## 2021-01-08 NOTE — PRE-ANESTHESIA EVALUATION ADULT - NSANTHPMHFT_GEN_ALL_CORE
Medical record reviewed including: Sev COPD with recent exacerbation on home O2 multiple meds labored respirations including accessory muscles. on 4 liters NC SpO2 100% DNR DNI. Discussed with Niece proxy and patient both understanding the possibility of failure to extubate.

## 2021-01-08 NOTE — CHART NOTE - NSCHARTNOTEFT_GEN_A_CORE
Interventional Neuro Radiology  Pre-Procedure Note    This is a 75y ____ hand dominant Female      HPI:  The patient is a 75-year-old woman with a past medical history of severe chronic obstructive pulmonary disease, managed at home with home oxygen therapy, who presented to the emergency department overnight as a transfer from [facility] for management of unexplained bilateral lower extremity weakness. Per chart review, the patient had a recent admission to [facility] for management of a left hip fracture and was sent to rehab on 12/18. However, shortly thereafter, the patient was transferred to [facility] for workup and management of an acute alteration in mental status, found to be secondary to acute retention of carbon dioxide, most likely secondary to an acute exacerbation, of uncertain etiology, of her chronic obstructive pulmonary disease. The patient was sedated and intubated and was managed in the intensive care unit at [place] with standing methylprednisolone, azithromycin, and albuterol in conjunction with her outpatient long-acting beta agonist, long-acting muscarinic antagonist, and inhaled corticosteroid. On 12/24, the patient was successfully extubated and transitioned to management with bilevel positive airway pressure, and ultimately to oxygen by nasal cannula. On the morning of 12/28, the patient's care was transitioned to the rehabilitation facility at [place], and it was noted that she had--presumably new--weakness in her bilateral lower extremities, so she was transferred to [facility] for further workup and management.     While, in the emergency department, the patient underwent evaluation with neurosurgery, which included an emergent MRI of the spinal cord to rule out a spinal cord compression. None was identified; however, enhancement on T2 of the distal lumbar cord and conus medullaris was evident. Also while the patient was in the emergency department, she was noted to have an acute oxygen desaturation while managed on 4L nasal cannula (from 100% spO2 to about 84%), so she was transitioned to management with bilevel positive airway pressure. Her vital signs did not demonstrate any signs of fever or hypotension but showed evident tachycardia (sinus on EKG). An arterial blood gas drawn and demonstrated evidence of acute-on-chronic retention of carbon dioxide.  (29 Dec 2020 10:57)      Neuro Exam: Awake and alert, oriented x3, fluent, normal naming and repetition, follows 3 step commands. Extraocular movements intact, no nystagmus, visual fields full, face symmetric, tongue midline. No drift, 5/5 power x 4 extremities. Normal sensation to LT. Normal finger-to-nose and rapid alternating movements.    PAST MEDICAL & SURGICAL HISTORY:  Anxiety    COPD, severe    HTN (hypertension)    Anxiety    Advanced COPD  on home O2    History of repair of hip fracture    Status post closed fracture of right femur  surgical repair done at [place]        Social History:   Denies tobacco use    FAMILY HISTORY:  No pertinent family history in first degree relatives      No pertinent family history    Allergies: albuterol (Other)      Current Medications: acetaminophen   Tablet .. 650 milliGRAM(s) Oral every 6 hours PRN  acyclovir IVPB 700 milliGRAM(s) IV Intermittent every 8 hours  amLODIPine   Tablet 5 milliGRAM(s) Oral daily  budesonide  80 MICROgram(s)/formoterol 4.5 MICROgram(s) Inhaler 2 Puff(s) Inhalation two times a day  dextrose 40% Gel 15 Gram(s) Oral once  dextrose 5%. 1000 milliLiter(s) IV Continuous <Continuous>  dextrose 5%. 1000 milliLiter(s) IV Continuous <Continuous>  dextrose 50% Injectable 25 Gram(s) IV Push once  dextrose 50% Injectable 12.5 Gram(s) IV Push once  dextrose 50% Injectable 25 Gram(s) IV Push once  glucagon  Injectable 1 milliGRAM(s) IntraMuscular once  insulin lispro (ADMELOG) corrective regimen sliding scale   SubCutaneous three times a day before meals  insulin lispro (ADMELOG) corrective regimen sliding scale   SubCutaneous at bedtime  isosorbide   dinitrate Tablet (ISORDIL) 10 milliGRAM(s) Oral two times a day  levalbuterol Inhalation 0.63 milliGRAM(s) Inhalation three times a day  methylPREDNISolone sodium succinate IVPB 500 milliGRAM(s) IV Intermittent daily  pantoprazole    Tablet 40 milliGRAM(s) Oral before breakfast  sodium chloride 0.9%. 1000 milliLiter(s) IV Continuous <Continuous>  tiotropium 18 MICROgram(s) Capsule 1 Capsule(s) Inhalation daily      Labs:                         7.4    20.95 )-----------( 247      ( 08 Jan 2021 07:27 )             24.4       01-08    134<L>  |  90<L>  |  18  ----------------------------<  115<H>  3.9   |  43<H>  |  0.35<L>    Ca    9.3      08 Jan 2021 07:25  Phos  2.6     01-08  Mg     1.8     01-08    TPro  5.0<L>  /  Alb  3.0<L>  /  TBili  0.4  /  DBili  <0.1  /  AST  44<H>  /  ALT  57<H>  /  AlkPhos  92  01-08        Blood Bank: 01-08-21  O  --  Positive      Assessment/Plan:   This is a 75y ____ hand dominant Female  presents with ______. Patient presents to neuro-IR for selective cerebral angiography. Procedure/ risks/ benefits/ goals/ alternatives were explained. Risks include but are not limited to stroke/ vessel injury/ hemorrhage/ groin hematoma. All questions answered. Informed content obtained from patient____. Consent placed in chart. Interventional Neuro Radiology  Pre-Procedure Note    This is a 75y ____ hand dominant Female      HPI:  The patient is a 75-year-old woman with a past medical history of severe chronic obstructive pulmonary disease, managed at home with home oxygen therapy, who presented to the emergency department overnight as a transfer from [facility] for management of unexplained bilateral lower extremity weakness. Per chart review, the patient had a recent admission to [facility] for management of a left hip fracture and was sent to rehab on 12/18. However, shortly thereafter, the patient was transferred to [facility] for workup and management of an acute alteration in mental status, found to be secondary to acute retention of carbon dioxide, most likely secondary to an acute exacerbation, of uncertain etiology, of her chronic obstructive pulmonary disease. The patient was sedated and intubated and was managed in the intensive care unit at [place] with standing methylprednisolone, azithromycin, and albuterol in conjunction with her outpatient long-acting beta agonist, long-acting muscarinic antagonist, and inhaled corticosteroid. On 12/24, the patient was successfully extubated and transitioned to management with bilevel positive airway pressure, and ultimately to oxygen by nasal cannula. On the morning of 12/28, the patient's care was transitioned to the rehabilitation facility at [place], and it was noted that she had--presumably new--weakness in her bilateral lower extremities, so she was transferred to [facility] for further workup and management.     While, in the emergency department, the patient underwent evaluation with neurosurgery, which included an emergent MRI of the spinal cord to rule out a spinal cord compression. None was identified; however, enhancement on T2 of the distal lumbar cord and conus medullaris was evident. Also while the patient was in the emergency department, she was noted to have an acute oxygen desaturation while managed on 4L nasal cannula (from 100% spO2 to about 84%), so she was transitioned to management with bilevel positive airway pressure. Her vital signs did not demonstrate any signs of fever or hypotension but showed evident tachycardia (sinus on EKG). An arterial blood gas drawn and demonstrated evidence of acute-on-chronic retention of carbon dioxide.  (29 Dec 2020 10:57)      Neuro Exam: Awake and alert, oriented x3, fluent, normal naming and repetition, follows 3 step commands. Extraocular movements intact, no nystagmus, visual fields full, face symmetric, tongue midline. No drift, 5/5 power x 4 extremities. Normal sensation to LT. Normal finger-to-nose and rapid alternating movements.    PAST MEDICAL & SURGICAL HISTORY:  Anxiety    COPD, severe    HTN (hypertension)    Anxiety    Advanced COPD  on home O2    History of repair of hip fracture    Status post closed fracture of right femur  surgical repair done at [place]        Social History:   Denies tobacco use    FAMILY HISTORY:  No pertinent family history in first degree relatives      No pertinent family history    Allergies: albuterol (Other)      Current Medications: acetaminophen   Tablet .. 650 milliGRAM(s) Oral every 6 hours PRN  acyclovir IVPB 700 milliGRAM(s) IV Intermittent every 8 hours  amLODIPine   Tablet 5 milliGRAM(s) Oral daily  budesonide  80 MICROgram(s)/formoterol 4.5 MICROgram(s) Inhaler 2 Puff(s) Inhalation two times a day  dextrose 40% Gel 15 Gram(s) Oral once  dextrose 5%. 1000 milliLiter(s) IV Continuous <Continuous>  dextrose 5%. 1000 milliLiter(s) IV Continuous <Continuous>  dextrose 50% Injectable 25 Gram(s) IV Push once  dextrose 50% Injectable 12.5 Gram(s) IV Push once  dextrose 50% Injectable 25 Gram(s) IV Push once  glucagon  Injectable 1 milliGRAM(s) IntraMuscular once  insulin lispro (ADMELOG) corrective regimen sliding scale   SubCutaneous three times a day before meals  insulin lispro (ADMELOG) corrective regimen sliding scale   SubCutaneous at bedtime  isosorbide   dinitrate Tablet (ISORDIL) 10 milliGRAM(s) Oral two times a day  levalbuterol Inhalation 0.63 milliGRAM(s) Inhalation three times a day  methylPREDNISolone sodium succinate IVPB 500 milliGRAM(s) IV Intermittent daily  pantoprazole    Tablet 40 milliGRAM(s) Oral before breakfast  sodium chloride 0.9%. 1000 milliLiter(s) IV Continuous <Continuous>  tiotropium 18 MICROgram(s) Capsule 1 Capsule(s) Inhalation daily      Labs:                         7.4    20.95 )-----------( 247      ( 08 Jan 2021 07:27 )             24.4       01-08    134<L>  |  90<L>  |  18  ----------------------------<  115<H>  3.9   |  43<H>  |  0.35<L>    Ca    9.3      08 Jan 2021 07:25  Phos  2.6     01-08  Mg     1.8     01-08    TPro  5.0<L>  /  Alb  3.0<L>  /  TBili  0.4  /  DBili  <0.1  /  AST  44<H>  /  ALT  57<H>  /  AlkPhos  92  01-08        Blood Bank: 01-08-21  O  --  Positive      Assessment/Plan:   This is a 75y Female who presents with spinal AVM. Patient presents to neuro-IR for selective spinal angiography and possible embolization. Procedure/ risks/ benefits/ goals/ alternatives were explained. Risks include but are not limited to stroke/ vessel injury/ hemorrhage/ groin hematoma. All questions answered. Informed content obtained from patient's niece, Charline. Consent placed in chart.    Vi Garner PA-C Interventional Neuro Radiology  Pre-Procedure Note      The patient is a 75-year-old woman with a past medical history of severe chronic obstructive pulmonary disease, managed at home with home oxygen therapy, who presented to the emergency department overnight as a transfer from [facility] for management of unexplained bilateral lower extremity weakness. Per chart review, the patient had a recent admission to [facility] for management of a left hip fracture and was sent to rehab on 12/18. However, shortly thereafter, the patient was transferred to [facility] for workup and management of an acute alteration in mental status, found to be secondary to acute retention of carbon dioxide, most likely secondary to an acute exacerbation, of uncertain etiology, of her chronic obstructive pulmonary disease. The patient was sedated and intubated and was managed in the intensive care unit at [place] with standing methylprednisolone, azithromycin, and albuterol in conjunction with her outpatient long-acting beta agonist, long-acting muscarinic antagonist, and inhaled corticosteroid. On 12/24, the patient was successfully extubated and transitioned to management with bilevel positive airway pressure, and ultimately to oxygen by nasal cannula. On the morning of 12/28, the patient's care was transitioned to the rehabilitation facility at [place], and it was noted that she had--presumably new--weakness in her bilateral lower extremities, so she was transferred to [facility] for further workup and management.     While, in the emergency department, the patient underwent evaluation with neurosurgery, which included an emergent MRI of the spinal cord to rule out a spinal cord compression. None was identified; however, enhancement on T2 of the distal lumbar cord and conus medullaris was evident. Also while the patient was in the emergency department, she was noted to have an acute oxygen desaturation while managed on 4L nasal cannula (from 100% spO2 to about 84%), so she was transitioned to management with bilevel positive airway pressure. Her vital signs did not demonstrate any signs of fever or hypotension but showed evident tachycardia (sinus on EKG). An arterial blood gas drawn and demonstrated evidence of acute-on-chronic retention of carbon dioxide.  (29 Dec 2020 10:57)      Neuro Exam:  MS: Somnolent, opens eyes to verbal stimulation, oriented to person, place, month and year (not day). Normal affect. Follows all commands.  PERRL (R = 3mm, L = 3mm). VFF. EOMI no nystagmus, V1-3 intact to LT, well developed masseter muscles b/l. No facial asymmetry b/l, full eye closure strength b/l. Unable to assess palate elevation due to BiPAP mask. Gag reflex deferred. Shoulder shrug intact b/l. Tongue midline.  Motor: UE strength 4/5 b/l throughout  RLE decreased tone, atrophy, strength 0/5 except 1/5 at toes (slight toe wiggle)  LLE decreased tone, atrophy, strength 0/5 except 1/5 at toes (slight toe wiggle)   Sensation: Diminished to LT/PP in b/l LE, sensation intact in UE b/l    PAST MEDICAL & SURGICAL HISTORY:  Anxiety    COPD, severe    HTN (hypertension)    Anxiety    Advanced COPD  on home O2    History of repair of hip fracture    Status post closed fracture of right femur  surgical repair done at [place]        Social History:   Denies tobacco use    FAMILY HISTORY:  No pertinent family history in first degree relatives      No pertinent family history    Allergies: albuterol (Other)      Current Medications: acetaminophen   Tablet .. 650 milliGRAM(s) Oral every 6 hours PRN  acyclovir IVPB 700 milliGRAM(s) IV Intermittent every 8 hours  amLODIPine   Tablet 5 milliGRAM(s) Oral daily  budesonide  80 MICROgram(s)/formoterol 4.5 MICROgram(s) Inhaler 2 Puff(s) Inhalation two times a day  dextrose 40% Gel 15 Gram(s) Oral once  dextrose 5%. 1000 milliLiter(s) IV Continuous <Continuous>  dextrose 5%. 1000 milliLiter(s) IV Continuous <Continuous>  dextrose 50% Injectable 25 Gram(s) IV Push once  dextrose 50% Injectable 12.5 Gram(s) IV Push once  dextrose 50% Injectable 25 Gram(s) IV Push once  glucagon  Injectable 1 milliGRAM(s) IntraMuscular once  insulin lispro (ADMELOG) corrective regimen sliding scale   SubCutaneous three times a day before meals  insulin lispro (ADMELOG) corrective regimen sliding scale   SubCutaneous at bedtime  isosorbide   dinitrate Tablet (ISORDIL) 10 milliGRAM(s) Oral two times a day  levalbuterol Inhalation 0.63 milliGRAM(s) Inhalation three times a day  methylPREDNISolone sodium succinate IVPB 500 milliGRAM(s) IV Intermittent daily  pantoprazole    Tablet 40 milliGRAM(s) Oral before breakfast  sodium chloride 0.9%. 1000 milliLiter(s) IV Continuous <Continuous>  tiotropium 18 MICROgram(s) Capsule 1 Capsule(s) Inhalation daily      Labs:                         7.4    20.95 )-----------( 247      ( 08 Jan 2021 07:27 )             24.4       01-08    134<L>  |  90<L>  |  18  ----------------------------<  115<H>  3.9   |  43<H>  |  0.35<L>    Ca    9.3      08 Jan 2021 07:25  Phos  2.6     01-08  Mg     1.8     01-08    TPro  5.0<L>  /  Alb  3.0<L>  /  TBili  0.4  /  DBili  <0.1  /  AST  44<H>  /  ALT  57<H>  /  AlkPhos  92  01-08        Blood Bank: 01-08-21  O  --  Positive      Assessment/Plan:   This is a 75y Female who presents with spinal AVM. Patient presents to neuro-IR for selective spinal angiography and possible embolization. Procedure/ risks/ benefits/ goals/ alternatives were explained. Risks include but are not limited to stroke/ vessel injury/ hemorrhage/ groin hematoma. All questions answered. Informed content obtained from patient's niece, Charline. Consent placed in chart.    Vi Garner PA-C

## 2021-01-08 NOTE — PROGRESS NOTE ADULT - ASSESSMENT
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 Desaturated in ED at [facility], suspected cause being respiratory muscle collapse, requiring BiPAP and O2 NC, alongside her home COPD medications. Patient has bilateral LE weakness with intact bowel and bladder continence. 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 Patient was placed on steroids, and MR angio ordered to rule out a spinal cord infarct. Acyclovir started by ID. Patient made herself DNR/DNI, and palliative care consulted for further GOC.

## 2021-01-08 NOTE — PROGRESS NOTE ADULT - ASSESSMENT
The patient is a 75-year-old woman with a past medical history of severe COPD, on home oxygen, and with a history of a recent COPD exacerbation requiring endotracheal intubation and management in the intensive care unit at 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

## 2021-01-08 NOTE — PROGRESS NOTE ADULT - PROBLEM SELECTOR PLAN 1
-In the emergency department, the patient was noted to have an acute desaturation to 86% while managed on 4L nasal cannula (saturating at 100% previously while being monitored in the ED on 4L nasal cannula)   -Etiology of acute respiratory failure with hypoxia most likely is respiratory muscle collapse, which may represent insufficient respiratory muscle reserve in the setting of such great carbon dioxide retention  -Etiology of acute exacerbation of COPD uncertain; no documentation in [place] of acute viral prodrome; no changes in medication management; no evidence of missed doses   -Arterial blood gas demonstrates marginally less carbon dioxide retention   -Will manage as COPD exacerbation; will administer Xopenex q8 hours  -s/p 40 mg of intravenous methylprednisolone q12 hours (and space as needed); given patient's persistent subjective shortness of breath  -will now pulse steroids with 500mg IV methylprednisolone for 3 days per neurology  -s/p five days of azithromycin  -Will manage with bilevel positive airway pressure (currently at inspiratory pressure of 16 and expiratory pressure of 5) at night and transition to nasal cannula 2L during the day; titrate oxygen saturation on pulse oximetry to 88-92%  -Will administer home doses of LAMA, LABA, and inhaled corticosteroid -In the emergency department, the patient was noted to have an acute desaturation to 86% while managed on 4L nasal cannula (saturating at 100% previously while being monitored in the ED on 4L nasal cannula)   -Etiology of acute respiratory failure with hypoxia most likely is respiratory muscle collapse, which may represent insufficient respiratory muscle reserve in the setting of such great carbon dioxide retention  -Etiology of acute exacerbation of COPD uncertain; no documentation in [place] of acute viral prodrome; no changes in medication management; no evidence of missed doses   -Arterial blood gas demonstrates marginally less carbon dioxide retention   -Will manage as COPD exacerbation; will administer Xopenex q8 hours  -s/p 40 mg of intravenous methylprednisolone q12 hours (and space as needed); given patient's persistent subjective shortness of breath  -will now pulse steroids with 500mg IV methylprednisolone for 3 days per neurology, then place back on methylprednisolone 40mg q12hrs and taper off steriods  -s/p five days of azithromycin  -Will manage with bilevel positive airway pressure (currently at inspiratory pressure of 16 and expiratory pressure of 5) at night and transition to nasal cannula 2L during the day; titrate oxygen saturation on pulse oximetry to 88-92%  -Will administer home doses of LAMA, LABA, and inhaled corticosteroid

## 2021-01-08 NOTE — PROGRESS NOTE ADULT - SUBJECTIVE AND OBJECTIVE BOX
SUBJECTIVE:     Patient seen and examined at bedside. No acute events overnight. Patient on BiPAP. Patient admits to SOB. Patient denies changes to strength or sensation in her b/l LE.         PAST MEDICAL & SURGICAL HISTORY:  Anxiety    COPD, severe    HTN (hypertension)    Anxiety    Advanced COPD  on home O2    History of repair of hip fracture    Status post closed fracture of right femur  surgical repair done at [facility]      FAMILY HISTORY:  No pertinent family history in first degree relatives        MEDICATIONS (HOME):  Home Medications:  ALPRAZolam 0.25 mg oral tablet: 1 tab(s) orally 3 times a day, As Needed (06 Jan 2021 16:29)  ALPRAZolam 0.25 mg oral tablet: 1 tab(s) orally 2 times a day, As Needed (19 Dec 2020 01:58)  amLODIPine 5 mg oral tablet: 1 tab(s) orally once a day (19 Dec 2020 01:58)  amLODIPine 5 mg oral tablet: 1 tab(s) orally once a day (06 Jan 2021 16:29)  Breo Ellipta 100 mcg-25 mcg/inh inhalation powder: 1 puff(s) inhaled once a day (06 Jan 2021 16:29)  Breo Ellipta 100 mcg-25 mcg/inh inhalation powder: 1 puff(s) inhaled once a day (19 Dec 2020 01:58)  budesonide: 0.5 milligram(s) inhaled 2 times a day (28 Dec 2020 16:38)  enoxaparin: 40 milligram(s) subcutaneous once a day (28 Dec 2020 16:38)  Incruse Ellipta 62.5 mcg/inh inhalation powder: 1 puff(s) inhaled every 24 hours (19 Dec 2020 01:58)  Incruse Ellipta 62.5 mcg/inh inhalation powder: 1 puff(s) inhaled every 24 hours (06 Jan 2021 16:29)  isosorbide dinitrate 10 mg oral tablet: 1 tab(s) orally 2 times a day (06 Jan 2021 16:29)  isosorbide dinitrate 10 mg oral tablet: 1 tab(s) orally 3 times a day (28 Dec 2020 16:38)  levalbuterol 0.31 mg/3 mL inhalation solution: 3 milliliter(s) inhaled 3 times a day, As Needed (06 Jan 2021 16:29)  levalbuterol 1.25 mg/3 mL inhalation solution: 3 milliliter(s) inhaled every 6 hours (28 Dec 2020 16:38)  methylPREDNISolone: 40 milligram(s) injectable every 8 hours (28 Dec 2020 16:38)  pantoprazole 40 mg oral delayed release tablet: 1 tab(s) orally once a day (06 Jan 2021 16:29)  pantoprazole 40 mg oral granule, delayed release:  orally  (28 Dec 2020 16:38)    MEDICATIONS  (STANDING):  acyclovir IVPB 700 milliGRAM(s) IV Intermittent every 8 hours  amLODIPine   Tablet 5 milliGRAM(s) Oral daily  budesonide  80 MICROgram(s)/formoterol 4.5 MICROgram(s) Inhaler 2 Puff(s) Inhalation two times a day  dextrose 40% Gel 15 Gram(s) Oral once  dextrose 5%. 1000 milliLiter(s) (50 mL/Hr) IV Continuous <Continuous>  dextrose 5%. 1000 milliLiter(s) (100 mL/Hr) IV Continuous <Continuous>  dextrose 50% Injectable 25 Gram(s) IV Push once  dextrose 50% Injectable 12.5 Gram(s) IV Push once  dextrose 50% Injectable 25 Gram(s) IV Push once  glucagon  Injectable 1 milliGRAM(s) IntraMuscular once  insulin lispro (ADMELOG) corrective regimen sliding scale   SubCutaneous three times a day before meals  insulin lispro (ADMELOG) corrective regimen sliding scale   SubCutaneous at bedtime  isosorbide   dinitrate Tablet (ISORDIL) 10 milliGRAM(s) Oral two times a day  levalbuterol Inhalation 0.63 milliGRAM(s) Inhalation three times a day  methylPREDNISolone sodium succinate IVPB 500 milliGRAM(s) IV Intermittent daily  pantoprazole    Tablet 40 milliGRAM(s) Oral before breakfast  sodium chloride 0.9%. 1000 milliLiter(s) (65 mL/Hr) IV Continuous <Continuous>  tiotropium 18 MICROgram(s) Capsule 1 Capsule(s) Inhalation daily    MEDICATIONS  (PRN):  acetaminophen   Tablet .. 650 milliGRAM(s) Oral every 6 hours PRN Mild Pain (1 - 3), Moderate Pain (4 - 6)    ALLERGIES/INTOLERANCES:  Allergies  albuterol (Other)    Intolerances    VITALS & EXAMINATION:  Vital Signs Last 24 Hrs  T(C): 36.5 (08 Jan 2021 11:12), Max: 36.8 (07 Jan 2021 20:26)  T(F): 97.7 (08 Jan 2021 11:12), Max: 98.2 (07 Jan 2021 20:26)  HR: 109 (08 Jan 2021 11:12) (105 - 125)  BP: 147/87 (08 Jan 2021 11:12) (119/72 - 153/88)  BP(mean): --  RR: 19 (08 Jan 2021 11:12) (19 - 20)  SpO2: 98% (08 Jan 2021 11:12) (91% - 99%)    General:  Constitutional: appears stated age  Head: Normocephalic & atraumatic.  Respiratory: Increased work of breathing  Extremities: atrophy of b/l LE R > L   Skin: No rashes, bruising, or discoloration.      Neurological (>12):  MS: Somnolent, opens eyes to verbal stimulation, oriented to person, place, month and year (not day). Normal affect. Follows all commands.    Language: Speech is clear, fluent with good repetition & comprehension    CNs: PERRL (R = 3mm, L = 3mm). VFF. EOMI no nystagmus, V1-3 intact to LT, well developed masseter muscles b/l. No facial asymmetry b/l, full eye closure strength b/l. Unable to assess palate elevation due to BiPAP mask. Gag reflex deferred. Shoulder shrug intact b/l. Tongue midline.    Fundoscopic: deferred      Motor:   UE strength 4/5 b/l throughout  RLE decreased tone, atrophy, strength 0/5 except 1/5 at toes (slight toe wiggle)  LLE decreased tone, atrophy, strength 0/5 except 1/5 at toes (slight toe wiggle)     Sensation: Decreased to LT/PP in b/l LE, sensation intact in UE b/l        LABORATORY:  CBC                       7.4    20.95 )-----------( 247      ( 08 Jan 2021 07:27 )             24.4     Chem 01-08    134<L>  |  90<L>  |  18  ----------------------------<  115<H>  3.9   |  43<H>  |  0.35<L>    Ca    9.3      08 Jan 2021 07:25  Phos  2.6     01-08  Mg     1.8     01-08    TPro  5.0<L>  /  Alb  3.0<L>  /  TBili  0.4  /  DBili  <0.1  /  AST  44<H>  /  ALT  57<H>  /  AlkPhos  92  01-08    LFTs LIVER FUNCTIONS - ( 08 Jan 2021 07:25 )  Alb: 3.0 g/dL / Pro: 5.0 g/dL / ALK PHOS: 92 U/L / ALT: 57 U/L / AST: 44 U/L / GGT: x           Coagulopathy PT/INR - ( 08 Jan 2021 08:55 )   PT: 11.1 sec;   INR: 0.94 ratio         PTT - ( 08 Jan 2021 08:55 )  PTT:22.6 sec  Lipid Panel 12-29 Chol 250<H> LDL -- HDL 76 Trig 184<H>  A1c   Cardiac enzymes     U/A   CSF  Immunological  Other    STUDIES & IMAGING:  Studies (EKG, EEG, EMG, etc):     Radiology (XR, CT, MR, U/S, TTE/MARYAM):        < from: MR Angio Spinal Canal w/ IV Cont (01.06.21 @ 16:59) >  FINDINGS:  Extensive motion artifact limits evaluation.    There is mild restricted diffusion throughout the thoracic cord. On ADC map, T2 shine through is suggested within the central aspect of the conus medullaris, representing edema.    Again noted is enhancement involving the central aspect of the conus medullaris as well as the ventral cauda equina roots. There is also thickening of these cauda equina roots.    There are prominent vessels along the ventral and dorsal aspect ofthe distal thoracic cord spanning T8-L1 level. In particular there is a prominent vessel dorsal to the cord at T11 level, which appears to be supplied by relatively prominent left radicular vessels at the left T12-L1 level. (See key images)      IMPRESSION:    Within limitations of this motion degraded examination:    -Mild restricted diffusion throughout the thoracic cord, without preferential increased signal involving the edematous distal thoracic cord. There is evidence for T2 shine through within the central conus medullaris, which suggests nonspecific edema with no definite acute infarct. A subacute or chronic infarct cannot be entirely ruled out. If clinical symptomatology worsens or clinical suspicion persists, repeat imaging with sedation as necessary may be considered to obtain higher quality diffusion-weighted sequence.    -Prominent dorsal and ventral cord vasculature from T8 through L1, including specifically a prominent dorsal vessel at the level of T11. (See key images). 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 Consider neuro-interventional consultation.    -Again noted enhancement within the central conus medullaris and ventral cauda equina roots. While differential of infectious/inflammatory/autoimmune processes is still considered, possibility of edema and enhancement related to venous congestion from a dural AV fistula should also be raised.    < end of copied text >       SUBJECTIVE:     Patient seen and examined at bedside. No acute events overnight. Patient on BiPAP. Patient admits to SOB. Patient denies changes to strength or sensation in her b/l LE.         PAST MEDICAL & SURGICAL HISTORY:  Anxiety    COPD, severe    HTN (hypertension)    Anxiety    Advanced COPD  on home O2    History of repair of hip fracture    Status post closed fracture of right femur  surgical repair done at [facility]      FAMILY HISTORY:  No pertinent family history in first degree relatives        MEDICATIONS (HOME):  Home Medications:  ALPRAZolam 0.25 mg oral tablet: 1 tab(s) orally 3 times a day, As Needed (06 Jan 2021 16:29)  ALPRAZolam 0.25 mg oral tablet: 1 tab(s) orally 2 times a day, As Needed (19 Dec 2020 01:58)  amLODIPine 5 mg oral tablet: 1 tab(s) orally once a day (19 Dec 2020 01:58)  amLODIPine 5 mg oral tablet: 1 tab(s) orally once a day (06 Jan 2021 16:29)  Breo Ellipta 100 mcg-25 mcg/inh inhalation powder: 1 puff(s) inhaled once a day (06 Jan 2021 16:29)  Breo Ellipta 100 mcg-25 mcg/inh inhalation powder: 1 puff(s) inhaled once a day (19 Dec 2020 01:58)  budesonide: 0.5 milligram(s) inhaled 2 times a day (28 Dec 2020 16:38)  enoxaparin: 40 milligram(s) subcutaneous once a day (28 Dec 2020 16:38)  Incruse Ellipta 62.5 mcg/inh inhalation powder: 1 puff(s) inhaled every 24 hours (19 Dec 2020 01:58)  Incruse Ellipta 62.5 mcg/inh inhalation powder: 1 puff(s) inhaled every 24 hours (06 Jan 2021 16:29)  isosorbide dinitrate 10 mg oral tablet: 1 tab(s) orally 2 times a day (06 Jan 2021 16:29)  isosorbide dinitrate 10 mg oral tablet: 1 tab(s) orally 3 times a day (28 Dec 2020 16:38)  levalbuterol 0.31 mg/3 mL inhalation solution: 3 milliliter(s) inhaled 3 times a day, As Needed (06 Jan 2021 16:29)  levalbuterol 1.25 mg/3 mL inhalation solution: 3 milliliter(s) inhaled every 6 hours (28 Dec 2020 16:38)  methylPREDNISolone: 40 milligram(s) injectable every 8 hours (28 Dec 2020 16:38)  pantoprazole 40 mg oral delayed release tablet: 1 tab(s) orally once a day (06 Jan 2021 16:29)  pantoprazole 40 mg oral granule, delayed release:  orally  (28 Dec 2020 16:38)    MEDICATIONS  (STANDING):  acyclovir IVPB 700 milliGRAM(s) IV Intermittent every 8 hours  amLODIPine   Tablet 5 milliGRAM(s) Oral daily  budesonide  80 MICROgram(s)/formoterol 4.5 MICROgram(s) Inhaler 2 Puff(s) Inhalation two times a day  dextrose 40% Gel 15 Gram(s) Oral once  dextrose 5%. 1000 milliLiter(s) (50 mL/Hr) IV Continuous <Continuous>  dextrose 5%. 1000 milliLiter(s) (100 mL/Hr) IV Continuous <Continuous>  dextrose 50% Injectable 25 Gram(s) IV Push once  dextrose 50% Injectable 12.5 Gram(s) IV Push once  dextrose 50% Injectable 25 Gram(s) IV Push once  glucagon  Injectable 1 milliGRAM(s) IntraMuscular once  insulin lispro (ADMELOG) corrective regimen sliding scale   SubCutaneous three times a day before meals  insulin lispro (ADMELOG) corrective regimen sliding scale   SubCutaneous at bedtime  isosorbide   dinitrate Tablet (ISORDIL) 10 milliGRAM(s) Oral two times a day  levalbuterol Inhalation 0.63 milliGRAM(s) Inhalation three times a day  methylPREDNISolone sodium succinate IVPB 500 milliGRAM(s) IV Intermittent daily  pantoprazole    Tablet 40 milliGRAM(s) Oral before breakfast  sodium chloride 0.9%. 1000 milliLiter(s) (65 mL/Hr) IV Continuous <Continuous>  tiotropium 18 MICROgram(s) Capsule 1 Capsule(s) Inhalation daily    MEDICATIONS  (PRN):  acetaminophen   Tablet .. 650 milliGRAM(s) Oral every 6 hours PRN Mild Pain (1 - 3), Moderate Pain (4 - 6)    ALLERGIES/INTOLERANCES:  Allergies  albuterol (Other)    Intolerances    VITALS & EXAMINATION:  Vital Signs Last 24 Hrs  T(C): 36.5 (08 Jan 2021 11:12), Max: 36.8 (07 Jan 2021 20:26)  T(F): 97.7 (08 Jan 2021 11:12), Max: 98.2 (07 Jan 2021 20:26)  HR: 109 (08 Jan 2021 11:12) (105 - 125)  BP: 147/87 (08 Jan 2021 11:12) (119/72 - 153/88)  BP(mean): --  RR: 19 (08 Jan 2021 11:12) (19 - 20)  SpO2: 98% (08 Jan 2021 11:12) (91% - 99%)    General:  Constitutional: appears stated age  Head: Normocephalic & atraumatic.  Respiratory: Increased work of breathing  Extremities: atrophy of b/l LE R > L   Skin: No rashes, bruising, or discoloration.      Neurological (>12):  MS: Somnolent, opens eyes to verbal stimulation, oriented to person, place, month and year (not day). Normal affect. Follows all commands.    Language: Speech is clear, fluent with good repetition & comprehension    CNs: PERRL (R = 3mm, L = 3mm). VFF. EOMI no nystagmus, V1-3 intact to LT, well developed masseter muscles b/l. No facial asymmetry b/l, full eye closure strength b/l. Unable to assess palate elevation due to BiPAP mask. Gag reflex deferred. Shoulder shrug intact b/l. Tongue midline.    Fundoscopic: deferred      Motor:   UE strength 4/5 b/l throughout  RLE decreased tone, atrophy, strength 0/5 except 1/5 at toes (slight toe wiggle)  LLE decreased tone, atrophy, strength 0/5 except 1/5 at toes (slight toe wiggle)     Sensation: Diminished to LT/PP in b/l LE, sensation intact in UE b/l        LABORATORY:  CBC                       7.4    20.95 )-----------( 247      ( 08 Jan 2021 07:27 )             24.4     Chem 01-08    134<L>  |  90<L>  |  18  ----------------------------<  115<H>  3.9   |  43<H>  |  0.35<L>    Ca    9.3      08 Jan 2021 07:25  Phos  2.6     01-08  Mg     1.8     01-08    TPro  5.0<L>  /  Alb  3.0<L>  /  TBili  0.4  /  DBili  <0.1  /  AST  44<H>  /  ALT  57<H>  /  AlkPhos  92  01-08    LFTs LIVER FUNCTIONS - ( 08 Jan 2021 07:25 )  Alb: 3.0 g/dL / Pro: 5.0 g/dL / ALK PHOS: 92 U/L / ALT: 57 U/L / AST: 44 U/L / GGT: x           Coagulopathy PT/INR - ( 08 Jan 2021 08:55 )   PT: 11.1 sec;   INR: 0.94 ratio         PTT - ( 08 Jan 2021 08:55 )  PTT:22.6 sec  Lipid Panel 12-29 Chol 250<H> LDL -- HDL 76 Trig 184<H>  A1c   Cardiac enzymes     U/A   CSF  Immunological  Other    STUDIES & IMAGING:  Studies (EKG, EEG, EMG, etc):     Radiology (XR, CT, MR, U/S, TTE/MARYAM):        < from: MR Angio Spinal Canal w/ IV Cont (01.06.21 @ 16:59) >  FINDINGS:  Extensive motion artifact limits evaluation.    There is mild restricted diffusion throughout the thoracic cord. On ADC map, T2 shine through is suggested within the central aspect of the conus medullaris, representing edema.    Again noted is enhancement involving the central aspect of the conus medullaris as well as the ventral cauda equina roots. There is also thickening of these cauda equina roots.    There are prominent vessels along the ventral and dorsal aspect ofthe distal thoracic cord spanning T8-L1 level. In particular there is a prominent vessel dorsal to the cord at T11 level, which appears to be supplied by relatively prominent left radicular vessels at the left T12-L1 level. (See key images)      IMPRESSION:    Within limitations of this motion degraded examination:    -Mild restricted diffusion throughout the thoracic cord, without preferential increased signal involving the edematous distal thoracic cord. There is evidence for T2 shine through within the central conus medullaris, which suggests nonspecific edema with no definite acute infarct. A subacute or chronic infarct cannot be entirely ruled out. If clinical symptomatology worsens or clinical suspicion persists, repeat imaging with sedation as necessary may be considered to obtain higher quality diffusion-weighted sequence.    -Prominent dorsal and ventral cord vasculature from T8 through L1, including specifically a prominent dorsal vessel at the level of T11. (See key images). 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 Consider neuro-interventional consultation.    -Again noted enhancement within the central conus medullaris and ventral cauda equina roots. While differential of infectious/inflammatory/autoimmune processes is still considered, possibility of edema and enhancement related to venous congestion from a dural AV fistula should also be raised.    < end of copied text >

## 2021-01-08 NOTE — PROGRESS NOTE ADULT - ATTENDING COMMENTS
Pt seen and examined. Agree with above with additional findings:    # acute hypoxic/hypercapeneic respiratory failure  # bilateral LE weakness  # post-ICU neuropathy/myopathy  # HSV2 meningomyelitis    - s/p BIPAP with improvement in WOB  - MRA spinal canal with nonspecific enhancement (chronic infarct?) as well as questionable dural AV fistula  - discussed care with NSG: plan for angiogram today  - RCRI 3, class IV risk, 15% 30 day risk of death, MI or cardiac arrest; pt at moderate risk for intermediate risk procedure; no further testing indicated at this time before proceeding with the procedure; no objection from medicine perspectives  - discussed care with Dr. Rutherford - chronic dural AV fistula can lead to LE paralysis  - re-consulted NSG for possible anigogram for dural AV fistula  - continue IV acyclovir; appreciate ID recs on further regimen  - PT/OT/PMNR consult/OOB to chair    Karen Mcconnell MD  Division of Hospital Medicine  Cell: [phone]  Office: [phone].

## 2021-01-08 NOTE — PROGRESS NOTE ADULT - SUBJECTIVE AND OBJECTIVE BOX
Killian Rocha MD   Internal Medicine PGY-1  Pager: NS: [phone] [facility]: [phone]    Patient is a 75y old  Female who presents with a chief complaint of acute respiratory failure (08 Jan 2021 05:12)      SUBJECTIVE / OVERNIGHT EVENTS:    MEDICATIONS  (STANDING):  acyclovir IVPB 700 milliGRAM(s) IV Intermittent every 8 hours  amLODIPine   Tablet 5 milliGRAM(s) Oral daily  budesonide  80 MICROgram(s)/formoterol 4.5 MICROgram(s) Inhaler 2 Puff(s) Inhalation two times a day  dextrose 40% Gel 15 Gram(s) Oral once  dextrose 5%. 1000 milliLiter(s) (50 mL/Hr) IV Continuous <Continuous>  dextrose 5%. 1000 milliLiter(s) (100 mL/Hr) IV Continuous <Continuous>  dextrose 50% Injectable 25 Gram(s) IV Push once  dextrose 50% Injectable 12.5 Gram(s) IV Push once  dextrose 50% Injectable 25 Gram(s) IV Push once  glucagon  Injectable 1 milliGRAM(s) IntraMuscular once  insulin lispro (ADMELOG) corrective regimen sliding scale   SubCutaneous three times a day before meals  insulin lispro (ADMELOG) corrective regimen sliding scale   SubCutaneous at bedtime  isosorbide   dinitrate Tablet (ISORDIL) 10 milliGRAM(s) Oral two times a day  levalbuterol Inhalation 0.63 milliGRAM(s) Inhalation three times a day  methylPREDNISolone sodium succinate IVPB 500 milliGRAM(s) IV Intermittent daily  pantoprazole    Tablet 40 milliGRAM(s) Oral before breakfast  sodium chloride 0.9%. 1000 milliLiter(s) (65 mL/Hr) IV Continuous <Continuous>  tiotropium 18 MICROgram(s) Capsule 1 Capsule(s) Inhalation daily    MEDICATIONS  (PRN):  acetaminophen   Tablet .. 650 milliGRAM(s) Oral every 6 hours PRN Mild Pain (1 - 3), Moderate Pain (4 - 6)      T(C): 36.7 (01-08-21 @ 07:51), Max: 36.8 (01-07-21 @ 20:26)  HR: 114 (01-08-21 @ 07:51) (105 - 125)  BP: 144/80 (01-08-21 @ 07:51) (119/72 - 153/88)  RR: 19 (01-08-21 @ 07:51) (19 - 20)  SpO2: 95% (01-08-21 @ 07:51) (91% - 97%)  CAPILLARY BLOOD GLUCOSE      POCT Blood Glucose.: 177 mg/dL (08 Jan 2021 07:29)  POCT Blood Glucose.: 200 mg/dL (07 Jan 2021 21:22)  POCT Blood Glucose.: 275 mg/dL (07 Jan 2021 16:26)  POCT Blood Glucose.: 160 mg/dL (07 Jan 2021 11:10)    I&O's Summary    07 Jan 2021 07:01  -  08 Jan 2021 07:00  --------------------------------------------------------  IN: 505 mL / OUT: 0 mL / NET: 505 mL        REVIEW OF SYSTEMS    General: No fevers/chills  Skin/Breast: No rash  Ophthalmologic: No vision changes  ENMT:No dysphagia or odynophagia  Respiratory and Thorax: No SOB, wheezing, cough  Cardiovascular: No chest pain or palpitations  Gastrointestinal: No n/v/d, No melena, No Hematochezia  Genitourinary:  No dysuria, No change in urinary frequency  Musculoskeletal: No joint or muscle pains.  Neurological: No focal deficits, No headache    PHYSICAL EXAM:  GENERAL: NAD, well-developed  HEAD:  Atraumatic, Normocephalic  EYES: EOMI, PERRLA, conjunctiva and sclera clear  NECK: Supple, No JVD  CHEST/LUNG: Clear to auscultation bilaterally; No wheeze  HEART: Regular rate and rhythm; No murmurs, rubs, or gallops  ABDOMEN: Soft, Nontender, Nondistended; Bowel sounds present  EXTREMITIES:  2+ Peripheral Pulses, No clubbing, cyanosis, or edema  PSYCH: AAOx3  NEUROLOGY: non-focal  SKIN: No rashes or lesions    LABS:                        7.4    20.95 )-----------( 247      ( 08 Jan 2021 07:27 )             24.4     01-07    137  |  91<L>  |  20  ----------------------------<  78  4.4   |  41<H>  |  0.35<L>    Ca    9.0      07 Jan 2021 07:10  Phos  2.7     01-07  Mg     1.8     01-07    PT/INR - ( 07 Jan 2021 17:45 )   PT: 10.3 sec;   INR: 0.85 ratio      PTT - ( 07 Jan 2021 17:45 )  PTT:23.5 sec    RADIOLOGY & ADDITIONAL TESTS:    Imaging Personally Reviewed:    Consultant(s) Notes Reviewed:      Care Discussed with Consultants/Other Providers:   Killian Rocha MD   Internal Medicine PGY-1  Pager: NS: [phone] [facility]: [phone]    Patient is a 75y old  Female who presents with a chief complaint of acute respiratory failure (08 Jan 2021 05:12)    SUBJECTIVE / OVERNIGHT EVENTS:  No acute events overnight. Patient on BiPAP overnight.        MEDICATIONS  (STANDING):  acyclovir IVPB 700 milliGRAM(s) IV Intermittent every 8 hours  amLODIPine   Tablet 5 milliGRAM(s) Oral daily  budesonide  80 MICROgram(s)/formoterol 4.5 MICROgram(s) Inhaler 2 Puff(s) Inhalation two times a day  dextrose 40% Gel 15 Gram(s) Oral once  dextrose 5%. 1000 milliLiter(s) (50 mL/Hr) IV Continuous <Continuous>  dextrose 5%. 1000 milliLiter(s) (100 mL/Hr) IV Continuous <Continuous>  dextrose 50% Injectable 25 Gram(s) IV Push once  dextrose 50% Injectable 12.5 Gram(s) IV Push once  dextrose 50% Injectable 25 Gram(s) IV Push once  glucagon  Injectable 1 milliGRAM(s) IntraMuscular once  insulin lispro (ADMELOG) corrective regimen sliding scale   SubCutaneous three times a day before meals  insulin lispro (ADMELOG) corrective regimen sliding scale   SubCutaneous at bedtime  isosorbide   dinitrate Tablet (ISORDIL) 10 milliGRAM(s) Oral two times a day  levalbuterol Inhalation 0.63 milliGRAM(s) Inhalation three times a day  methylPREDNISolone sodium succinate IVPB 500 milliGRAM(s) IV Intermittent daily  pantoprazole    Tablet 40 milliGRAM(s) Oral before breakfast  sodium chloride 0.9%. 1000 milliLiter(s) (65 mL/Hr) IV Continuous <Continuous>  tiotropium 18 MICROgram(s) Capsule 1 Capsule(s) Inhalation daily    MEDICATIONS  (PRN):  acetaminophen   Tablet .. 650 milliGRAM(s) Oral every 6 hours PRN Mild Pain (1 - 3), Moderate Pain (4 - 6)      T(C): 36.7 (01-08-21 @ 07:51), Max: 36.8 (01-07-21 @ 20:26)  HR: 114 (01-08-21 @ 07:51) (105 - 125)  BP: 144/80 (01-08-21 @ 07:51) (119/72 - 153/88)  RR: 19 (01-08-21 @ 07:51) (19 - 20)  SpO2: 95% (01-08-21 @ 07:51) (91% - 97%)  CAPILLARY BLOOD GLUCOSE      POCT Blood Glucose.: 177 mg/dL (08 Jan 2021 07:29)  POCT Blood Glucose.: 200 mg/dL (07 Jan 2021 21:22)  POCT Blood Glucose.: 275 mg/dL (07 Jan 2021 16:26)  POCT Blood Glucose.: 160 mg/dL (07 Jan 2021 11:10)    I&O's Summary    07 Jan 2021 07:01  -  08 Jan 2021 07:00  --------------------------------------------------------  IN: 505 mL / OUT: 0 mL / NET: 505 mL        REVIEW OF SYSTEMS    General: No fevers/chills  Skin/Breast: No rash  Ophthalmologic: No vision changes  ENMT:No dysphagia or odynophagia  Respiratory and Thorax: No SOB, wheezing, cough  Cardiovascular: No chest pain or palpitations  Gastrointestinal: No n/v/d, No melena, No Hematochezia  Genitourinary:  No dysuria, No change in urinary frequency  Musculoskeletal: No joint or muscle pains.  Neurological: No focal deficits, No headache    PHYSICAL EXAM:  GENERAL: NAD, well-developed  HEAD:  Atraumatic, Normocephalic  EYES: EOMI, PERRLA, conjunctiva and sclera clear  NECK: Supple, No JVD  CHEST/LUNG: Clear to auscultation bilaterally; No wheeze  HEART: Regular rate and rhythm; No murmurs, rubs, or gallops  ABDOMEN: Soft, Nontender, Nondistended; Bowel sounds present  EXTREMITIES:  2+ Peripheral Pulses, No clubbing, cyanosis, or edema  PSYCH: AAOx3  NEUROLOGY: non-focal  SKIN: No rashes or lesions    LABS:                        7.4    20.95 )-----------( 247      ( 08 Jan 2021 07:27 )             24.4     01-07    137  |  91<L>  |  20  ----------------------------<  78  4.4   |  41<H>  |  0.35<L>    Ca    9.0      07 Jan 2021 07:10  Phos  2.7     01-07  Mg     1.8     01-07    PT/INR - ( 07 Jan 2021 17:45 )   PT: 10.3 sec;   INR: 0.85 ratio      PTT - ( 07 Jan 2021 17:45 )  PTT:23.5 sec    RADIOLOGY & ADDITIONAL TESTS:    Imaging Personally Reviewed:    Consultant(s) Notes Reviewed:      Care Discussed with Consultants/Other Providers:   Killian Rocha MD   Internal Medicine PGY-1  Pager: NS: [phone] [facility]: [phone]    Patient is a 75y old  Female who presents with a chief complaint of acute respiratory failure (08 Jan 2021 05:12)    SUBJECTIVE / OVERNIGHT EVENTS:  No acute events overnight. Patient on BiPAP overnight. Appears better overall, not lethargic. AOx3. Aware she will go for angiogram possible embolization with neurosurgery today. NPO since midnight. Denies any fevers, chills, n/v, chest pain, cough, abdominal pain, dysuria. Still having lower extremity weakness, but does have gross sensation intact in lower extremities.     MEDICATIONS  (STANDING):  acyclovir IVPB 700 milliGRAM(s) IV Intermittent every 8 hours  amLODIPine   Tablet 5 milliGRAM(s) Oral daily  budesonide  80 MICROgram(s)/formoterol 4.5 MICROgram(s) Inhaler 2 Puff(s) Inhalation two times a day  dextrose 40% Gel 15 Gram(s) Oral once  dextrose 5%. 1000 milliLiter(s) (50 mL/Hr) IV Continuous <Continuous>  dextrose 5%. 1000 milliLiter(s) (100 mL/Hr) IV Continuous <Continuous>  dextrose 50% Injectable 25 Gram(s) IV Push once  dextrose 50% Injectable 12.5 Gram(s) IV Push once  dextrose 50% Injectable 25 Gram(s) IV Push once  glucagon  Injectable 1 milliGRAM(s) IntraMuscular once  insulin lispro (ADMELOG) corrective regimen sliding scale   SubCutaneous three times a day before meals  insulin lispro (ADMELOG) corrective regimen sliding scale   SubCutaneous at bedtime  isosorbide   dinitrate Tablet (ISORDIL) 10 milliGRAM(s) Oral two times a day  levalbuterol Inhalation 0.63 milliGRAM(s) Inhalation three times a day  methylPREDNISolone sodium succinate IVPB 500 milliGRAM(s) IV Intermittent daily  pantoprazole    Tablet 40 milliGRAM(s) Oral before breakfast  sodium chloride 0.9%. 1000 milliLiter(s) (65 mL/Hr) IV Continuous <Continuous>  tiotropium 18 MICROgram(s) Capsule 1 Capsule(s) Inhalation daily    MEDICATIONS  (PRN):  acetaminophen   Tablet .. 650 milliGRAM(s) Oral every 6 hours PRN Mild Pain (1 - 3), Moderate Pain (4 - 6)    T(C): 36.7 (01-08-21 @ 07:51), Max: 36.8 (01-07-21 @ 20:26)  HR: 114 (01-08-21 @ 07:51) (105 - 125)  BP: 144/80 (01-08-21 @ 07:51) (119/72 - 153/88)  RR: 19 (01-08-21 @ 07:51) (19 - 20)  SpO2: 95% (01-08-21 @ 07:51) (91% - 97%)    CAPILLARY BLOOD GLUCOSE  POCT Blood Glucose.: 177 mg/dL (08 Jan 2021 07:29)  POCT Blood Glucose.: 200 mg/dL (07 Jan 2021 21:22)  POCT Blood Glucose.: 275 mg/dL (07 Jan 2021 16:26)  POCT Blood Glucose.: 160 mg/dL (07 Jan 2021 11:10)    I&O's Summary    07 Jan 2021 07:01  -  08 Jan 2021 07:00  --------------------------------------------------------  IN: 505 mL / OUT: 0 mL / NET: 505 mL    REVIEW OF SYSTEMS  General: No fevers/chills  Skin/Breast: No rash  Ophthalmologic: No vision changes  ENMT: No dysphagia or odynophagia  Respiratory and Thorax: + SOB, no wheezing or cough  Cardiovascular: No chest pain or palpitations  Gastrointestinal: No n/v/d  Genitourinary:  No dysuria, No change in urinary frequency  Musculoskeletal: No joint or muscle pains.  Neurological: + LE weakness    PHYSICAL EXAM:  GENERAL: NAD, well-developed, lying in bed  HEAD:  Atraumatic, Normocephalic, poor dentition  EYES: EOMI, conjunctiva and sclera clear  NECK: Supple, No JVD  CHEST/LUNG: Clear to auscultation bilaterally; No wheeze or crackles, on BIPAP  HEART: Regular rate and rhythm; No murmurs, rubs, or gallops  ABDOMEN: Soft, Nontender, Nondistended; Bowel sounds present  EXTREMITIES:  2+ Peripheral Pulses, No clubbing, cyanosis, or edema  PSYCH: AAOx3  NEUROLOGY: unable to move lower extremities, sensation grossly intact in LE  SKIN: No rashes or lesions    LABS:                        7.4    20.95 )-----------( 247      ( 08 Jan 2021 07:27 )             24.4     01-07    137  |  91<L>  |  20  ----------------------------<  78  4.4   |  41<H>  |  0.35<L>    Ca    9.0      07 Jan 2021 07:10  Phos  2.7     01-07  Mg     1.8     01-07    PT/INR - ( 07 Jan 2021 17:45 )   PT: 10.3 sec;   INR: 0.85 ratio      PTT - ( 07 Jan 2021 17:45 )  PTT:23.5 sec    RADIOLOGY & ADDITIONAL TESTS:    Imaging Personally Reviewed:   < from: MR Angio Spinal Canal w/ IV Cont (01.06.21 @ 16:59) >  EXAM:  MR ANGIO SPINAL CANAL IC                          PROCEDURE DATE:  01/06/2021      INTERPRETATION:  CLINICAL INDICATION: Acute onset paraplegia. Extensive spinal cord abnormality. Rule out spinal infarct.    TECHNIQUE: MRA of the thoracic spine was performed with intravenous administration of contrast according to standard protocol. Diffusion weighted sequence were also performed. 7.5 ml of Gadavist IV contrast was administered.    COMPARISON: MRI of the thoracic and lumbar spine12/31/2020.    FINDINGS:  Extensive motion artifact limits evaluation.    There is mild restricted diffusion throughout the thoracic cord. On ADC map, T2 shine through is suggested within the central aspect of the conus medullaris, representing edema.    Again noted is enhancement involving the central aspect of the conus medullaris as well as the ventral cauda equina roots. There is also thickening of these cauda equina roots.    There are prominent vessels along the ventral and dorsal aspect ofthe distal thoracic cord spanning T8-L1 level. In particular there is a prominent vessel dorsal to the cord at T11 level, which appears to be supplied by relatively prominent left radicular vessels at the left T12-L1 level. (See key images)      IMPRESSION:    Within limitations of this motion degraded examination:    -Mild restricted diffusion throughout the thoracic cord, without preferential increased signal involving the edematous distal thoracic cord. There is evidence for T2 shine through within the central conus medullaris, which suggests nonspecific edema with no definite acute infarct. A subacute or chronic infarct cannot be entirely ruled out. If clinical symptomatology worsens or clinical suspicion persists, repeat imaging with sedation as necessary may be considered to obtain higher quality diffusion-weighted sequence.    -Prominent dorsal and ventral cord vasculature from T8 through L1, including specifically a prominent dorsal vessel at the level of T11. (See key images). 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 Consider neuro-interventional consultation.    -Again noted enhancement within the central conus medullaris and ventral cauda equina roots. While differential of infectious/inflammatory/autoimmune processes is still considered, possibility of edema and enhancement related to venous congestion from a dural AV fistula should also be raised.    JEANETTE ROMAN M.D., ATTENDING RADIOLOGIST  This document has been electronically signed.Jan 7 2021 12:02PM    < end of copied text >    Consultant(s) Notes Reviewed: Neurology, Neurosurgery, ID    Care Discussed with Consultants/Other Providers: Neurology, Neurosurgery, ID

## 2021-01-08 NOTE — PROGRESS NOTE ADULT - ASSESSMENT
76 y/o female with PMHx advanced COPD on 3L home O2, former smoker, initially admitted at [place] who had been intubated for respiratory arrest, and upon extubation was noted to be flaccid in the legs with decreased sensation and transferred here for evaluation for possible spinal cord compression. Labs significant for leukocytosis to WBC 24. Exam significant for sensory level at about T12 and 0/5 lower extremity strength, except for 1/5 at toes bilaterally. MRI brain w/ and w/o showed symmetric globus pallidus and dentate nucleus enhancement. MR T/L spine w/ and w/o showed cord expansion from T8 through the conus and subtle cauda equina nerve root enhancement. No significant change to motor exam today. 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 Rutherford.  76 y/o female with PMHx advanced COPD on 3L home O2, former smoker, initially admitted at [place] who had been intubated for respiratory arrest, and upon extubation was noted to be flaccid in the legs with decreased sensation and transferred here for evaluation for possible spinal cord compression. Labs significant for leukocytosis to WBC 24. Exam significant for sensory level at about T12 and 0/5 lower extremity strength, except for 1/5 at toes bilaterally. MRI brain w/ and w/o showed symmetric globus pallidus and dentate nucleus enhancement. MR T/L spine w/ and w/o showed cord expansion from T8 through the conus and subtle cauda equina nerve root enhancement. No significant change to motor exam today. 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 Rutherford.

## 2021-01-08 NOTE — PROGRESS NOTE ADULT - PROBLEM SELECTOR PLAN 7
-Will administer lovenox 40 for dvt prophylaxis   -Pureed diet  -Ongoing goals of care discussions currently DNR/DNI and questions the utility of treatments, and wonders if she may want to transition to a more palliative treatment option, palliative care following

## 2021-01-08 NOTE — PROGRESS NOTE ADULT - SUBJECTIVE AND OBJECTIVE BOX
Patient seen and examined at bedside.    --Anticoagulation--    T(C): 36.4 (01-08-21 @ 05:01), Max: 36.8 (01-07-21 @ 07:01)  HR: 108 (01-08-21 @ 05:01) (101 - 125)  BP: 153/88 (01-08-21 @ 05:01) (119/72 - 160/83)  RR: 19 (01-08-21 @ 05:01) (19 - 20)  SpO2: 97% (01-08-21 @ 05:01) (91% - 97%)  Wt(kg): --    Exam:  Wide awake, AOx2, pupils 3R, EOMI, no facial, BUE 5/5, no drift, BLE flaccid, no mvmt to deep noxious, no babinski response, decreased but persistent sensation BLE (can discriminate which leg I am touching), +rectal tone and aware of urine continence (in diaper).    Imaging:   - no new imaging    Labs:                         7.5    15.89 )-----------( 270      ( 07 Jan 2021 07:10 )             25.5     01-07    137  |  91<L>  |  20  ----------------------------<  78  4.4   |  41<H>  |  0.35<L>    Ca    9.0      07 Jan 2021 07:10  Phos  2.7     01-07  Mg     1.8     01-07    PT/INR - ( 07 Jan 2021 17:45 )   PT: 10.3 sec;   INR: 0.85 ratio         PTT - ( 07 Jan 2021 17:45 )  PTT:23.5 sec

## 2021-01-08 NOTE — PROGRESS NOTE ADULT - PROBLEM SELECTOR PLAN 3
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 hiv, lyme, tb, ebv, hep b/c), inflammatory etiology (acute demyelating inflammatory polyneuropathy/gbs), toxin-induced (b12, folate, copper deficiency; lead poisoning), para-neoplastic syndrome, syringomyelia, spinal infarction   -Motor deficits only noted on neurologic exam (sensory exam intact per limited exam); this is most consistent with diagnosis of anterior spinal cord infarction (less likely as no bowel or bladder incontinence and temperature sensation intact) or guillan-barre syndrome; less consistent with infectious or toxic etiologies, which would likely affect peripheral nerve and cause sensory deficits in addition   -B12, folate, and copper titers normal; mri brain normal; mri lumbar spine demonstrates t2 enhancement of conus medullaris    -Lumbar puncture demonstrates mildly elevated protein level (about 50); bloody tap, first bottle demonstrates cell count of 1, less than 1 on final bottle; 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  -MR spine- can't r/o acute to subacute infarct, and with possible subdural AV fistula, may need neurointerventional input  -c/w 500mg IV methylprednisolone for 3 days (1/7-1/9) per neurology 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  -MR spine- can't r/o acute to subacute infarct, and with possible subdural AV fistula, may need neurointerventional input  -c/w 500mg IV methylprednisolone for 3 days (1/7-1/9) per neurology  - will go for angiogram possible embolization with neuro surgery on 1/8

## 2021-01-08 NOTE — PROGRESS NOTE ADULT - ASSESSMENT
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

## 2021-01-08 NOTE — PROGRESS NOTE ADULT - ATTENDING COMMENTS
Pt seen by neurosurgery. Pre-op for angio and possible embolization today if AVM or dural fistula found. Continue PT and DVT ppx after.

## 2021-01-08 NOTE — PROGRESS NOTE ADULT - SUBJECTIVE AND OBJECTIVE BOX
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 Desaturated in ED at [facility], suspected cause being respiratory muscle collapse, requiring BiPAP and O2 NC, alongside her home COPD medications. Patient has bilateral LE weakness with intact bowel and bladder continence. 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 Patient was placed on steroids, and MR angio ordered to rule out a spinal cord infarct. Acyclovir started by ID. Patient made herself DNR/DNI, and palliative care consulted for further GOC.    INTERVAL EVENTS:  1/7: patient with dyspnea this AM, on BiPAP and feeling better  1/8: patient off BiPAP, appears more comfortable    ADVANCE DIRECTIVES:    DNR  Yes    MOLST  [X]  Living Will  [ ]   DECISION MAKER(s):  [X] Health Care Proxy(s)  [ ] Surrogate(s)  [ ] Guardian           Name(s): Phone Number(s): Charline Patel (niece) [phone],    BASELINE (I)ADL(s) (prior to admission):  [place]: [X]Total  [ ] Moderate [ ]Dependent    Allergies    albuterol (Other)    Intolerances    MEDICATIONS  (STANDING):  acyclovir IVPB 700 milliGRAM(s) IV Intermittent every 8 hours  amLODIPine   Tablet 5 milliGRAM(s) Oral daily  budesonide  80 MICROgram(s)/formoterol 4.5 MICROgram(s) Inhaler 2 Puff(s) Inhalation two times a day  dextrose 40% Gel 15 Gram(s) Oral once  dextrose 5%. 1000 milliLiter(s) (50 mL/Hr) IV Continuous <Continuous>  dextrose 5%. 1000 milliLiter(s) (100 mL/Hr) IV Continuous <Continuous>  dextrose 50% Injectable 25 Gram(s) IV Push once  dextrose 50% Injectable 12.5 Gram(s) IV Push once  dextrose 50% Injectable 25 Gram(s) IV Push once  glucagon  Injectable 1 milliGRAM(s) IntraMuscular once  insulin lispro (ADMELOG) corrective regimen sliding scale   SubCutaneous three times a day before meals  insulin lispro (ADMELOG) corrective regimen sliding scale   SubCutaneous at bedtime  isosorbide   dinitrate Tablet (ISORDIL) 10 milliGRAM(s) Oral two times a day  levalbuterol Inhalation 0.63 milliGRAM(s) Inhalation three times a day  methylPREDNISolone sodium succinate IVPB 500 milliGRAM(s) IV Intermittent daily  pantoprazole    Tablet 40 milliGRAM(s) Oral before breakfast  sodium chloride 0.9%. 1000 milliLiter(s) (65 mL/Hr) IV Continuous <Continuous>  tiotropium 18 MICROgram(s) Capsule 1 Capsule(s) Inhalation daily    MEDICATIONS  (PRN):  acetaminophen   Tablet .. 650 milliGRAM(s) Oral every 6 hours PRN Mild Pain (1 - 3), Moderate Pain (4 - 6)      PRESENT SYMPTOMS: [ ]Unable to obtain due to poor mentation   Source if other than patient:  [ ]Family   [ ]Team     Pain: [ ]yes [X ]no  QOL impact -   Location -    back                Aggravating factors -  Quality - aching  Radiation -  Timing-  Severity (0-10 scale):  Minimal acceptable level (0-10 scale):     CPOT:    https://www.[facility].org/getattachment/mao05h38-1l6o-2u6i-9w7m-9445a3658l1b/Critical-Care-Pain-Observation-Tool-(CPOT)      PAIN AD Score:     http://geriatrictoolkit.[place].[facility]/cog/painad.pdf (press ctrl +  left click to view)    Dyspnea:                           [ ]Mild [ ]Moderate [ ]Severe  Anxiety:                             [ ]Mild [ ]Moderate [ ]Severe  Fatigue:                             [ ]Mild [ ]Moderate [ ]Severe  Nausea:                             [ ]Mild [ ]Moderate [ ]Severe  Loss of appetite:              [ ]Mild [ ]Moderate [ ]Severe  Constipation:                    [ ]Mild [ ]Moderate [ ]Severe    Other Symptoms:  [ ]All other review of systems negative     Palliative Performance Status Version 2:         %    http://npcrc.org/files/news/palliative_performance_scale_ppsv2.pdf    PHYSICAL EXAM:  Vital Signs Last 24 Hrs  T(C): 36.7 (08 Jan 2021 16:08), Max: 36.8 (07 Jan 2021 20:26)  T(F): 98 (08 Jan 2021 16:08), Max: 98.2 (07 Jan 2021 20:26)  HR: 107 (08 Jan 2021 16:08) (105 - 123)  BP: 128/74 (08 Jan 2021 16:08) (119/72 - 153/88)  BP(mean): --  RR: 18 (08 Jan 2021 16:08) (18 - 20)  SpO2: 100% (08 Jan 2021 16:08) (94% - 100%)    Limited exam for patient safety and to limit infection risk during COVID-19 outbreak. Please refer to primary team's examination for today.  GENERAL:  [X]Alert  [ ]Oriented x   [ ]Lethargic  [ ]Cachexia  [ ]Unarousable  [X ]Verbal  [ ]Non-Verbal  Behavioral:   [ ] Anxiety  [ ] Delirium [ ] Agitation [ ] Other  HEENT:  [ X]Normal   [ ]Dry mouth   [ ]ET Tube/Trach  [ ]Oral lesions  PULMONARY:   [ ]Clear [ ]Tachypnea  [ ]Audible excessive secretions   [ ]Rhonchi        [ ]Right [ ]Left [ ]Bilateral  [ ]Crackles        [ ]Right [ ]Left [ ]Bilateral  [ ]Wheezing     [ ]Right [ ]Left [ ]Bilateral  [ ]Diminished breath sounds [ ]right [ ]left [ ]bilateral  CARDIOVASCULAR:    [ ]Regular [ ]Irregular [ ]Tachy  [ ]Lance [ ]Murmur [ ]Other  GASTROINTESTINAL:  [ ]Soft  [ ]Distended   [ ]+BS  [ ]Non tender [ ]Tender  [ ]PEG [ ]OGT/ NGT  Last BM:     GENITOURINARY:  [ ]Normal [ ] Incontinent   [ ]Oliguria/Anuria   [ ]Fountain  MUSCULOSKELETAL:   [ ]Normal   [ ]Weakness  [ ]Bed/Wheelchair bound [ ]Edema  NEUROLOGIC:   [ ]No focal deficits  [ ]Cognitive impairment  [ ]Dysphagia [ ]Dysarthria [ ]Paresis [ ]Other   SKIN:   [ ]Normal    [ ]Rash  [ ]Pressure ulcer(s)       Present on admission [ ]y [ ]n    CRITICAL CARE:  [ ] Shock Present  [ ]Septic [ ]Cardiogenic [ ]Neurologic [ ]Hypovolemic  [ ]  Vasopressors [ ]  Inotropes   [ ]Respiratory failure present [ ]Mechanical ventilation [ ]Non-invasive ventilatory support [ ]High flow  [ ]Acute  [ ]Chronic [ ]Hypoxic  [ ]Hypercarbic [ ]Other  [ ]Other organ failure     LABS: reviewed                        7.4    20.95 )-----------( 247      ( 08 Jan 2021 07:27 )             24.4     01-08    134<L>  |  90<L>  |  18  ----------------------------<  115<H>  3.9   |  43<H>  |  0.35<L>    Ca    9.3      08 Jan 2021 07:25  Phos  2.6     01-08  Mg     1.8     01-08          RADIOLOGY & ADDITIONAL STUDIES:    CT chest 1/3/21    No pneumonia.    Emphysema.    A few bilateral 2 or 3 mm pulmonary nodules. A 1 year follow-up noncontrast chest CT is recommended to ensure stability.    Aortic root enlargement measuring about 4.2 cm.    2.4 cm right thyroid nodule. Recommend comparison to prior imaging studies. If none exists, recommend thyroid ultrasound.    Left breast nodules. Correlation with mammography and/or ultrasound is recommended if not recently performed.    PROTEIN CALORIE MALNUTRITION PRESENT: [ ]mild [ ]moderate [ ]severe [ ]underweight [ ]morbid obesity  https://www.andeal.org/vault/2440/web/files/ONC/Table_Clinical%20Characteristics%20to%20Document%20Malnutrition-White%20JV%20et%20al%202012.pdf    Height (cm): 167.6 (12-28-20 @ 17:32)  Weight (kg): 74.8 (12-28-20 @ 17:32)  BMI (kg/m2): 26.6 (12-28-20 @ 17:32)    [ ]PPSV2 < or = to 30% [ ]significant weight loss  [ ]poor nutritional intake  [ ]anasarca     Albumin, Serum: 2.7 g/dL (01-04-21 @ 07:00)   [ ]Artificial Nutrition      REFERRALS:   [ ]Chaplaincy  [ ]Hospice  [ ]Child Life  [ ]Social Work  [ ]Case management [ ]Holistic Therapy     Goals of Care Document:     ______________  Dick Sy MD   of Geriatric and Palliative Medicine  [facility]     Please page the following number for clinical matters between the hours of 9AM and 5PM   from Monday through Friday : [phone]    After 5PM and on weekends, please page: [phone]. The Geriatric and Palliative Medicine consult service has 24/7 coverage for medical recommendations, including for symptom management needs.

## 2021-01-08 NOTE — CHART NOTE - NSCHARTNOTEFT_GEN_A_CORE
Interventional Neuro- Radiology   Procedure Note      Procedure: Selective Spinal Angiography and Possible Embolization  Pre- Procedure Diagnosis: spinal AVM  Post- Procedure Diagnosis:    : Dr. Mike MD    Physician Assistant: Vi Garner PA-C    RN: Jazmin Randall: Kamar    Anesthesia: Dr. London MD (MAC)   (general anesthesia)    I/Os:  Fluids:  Fountain:  Contrast:  Estimated Blood Loss: <10cc    Preliminary Report:  Under MAC/ general anesthesia, using a ___Fr short/long sheath to the right/ left/ bilateral groin examination of left vertebral artery/ left internal carotid artery/ left external carotid artery/ right vertebral artery/ right internal carotid artery/ right external carotid artery via selective spinalal angiography demonstrates ________. ( Official note to follow).    Patient tolerated procedure well, vital signs stable, hemodynamically stable, no change in neurological status compared to baseline. Results discussed with neurosurgery/ patient and their family. Groin sheath d/c'ed, manual compression held to hemostasis, no active bleeding, no hematoma, star-close device applied, quick clot and safeguard balloon dressing applied at _____h. Patient transferred to PACU/ NSCU/ IR recovery for further care/ monitoring. Interventional Neuro- Radiology   Procedure Note      Procedure: Selective Spinal Angiography and Possible Embolization  Pre- Procedure Diagnosis: spinal AVM  Post- Procedure Diagnosis:    : Dr. Mike MD    Physician Assistant: Vi Garner PA-C    RN: Jazmin Randall: Kamar    Anesthesia: Dr. London MD (MAC)   (general anesthesia)    I/Os:  Fluids:  Fountain:  Contrast:  Estimated Blood Loss: <10cc    Preliminary Report:  Under MAC/ general anesthesia, using a ___Fr short/long sheath to the right/ left/ bilateral groin examination of left vertebral artery/ left internal carotid artery/ left external carotid artery/ right vertebral artery/ right internal carotid artery/ right external carotid artery via selective spinal angiography demonstrates ________. ( Official note to follow).    Patient tolerated procedure well, vital signs stable, hemodynamically stable, no change in neurological status compared to baseline. Results discussed with neurosurgery/ patient and their family. Groin sheath d/c'ed, manual compression held to hemostasis, no active bleeding, no hematoma, star-close device applied, quick clot and safeguard balloon dressing applied at _____h. Patient transferred to PACU/ NSCU/ IR recovery for further care/ monitoring. Interventional Neuro- Radiology   Procedure Note      Procedure: Selective Spinal Angiography and Possible Embolization  Pre- Procedure Diagnosis: spinal AVM  Post- Procedure Diagnosis:    : Dr. Mike MD  Fellow:     Dr Faraz Campoverde   Physician Assistant: Vi Garner PA-C    RN: Kandice Jones RN  Tech: Damion Lewis LRT     Kamar Jose LRT    Anesthesia: Dr. Dahlia CONNELLY (MAC)   (general anesthesia)  CRNA:         Roldan Yao    I/Os: Fluids:      Fountain:      Contrast:     Estimated Blood Loss: <10cc    Preliminary Report:  Under MAC sedation using a 5 [place] short sheath to the right femoral artery groin spinal angiography was performed and demonstrated ________. ( Official note to follow).  Patient tolerated procedure well, vital signs stable, hemodynamically stable, no change in neurological status compared to baseline. Results discussed with neurosurgery/ patient and their family. Right groin sheath was discontinued manual compression held to hemostasis, no active bleeding, no hematoma, star-close device applied, quick clot and safeguard balloon dressing applied at _____h. Patient transferred to PACU/ NSCU/ IR recovery for further care/ monitoring. Interventional Neuro- Radiology   Procedure Note      Procedure: Selective Spinal Angiography and Possible Embolization  Pre- Procedure Diagnosis: spinal AVM  Post- Procedure Diagnosis:    : Dr. Mike MD  Fellow:     Dr Faraz Campoverde   Physician Assistant: Vi Garner PA-C    RN: Kandice Jones RN  Tech: Damion Lewis LRT     Kamar Jose LRT    Anesthesia: Dr. Dahlia CONNELLY (MAC)   (general anesthesia)  CRNA:        Roldan Yao    I/Os: Fluids:      Fountain:      Contrast:     Estimated Blood Loss: <10cc    Preliminary Report:  Under MAC sedation using a 5 [place] short sheath to the right femoral artery groin spinal angiography was performed and demonstrated ________. ( Official note to follow).  Patient tolerated procedure well, vital signs stable, hemodynamically stable, no change in neurological status compared to baseline. Results discussed with neurosurgery/ patient and their family. Right groin sheath was discontinued manual compression held to hemostasis, no active bleeding, no hematoma, star-close device applied, quick clot and safeguard balloon dressing applied at _____h. Patient transferred to PACU/ NSCU/ IR recovery for further care/ monitoring. Interventional Neuro- Radiology   Procedure Note      Procedure: Selective Spinal Angiography   Pre- Procedure Diagnosis: spinal AVM  Post- Procedure Diagnosis: No spinal AVM, prominent anterior spinal artery     : Dr. Mike MD  Fellow:     Dr Faraz Campoverde   Physician Assistant: Vi Garner PA-C    RN: Kandice Jones RN  Tech: Damion Lewis LRT     Kamar Jose LRT    Orlando Avalos LRT     Anesthesia: Dr. Dahlia CONNELLY (MAC)   (general anesthesia)  CRNA:        Roldan Yao    I/Os: Fluids:100cc  Fountain:3000cc s Contrast:350cc  Estimated Blood Loss: <10cc    Preliminary Report:  Under MAC sedation using a 5 Citizen of the [place] short sheath to the right femoral artery groin spinal angiography from T3 to sacral region was performed and demonstrated no AVM. Patient has a prominent anterior spinal artery. Official note to follow.   Patient tolerated procedure well, vital signs stable, hemodynamically stable, no change in neurological status compared to baseline. Results discussed with neurosurgery and patient. Right groin sheath was discontinued manual compression held to hemostasis, no active bleeding, no hematoma, Minx 5 Citizen of the [place] closure device was utilized, a quick clot and safeguard balloon device was applied at 0730pm. Patient transferred to PACU.

## 2021-01-09 LAB
ANION GAP SERPL CALC-SCNC: 3 MMOL/L — LOW (ref 5–17)
BASE EXCESS BLDV CALC-SCNC: 21.2 MMOL/L — HIGH (ref -2–2)
BUN SERPL-MCNC: 14 MG/DL — SIGNIFICANT CHANGE UP (ref 7–23)
CA-I SERPL-SCNC: 1.15 MMOL/L — SIGNIFICANT CHANGE UP (ref 1.12–1.3)
CALCIUM SERPL-MCNC: 9.1 MG/DL — SIGNIFICANT CHANGE UP (ref 8.4–10.5)
CHLORIDE BLDV-SCNC: 92 MMOL/L — LOW (ref 96–108)
CHLORIDE SERPL-SCNC: 91 MMOL/L — LOW (ref 96–108)
CO2 BLDV-SCNC: 51 MMOL/L — HIGH (ref 22–30)
CO2 SERPL-SCNC: 44 MMOL/L — HIGH (ref 22–31)
CREAT SERPL-MCNC: 0.31 MG/DL — LOW (ref 0.5–1.3)
GAS PNL BLDV: 137 MMOL/L — SIGNIFICANT CHANGE UP (ref 135–145)
GAS PNL BLDV: SIGNIFICANT CHANGE UP
GLUCOSE BLDC GLUCOMTR-MCNC: 120 MG/DL — HIGH (ref 70–99)
GLUCOSE BLDC GLUCOMTR-MCNC: 150 MG/DL — HIGH (ref 70–99)
GLUCOSE BLDC GLUCOMTR-MCNC: 223 MG/DL — HIGH (ref 70–99)
GLUCOSE BLDC GLUCOMTR-MCNC: 243 MG/DL — HIGH (ref 70–99)
GLUCOSE BLDV-MCNC: 126 MG/DL — HIGH (ref 70–99)
GLUCOSE SERPL-MCNC: 120 MG/DL — HIGH (ref 70–99)
HCO3 BLDV-SCNC: 48 MMOL/L — HIGH (ref 21–29)
HCT VFR BLD CALC: 25.5 % — LOW (ref 34.5–45)
HCT VFR BLDA CALC: 25 % — LOW (ref 39–50)
HGB BLD CALC-MCNC: 8 G/DL — LOW (ref 11.5–15.5)
HGB BLD-MCNC: 7.8 G/DL — LOW (ref 11.5–15.5)
LACTATE BLDV-MCNC: 0.8 MMOL/L — SIGNIFICANT CHANGE UP (ref 0.7–2)
MAGNESIUM SERPL-MCNC: 1.7 MG/DL — SIGNIFICANT CHANGE UP (ref 1.6–2.6)
MCHC RBC-ENTMCNC: 30.2 PG — SIGNIFICANT CHANGE UP (ref 27–34)
MCHC RBC-ENTMCNC: 30.6 GM/DL — LOW (ref 32–36)
MCV RBC AUTO: 98.8 FL — SIGNIFICANT CHANGE UP (ref 80–100)
NRBC # BLD: 0 /100 WBCS — SIGNIFICANT CHANGE UP (ref 0–0)
OTHER CELLS CSF MANUAL: 9 ML/DL — LOW (ref 18–22)
PCO2 BLDV: 79 MMHG — HIGH (ref 35–50)
PH BLDV: 7.41 — SIGNIFICANT CHANGE UP (ref 7.35–7.45)
PHOSPHATE SERPL-MCNC: 3.2 MG/DL — SIGNIFICANT CHANGE UP (ref 2.5–4.5)
PLATELET # BLD AUTO: 221 K/UL — SIGNIFICANT CHANGE UP (ref 150–400)
PO2 BLDV: 48 MMHG — HIGH (ref 25–45)
POTASSIUM BLDV-SCNC: 3.3 MMOL/L — LOW (ref 3.5–5.3)
POTASSIUM SERPL-MCNC: 3.3 MMOL/L — LOW (ref 3.5–5.3)
POTASSIUM SERPL-SCNC: 3.3 MMOL/L — LOW (ref 3.5–5.3)
RBC # BLD: 2.58 M/UL — LOW (ref 3.8–5.2)
RBC # FLD: 14 % — SIGNIFICANT CHANGE UP (ref 10.3–14.5)
SAO2 % BLDV: 82 % — SIGNIFICANT CHANGE UP (ref 67–88)
SODIUM SERPL-SCNC: 138 MMOL/L — SIGNIFICANT CHANGE UP (ref 135–145)
WBC # BLD: 20.81 K/UL — HIGH (ref 3.8–10.5)
WBC # FLD AUTO: 20.81 K/UL — HIGH (ref 3.8–10.5)

## 2021-01-09 PROCEDURE — 99231 SBSQ HOSP IP/OBS SF/LOW 25: CPT | Mod: GC

## 2021-01-09 PROCEDURE — 99233 SBSQ HOSP IP/OBS HIGH 50: CPT | Mod: GC

## 2021-01-09 RX ORDER — MAGNESIUM SULFATE 500 MG/ML
1 VIAL (ML) INJECTION ONCE
Refills: 0 | Status: COMPLETED | OUTPATIENT
Start: 2021-01-09 | End: 2021-01-09

## 2021-01-09 RX ORDER — POTASSIUM CHLORIDE 20 MEQ
40 PACKET (EA) ORAL ONCE
Refills: 0 | Status: COMPLETED | OUTPATIENT
Start: 2021-01-09 | End: 2021-01-09

## 2021-01-09 RX ORDER — POTASSIUM CHLORIDE 20 MEQ
10 PACKET (EA) ORAL
Refills: 0 | Status: COMPLETED | OUTPATIENT
Start: 2021-01-09 | End: 2021-01-09

## 2021-01-09 RX ADMIN — Medication 100 MILLIGRAM(S): at 04:54

## 2021-01-09 RX ADMIN — Medication 264 MILLIGRAM(S): at 22:27

## 2021-01-09 RX ADMIN — PANTOPRAZOLE SODIUM 40 MILLIGRAM(S): 20 TABLET, DELAYED RELEASE ORAL at 05:37

## 2021-01-09 RX ADMIN — TIOTROPIUM BROMIDE 1 CAPSULE(S): 18 CAPSULE ORAL; RESPIRATORY (INHALATION) at 11:56

## 2021-01-09 RX ADMIN — SODIUM CHLORIDE 80 MILLILITER(S): 9 INJECTION INTRAMUSCULAR; INTRAVENOUS; SUBCUTANEOUS at 00:05

## 2021-01-09 RX ADMIN — Medication 0.25 MILLIGRAM(S): at 12:48

## 2021-01-09 RX ADMIN — LEVALBUTEROL 0.63 MILLIGRAM(S): 1.25 SOLUTION, CONCENTRATE RESPIRATORY (INHALATION) at 04:54

## 2021-01-09 RX ADMIN — BUDESONIDE AND FORMOTEROL FUMARATE DIHYDRATE 2 PUFF(S): 160; 4.5 AEROSOL RESPIRATORY (INHALATION) at 16:38

## 2021-01-09 RX ADMIN — BUDESONIDE AND FORMOTEROL FUMARATE DIHYDRATE 2 PUFF(S): 160; 4.5 AEROSOL RESPIRATORY (INHALATION) at 05:39

## 2021-01-09 RX ADMIN — SODIUM CHLORIDE 80 MILLILITER(S): 9 INJECTION INTRAMUSCULAR; INTRAVENOUS; SUBCUTANEOUS at 04:54

## 2021-01-09 RX ADMIN — Medication 100 GRAM(S): at 09:05

## 2021-01-09 RX ADMIN — Medication 40 MILLIEQUIVALENT(S): at 11:54

## 2021-01-09 RX ADMIN — AMLODIPINE BESYLATE 5 MILLIGRAM(S): 2.5 TABLET ORAL at 05:38

## 2021-01-09 RX ADMIN — Medication 2: at 11:55

## 2021-01-09 RX ADMIN — Medication 100 MILLIEQUIVALENT(S): at 10:19

## 2021-01-09 RX ADMIN — Medication 100 MILLIEQUIVALENT(S): at 13:46

## 2021-01-09 RX ADMIN — Medication 264 MILLIGRAM(S): at 14:48

## 2021-01-09 RX ADMIN — Medication 100 MILLIEQUIVALENT(S): at 12:45

## 2021-01-09 RX ADMIN — ISOSORBIDE DINITRATE 10 MILLIGRAM(S): 5 TABLET ORAL at 11:54

## 2021-01-09 RX ADMIN — LEVALBUTEROL 0.63 MILLIGRAM(S): 1.25 SOLUTION, CONCENTRATE RESPIRATORY (INHALATION) at 22:27

## 2021-01-09 RX ADMIN — Medication 264 MILLIGRAM(S): at 04:54

## 2021-01-09 RX ADMIN — LEVALBUTEROL 0.63 MILLIGRAM(S): 1.25 SOLUTION, CONCENTRATE RESPIRATORY (INHALATION) at 12:47

## 2021-01-09 RX ADMIN — Medication 2: at 16:38

## 2021-01-09 NOTE — PROGRESS NOTE ADULT - PROBLEM SELECTOR PLAN 3
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  -MR spine- can't r/o acute to subacute infarct, and with possible subdural AV fistula, may need neurointerventional input  -c/w 500mg IV methylprednisolone for 3 days (1/7-1/9) per neurology  - will go for angiogram possible embolization with neuro surgery on 1/8

## 2021-01-09 NOTE — PROGRESS NOTE ADULT - ASSESSMENT
Patient seen and examined at bedside, Angiogram negative for vascular malformation.  -Groin soft this AM, no E/O hematoma.  -No further neurosurgical intervention  -Care per medicine/neurology

## 2021-01-09 NOTE — PROGRESS NOTE ADULT - SUBJECTIVE AND OBJECTIVE BOX
Dick Solomon MD PGY3  Pager: [phone] SID, [phone] [facility]  After 7: Night Float pager    Patient is a 75y old  Female who presents with a chief complaint of acute respiratory failure (08 Jan 2021 16:22)      SUBJECTIVE / OVERNIGHT EVENTS: Overnight, patient received spinal angiography per neuro-IR, found with NO spinal AVM. only with prominent anterior spinal artery. Patient seen and examined at bedside this AM.     MEDICATIONS  (STANDING):  acyclovir IVPB 700 milliGRAM(s) IV Intermittent every 8 hours  amLODIPine   Tablet 5 milliGRAM(s) Oral daily  budesonide  80 MICROgram(s)/formoterol 4.5 MICROgram(s) Inhaler 2 Puff(s) Inhalation two times a day  dextrose 40% Gel 15 Gram(s) Oral once  dextrose 5%. 1000 milliLiter(s) (50 mL/Hr) IV Continuous <Continuous>  dextrose 5%. 1000 milliLiter(s) (100 mL/Hr) IV Continuous <Continuous>  dextrose 50% Injectable 25 Gram(s) IV Push once  dextrose 50% Injectable 12.5 Gram(s) IV Push once  dextrose 50% Injectable 25 Gram(s) IV Push once  glucagon  Injectable 1 milliGRAM(s) IntraMuscular once  insulin lispro (ADMELOG) corrective regimen sliding scale   SubCutaneous three times a day before meals  insulin lispro (ADMELOG) corrective regimen sliding scale   SubCutaneous at bedtime  isosorbide   dinitrate Tablet (ISORDIL) 10 milliGRAM(s) Oral two times a day  levalbuterol Inhalation 0.63 milliGRAM(s) Inhalation three times a day  pantoprazole    Tablet 40 milliGRAM(s) Oral before breakfast  sodium chloride 0.9%. 1000 milliLiter(s) (80 mL/Hr) IV Continuous <Continuous>  tiotropium 18 MICROgram(s) Capsule 1 Capsule(s) Inhalation daily    MEDICATIONS  (PRN):  acetaminophen   Tablet .. 650 milliGRAM(s) Oral every 6 hours PRN Mild Pain (1 - 3), Moderate Pain (4 - 6)      Vital Signs Last 24 Hrs  T(C): 36.1 (09 Jan 2021 05:04), Max: 36.7 (08 Jan 2021 15:54)  T(F): 97 (09 Jan 2021 05:04), Max: 98 (08 Jan 2021 15:54)  HR: 92 (09 Jan 2021 05:04) (88 - 121)  BP: 162/77 (09 Jan 2021 05:04) (114/57 - 162/77)  BP(mean): 81 (09 Jan 2021 00:30) (81 - 111)  RR: 17 (09 Jan 2021 05:04) (16 - 19)  SpO2: 97% (09 Jan 2021 05:04) (97% - 100%)  CAPILLARY BLOOD GLUCOSE      POCT Blood Glucose.: 150 mg/dL (09 Jan 2021 07:33)  POCT Blood Glucose.: 128 mg/dL (08 Jan 2021 21:04)  POCT Blood Glucose.: 199 mg/dL (08 Jan 2021 16:14)  POCT Blood Glucose.: 185 mg/dL (08 Jan 2021 11:22)    I&O's Summary    08 Jan 2021 07:01  -  09 Jan 2021 07:00  --------------------------------------------------------  IN: 2290 mL / OUT: 2300 mL / NET: -10 mL        PHYSICAL EXAM:  GENERAL: NAD, well-developed  EYES: EOMI, PERRLA, conjunctiva and sclera clear  NECK:  No JVD  CHEST/LUNG: CTABL ; No wheeze  HEART: RRR; No murmurs  ABDOMEN: Soft, Nontender, Nondistended; Bowel sounds present  : No Fountain  EXTREMITIES:  2+ Peripheral Pulses, No edema  PSYCH: AAOx3  NEUROLOGY: non-focal  SKIN: No rashes or lesions. No sacral ulcer    LABS:                        7.8    20.81 )-----------( 221      ( 09 Jan 2021 07:29 )             25.5     01-09    138  |  91<L>  |  14  ----------------------------<  120<H>  3.3<L>   |  44<H>  |  0.31<L>    Ca    9.1      09 Jan 2021 07:23  Phos  3.2     01-09  Mg     1.7     01-09    TPro  5.0<L>  /  Alb  3.0<L>  /  TBili  0.4  /  DBili  <0.1  /  AST  44<H>  /  ALT  57<H>  /  AlkPhos  92  01-08    PT/INR - ( 08 Jan 2021 08:55 )   PT: 11.1 sec;   INR: 0.94 ratio         PTT - ( 08 Jan 2021 08:55 )  PTT:22.6 sec          Microbiology;        RADIOLOGY & ADDITIONAL TESTS:    Imaging Personally Reviewed:          Consultant(s) Notes Reviewed:      Care Discussed with Consultants/Other Providers:

## 2021-01-09 NOTE — PROGRESS NOTE ADULT - ATTENDING COMMENTS
Pt seen and examined. Agree with above with additional findings:    # acute hypoxic/hypercapeneic respiratory failure  # bilateral LE weakness  # post-ICU neuropathy/myopathy  # HSV2 meningomyelitis    - s/p angiogram, negative for AVM  - s/p BIPAP with improvement in WOB  - discussed care with Dr. Rutherford  - continue IV acyclovir; appreciate ID recs on further regimen  - PT/OT/PMNR consult/OOB to chair  - dc planning to KORIN Mcconnell MD  Division of Hospital Medicine  Cell: [phone]  Office: [phone].

## 2021-01-09 NOTE — PROGRESS NOTE ADULT - SUBJECTIVE AND OBJECTIVE BOX
Patient seen and examined at bedside.    --Anticoagulation--    T(C): 36.4 (01-09-21 @ 12:00), Max: 36.7 (01-08-21 @ 15:54)  HR: 97 (01-09-21 @ 12:00) (88 - 121)  BP: 154/82 (01-09-21 @ 12:00) (114/57 - 162/77)  RR: 17 (01-09-21 @ 12:00) (16 - 18)  SpO2: 91% (01-09-21 @ 12:00) (91% - 100%)  Wt(kg): --    Exam:  Wide Awake, Ox2, UE moving strong AG, BLE toe wiggle only, groin soft, minimally tender, no hematoma

## 2021-01-09 NOTE — PROGRESS NOTE ADULT - PROBLEM SELECTOR PLAN 1
-In the emergency department, the patient was noted to have an acute desaturation to 86% while managed on 4L nasal cannula (saturating at 100% previously while being monitored in the ED on 4L nasal cannula)   -Etiology of acute respiratory failure with hypoxia most likely is respiratory muscle collapse, which may represent insufficient respiratory muscle reserve in the setting of such great carbon dioxide retention  -Etiology of acute exacerbation of COPD uncertain; no documentation in [place] of acute viral prodrome; no changes in medication management; no evidence of missed doses   -Arterial blood gas demonstrates marginally less carbon dioxide retention   -Will manage as COPD exacerbation; will administer Xopenex q8 hours  -s/p 40 mg of intravenous methylprednisolone q12 hours (and space as needed); given patient's persistent subjective shortness of breath  -will now pulse steroids with 500mg IV methylprednisolone for 3 days per neurology, then place back on methylprednisolone 40mg q12hrs and taper off steriods  -s/p five days of azithromycin  -Will manage with bilevel positive airway pressure (currently at inspiratory pressure of 16 and expiratory pressure of 5) at night and transition to nasal cannula 2L during the day; titrate oxygen saturation on pulse oximetry to 88-92%  -Will administer home doses of LAMA, LABA, and inhaled corticosteroid

## 2021-01-09 NOTE — PROGRESS NOTE ADULT - ASSESSMENT
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

## 2021-01-10 LAB
ALBUMIN SERPL ELPH-MCNC: 2.6 G/DL — LOW (ref 3.3–5)
ALP SERPL-CCNC: 87 U/L — SIGNIFICANT CHANGE UP (ref 40–120)
ALT FLD-CCNC: 59 U/L — HIGH (ref 10–45)
ANION GAP SERPL CALC-SCNC: 4 MMOL/L — LOW (ref 5–17)
AST SERPL-CCNC: 33 U/L — SIGNIFICANT CHANGE UP (ref 10–40)
BASE EXCESS BLDV CALC-SCNC: 17 MMOL/L — HIGH (ref -2–2)
BILIRUB SERPL-MCNC: 0.3 MG/DL — SIGNIFICANT CHANGE UP (ref 0.2–1.2)
BUN SERPL-MCNC: 20 MG/DL — SIGNIFICANT CHANGE UP (ref 7–23)
CA-I SERPL-SCNC: 1.2 MMOL/L — SIGNIFICANT CHANGE UP (ref 1.12–1.3)
CALCIUM SERPL-MCNC: 9 MG/DL — SIGNIFICANT CHANGE UP (ref 8.4–10.5)
CHLORIDE BLDV-SCNC: 95 MMOL/L — LOW (ref 96–108)
CHLORIDE SERPL-SCNC: 95 MMOL/L — LOW (ref 96–108)
CO2 BLDV-SCNC: 46 MMOL/L — HIGH (ref 22–30)
CO2 SERPL-SCNC: 39 MMOL/L — HIGH (ref 22–31)
CREAT SERPL-MCNC: 0.38 MG/DL — LOW (ref 0.5–1.3)
CULTURE RESULTS: SIGNIFICANT CHANGE UP
CULTURE RESULTS: SIGNIFICANT CHANGE UP
GAS PNL BLDV: 136 MMOL/L — SIGNIFICANT CHANGE UP (ref 135–145)
GAS PNL BLDV: SIGNIFICANT CHANGE UP
GAS PNL BLDV: SIGNIFICANT CHANGE UP
GLUCOSE BLDC GLUCOMTR-MCNC: 139 MG/DL — HIGH (ref 70–99)
GLUCOSE BLDC GLUCOMTR-MCNC: 143 MG/DL — HIGH (ref 70–99)
GLUCOSE BLDC GLUCOMTR-MCNC: 182 MG/DL — HIGH (ref 70–99)
GLUCOSE BLDC GLUCOMTR-MCNC: 214 MG/DL — HIGH (ref 70–99)
GLUCOSE BLDV-MCNC: 108 MG/DL — HIGH (ref 70–99)
GLUCOSE SERPL-MCNC: 106 MG/DL — HIGH (ref 70–99)
HCO3 BLDV-SCNC: 44 MMOL/L — HIGH (ref 21–29)
HCT VFR BLD CALC: 23.7 % — LOW (ref 34.5–45)
HCT VFR BLDA CALC: 24 % — LOW (ref 39–50)
HGB BLD CALC-MCNC: 7.5 G/DL — LOW (ref 11.5–15.5)
HGB BLD-MCNC: 7.2 G/DL — LOW (ref 11.5–15.5)
LACTATE BLDV-MCNC: 1.3 MMOL/L — SIGNIFICANT CHANGE UP (ref 0.7–2)
MAGNESIUM SERPL-MCNC: 2 MG/DL — SIGNIFICANT CHANGE UP (ref 1.6–2.6)
MCHC RBC-ENTMCNC: 30.1 PG — SIGNIFICANT CHANGE UP (ref 27–34)
MCHC RBC-ENTMCNC: 30.4 GM/DL — LOW (ref 32–36)
MCV RBC AUTO: 99.2 FL — SIGNIFICANT CHANGE UP (ref 80–100)
NRBC # BLD: 0 /100 WBCS — SIGNIFICANT CHANGE UP (ref 0–0)
OTHER CELLS CSF MANUAL: 7 ML/DL — LOW (ref 18–22)
PCO2 BLDV: 74 MMHG — HIGH (ref 35–50)
PH BLDV: 7.39 — SIGNIFICANT CHANGE UP (ref 7.35–7.45)
PHOSPHATE SERPL-MCNC: 2.8 MG/DL — SIGNIFICANT CHANGE UP (ref 2.5–4.5)
PLATELET # BLD AUTO: 203 K/UL — SIGNIFICANT CHANGE UP (ref 150–400)
PO2 BLDV: 38 MMHG — SIGNIFICANT CHANGE UP (ref 25–45)
POTASSIUM BLDV-SCNC: 4 MMOL/L — SIGNIFICANT CHANGE UP (ref 3.5–5.3)
POTASSIUM SERPL-MCNC: 4.4 MMOL/L — SIGNIFICANT CHANGE UP (ref 3.5–5.3)
POTASSIUM SERPL-SCNC: 4.4 MMOL/L — SIGNIFICANT CHANGE UP (ref 3.5–5.3)
PROT SERPL-MCNC: 5 G/DL — LOW (ref 6–8.3)
RBC # BLD: 2.39 M/UL — LOW (ref 3.8–5.2)
RBC # FLD: 14 % — SIGNIFICANT CHANGE UP (ref 10.3–14.5)
SAO2 % BLDV: 66 % — LOW (ref 67–88)
SODIUM SERPL-SCNC: 138 MMOL/L — SIGNIFICANT CHANGE UP (ref 135–145)
SPECIMEN SOURCE: SIGNIFICANT CHANGE UP
SPECIMEN SOURCE: SIGNIFICANT CHANGE UP
WBC # BLD: 17.27 K/UL — HIGH (ref 3.8–10.5)
WBC # FLD AUTO: 17.27 K/UL — HIGH (ref 3.8–10.5)

## 2021-01-10 PROCEDURE — 99233 SBSQ HOSP IP/OBS HIGH 50: CPT | Mod: GC

## 2021-01-10 RX ORDER — ALPRAZOLAM 0.25 MG
0.25 TABLET ORAL DAILY
Refills: 0 | Status: DISCONTINUED | OUTPATIENT
Start: 2021-01-10 | End: 2021-01-12

## 2021-01-10 RX ADMIN — Medication 264 MILLIGRAM(S): at 12:10

## 2021-01-10 RX ADMIN — BUDESONIDE AND FORMOTEROL FUMARATE DIHYDRATE 2 PUFF(S): 160; 4.5 AEROSOL RESPIRATORY (INHALATION) at 05:29

## 2021-01-10 RX ADMIN — Medication 264 MILLIGRAM(S): at 05:27

## 2021-01-10 RX ADMIN — Medication 40 MILLIGRAM(S): at 05:28

## 2021-01-10 RX ADMIN — LEVALBUTEROL 0.63 MILLIGRAM(S): 1.25 SOLUTION, CONCENTRATE RESPIRATORY (INHALATION) at 12:10

## 2021-01-10 RX ADMIN — ISOSORBIDE DINITRATE 10 MILLIGRAM(S): 5 TABLET ORAL at 16:25

## 2021-01-10 RX ADMIN — LEVALBUTEROL 0.63 MILLIGRAM(S): 1.25 SOLUTION, CONCENTRATE RESPIRATORY (INHALATION) at 05:29

## 2021-01-10 RX ADMIN — Medication 2: at 16:49

## 2021-01-10 RX ADMIN — Medication 0.25 MILLIGRAM(S): at 14:38

## 2021-01-10 RX ADMIN — LEVALBUTEROL 0.63 MILLIGRAM(S): 1.25 SOLUTION, CONCENTRATE RESPIRATORY (INHALATION) at 22:01

## 2021-01-10 RX ADMIN — AMLODIPINE BESYLATE 5 MILLIGRAM(S): 2.5 TABLET ORAL at 05:29

## 2021-01-10 RX ADMIN — Medication 40 MILLIGRAM(S): at 16:26

## 2021-01-10 RX ADMIN — Medication 264 MILLIGRAM(S): at 22:02

## 2021-01-10 RX ADMIN — TIOTROPIUM BROMIDE 1 CAPSULE(S): 18 CAPSULE ORAL; RESPIRATORY (INHALATION) at 11:19

## 2021-01-10 RX ADMIN — ISOSORBIDE DINITRATE 10 MILLIGRAM(S): 5 TABLET ORAL at 05:29

## 2021-01-10 RX ADMIN — BUDESONIDE AND FORMOTEROL FUMARATE DIHYDRATE 2 PUFF(S): 160; 4.5 AEROSOL RESPIRATORY (INHALATION) at 16:26

## 2021-01-10 RX ADMIN — PANTOPRAZOLE SODIUM 40 MILLIGRAM(S): 20 TABLET, DELAYED RELEASE ORAL at 05:29

## 2021-01-10 NOTE — PROVIDER CONTACT NOTE (OTHER) - BACKGROUND
Pt admitted for other sx involving musculoskeletal system/ exacerbation of COPD.
Pt admitted for other symptom or sign involving musculoskeletal system. Pt's baseline is 100-120s on tele. Previous high HR was 130s on tele
Pt admitted for COPD exacerbation.
patient admitted for acute respiratory failure with hypoxia. Currently on Bipap with cont. pulse ox.
Pt admitted for COPD exacerbation.
Pt. came in with COPD exacerbation.  Pt. CO2 high, being trended
Pt. came in with COPD exacerbation.
admitting diagnosis COPD exacerbation

## 2021-01-10 NOTE — PROVIDER CONTACT NOTE (OTHER) - REASON
Pt. became tachypneic.  Pt. respiratory rate in the high 20s to 30s.  Pt. complains of shortness of breath
pt short of breath
HIgh BP
PSVT 220 x 4.6 seconds
Pt's HR went up to 140s on tele
RT side IV infiltration
VBG diluted, results abnormal
Pt. heart rate trending up.  Pt. sustaining between 120s and 130s.  Pt. in sinus tachycardia

## 2021-01-10 NOTE — PROVIDER CONTACT NOTE (OTHER) - RECOMMENDATIONS
Keep patient on continuos bipap.  MD to assess
Made MD aware.
T2 made aware.
Provider made aware.
Pt. placed back on BIPAP.  Continue to monitor
Removed IV- notify MD.
notify provider
Will notify night float MD Paez

## 2021-01-10 NOTE — PROGRESS NOTE ADULT - ASSESSMENT
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

## 2021-01-10 NOTE — PROVIDER CONTACT NOTE (OTHER) - ACTION/TREATMENT ORDERED:
Keep patient on continuos bipap.  continue to monitor.
Medication to be given
As per provider, repeat VBG in AM. Hold night time dose of isosorbide for NPO status. Will continue to monitor.
MD notified and assessed. IV removed, arm elevated and hot packs applied. Will continue to monitor.
Night float MD Paez. No interventions ordered at this time. Will continue to monitor pt on tele.
Pt. placed back on BIPAP.  continue to monitor
Continue to monitor and maintain safety.
Continue to monitor. Consult with day team.

## 2021-01-10 NOTE — PROGRESS NOTE ADULT - ATTENDING COMMENTS
Pt seen and examined. Agree with above with additional findings:    # acute hypoxic/hypercapeneic respiratory failure  # bilateral LE weakness  # post-ICU neuropathy/myopathy  # HSV2 meningomyelitis    - s/p angiogram, negative for AVM  - s/p BIPAP with improvement in WOB, ativan prn for anxiety  - discussed care with Dr. Rutherford (neurology)  - continue IV acyclovir; appreciate ID recs on further regimen - may need PICC line to complete the course  - PT/OT/PMNR consult/OOB to chair  - dc planning to acute rehab    Karen Mcconnell MD  Division of Hospital Medicine  Cell: [phone]  Office: [phone].

## 2021-01-10 NOTE — PROGRESS NOTE ADULT - PROBLEM SELECTOR PLAN 3
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  -MR spine- can't r/o acute to subacute infarct, and with possible subdural AV fistula, may need neurointerventional input  -c/w 500mg IV methylprednisolone for 3 days (1/7-1/9) per neurology  - will go for angiogram possible embolization with neuro surgery on 1/8 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  -MR spine- can't r/o acute to subacute infarct, and with possible subdural AV fistula, may need neurointerventional input  -c/w 500mg IV methylprednisolone for 3 days (1/7-1/9) per neurology  -s/p angiogram w/ neurointerventional but unable to identify AVM that would be the cause of her LE paralysis

## 2021-01-10 NOTE — PROVIDER CONTACT NOTE (OTHER) - SITUATION
Pt's HR went up to 140s on tele
pt short of breath
PSVT 220 x 4.6 seconds
Pt had manual /90
Pt. became very short of breath.  Pt. tachypneic.  Pt. vitals stable
Pt. heart rate fluctuating up.  Pt. sustaining in 120s to 130s.  Pt. placed on continuos bipap
RT side IV infiltration
VBG diluted, results abnormal

## 2021-01-10 NOTE — PROVIDER CONTACT NOTE (OTHER) - DATE AND TIME:
02-Jan-2020 10:00
02-Jan-2021 12:38
04-Jan-2021 16:31
09-Jan-2021 23:30
08-Jan-2021 09:00
02-Jan-2021 05:55
07-Jan-2021 06:52
04-Jan-2021 00:28

## 2021-01-10 NOTE — PROVIDER CONTACT NOTE (OTHER) - ASSESSMENT
A&Ox4, forgetful@times. VSS. No c/o of SOB or CP. No s/s of distress. Has h/o PSVT on tele.
Patient is A&Ox4, VSS. No complaints on Bipap.
Pt AOx3. Rt side IV site swollen. Pt received acyclovir IVPB through IV.
Pt denies pain, discomfort, SOB, palpitations upon assessment. VSS other than tachycardia, see flow sheet. Pt currently 120s HR on tele. Pt was receiving nebulizer treatment in bed during tachycardic episode
Pt. complains of shortness of breath.  Pt. in respiratory distress
Pt VSS. PSp02 between 90-92 on 6L NC. Xopenex and Symbicort was given this AM around 6am.
Pt. tachypneic and tachycardic.  Pt. vitals stable
Pt AOx2. Complains of headache. No changes in mentation. VSS, See flowsheet, , pt tachy at baseline.

## 2021-01-10 NOTE — PROGRESS NOTE ADULT - SUBJECTIVE AND OBJECTIVE BOX
Killian Rocha MD   Internal Medicine PGY-1  Pager: NS: [phone] [facility]: [id number]    INCOMPLETE NOTE  Killian Rocha MD   Internal Medicine PGY-1  Pager: NS: [phone] [facility]: [id number]    Patient is a 75y old  Female who presents with a chief complaint of acute respiratory failure (10 Danielito 2021 06:45)    SUBJECTIVE / OVERNIGHT EVENTS:  No acute overnight events. Pt stating she is hungry and wants a break from the BiPAP. She is still having lower extremity weakness, unable to move her legs. Sensation of LE grossly intact. She otherwise denies fevers, chills, n/v, abdominal pain, chest pain, cough, dysuria. No other complaints.     MEDICATIONS  (STANDING):  acyclovir IVPB 700 milliGRAM(s) IV Intermittent every 8 hours  amLODIPine   Tablet 5 milliGRAM(s) Oral daily  budesonide  80 MICROgram(s)/formoterol 4.5 MICROgram(s) Inhaler 2 Puff(s) Inhalation two times a day  dextrose 40% Gel 15 Gram(s) Oral once  dextrose 5%. 1000 milliLiter(s) (50 mL/Hr) IV Continuous <Continuous>  dextrose 5%. 1000 milliLiter(s) (100 mL/Hr) IV Continuous <Continuous>  dextrose 50% Injectable 25 Gram(s) IV Push once  dextrose 50% Injectable 12.5 Gram(s) IV Push once  dextrose 50% Injectable 25 Gram(s) IV Push once  glucagon  Injectable 1 milliGRAM(s) IntraMuscular once  insulin lispro (ADMELOG) corrective regimen sliding scale   SubCutaneous three times a day before meals  insulin lispro (ADMELOG) corrective regimen sliding scale   SubCutaneous at bedtime  isosorbide   dinitrate Tablet (ISORDIL) 10 milliGRAM(s) Oral two times a day  levalbuterol Inhalation 0.63 milliGRAM(s) Inhalation three times a day  methylPREDNISolone sodium succinate Injectable 40 milliGRAM(s) IV Push every 12 hours  pantoprazole    Tablet 40 milliGRAM(s) Oral before breakfast  tiotropium 18 MICROgram(s) Capsule 1 Capsule(s) Inhalation daily    MEDICATIONS  (PRN):  acetaminophen   Tablet .. 650 milliGRAM(s) Oral every 6 hours PRN Mild Pain (1 - 3), Moderate Pain (4 - 6)    T(C): 36.9 (01-10-21 @ 11:18), Max: 36.9 (01-10-21 @ 11:18)  HR: 85 (01-10-21 @ 11:18) (78 - 105)  BP: 123/66 (01-10-21 @ 11:18) (109/68 - 123/66)  RR: 19 (01-10-21 @ 11:18) (18 - 19)  SpO2: 99% (01-10-21 @ 11:18) (94% - 99%)    CAPILLARY BLOOD GLUCOSE  POCT Blood Glucose.: 143 mg/dL (10 Danielito 2021 11:35)  POCT Blood Glucose.: 139 mg/dL (10 Danielito 2021 07:43)  POCT Blood Glucose.: 120 mg/dL (09 Jan 2021 21:15)  POCT Blood Glucose.: 223 mg/dL (09 Jan 2021 16:24)    I&O's Summary    09 Jan 2021 07:01  -  10 Danielito 2021 07:00  --------------------------------------------------------  IN: 1780 mL / OUT: 1360 mL / NET: 420 mL    PHYSICAL EXAM:  GENERAL: NAD, well-developed, lying in bed  HEAD:  Atraumatic, Normocephalic, poor dentition  EYES: EOMI, conjunctiva and sclera clear  NECK: Supple, No JVD  CHEST/LUNG: Clear to auscultation bilaterally; No wheeze or crackles, on BIPAP  HEART: Regular rate and rhythm; No murmurs, rubs, or gallops  ABDOMEN: Soft, Nontender, Nondistended; Bowel sounds present  EXTREMITIES:  2+ Peripheral Pulses, No clubbing, cyanosis, or edema  PSYCH: AAOx3  NEUROLOGY: unable to move lower extremities, sensation grossly intact in LE  SKIN: No rashes or lesions    LABS:                        7.2    17.27 )-----------( 203      ( 10 Danielito 2021 06:36 )             23.7     01-10    138  |  95<L>  |  20  ----------------------------<  106<H>  4.4   |  39<H>  |  0.38<L>    Ca    9.0      10 Danielito 2021 06:36  Phos  2.8     01-10  Mg     2.0     01-10    TPro  5.0<L>  /  Alb  2.6<L>  /  TBili  0.3  /  DBili  x   /  AST  33  /  ALT  59<H>  /  AlkPhos  87  01-10    RADIOLOGY & ADDITIONAL TESTS:    Imaging Personally Reviewed: Reviewed     Consultant(s) Notes Reviewed: Neurology, Neurosurgery, ID     Care Discussed with Consultants/Other Providers: Neurology, Neurosurgery, ID

## 2021-01-10 NOTE — PROGRESS NOTE ADULT - PROBLEM SELECTOR PLAN 6
-History of anxiety noted; administered alprazolam at home as needed   -Holding for now -History of anxiety noted; administered alprazolam at home as needed   -can restart home alprazolam as needed

## 2021-01-11 LAB
ANION GAP SERPL CALC-SCNC: 5 MMOL/L — SIGNIFICANT CHANGE UP (ref 5–17)
BASE EXCESS BLDV CALC-SCNC: 16.4 MMOL/L — HIGH (ref -2–2)
BUN SERPL-MCNC: 23 MG/DL — SIGNIFICANT CHANGE UP (ref 7–23)
CA-I SERPL-SCNC: 1.26 MMOL/L — SIGNIFICANT CHANGE UP (ref 1.12–1.3)
CALCIUM SERPL-MCNC: 9.5 MG/DL — SIGNIFICANT CHANGE UP (ref 8.4–10.5)
CHLORIDE BLDV-SCNC: 92 MMOL/L — LOW (ref 96–108)
CHLORIDE SERPL-SCNC: 94 MMOL/L — LOW (ref 96–108)
CO2 BLDV-SCNC: 45 MMOL/L — HIGH (ref 22–30)
CO2 SERPL-SCNC: 40 MMOL/L — HIGH (ref 22–31)
CREAT SERPL-MCNC: 0.38 MG/DL — LOW (ref 0.5–1.3)
GAS PNL BLDV: 138 MMOL/L — SIGNIFICANT CHANGE UP (ref 135–145)
GAS PNL BLDV: SIGNIFICANT CHANGE UP
GAS PNL BLDV: SIGNIFICANT CHANGE UP
GLUCOSE BLDC GLUCOMTR-MCNC: 151 MG/DL — HIGH (ref 70–99)
GLUCOSE BLDC GLUCOMTR-MCNC: 179 MG/DL — HIGH (ref 70–99)
GLUCOSE BLDC GLUCOMTR-MCNC: 206 MG/DL — HIGH (ref 70–99)
GLUCOSE BLDC GLUCOMTR-MCNC: 222 MG/DL — HIGH (ref 70–99)
GLUCOSE BLDV-MCNC: 109 MG/DL — HIGH (ref 70–99)
GLUCOSE SERPL-MCNC: 115 MG/DL — HIGH (ref 70–99)
HCO3 BLDV-SCNC: 43 MMOL/L — HIGH (ref 21–29)
HCT VFR BLD CALC: 22.4 % — LOW (ref 34.5–45)
HCT VFR BLDA CALC: 22 % — CRITICAL LOW (ref 39–50)
HGB BLD CALC-MCNC: 7.1 G/DL — LOW (ref 11.5–15.5)
HGB BLD-MCNC: 6.9 G/DL — CRITICAL LOW (ref 11.5–15.5)
LACTATE BLDV-MCNC: 1.4 MMOL/L — SIGNIFICANT CHANGE UP (ref 0.7–2)
MAGNESIUM SERPL-MCNC: 1.9 MG/DL — SIGNIFICANT CHANGE UP (ref 1.6–2.6)
MCHC RBC-ENTMCNC: 30.3 PG — SIGNIFICANT CHANGE UP (ref 27–34)
MCHC RBC-ENTMCNC: 30.8 GM/DL — LOW (ref 32–36)
MCV RBC AUTO: 98.2 FL — SIGNIFICANT CHANGE UP (ref 80–100)
NRBC # BLD: 0 /100 WBCS — SIGNIFICANT CHANGE UP (ref 0–0)
OTHER CELLS CSF MANUAL: 8 ML/DL — LOW (ref 18–22)
PCO2 BLDV: 70 MMHG — HIGH (ref 35–50)
PH BLDV: 7.4 — SIGNIFICANT CHANGE UP (ref 7.35–7.45)
PHOSPHATE SERPL-MCNC: 3 MG/DL — SIGNIFICANT CHANGE UP (ref 2.5–4.5)
PLATELET # BLD AUTO: 175 K/UL — SIGNIFICANT CHANGE UP (ref 150–400)
PO2 BLDV: 48 MMHG — HIGH (ref 25–45)
POTASSIUM BLDV-SCNC: 4.1 MMOL/L — SIGNIFICANT CHANGE UP (ref 3.5–5.3)
POTASSIUM SERPL-MCNC: 4.2 MMOL/L — SIGNIFICANT CHANGE UP (ref 3.5–5.3)
POTASSIUM SERPL-SCNC: 4.2 MMOL/L — SIGNIFICANT CHANGE UP (ref 3.5–5.3)
RBC # BLD: 2.28 M/UL — LOW (ref 3.8–5.2)
RBC # FLD: 14.6 % — HIGH (ref 10.3–14.5)
SAO2 % BLDV: 84 % — SIGNIFICANT CHANGE UP (ref 67–88)
SODIUM SERPL-SCNC: 139 MMOL/L — SIGNIFICANT CHANGE UP (ref 135–145)
WBC # BLD: 17.29 K/UL — HIGH (ref 3.8–10.5)
WBC # FLD AUTO: 17.29 K/UL — HIGH (ref 3.8–10.5)

## 2021-01-11 PROCEDURE — 99233 SBSQ HOSP IP/OBS HIGH 50: CPT

## 2021-01-11 PROCEDURE — 99232 SBSQ HOSP IP/OBS MODERATE 35: CPT | Mod: GC

## 2021-01-11 PROCEDURE — 99232 SBSQ HOSP IP/OBS MODERATE 35: CPT

## 2021-01-11 RX ADMIN — Medication 1: at 07:47

## 2021-01-11 RX ADMIN — Medication 40 MILLIGRAM(S): at 17:01

## 2021-01-11 RX ADMIN — LEVALBUTEROL 0.63 MILLIGRAM(S): 1.25 SOLUTION, CONCENTRATE RESPIRATORY (INHALATION) at 22:19

## 2021-01-11 RX ADMIN — BUDESONIDE AND FORMOTEROL FUMARATE DIHYDRATE 2 PUFF(S): 160; 4.5 AEROSOL RESPIRATORY (INHALATION) at 17:02

## 2021-01-11 RX ADMIN — Medication 40 MILLIGRAM(S): at 06:39

## 2021-01-11 RX ADMIN — Medication 2: at 17:01

## 2021-01-11 RX ADMIN — ISOSORBIDE DINITRATE 10 MILLIGRAM(S): 5 TABLET ORAL at 17:01

## 2021-01-11 RX ADMIN — PANTOPRAZOLE SODIUM 40 MILLIGRAM(S): 20 TABLET, DELAYED RELEASE ORAL at 06:39

## 2021-01-11 RX ADMIN — BUDESONIDE AND FORMOTEROL FUMARATE DIHYDRATE 2 PUFF(S): 160; 4.5 AEROSOL RESPIRATORY (INHALATION) at 06:39

## 2021-01-11 RX ADMIN — ISOSORBIDE DINITRATE 10 MILLIGRAM(S): 5 TABLET ORAL at 06:39

## 2021-01-11 RX ADMIN — TIOTROPIUM BROMIDE 1 CAPSULE(S): 18 CAPSULE ORAL; RESPIRATORY (INHALATION) at 11:57

## 2021-01-11 RX ADMIN — LEVALBUTEROL 0.63 MILLIGRAM(S): 1.25 SOLUTION, CONCENTRATE RESPIRATORY (INHALATION) at 12:00

## 2021-01-11 RX ADMIN — Medication 264 MILLIGRAM(S): at 13:02

## 2021-01-11 RX ADMIN — Medication 264 MILLIGRAM(S): at 22:19

## 2021-01-11 RX ADMIN — LEVALBUTEROL 0.63 MILLIGRAM(S): 1.25 SOLUTION, CONCENTRATE RESPIRATORY (INHALATION) at 06:39

## 2021-01-11 RX ADMIN — AMLODIPINE BESYLATE 5 MILLIGRAM(S): 2.5 TABLET ORAL at 06:39

## 2021-01-11 RX ADMIN — Medication 264 MILLIGRAM(S): at 06:40

## 2021-01-11 RX ADMIN — Medication 2: at 11:56

## 2021-01-11 NOTE — PROGRESS NOTE ADULT - PROBLEM SELECTOR PLAN 3
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  -MR spine- can't r/o acute to subacute infarct, and with possible subdural AV fistula, may need neurointerventional input  -c/w 500mg IV methylprednisolone for 3 days (1/7-1/9) per neurology  -s/p angiogram w/ neurointerventional but unable to identify AVM that would be the cause of her LE paralysis

## 2021-01-11 NOTE — PROGRESS NOTE ADULT - PROBLEM SELECTOR PLAN 6
-History of anxiety noted; administered alprazolam at home as needed   -can restart home alprazolam as needed -History of anxiety noted; administered alprazolam at home as needed   - restarted home 0.25mg qd alprazolam as needed

## 2021-01-11 NOTE — PROGRESS NOTE ADULT - ASSESSMENT
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 Desaturated in ED at [facility], suspected cause being respiratory muscle collapse, requiring BiPAP and O2 NC, alongside her home COPD medications. Patient has bilateral LE weakness with intact bowel and bladder continence. 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 Patient was placed on steroids, and MR angio ordered to rule out a spinal cord infarct. Acyclovir started by ID. Patient made herself DNR/DNI, and palliative care consulted for further GOC.

## 2021-01-11 NOTE — PROGRESS NOTE ADULT - ATTENDING COMMENTS
Pt seen and examined. Agree with above with additional findings:    # acute hypoxic-hypercarbic respiratory failure  # bilateral LE weakness  # post-ICU neuropathy/myopathy  # HSV2 meningomyelitis  # anemia    - hgb continuously downtrending to 6.9 this morning; no signs of active bleeding at this time  - pt wants to think about transfusion  - s/p angiogram, negative for AVM  - s/p BIPAP with improvement in WOB, ativan prn for anxiety  - discussed care with Dr. Rutherford (neurology)  - continue IV acyclovir for now; appreciate ID recs on further regimen - may need PICC line to complete the course  - PT/OT/PMNR consult/OOB to chair  - dc planning to acute rehab    Karen Mcconnell MD  Division of Hospital Medicine  Cell: [phone]  Office: [phone].

## 2021-01-11 NOTE — PROGRESS NOTE ADULT - SUBJECTIVE AND OBJECTIVE BOX
Killian Rocha MD   Internal Medicine PGY-1  Pager: NS: [phone] [facility]: [id number]    INCOMPLETE NOTE Killian Rocha MD   Internal Medicine PGY-1  Pager: NS: [phone] [facility]: [phone]    Patient is a 75y old  Female who presents with a chief complaint of acute respiratory failure (11 Jan 2021 06:49)    SUBJECTIVE / OVERNIGHT EVENTS:  No acute events overnight. Pt on BiPAP overnight. AOx3 this morning. Aware of slowly downtrending Hgb, denies any active bleeding and currently refusing any blood transfusion. She still is having LE weakness, unable to move her legs. Gross sensation still intact. Explained to patient work up so far has been unimpressive, and this may be her new baseline. Tolerating diet. She denies any fevers, chills, n/v, chest pain, abdominal pain. No other complaints.     MEDICATIONS  (STANDING):  acyclovir IVPB 700 milliGRAM(s) IV Intermittent every 8 hours  amLODIPine   Tablet 5 milliGRAM(s) Oral daily  budesonide  80 MICROgram(s)/formoterol 4.5 MICROgram(s) Inhaler 2 Puff(s) Inhalation two times a day  dextrose 40% Gel 15 Gram(s) Oral once  dextrose 5%. 1000 milliLiter(s) (50 mL/Hr) IV Continuous <Continuous>  dextrose 5%. 1000 milliLiter(s) (100 mL/Hr) IV Continuous <Continuous>  dextrose 50% Injectable 25 Gram(s) IV Push once  dextrose 50% Injectable 12.5 Gram(s) IV Push once  dextrose 50% Injectable 25 Gram(s) IV Push once  glucagon  Injectable 1 milliGRAM(s) IntraMuscular once  insulin lispro (ADMELOG) corrective regimen sliding scale   SubCutaneous three times a day before meals  insulin lispro (ADMELOG) corrective regimen sliding scale   SubCutaneous at bedtime  isosorbide   dinitrate Tablet (ISORDIL) 10 milliGRAM(s) Oral two times a day  levalbuterol Inhalation 0.63 milliGRAM(s) Inhalation three times a day  methylPREDNISolone sodium succinate Injectable 40 milliGRAM(s) IV Push every 12 hours  pantoprazole    Tablet 40 milliGRAM(s) Oral before breakfast  tiotropium 18 MICROgram(s) Capsule 1 Capsule(s) Inhalation daily    MEDICATIONS  (PRN):  acetaminophen   Tablet .. 650 milliGRAM(s) Oral every 6 hours PRN Mild Pain (1 - 3), Moderate Pain (4 - 6)  ALPRAZolam 0.25 milliGRAM(s) Oral daily PRN anxiety    T(C): 36.4 (01-11-21 @ 04:19), Max: 36.7 (01-10-21 @ 23:59)  HR: 80 (01-11-21 @ 09:10) (80 - 116)  BP: 120/77 (01-11-21 @ 04:19) (110/69 - 120/77)  RR: 19 (01-11-21 @ 04:19) (18 - 19)  SpO2: 95% (01-11-21 @ 09:10) (94% - 99%)    CAPILLARY BLOOD GLUCOSE  POCT Blood Glucose.: 206 mg/dL (11 Jan 2021 11:34)  POCT Blood Glucose.: 179 mg/dL (11 Jan 2021 07:44)  POCT Blood Glucose.: 182 mg/dL (10 Danielito 2021 21:41)  POCT Blood Glucose.: 214 mg/dL (10 Danielito 2021 16:43)    I&O's Summary    10 Danielito 2021 07:01  -  11 Jan 2021 07:00  --------------------------------------------------------  IN: 600 mL / OUT: 1550 mL / NET: -950 mL    PHYSICAL EXAM:  GENERAL: NAD, well-developed, lying in bed  HEAD:  Atraumatic, Normocephalic, poor dentition  EYES: EOMI, conjunctiva and sclera clear  NECK: Supple, No JVD  CHEST/LUNG: Clear to auscultation bilaterally; No wheeze or crackles, on BIPAP  HEART: Regular rate and rhythm; No murmurs, rubs, or gallops  ABDOMEN: Soft, Nontender, Nondistended; Bowel sounds present  EXTREMITIES:  2+ Peripheral Pulses, No clubbing, cyanosis, or edema  PSYCH: AAOx3  NEUROLOGY: unable to move lower extremities, sensation grossly intact in LE  SKIN: No rashes or lesions    LABS:                        6.9    17.29 )-----------( 175      ( 11 Jan 2021 06:15 )             22.4     01-11    139  |  94<L>  |  23  ----------------------------<  115<H>  4.2   |  40<H>  |  0.38<L>    Ca    9.5      11 Jan 2021 06:15  Phos  3.0     01-11  Mg     1.9     01-11    TPro  5.0<L>  /  Alb  2.6<L>  /  TBili  0.3  /  DBili  x   /  AST  33  /  ALT  59<H>  /  AlkPhos  87  01-10    RADIOLOGY & ADDITIONAL TESTS:    Imaging Personally Reviewed: Reviewed     Consultant(s) Notes Reviewed: ID and Neurology    Care Discussed with Consultants/Other Providers: ID and Neurology

## 2021-01-11 NOTE — PROGRESS NOTE ADULT - SUBJECTIVE AND OBJECTIVE BOX
75y old  Female who presents with a chief complaint of acute respiratory failure (11 Jan 2021 06:49)      Interval history:  Afebrile, on BiPAP, no abdominal pain.       Allergies:   albuterol (Other)      Antimicrobials:  acyclovir IVPB 700 milliGRAM(s) IV Intermittent every 8 hours      REVIEW OF SYSTEMS:  No chest pain   No N/V  No dysuria  No rash.       Vital Signs Last 24 Hrs  T(C): 36.6 (01-11-21 @ 12:12), Max: 36.7 (01-10-21 @ 23:59)  T(F): 97.9 (01-11-21 @ 12:12), Max: 98.1 (01-10-21 @ 23:59)  HR: 120 (01-11-21 @ 12:12) (80 - 120)  BP: 125/81 (01-11-21 @ 12:12) (110/69 - 125/81)  BP(mean): --  RR: 18 (01-11-21 @ 12:12) (18 - 19)  SpO2: 93% (01-11-21 @ 12:12) (93% - 99%)      PHYSICAL EXAM:  Patient in no acute distress. Alert, awake, on BiPAP   Cardiovascular: S1S2 normal, tachy   Lungs: poor air entry B/L lung fields.  Gastrointestinal: soft, nontender, nondistended.  Extremities: lt upper extremity edema   no obvious genital ulcers                             6.9    17.29 )-----------( 175      ( 11 Jan 2021 06:15 )             22.4   01-11    139  |  94<L>  |  23  ----------------------------<  115<H>  4.2   |  40<H>  |  0.38<L>    Ca    9.5      11 Jan 2021 06:15  Phos  3.0     01-11  Mg     1.9     01-11    TPro  5.0<L>  /  Alb  2.6<L>  /  TBili  0.3  /  DBili  x   /  AST  33  /  ALT  59<H>  /  AlkPhos  87  01-10      LIVER FUNCTIONS - ( 10 Danielito 2021 06:36 )  Alb: 2.6 g/dL / Pro: 5.0 g/dL / ALK PHOS: 87 U/L / ALT: 59 U/L / AST: 33 U/L / GGT: x             Culture - Blood in AM (01.05.21 @ 14:24)   Specimen Source: .Blood Blood-Venous   Culture Results:   No Growth Final

## 2021-01-11 NOTE — PROGRESS NOTE ADULT - ASSESSMENT
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

## 2021-01-11 NOTE — PROGRESS NOTE ADULT - SUBJECTIVE AND OBJECTIVE BOX
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 Desaturated in ED at [facility], suspected cause being respiratory muscle collapse, requiring BiPAP and O2 NC, alongside her home COPD medications. Patient has bilateral LE weakness with intact bowel and bladder continence. 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 Patient was placed on steroids, and MR angio ordered to rule out a spinal cord infarct. Acyclovir started by ID. Patient made herself DNR/DNI, and palliative care consulted for further GOC.    INTERVAL EVENTS:  1/7: patient with dyspnea this AM, on BiPAP and feeling better  1/8: patient off BiPAP, appears more comfortable  1/11: appears comfortable on BiPAP, denies concerns    ADVANCE DIRECTIVES:    DNR  Yes    MOLST  [X]  Living Will  [ ]   DECISION MAKER(s):  [X] Health Care Proxy(s)  [ ] Surrogate(s)  [ ] Guardian           Name(s): Phone Number(s): Charline Patel (niece) [phone],    BASELINE (I)ADL(s) (prior to admission):  Railroad: [X]Total  [ ] Moderate [ ]Dependent    Allergies    albuterol (Other)    Intolerances    MEDICATIONS  (STANDING):  acyclovir IVPB 700 milliGRAM(s) IV Intermittent every 8 hours  amLODIPine   Tablet 5 milliGRAM(s) Oral daily  budesonide  80 MICROgram(s)/formoterol 4.5 MICROgram(s) Inhaler 2 Puff(s) Inhalation two times a day  dextrose 40% Gel 15 Gram(s) Oral once  dextrose 5%. 1000 milliLiter(s) (50 mL/Hr) IV Continuous <Continuous>  dextrose 5%. 1000 milliLiter(s) (100 mL/Hr) IV Continuous <Continuous>  dextrose 50% Injectable 25 Gram(s) IV Push once  dextrose 50% Injectable 12.5 Gram(s) IV Push once  dextrose 50% Injectable 25 Gram(s) IV Push once  glucagon  Injectable 1 milliGRAM(s) IntraMuscular once  insulin lispro (ADMELOG) corrective regimen sliding scale   SubCutaneous three times a day before meals  insulin lispro (ADMELOG) corrective regimen sliding scale   SubCutaneous at bedtime  isosorbide   dinitrate Tablet (ISORDIL) 10 milliGRAM(s) Oral two times a day  levalbuterol Inhalation 0.63 milliGRAM(s) Inhalation three times a day  methylPREDNISolone sodium succinate Injectable 40 milliGRAM(s) IV Push every 12 hours  pantoprazole    Tablet 40 milliGRAM(s) Oral before breakfast  tiotropium 18 MICROgram(s) Capsule 1 Capsule(s) Inhalation daily    MEDICATIONS  (PRN):  acetaminophen   Tablet .. 650 milliGRAM(s) Oral every 6 hours PRN Mild Pain (1 - 3), Moderate Pain (4 - 6)  ALPRAZolam 0.25 milliGRAM(s) Oral daily PRN anxiety    PRESENT SYMPTOMS: [ ]Unable to obtain due to poor mentation   Source if other than patient:  [ ]Family   [ ]Team     Pain: [ ]yes [X ]no  QOL impact -   Location -    back                Aggravating factors -  Quality - aching  Radiation -  Timing-  Severity (0-10 scale):  Minimal acceptable level (0-10 scale):     CPOT:    https://www.[email or web address]/getattachment/[email or web address]/Critical-Care-Pain-Observation-Tool-(CPOT)      PAIN AD Score:     [email or web address].[email or web address]/painad.pdf (press ctrl +  left click to view)    Dyspnea:                           [ ]Mild [ ]Moderate [ ]Severe  Anxiety:                             [ ]Mild [ ]Moderate [ ]Severe  Fatigue:                             [ ]Mild [ ]Moderate [ ]Severe  Nausea:                             [ ]Mild [ ]Moderate [ ]Severe  Loss of appetite:              [ ]Mild [ ]Moderate [ ]Severe  Constipation:                    [ ]Mild [ ]Moderate [ ]Severe    Other Symptoms:  [ ]All other review of systems negative     Palliative Performance Status Version 2:         %    http://npcrc.org/files/news/palliative_performance_scale_ppsv2.pdf    PHYSICAL EXAM:  Vital Signs Last 24 Hrs  T(C): 36.6 (11 Jan 2021 12:12), Max: 36.7 (10 Danielito 2021 23:59)  T(F): 97.9 (11 Jan 2021 12:12), Max: 98.1 (10 Danielito 2021 23:59)  HR: 120 (11 Jan 2021 12:12) (80 - 120)  BP: 125/81 (11 Jan 2021 12:12) (110/69 - 125/81)  BP(mean): --  RR: 18 (11 Jan 2021 12:12) (18 - 19)  SpO2: 93% (11 Jan 2021 12:12) (93% - 99%)    Limited exam for patient safety and to limit infection risk during COVID-19 outbreak. Please refer to primary team's examination for today.  GENERAL:  [X]Alert  [ ]Oriented x   [ ]Lethargic  [ ]Cachexia  [ ]Unarousable  [X ]Verbal  [ ]Non-Verbal  Behavioral:   [ ] Anxiety  [ ] Delirium [ ] Agitation [ ] Other  HEENT:  [ X]Normal   [ ]Dry mouth   [ ]ET Tube/Trach  [ ]Oral lesions  PULMONARY:   [ ]Clear [ ]Tachypnea  [ ]Audible excessive secretions   [ ]Rhonchi        [ ]Right [ ]Left [ ]Bilateral  [ ]Crackles        [ ]Right [ ]Left [ ]Bilateral  [ ]Wheezing     [ ]Right [ ]Left [ ]Bilateral  [ ]Diminished breath sounds [ ]right [ ]left [ ]bilateral  CARDIOVASCULAR:    [ ]Regular [ ]Irregular [ ]Tachy  [ ]Lance [ ]Murmur [ ]Other  GASTROINTESTINAL:  [ ]Soft  [ ]Distended   [ ]+BS  [ ]Non tender [ ]Tender  [ ]PEG [ ]OGT/ NGT  Last BM:     GENITOURINARY:  [ ]Normal [ ] Incontinent   [ ]Oliguria/Anuria   [ ][place]  MUSCULOSKELETAL:   [ ]Normal   [ ]Weakness  [ ]Bed/Wheelchair bound [ ]Edema  NEUROLOGIC:   [ ]No focal deficits  [ ]Cognitive impairment  [ ]Dysphagia [ ]Dysarthria [ ]Paresis [ ]Other   SKIN:   [ ]Normal    [ ]Rash  [ ]Pressure ulcer(s)       Present on admission [ ]y [ ]n    CRITICAL CARE:  [ ] Shock Present  [ ]Septic [ ]Cardiogenic [ ]Neurologic [ ]Hypovolemic  [ ]  Vasopressors [ ]  Inotropes   [ ]Respiratory failure present [ ]Mechanical ventilation [ ]Non-invasive ventilatory support [ ]High flow  [ ]Acute  [ ]Chronic [ ]Hypoxic  [ ]Hypercarbic [ ]Other  [ ]Other organ failure     LABS: reviewed                             6.9    17.29 )-----------( 175      ( 11 Jan 2021 06:15 )             22.4     01-11    139  |  94<L>  |  23  ----------------------------<  115<H>  4.2   |  40<H>  |  0.38<L>    Ca    9.5      11 Jan 2021 06:15  Phos  3.0     01-11  Mg     1.9     01-11      RADIOLOGY & ADDITIONAL STUDIES:    CT chest 1/3/21    No pneumonia.    Emphysema.    A few bilateral 2 or 3 mm pulmonary nodules. A 1 year follow-up noncontrast chest CT is recommended to ensure stability.    Aortic root enlargement measuring about 4.2 cm.    2.4 cm right thyroid nodule. Recommend comparison to prior imaging studies. If none exists, recommend thyroid ultrasound.    Left breast nodules. Correlation with mammography and/or ultrasound is recommended if not recently performed.    PROTEIN CALORIE MALNUTRITION PRESENT: [ ]mild [ ]moderate [ ]severe [ ]underweight [ ]morbid obesity  https://www.andeal.org/vault/2440/web/files/ONC/Table_Clinical%20Characteristics%20to%20Document%20Malnutrition-White%20JV%20et%20al%202012.pdf    Height (cm): 167.6 (12-28-20 @ 17:32)  Weight (kg): 74.8 (12-28-20 @ 17:32)  BMI (kg/m2): 26.6 (12-28-20 @ 17:32)    [ ]PPSV2 < or = to 30% [ ]significant weight loss  [ ]poor nutritional intake  [ ]anasarca     Albumin, Serum: 2.7 g/dL (01-04-21 @ 07:00)   [ ]Artificial Nutrition      REFERRALS:   [ ]Chaplaincy  [ ]Hospice  [ ]Child Life  [ ]Social Work  [ ]Case management [ ]Holistic Therapy     Goals of Care Document:     ______________  Dick Sy MD   of Geriatric and Palliative Medicine  [facility]     Please page the following number for clinical matters between the hours of 9AM and 5PM   from Monday through Friday : [phone]    After 5PM and on weekends, please page: [phone]. The Geriatric and Palliative Medicine consult service has 24/7 coverage for medical recommendations, including for symptom management needs.

## 2021-01-11 NOTE — PROGRESS NOTE ADULT - ASSESSMENT
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      HSV-2 encephalitis/myelitis, abnormal MRI   -CSF cell count with only 1 nucleated cell is unusual, ( due to steroids?) however pt has MRI findings and neurologic symptoms, would treat HSV with 700 mg q 8  - plan for 14 days of treatment, with spinal cord involvement with HSV neurological recovery even with antiviral and steroids might not be complete.   - day 11/14 of therapy   -c/w hydration to prevent crystaluria if feasible   - neuro following, on steroids. If plan for prolonged steroids might benefit from PCP ppx.       Hypoxemic respiratory failure, Leucocytosis:   -CXR appreciated, CT Chest with no superimposed pneumonia   -leukocytosis is likely related to steroids  -trend CBC, leucocytosis persistent   -blood cx NTD

## 2021-01-12 LAB
ANION GAP SERPL CALC-SCNC: 5 MMOL/L — SIGNIFICANT CHANGE UP (ref 5–17)
BASE EXCESS BLDV CALC-SCNC: 18 MMOL/L — HIGH (ref -2–2)
BUN SERPL-MCNC: 18 MG/DL — SIGNIFICANT CHANGE UP (ref 7–23)
CA-I SERPL-SCNC: 1.24 MMOL/L — SIGNIFICANT CHANGE UP (ref 1.12–1.3)
CALCIUM SERPL-MCNC: 9.2 MG/DL — SIGNIFICANT CHANGE UP (ref 8.4–10.5)
CHLORIDE BLDV-SCNC: 93 MMOL/L — LOW (ref 96–108)
CHLORIDE SERPL-SCNC: 92 MMOL/L — LOW (ref 96–108)
CO2 BLDV-SCNC: 47 MMOL/L — HIGH (ref 22–30)
CO2 SERPL-SCNC: 39 MMOL/L — HIGH (ref 22–31)
CREAT SERPL-MCNC: 0.3 MG/DL — LOW (ref 0.5–1.3)
FERRITIN SERPL-MCNC: 319 NG/ML — HIGH (ref 15–150)
GAS PNL BLDV: 138 MMOL/L — SIGNIFICANT CHANGE UP (ref 135–145)
GAS PNL BLDV: SIGNIFICANT CHANGE UP
GAS PNL BLDV: SIGNIFICANT CHANGE UP
GLUCOSE BLDC GLUCOMTR-MCNC: 155 MG/DL — HIGH (ref 70–99)
GLUCOSE BLDC GLUCOMTR-MCNC: 165 MG/DL — HIGH (ref 70–99)
GLUCOSE BLDC GLUCOMTR-MCNC: 166 MG/DL — HIGH (ref 70–99)
GLUCOSE BLDC GLUCOMTR-MCNC: 260 MG/DL — HIGH (ref 70–99)
GLUCOSE BLDV-MCNC: 143 MG/DL — HIGH (ref 70–99)
GLUCOSE SERPL-MCNC: 144 MG/DL — HIGH (ref 70–99)
HCO3 BLDV-SCNC: 45 MMOL/L — HIGH (ref 21–29)
HCT VFR BLD CALC: 22.8 % — LOW (ref 34.5–45)
HCT VFR BLDA CALC: 24 % — LOW (ref 39–50)
HGB BLD CALC-MCNC: 7.5 G/DL — LOW (ref 11.5–15.5)
HGB BLD-MCNC: 7 G/DL — CRITICAL LOW (ref 11.5–15.5)
IRON SATN MFR SERPL: 33 % — SIGNIFICANT CHANGE UP (ref 14–50)
IRON SATN MFR SERPL: 90 UG/DL — SIGNIFICANT CHANGE UP (ref 30–160)
LACTATE BLDV-MCNC: 1 MMOL/L — SIGNIFICANT CHANGE UP (ref 0.7–2)
MAGNESIUM SERPL-MCNC: 1.8 MG/DL — SIGNIFICANT CHANGE UP (ref 1.6–2.6)
MCHC RBC-ENTMCNC: 30.6 PG — SIGNIFICANT CHANGE UP (ref 27–34)
MCHC RBC-ENTMCNC: 30.7 GM/DL — LOW (ref 32–36)
MCV RBC AUTO: 99.6 FL — SIGNIFICANT CHANGE UP (ref 80–100)
NRBC # BLD: 0 /100 WBCS — SIGNIFICANT CHANGE UP (ref 0–0)
OTHER CELLS CSF MANUAL: 11 ML/DL — LOW (ref 18–22)
PCO2 BLDV: 74 MMHG — HIGH (ref 35–50)
PH BLDV: 7.4 — SIGNIFICANT CHANGE UP (ref 7.35–7.45)
PHOSPHATE SERPL-MCNC: 3.2 MG/DL — SIGNIFICANT CHANGE UP (ref 2.5–4.5)
PLATELET # BLD AUTO: 173 K/UL — SIGNIFICANT CHANGE UP (ref 150–400)
PO2 BLDV: 135 MMHG — HIGH (ref 25–45)
POTASSIUM BLDV-SCNC: 3.4 MMOL/L — LOW (ref 3.5–5.3)
POTASSIUM SERPL-MCNC: 4 MMOL/L — SIGNIFICANT CHANGE UP (ref 3.5–5.3)
POTASSIUM SERPL-SCNC: 4 MMOL/L — SIGNIFICANT CHANGE UP (ref 3.5–5.3)
RBC # BLD: 2.29 M/UL — LOW (ref 3.8–5.2)
RBC # FLD: 14.4 % — SIGNIFICANT CHANGE UP (ref 10.3–14.5)
SAO2 % BLDV: 100 % — HIGH (ref 67–88)
SODIUM SERPL-SCNC: 138 MMOL/L — SIGNIFICANT CHANGE UP (ref 135–145)
TIBC SERPL-MCNC: 271 UG/DL — SIGNIFICANT CHANGE UP (ref 220–430)
UIBC SERPL-MCNC: 181 UG/DL — SIGNIFICANT CHANGE UP (ref 110–370)
WBC # BLD: 18.33 K/UL — HIGH (ref 3.8–10.5)
WBC # FLD AUTO: 18.33 K/UL — HIGH (ref 3.8–10.5)

## 2021-01-12 PROCEDURE — 99233 SBSQ HOSP IP/OBS HIGH 50: CPT | Mod: GC

## 2021-01-12 PROCEDURE — 99232 SBSQ HOSP IP/OBS MODERATE 35: CPT | Mod: GC

## 2021-01-12 RX ORDER — ALPRAZOLAM 0.25 MG
0.25 TABLET ORAL THREE TIMES A DAY
Refills: 0 | Status: DISCONTINUED | OUTPATIENT
Start: 2021-01-12 | End: 2021-01-19

## 2021-01-12 RX ADMIN — Medication 264 MILLIGRAM(S): at 12:40

## 2021-01-12 RX ADMIN — LEVALBUTEROL 0.63 MILLIGRAM(S): 1.25 SOLUTION, CONCENTRATE RESPIRATORY (INHALATION) at 05:18

## 2021-01-12 RX ADMIN — ISOSORBIDE DINITRATE 10 MILLIGRAM(S): 5 TABLET ORAL at 17:07

## 2021-01-12 RX ADMIN — PANTOPRAZOLE SODIUM 40 MILLIGRAM(S): 20 TABLET, DELAYED RELEASE ORAL at 05:18

## 2021-01-12 RX ADMIN — Medication 1: at 22:27

## 2021-01-12 RX ADMIN — Medication 0.25 MILLIGRAM(S): at 02:23

## 2021-01-12 RX ADMIN — Medication 40 MILLIGRAM(S): at 05:18

## 2021-01-12 RX ADMIN — BUDESONIDE AND FORMOTEROL FUMARATE DIHYDRATE 2 PUFF(S): 160; 4.5 AEROSOL RESPIRATORY (INHALATION) at 17:06

## 2021-01-12 RX ADMIN — AMLODIPINE BESYLATE 5 MILLIGRAM(S): 2.5 TABLET ORAL at 05:19

## 2021-01-12 RX ADMIN — Medication 0.25 MILLIGRAM(S): at 09:55

## 2021-01-12 RX ADMIN — Medication 1: at 12:32

## 2021-01-12 RX ADMIN — Medication 264 MILLIGRAM(S): at 21:43

## 2021-01-12 RX ADMIN — Medication 1: at 17:07

## 2021-01-12 RX ADMIN — LEVALBUTEROL 0.63 MILLIGRAM(S): 1.25 SOLUTION, CONCENTRATE RESPIRATORY (INHALATION) at 12:31

## 2021-01-12 RX ADMIN — BUDESONIDE AND FORMOTEROL FUMARATE DIHYDRATE 2 PUFF(S): 160; 4.5 AEROSOL RESPIRATORY (INHALATION) at 05:20

## 2021-01-12 RX ADMIN — ISOSORBIDE DINITRATE 10 MILLIGRAM(S): 5 TABLET ORAL at 05:19

## 2021-01-12 RX ADMIN — Medication 40 MILLIGRAM(S): at 12:31

## 2021-01-12 RX ADMIN — TIOTROPIUM BROMIDE 1 CAPSULE(S): 18 CAPSULE ORAL; RESPIRATORY (INHALATION) at 12:31

## 2021-01-12 RX ADMIN — Medication 264 MILLIGRAM(S): at 05:24

## 2021-01-12 RX ADMIN — Medication 0.25 MILLIGRAM(S): at 21:43

## 2021-01-12 RX ADMIN — LEVALBUTEROL 0.63 MILLIGRAM(S): 1.25 SOLUTION, CONCENTRATE RESPIRATORY (INHALATION) at 21:48

## 2021-01-12 RX ADMIN — Medication 1: at 09:19

## 2021-01-12 NOTE — PROGRESS NOTE ADULT - ATTENDING COMMENTS
Pt seen and examined. Agree with above with additional findings:    # acute hypoxic-hypercarbic respiratory failure  # bilateral LE weakness  # post-ICU neuropathy/myopathy  # HSV2 meningomyelitis  # anemia    - s/p angiogram, negative for AVM  - s/p BIPAP with improvement in WOB, xanax prn for anxiety  - discussed care with Dr. Rutherford (neurology)  - complete total 14 day course of acyclovir (Care discussed with Dr. Carrion)  - PT/OT/PMNR consult/OOB to chair  - dc planning to acute rehab (Fri); COVID PCR     Karen Mcconnell MD  Division of Hospital Medicine  Cell: [phone]  Office: [phone].

## 2021-01-12 NOTE — PROGRESS NOTE ADULT - SUBJECTIVE AND OBJECTIVE BOX
SUBJECTIVE:     Patient seen and examined at bedside. No acute events overnight. Patient resting comfortably. Patient denies changes to strength or sensation in her b/l LE.   No other complaints.     PAST MEDICAL & SURGICAL HISTORY:  Anxiety    COPD, severe    HTN (hypertension)    Anxiety    Advanced COPD  on home O2    History of repair of hip fracture    Status post closed fracture of right femur  surgical repair done at [place]      FAMILY HISTORY:  No pertinent family history in first degree relatives        MEDICATIONS (HOME):  Home Medications:  ALPRAZolam 0.25 mg oral tablet: 1 tab(s) orally 3 times a day, As Needed (06 Jan 2021 16:29)  ALPRAZolam 0.25 mg oral tablet: 1 tab(s) orally 2 times a day, As Needed (19 Dec 2020 01:58)  amLODIPine 5 mg oral tablet: 1 tab(s) orally once a day (19 Dec 2020 01:58)  amLODIPine 5 mg oral tablet: 1 tab(s) orally once a day (06 Jan 2021 16:29)  Breo Ellipta 100 mcg-25 mcg/inh inhalation powder: 1 puff(s) inhaled once a day (06 Jan 2021 16:29)  Breo Ellipta 100 mcg-25 mcg/inh inhalation powder: 1 puff(s) inhaled once a day (19 Dec 2020 01:58)  budesonide: 0.5 milligram(s) inhaled 2 times a day (28 Dec 2020 16:38)  enoxaparin: 40 milligram(s) subcutaneous once a day (28 Dec 2020 16:38)  Incruse Ellipta 62.5 mcg/inh inhalation powder: 1 puff(s) inhaled every 24 hours (19 Dec 2020 01:58)  Incruse Ellipta 62.5 mcg/inh inhalation powder: 1 puff(s) inhaled every 24 hours (06 Jan 2021 16:29)  isosorbide dinitrate 10 mg oral tablet: 1 tab(s) orally 2 times a day (06 Jan 2021 16:29)  isosorbide dinitrate 10 mg oral tablet: 1 tab(s) orally 3 times a day (28 Dec 2020 16:38)  levalbuterol 0.31 mg/3 mL inhalation solution: 3 milliliter(s) inhaled 3 times a day, As Needed (06 Jan 2021 16:29)  levalbuterol 1.25 mg/3 mL inhalation solution: 3 milliliter(s) inhaled every 6 hours (28 Dec 2020 16:38)  methylPREDNISolone: 40 milligram(s) injectable every 8 hours (28 Dec 2020 16:38)  pantoprazole 40 mg oral delayed release tablet: 1 tab(s) orally once a day (06 Jan 2021 16:29)  pantoprazole 40 mg oral granule, delayed release:  orally  (28 Dec 2020 16:38)    MEDICATIONS  (STANDING):  acyclovir IVPB 700 milliGRAM(s) IV Intermittent every 8 hours  amLODIPine   Tablet 5 milliGRAM(s) Oral daily  budesonide  80 MICROgram(s)/formoterol 4.5 MICROgram(s) Inhaler 2 Puff(s) Inhalation two times a day  dextrose 40% Gel 15 Gram(s) Oral once  dextrose 5%. 1000 milliLiter(s) (50 mL/Hr) IV Continuous <Continuous>  dextrose 5%. 1000 milliLiter(s) (100 mL/Hr) IV Continuous <Continuous>  dextrose 50% Injectable 25 Gram(s) IV Push once  dextrose 50% Injectable 12.5 Gram(s) IV Push once  dextrose 50% Injectable 25 Gram(s) IV Push once  glucagon  Injectable 1 milliGRAM(s) IntraMuscular once  insulin lispro (ADMELOG) corrective regimen sliding scale   SubCutaneous three times a day before meals  insulin lispro (ADMELOG) corrective regimen sliding scale   SubCutaneous at bedtime  isosorbide   dinitrate Tablet (ISORDIL) 10 milliGRAM(s) Oral two times a day  levalbuterol Inhalation 0.63 milliGRAM(s) Inhalation three times a day  methylPREDNISolone sodium succinate IVPB 500 milliGRAM(s) IV Intermittent daily  pantoprazole    Tablet 40 milliGRAM(s) Oral before breakfast  sodium chloride 0.9%. 1000 milliLiter(s) (65 mL/Hr) IV Continuous <Continuous>  tiotropium 18 MICROgram(s) Capsule 1 Capsule(s) Inhalation daily    MEDICATIONS  (PRN):  acetaminophen   Tablet .. 650 milliGRAM(s) Oral every 6 hours PRN Mild Pain (1 - 3), Moderate Pain (4 - 6)    ALLERGIES/INTOLERANCES:  Allergies  albuterol (Other)    Intolerances    VITALS & EXAMINATION:  Vital Signs Last 24 Hrs  T(C): 36.5 (08 Jan 2021 11:12), Max: 36.8 (07 Jan 2021 20:26)  T(F): 97.7 (08 Jan 2021 11:12), Max: 98.2 (07 Jan 2021 20:26)  HR: 109 (08 Jan 2021 11:12) (105 - 125)  BP: 147/87 (08 Jan 2021 11:12) (119/72 - 153/88)  BP(mean): --  RR: 19 (08 Jan 2021 11:12) (19 - 20)  SpO2: 98% (08 Jan 2021 11:12) (91% - 99%)    General:  Constitutional: appears stated age  Head: Normocephalic & atraumatic.  Respiratory: Increased work of breathing  Extremities: atrophy of b/l LE R > L   Skin: No rashes, bruising, or discoloration.      Neurological (>12):  MS: Somnolent, opens eyes to verbal stimulation, oriented to person, place, month and year (not day). Normal affect. Follows all commands.    Language: Speech is clear, fluent with good repetition & comprehension    CNs: PERRL (R = 3mm, L = 3mm). VFF. EOMI no nystagmus, V1-3 intact to LT, well developed masseter muscles b/l. No facial asymmetry b/l, full eye closure strength b/l. Unable to assess palate elevation due to BiPAP mask. Gag reflex deferred. Shoulder shrug intact b/l. Tongue midline.    Fundoscopic: deferred      Motor:   UE strength 4+/5 b/l throughout  RLE decreased tone, atrophy, strength 0/5 except 1/5 at toes (trace toe wiggle)  LLE decreased tone, atrophy, strength 0/5 except 1/5 at toes trace toe wiggle)     Sensation: Diminished to LT/PP in b/l LE, sensation intact in UE b/l        LABORATORY:  CBC                       7.4    20.95 )-----------( 247      ( 08 Jan 2021 07:27 )             24.4     Chem 01-08    134<L>  |  90<L>  |  18  ----------------------------<  115<H>  3.9   |  43<H>  |  0.35<L>    Ca    9.3      08 Jan 2021 07:25  Phos  2.6     01-08  Mg     1.8     01-08    TPro  5.0<L>  /  Alb  3.0<L>  /  TBili  0.4  /  DBili  <0.1  /  AST  44<H>  /  ALT  57<H>  /  AlkPhos  92  01-08    LFTs LIVER FUNCTIONS - ( 08 Jan 2021 07:25 )  Alb: 3.0 g/dL / Pro: 5.0 g/dL / ALK PHOS: 92 U/L / ALT: 57 U/L / AST: 44 U/L / GGT: x           Coagulopathy PT/INR - ( 08 Jan 2021 08:55 )   PT: 11.1 sec;   INR: 0.94 ratio         PTT - ( 08 Jan 2021 08:55 )  PTT:22.6 sec  Lipid Panel 12-29 Chol 250<H> LDL -- HDL 76 Trig 184<H>    < from: MR Angio Spinal Canal w/ IV Cont (01.06.21 @ 16:59) >  FINDINGS:  Extensive motion artifact limits evaluation.    There is mild restricted diffusion throughout the thoracic cord. On ADC map, T2 shine through is suggested within the central aspect of the conus medullaris, representing edema.    Again noted is enhancement involving the central aspect of the conus medullaris as well as the ventral cauda equina roots. There is also thickening of these cauda equina roots.    There are prominent vessels along the ventral and dorsal aspect ofthe distal thoracic cord spanning T8-L1 level. In particular there is a prominent vessel dorsal to the cord at T11 level, which appears to be supplied by relatively prominent left radicular vessels at the left T12-L1 level. (See key images)      IMPRESSION:    Within limitations of this motion degraded examination:    -Mild restricted diffusion throughout the thoracic cord, without preferential increased signal involving the edematous distal thoracic cord. There is evidence for T2 shine through within the central conus medullaris, which suggests nonspecific edema with no definite acute infarct. A subacute or chronic infarct cannot be entirely ruled out. If clinical symptomatology worsens or clinical suspicion persists, repeat imaging with sedation as necessary may be considered to obtain higher quality diffusion-weighted sequence.    -Prominent dorsal and ventral cord vasculature from T8 through L1, including specifically a prominent dorsal vessel at the level of T11. (See key images). 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 Consider neuro-interventional consultation.    -Again noted enhancement within the central conus medullaris and ventral cauda equina roots. While differential of infectious/inflammatory/autoimmune processes is still considered, possibility of edema and enhancement related to venous congestion from a dural AV fistula should also be raised.    < end of copied text >

## 2021-01-12 NOTE — PROGRESS NOTE ADULT - PROBLEM SELECTOR PLAN 3
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  -MR spine- can't r/o acute to subacute infarct, and with possible subdural AV fistula, may need neurointerventional input  -c/w 500mg IV methylprednisolone for 3 days (1/7-1/9) per neurology  -s/p angiogram w/ neurointerventional but unable to identify AVM that would be the cause of her LE paralysis 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  -MR spine- can't r/o acute to subacute infarct, and with possible subdural AV fistula, may need neurointerventional input  -c/w 500mg IV methylprednisolone for 3 days (1/7-1/9) per neurology  -s/p angiogram w/ neurointerventional but unable to identify AVM that would be the cause of her LE paralysis  - f/u outpatient with Lesa Ang, Stroke NP, within 1 week of discharge.  Please email patient contact information to [email or web address]  [facility] [place] @ [address] [place] [address], [address] [place]; [phone]

## 2021-01-12 NOTE — PROGRESS NOTE ADULT - SUBJECTIVE AND OBJECTIVE BOX
Killian Rocha MD   Internal Medicine PGY-1  Pager: NS: [phone] [facility]: [id number]    INCOMPLETE NOTE Killian Rocha MD   Internal Medicine PGY-1  Pager: NS: [phone] [facility]: [id number]    Patient is a 75y old  Female who presents with a chief complaint of acute respiratory failure (12 Jan 2021 07:20)    SUBJECTIVE / OVERNIGHT EVENTS:  No acute events overnight. Pt transferred to the 5th floor. Concern that the patient is getting more anxious, will continue on home dose of alprazolam. Continue on BiPAP overnight, and nasal cannula during the day. She is AOx2 this morning. She denies any fevers, chills, n/v, abdominal pain, chest pain, cough. Still with no improvement in LE weakness. Sensation grossly intact. No other complaints this morning. Progressing towards transition to acute rehab.    MEDICATIONS  (STANDING):  acyclovir IVPB 700 milliGRAM(s) IV Intermittent every 8 hours  amLODIPine   Tablet 5 milliGRAM(s) Oral daily  budesonide  80 MICROgram(s)/formoterol 4.5 MICROgram(s) Inhaler 2 Puff(s) Inhalation two times a day  dextrose 40% Gel 15 Gram(s) Oral once  dextrose 5%. 1000 milliLiter(s) (50 mL/Hr) IV Continuous <Continuous>  dextrose 5%. 1000 milliLiter(s) (100 mL/Hr) IV Continuous <Continuous>  dextrose 50% Injectable 25 Gram(s) IV Push once  dextrose 50% Injectable 12.5 Gram(s) IV Push once  dextrose 50% Injectable 25 Gram(s) IV Push once  glucagon  Injectable 1 milliGRAM(s) IntraMuscular once  insulin lispro (ADMELOG) corrective regimen sliding scale   SubCutaneous three times a day before meals  insulin lispro (ADMELOG) corrective regimen sliding scale   SubCutaneous at bedtime  isosorbide   dinitrate Tablet (ISORDIL) 10 milliGRAM(s) Oral two times a day  levalbuterol Inhalation 0.63 milliGRAM(s) Inhalation three times a day  methylPREDNISolone sodium succinate Injectable 40 milliGRAM(s) IV Push daily  pantoprazole    Tablet 40 milliGRAM(s) Oral before breakfast  tiotropium 18 MICROgram(s) Capsule 1 Capsule(s) Inhalation daily    MEDICATIONS  (PRN):  acetaminophen   Tablet .. 650 milliGRAM(s) Oral every 6 hours PRN Mild Pain (1 - 3), Moderate Pain (4 - 6)  ALPRAZolam 0.25 milliGRAM(s) Oral three times a day PRN Anxiety    T(C): 36.2 (01-12-21 @ 09:39), Max: 36.7 (01-11-21 @ 21:01)  HR: 88 (01-12-21 @ 09:39) (81 - 121)  BP: 135/80 (01-12-21 @ 09:39) (135/80 - 153/78)  RR: 20 (01-12-21 @ 09:39) (18 - 20)  SpO2: 95% (01-12-21 @ 09:39) (95% - 100%)    CAPILLARY BLOOD GLUCOSE  POCT Blood Glucose.: 166 mg/dL (12 Jan 2021 12:14)  POCT Blood Glucose.: 155 mg/dL (12 Jan 2021 08:54)  POCT Blood Glucose.: 151 mg/dL (11 Jan 2021 21:30)  POCT Blood Glucose.: 222 mg/dL (11 Jan 2021 16:32)    I&O's Summary    11 Jan 2021 07:01  -  12 Jan 2021 07:00  --------------------------------------------------------  IN: 700 mL / OUT: 1700 mL / NET: -1000 mL    PHYSICAL EXAM:  GENERAL: NAD, well-developed, lying in bed  HEAD:  Atraumatic, Normocephalic, poor dentition  EYES: EOMI, conjunctiva and sclera clear  NECK: Supple, No JVD  CHEST/LUNG: Clear to auscultation bilaterally; No wheeze or crackles, on BIPAP  HEART: Regular rate and rhythm; No murmurs, rubs, or gallops  ABDOMEN: Soft, Nontender, Nondistended; Bowel sounds present  EXTREMITIES:  2+ Peripheral Pulses, No clubbing, cyanosis, or edema  PSYCH: AAOx3  NEUROLOGY: unable to move lower extremities, sensation grossly intact in LE  SKIN: No rashes or lesions    LABS:                        7.0    18.33 )-----------( 173      ( 12 Jan 2021 07:30 )             22.8     01-12    138  |  92<L>  |  18  ----------------------------<  144<H>  4.0   |  39<H>  |  0.30<L>    Ca    9.2      12 Jan 2021 07:24  Phos  3.2     01-12  Mg     1.8     01-12    RADIOLOGY & ADDITIONAL TESTS:    Imaging Personally Reviewed: Reviewed     Consultant(s) Notes Reviewed: ID, Neurology    Care Discussed with Consultants/Other Providers: ID, Neurology

## 2021-01-12 NOTE — PROGRESS NOTE ADULT - PROBLEM SELECTOR PLAN 6
-History of anxiety noted; administered alprazolam at home as needed   - restarted home 0.25mg qd alprazolam as needed

## 2021-01-12 NOTE — PROGRESS NOTE ADULT - ATTENDING COMMENTS
Imaging reviewed in detail. 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

## 2021-01-12 NOTE — PROGRESS NOTE ADULT - ASSESSMENT
The patient is a 75-year-old woman with a past medical history of severe COPD, on home oxygen, and with a history of a recent COPD exacerbation requiring endotracheal intubation and management in the intensive care unit at [facility] who presented to 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

## 2021-01-12 NOTE — PROGRESS NOTE ADULT - PROBLEM SELECTOR PLAN 1
-In the emergency department, the patient was noted to have an acute desaturation to 86% while managed on 4L nasal cannula (saturating at 100% previously while being monitored in the ED on 4L nasal cannula)   -Etiology of acute respiratory failure with hypoxia most likely is respiratory muscle collapse, which may represent insufficient respiratory muscle reserve in the setting of such great carbon dioxide retention  -Etiology of acute exacerbation of COPD uncertain; no documentation in [place] of acute viral prodrome; no changes in medication management; no evidence of missed doses   -Arterial blood gas demonstrates marginally less carbon dioxide retention   -Will manage as COPD exacerbation; will administer Xopenex q8 hours  -s/p 40 mg of intravenous methylprednisolone q12 hours (and space as needed); given patient's persistent subjective shortness of breath  -will now pulse steroids with 500mg IV methylprednisolone for 3 days per neurology, then place back on methylprednisolone 40mg q12hrs and taper off steriods  -s/p five days of azithromycin  -Will manage with bilevel positive airway pressure (currently at inspiratory pressure of 16 and expiratory pressure of 5) at night and transition to nasal cannula 2L during the day; titrate oxygen saturation on pulse oximetry to 88-92%  -Will administer home doses of LAMA, LABA, and inhaled corticosteroid -In the emergency department, the patient was noted to have an acute desaturation to 86% while managed on 4L nasal cannula (saturating at 100% previously while being monitored in the ED on 4L nasal cannula)   -Etiology of acute respiratory failure with hypoxia most likely is respiratory muscle collapse, which may represent insufficient respiratory muscle reserve in the setting of such great carbon dioxide retention  -Etiology of acute exacerbation of COPD uncertain; no documentation in [place] of acute viral prodrome; no changes in medication management; no evidence of missed doses   -Arterial blood gas demonstrates marginally less carbon dioxide retention   -Will manage as COPD exacerbation; will administer Xopenex q8 hours  -s/p 40 mg of intravenous methylprednisolone q12 hours (and space as needed); given patient's persistent subjective shortness of breath  -will now pulse steroids with 500mg IV methylprednisolone for 3 days per neurology, then place back on methylprednisolone 40mg qd and taper off steriods  -s/p five days of azithromycin  -Will manage with bilevel positive airway pressure (currently at inspiratory pressure of 16 and expiratory pressure of 5) at night and transition to nasal cannula 2L during the day; titrate oxygen saturation on pulse oximetry to 88-92%  -Will administer home doses of LAMA, LABA, and inhaled corticosteroid

## 2021-01-12 NOTE — PROGRESS NOTE ADULT - ASSESSMENT
76 y/o female with PMHx advanced COPD on 3L home O2, former smoker, initially admitted at [facility] who had been intubated for respiratory arrest, and upon extubation was noted to be flaccid in the legs with decreased sensation and transferred here for evaluation for possible spinal cord compression. Labs significant for leukocytosis to WBC 24. Exam significant for sensory level at about T12 and 0/5 lower extremity strength, except for 1/5 at toes bilaterally. MRI brain w/ and w/o showed symmetric globus pallidus and dentate nucleus enhancement. MR T/L spine w/ and w/o showed cord expansion from T8 through the conus and subtle cauda equina nerve root enhancement.  MR angio of the spine w/ contrast shows possible dural AV fistula; however, patient is s/p conventional angiography which was negative for AV malformations.   No significant change to motor exam today.     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   - NSx recs appreciated.   - ID recs appreciated   - Rest of management per primary team   - PT/OT. Will likely have rehab needs.     Follow-up  [] f/u outpatient with Lesa Ang Stroke NP, within 1 week of discharge.    - Please email patient contact information to [email or web address].    - [facility] [place] @ [address] [place] [address], [address] [place]; [phone]

## 2021-01-13 LAB
ANION GAP SERPL CALC-SCNC: 1 MMOL/L — LOW (ref 5–17)
BASE EXCESS BLDV CALC-SCNC: 21.1 MMOL/L — HIGH (ref -2–2)
BUN SERPL-MCNC: 17 MG/DL — SIGNIFICANT CHANGE UP (ref 7–23)
CA-I SERPL-SCNC: 1.23 MMOL/L — SIGNIFICANT CHANGE UP (ref 1.12–1.3)
CALCIUM SERPL-MCNC: 9 MG/DL — SIGNIFICANT CHANGE UP (ref 8.4–10.5)
CHLORIDE BLDV-SCNC: 87 MMOL/L — LOW (ref 96–108)
CHLORIDE SERPL-SCNC: 93 MMOL/L — LOW (ref 96–108)
CO2 BLDV-SCNC: 50 MMOL/L — HIGH (ref 22–30)
CO2 SERPL-SCNC: 45 MMOL/L — CRITICAL HIGH (ref 22–31)
CREAT SERPL-MCNC: 0.32 MG/DL — LOW (ref 0.5–1.3)
GAS PNL BLDV: 139 MMOL/L — SIGNIFICANT CHANGE UP (ref 135–145)
GAS PNL BLDV: SIGNIFICANT CHANGE UP
GAS PNL BLDV: SIGNIFICANT CHANGE UP
GLUCOSE BLDC GLUCOMTR-MCNC: 130 MG/DL — HIGH (ref 70–99)
GLUCOSE BLDC GLUCOMTR-MCNC: 163 MG/DL — HIGH (ref 70–99)
GLUCOSE BLDC GLUCOMTR-MCNC: 188 MG/DL — HIGH (ref 70–99)
GLUCOSE BLDC GLUCOMTR-MCNC: 204 MG/DL — HIGH (ref 70–99)
GLUCOSE BLDV-MCNC: 108 MG/DL — HIGH (ref 70–99)
GLUCOSE SERPL-MCNC: 106 MG/DL — HIGH (ref 70–99)
HCO3 BLDV-SCNC: 48 MMOL/L — HIGH (ref 21–29)
HCT VFR BLD CALC: 23.5 % — LOW (ref 34.5–45)
HCT VFR BLDA CALC: 23 % — LOW (ref 39–50)
HGB BLD CALC-MCNC: 7.4 G/DL — LOW (ref 11.5–15.5)
HGB BLD-MCNC: 7.3 G/DL — LOW (ref 11.5–15.5)
LACTATE BLDV-MCNC: 1.3 MMOL/L — SIGNIFICANT CHANGE UP (ref 0.7–2)
MAGNESIUM SERPL-MCNC: 1.8 MG/DL — SIGNIFICANT CHANGE UP (ref 1.6–2.6)
MCHC RBC-ENTMCNC: 30.8 PG — SIGNIFICANT CHANGE UP (ref 27–34)
MCHC RBC-ENTMCNC: 31.1 GM/DL — LOW (ref 32–36)
MCV RBC AUTO: 99.2 FL — SIGNIFICANT CHANGE UP (ref 80–100)
NRBC # BLD: 0 /100 WBCS — SIGNIFICANT CHANGE UP (ref 0–0)
OTHER CELLS CSF MANUAL: 9 ML/DL — LOW (ref 18–22)
PCO2 BLDV: 72 MMHG — HIGH (ref 35–50)
PH BLDV: 7.44 — SIGNIFICANT CHANGE UP (ref 7.35–7.45)
PHOSPHATE SERPL-MCNC: 2.5 MG/DL — SIGNIFICANT CHANGE UP (ref 2.5–4.5)
PLATELET # BLD AUTO: 176 K/UL — SIGNIFICANT CHANGE UP (ref 150–400)
PO2 BLDV: 51 MMHG — HIGH (ref 25–45)
POTASSIUM BLDV-SCNC: 3.5 MMOL/L — SIGNIFICANT CHANGE UP (ref 3.5–5.3)
POTASSIUM SERPL-MCNC: 3.7 MMOL/L — SIGNIFICANT CHANGE UP (ref 3.5–5.3)
POTASSIUM SERPL-SCNC: 3.7 MMOL/L — SIGNIFICANT CHANGE UP (ref 3.5–5.3)
RBC # BLD: 2.37 M/UL — LOW (ref 3.8–5.2)
RBC # FLD: 14.3 % — SIGNIFICANT CHANGE UP (ref 10.3–14.5)
SAO2 % BLDV: 87 % — SIGNIFICANT CHANGE UP (ref 67–88)
SARS-COV-2 RNA SPEC QL NAA+PROBE: SIGNIFICANT CHANGE UP
SODIUM SERPL-SCNC: 139 MMOL/L — SIGNIFICANT CHANGE UP (ref 135–145)
WBC # BLD: 19.89 K/UL — HIGH (ref 3.8–10.5)
WBC # FLD AUTO: 19.89 K/UL — HIGH (ref 3.8–10.5)

## 2021-01-13 PROCEDURE — 99232 SBSQ HOSP IP/OBS MODERATE 35: CPT | Mod: GC

## 2021-01-13 RX ORDER — ENOXAPARIN SODIUM 100 MG/ML
40 INJECTION SUBCUTANEOUS DAILY
Refills: 0 | Status: DISCONTINUED | OUTPATIENT
Start: 2021-01-13 | End: 2021-01-23

## 2021-01-13 RX ADMIN — LEVALBUTEROL 0.63 MILLIGRAM(S): 1.25 SOLUTION, CONCENTRATE RESPIRATORY (INHALATION) at 20:52

## 2021-01-13 RX ADMIN — BUDESONIDE AND FORMOTEROL FUMARATE DIHYDRATE 2 PUFF(S): 160; 4.5 AEROSOL RESPIRATORY (INHALATION) at 17:58

## 2021-01-13 RX ADMIN — Medication 264 MILLIGRAM(S): at 12:47

## 2021-01-13 RX ADMIN — Medication 264 MILLIGRAM(S): at 20:52

## 2021-01-13 RX ADMIN — Medication 1: at 12:46

## 2021-01-13 RX ADMIN — LEVALBUTEROL 0.63 MILLIGRAM(S): 1.25 SOLUTION, CONCENTRATE RESPIRATORY (INHALATION) at 05:48

## 2021-01-13 RX ADMIN — Medication 0.25 MILLIGRAM(S): at 20:52

## 2021-01-13 RX ADMIN — Medication 0.25 MILLIGRAM(S): at 07:27

## 2021-01-13 RX ADMIN — AMLODIPINE BESYLATE 5 MILLIGRAM(S): 2.5 TABLET ORAL at 05:49

## 2021-01-13 RX ADMIN — LEVALBUTEROL 0.63 MILLIGRAM(S): 1.25 SOLUTION, CONCENTRATE RESPIRATORY (INHALATION) at 12:48

## 2021-01-13 RX ADMIN — Medication 40 MILLIGRAM(S): at 05:49

## 2021-01-13 RX ADMIN — PANTOPRAZOLE SODIUM 40 MILLIGRAM(S): 20 TABLET, DELAYED RELEASE ORAL at 06:02

## 2021-01-13 RX ADMIN — Medication 2: at 17:56

## 2021-01-13 RX ADMIN — ENOXAPARIN SODIUM 40 MILLIGRAM(S): 100 INJECTION SUBCUTANEOUS at 11:45

## 2021-01-13 RX ADMIN — ISOSORBIDE DINITRATE 10 MILLIGRAM(S): 5 TABLET ORAL at 17:57

## 2021-01-13 RX ADMIN — TIOTROPIUM BROMIDE 1 CAPSULE(S): 18 CAPSULE ORAL; RESPIRATORY (INHALATION) at 11:44

## 2021-01-13 RX ADMIN — BUDESONIDE AND FORMOTEROL FUMARATE DIHYDRATE 2 PUFF(S): 160; 4.5 AEROSOL RESPIRATORY (INHALATION) at 05:48

## 2021-01-13 RX ADMIN — Medication 264 MILLIGRAM(S): at 05:48

## 2021-01-13 RX ADMIN — Medication 0.25 MILLIGRAM(S): at 11:44

## 2021-01-13 RX ADMIN — ISOSORBIDE DINITRATE 10 MILLIGRAM(S): 5 TABLET ORAL at 05:49

## 2021-01-13 NOTE — PROGRESS NOTE ADULT - PROBLEM SELECTOR PLAN 3
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  -MR spine- can't r/o acute to subacute infarct, and with possible subdural AV fistula, may need neurointerventional input  -c/w 500mg IV methylprednisolone for 3 days (1/7-1/9) per neurology  -s/p angiogram w/ neurointerventional but unable to identify AVM that would be the cause of her LE paralysis  - f/u outpatient with Lesa Ang, Stroke NP, within 1 week of discharge.  Please email patient contact information to [email or web address].  [facility] [place] @ [address] [place] [address], [address] [place]; [phone]

## 2021-01-13 NOTE — PROGRESS NOTE ADULT - PROBLEM SELECTOR PLAN 1
-In the emergency department, the patient was noted to have an acute desaturation to 86% while managed on 4L nasal cannula (saturating at 100% previously while being monitored in the ED on 4L nasal cannula)   -Etiology of acute respiratory failure with hypoxia most likely is respiratory muscle collapse, which may represent insufficient respiratory muscle reserve in the setting of such great carbon dioxide retention  -Etiology of acute exacerbation of COPD uncertain; no documentation in [place] of acute viral prodrome; no changes in medication management; no evidence of missed doses   -Arterial blood gas demonstrates marginally less carbon dioxide retention   -Will manage as COPD exacerbation; will administer Xopenex q8 hours  -s/p 40 mg of intravenous methylprednisolone q12 hours (and space as needed); given patient's persistent subjective shortness of breath  -will now pulse steroids with 500mg IV methylprednisolone for 3 days per neurology, then place back on methylprednisolone 40mg qd and taper off steriods  -s/p five days of azithromycin  -Will manage with bilevel positive airway pressure (currently at inspiratory pressure of 16 and expiratory pressure of 5) at night and transition to nasal cannula 2L during the day; titrate oxygen saturation on pulse oximetry to 88-92%  -Will administer home doses of LAMA, LABA, and inhaled corticosteroid -In the emergency department, the patient was noted to have an acute desaturation to 86% while managed on 4L nasal cannula   -Etiology of acute respiratory failure with hypoxia most likely is respiratory muscle collapse, which may represent insufficient respiratory muscle reserve in the setting of carbon dioxide retention  -Etiology of acute exacerbation of COPD uncertain; no documentation in [place] of acute viral prodrome; no changes in medication management; no evidence of missed doses   -Arterial blood gas demonstrates marginally less carbon dioxide retention   -Will manage as COPD exacerbation; will administer Xopenex q8 hours  -s/p 40 mg of intravenous methylprednisolone q12 hours; given patient's persistent subjective shortness of breath  -pulsed steroids with 500mg IV methylprednisolone for 3 days per neurology, now on methylprednisolone 40mg qd and will stop on 1/15  -s/p five days of azithromycin  -Will manage with bilevel positive airway pressure (currently at inspiratory pressure of 16 and expiratory pressure of 5) at night and transition to nasal cannula 2L during the day; titrate oxygen saturation on pulse oximetry to 88-92%  -Will administer home doses of LAMA, LABA, and inhaled corticosteroid

## 2021-01-13 NOTE — PROGRESS NOTE ADULT - PROBLEM SELECTOR PLAN 7
-Will administer lovenox 40 for dvt prophylaxis   -Pureed diet  -Ongoing goals of care discussions currently DNR/DNI and questions the utility of treatments, and wonders if she may want to transition to a more palliative treatment option, palliative care following -lovenox 40 for dvt prophylaxis   -Pureed diet  -Ongoing goals of care discussions currently DNR/DNI and questions the utility of treatments, and wonders if she may want to transition to a more palliative treatment option, palliative care following

## 2021-01-13 NOTE — PROGRESS NOTE ADULT - ATTENDING COMMENTS
Pt seen and examined. Agree with above with additional findings:    # acute hypoxic-hypercarbic respiratory failure  # bilateral LE weakness  # post-ICU neuropathy/myopathy  # HSV2 meningomyelitis  # anemia    - s/p angiogram, negative for AVM  - s/p BIPAP with improvement in WOB, xanax prn for anxiety  - discussed care with Dr. Rutherford (neurology)  - complete total 14 day course of acyclovir (Care discussed with Dr. Carrion)  - PT/OT/PMNR consult/OOB to chair  - dc planning to acute rehab (Fri); COVID PCR     Karen Mcconnell MD  Division of Hospital Medicine  Cell: [phone]  Office: [phone].

## 2021-01-13 NOTE — PROGRESS NOTE ADULT - ASSESSMENT
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

## 2021-01-13 NOTE — PROGRESS NOTE ADULT - PROBLEM SELECTOR PLAN 6
-History of anxiety noted; administered alprazolam at home as needed   - restarted home 0.25mg qd alprazolam as needed -History of anxiety noted; administered alprazolam at home as needed   - restarted home 0.25mg TID alprazolam as needed

## 2021-01-13 NOTE — PROGRESS NOTE ADULT - SUBJECTIVE AND OBJECTIVE BOX
Killian Rocha MD   Internal Medicine PGY-1  Pager: NS: [phone] [facility]: [id number]    INCOMPLETE NOTE Killian Rocha MD   Internal Medicine PGY-1  Pager: NS: [phone] [facility]: [id number]    Patient is a 75y old  Female who presents with a chief complaint of acute respiratory failure (13 Jan 2021 06:54)      SUBJECTIVE / OVERNIGHT EVENTS:    MEDICATIONS  (STANDING):  acyclovir IVPB 700 milliGRAM(s) IV Intermittent every 8 hours  amLODIPine   Tablet 5 milliGRAM(s) Oral daily  budesonide  80 MICROgram(s)/formoterol 4.5 MICROgram(s) Inhaler 2 Puff(s) Inhalation two times a day  dextrose 40% Gel 15 Gram(s) Oral once  dextrose 5%. 1000 milliLiter(s) (50 mL/Hr) IV Continuous <Continuous>  dextrose 5%. 1000 milliLiter(s) (100 mL/Hr) IV Continuous <Continuous>  dextrose 50% Injectable 25 Gram(s) IV Push once  dextrose 50% Injectable 12.5 Gram(s) IV Push once  dextrose 50% Injectable 25 Gram(s) IV Push once  glucagon  Injectable 1 milliGRAM(s) IntraMuscular once  insulin lispro (ADMELOG) corrective regimen sliding scale   SubCutaneous three times a day before meals  insulin lispro (ADMELOG) corrective regimen sliding scale   SubCutaneous at bedtime  isosorbide   dinitrate Tablet (ISORDIL) 10 milliGRAM(s) Oral two times a day  levalbuterol Inhalation 0.63 milliGRAM(s) Inhalation three times a day  methylPREDNISolone sodium succinate Injectable 40 milliGRAM(s) IV Push daily  pantoprazole    Tablet 40 milliGRAM(s) Oral before breakfast  tiotropium 18 MICROgram(s) Capsule 1 Capsule(s) Inhalation daily    MEDICATIONS  (PRN):  acetaminophen   Tablet .. 650 milliGRAM(s) Oral every 6 hours PRN Mild Pain (1 - 3), Moderate Pain (4 - 6)  ALPRAZolam 0.25 milliGRAM(s) Oral three times a day PRN Anxiety      T(C): 36.4 (01-13-21 @ 05:47), Max: 36.6 (01-12-21 @ 14:18)  HR: 106 (01-13-21 @ 05:47) (100 - 121)  BP: 157/91 (01-13-21 @ 05:47) (129/72 - 157/91)  RR: 26 (01-13-21 @ 05:47) (20 - 28)  SpO2: 97% (01-13-21 @ 05:47) (94% - 100%)  CAPILLARY BLOOD GLUCOSE      POCT Blood Glucose.: 130 mg/dL (13 Jan 2021 08:33)  POCT Blood Glucose.: 260 mg/dL (12 Jan 2021 22:20)  POCT Blood Glucose.: 165 mg/dL (12 Jan 2021 16:58)  POCT Blood Glucose.: 166 mg/dL (12 Jan 2021 12:14)    I&O's Summary    12 Jan 2021 07:01  -  13 Jan 2021 07:00  --------------------------------------------------------  IN: 560 mL / OUT: 2300 mL / NET: -1740 mL        REVIEW OF SYSTEMS    General: No fevers/chills  Skin/Breast: No rash  Ophthalmologic: No vision changes  ENMT:No dysphagia or odynophagia  Respiratory and Thorax: No SOB, wheezing, cough  Cardiovascular: No chest pain or palpitations  Gastrointestinal: No n/v/d, No melena, No Hematochezia  Genitourinary:  No dysuria, No change in urinary frequency  Musculoskeletal: No joint or muscle pains.  Neurological: No focal deficits, No headache    PHYSICAL EXAM:  GENERAL: NAD, well-developed  HEAD:  Atraumatic, Normocephalic  EYES: EOMI, PERRLA, conjunctiva and sclera clear  NECK: Supple, No JVD  CHEST/LUNG: Clear to auscultation bilaterally; No wheeze  HEART: Regular rate and rhythm; No murmurs, rubs, or gallops  ABDOMEN: Soft, Nontender, Nondistended; Bowel sounds present  EXTREMITIES:  2+ Peripheral Pulses, No clubbing, cyanosis, or edema  PSYCH: AAOx3  NEUROLOGY: non-focal  SKIN: No rashes or lesions    LABS:                        7.3    19.89 )-----------( 176      ( 13 Jan 2021 05:45 )             23.5     01-13    139  |  93<L>  |  17  ----------------------------<  106<H>  3.7   |  45<HH>  |  0.32<L>    Ca    9.0      13 Jan 2021 05:45  Phos  2.5     01-13  Mg     1.8     01-13    RADIOLOGY & ADDITIONAL TESTS:    Imaging Personally Reviewed:    Consultant(s) Notes Reviewed:      Care Discussed with Consultants/Other Providers:   Killian Rocha MD   Internal Medicine PGY-1  Pager: NS: [phone] [facility]: [id number]    Patient is a 75y old  Female who presents with a chief complaint of acute respiratory failure (13 Jan 2021 06:54)    SUBJECTIVE / OVERNIGHT EVENTS:  No acute events overnight. Pt feeling cold this morning. Still having lower extremity paralysis. Sensation grossly intact. On BiPAP overnight. Appears well this morning. Aware her lower extremity weakness will likely not improve, but we plan on getting her to acute rehab, and follow up with neurology outpatient. She denies any fevers, chills, n/v, chest pain, abdominal pain. Was retaining urine, so has [place] in place. No other complaints.     MEDICATIONS  (STANDING):  acyclovir IVPB 700 milliGRAM(s) IV Intermittent every 8 hours  amLODIPine   Tablet 5 milliGRAM(s) Oral daily  budesonide  80 MICROgram(s)/formoterol 4.5 MICROgram(s) Inhaler 2 Puff(s) Inhalation two times a day  dextrose 40% Gel 15 Gram(s) Oral once  dextrose 5%. 1000 milliLiter(s) (50 mL/Hr) IV Continuous <Continuous>  dextrose 5%. 1000 milliLiter(s) (100 mL/Hr) IV Continuous <Continuous>  dextrose 50% Injectable 25 Gram(s) IV Push once  dextrose 50% Injectable 12.5 Gram(s) IV Push once  dextrose 50% Injectable 25 Gram(s) IV Push once  glucagon  Injectable 1 milliGRAM(s) IntraMuscular once  insulin lispro (ADMELOG) corrective regimen sliding scale   SubCutaneous three times a day before meals  insulin lispro (ADMELOG) corrective regimen sliding scale   SubCutaneous at bedtime  isosorbide   dinitrate Tablet (ISORDIL) 10 milliGRAM(s) Oral two times a day  levalbuterol Inhalation 0.63 milliGRAM(s) Inhalation three times a day  methylPREDNISolone sodium succinate Injectable 40 milliGRAM(s) IV Push daily  pantoprazole    Tablet 40 milliGRAM(s) Oral before breakfast  tiotropium 18 MICROgram(s) Capsule 1 Capsule(s) Inhalation daily    MEDICATIONS  (PRN):  acetaminophen   Tablet .. 650 milliGRAM(s) Oral every 6 hours PRN Mild Pain (1 - 3), Moderate Pain (4 - 6)  ALPRAZolam 0.25 milliGRAM(s) Oral three times a day PRN Anxiety    T(C): 36.4 (01-13-21 @ 05:47), Max: 36.6 (01-12-21 @ 14:18)  HR: 106 (01-13-21 @ 05:47) (100 - 121)  BP: 157/91 (01-13-21 @ 05:47) (129/72 - 157/91)  RR: 26 (01-13-21 @ 05:47) (20 - 28)  SpO2: 97% (01-13-21 @ 05:47) (94% - 100%)    CAPILLARY BLOOD GLUCOSE  POCT Blood Glucose.: 130 mg/dL (13 Jan 2021 08:33)  POCT Blood Glucose.: 260 mg/dL (12 Jan 2021 22:20)  POCT Blood Glucose.: 165 mg/dL (12 Jan 2021 16:58)  POCT Blood Glucose.: 166 mg/dL (12 Jan 2021 12:14)    I&O's Summary    12 Jan 2021 07:01  -  13 Jan 2021 07:00  --------------------------------------------------------  IN: 560 mL / OUT: 2300 mL / NET: -1740 mL    PHYSICAL EXAM:  GENERAL: NAD, well-developed, lying in bed  HEAD:  Atraumatic, Normocephalic, poor dentition  EYES: EOMI, conjunctiva and sclera clear  NECK: Supple, No JVD  CHEST/LUNG: Clear to auscultation bilaterally; No wheeze or crackles, on BIPAP  HEART: Regular rate and rhythm; No murmurs, rubs, or gallops  ABDOMEN: Soft, Nontender, Nondistended; Bowel sounds present  EXTREMITIES:  2+ Peripheral Pulses, No clubbing, cyanosis, or edema  PSYCH: AAOx3  NEUROLOGY: unable to move lower extremities, sensation grossly intact in LE  SKIN: No rashes or lesions    LABS:                        7.3    19.89 )-----------( 176      ( 13 Jan 2021 05:45 )             23.5     01-13    139  |  93<L>  |  17  ----------------------------<  106<H>  3.7   |  45<HH>  |  0.32<L>    Ca    9.0      13 Jan 2021 05:45  Phos  2.5     01-13  Mg     1.8     01-13    RADIOLOGY & ADDITIONAL TESTS:    Imaging Personally Reviewed: Reviewed     Consultant(s) Notes Reviewed: Neurology, Palliative Care, ID    Care Discussed with Consultants/Other Providers: Neurology, Palliative Care, ID

## 2021-01-14 LAB
BLD GP AB SCN SERPL QL: NEGATIVE — SIGNIFICANT CHANGE UP
BUN SERPL-MCNC: 17 MG/DL — SIGNIFICANT CHANGE UP (ref 7–23)
CALCIUM SERPL-MCNC: 8.9 MG/DL — SIGNIFICANT CHANGE UP (ref 8.4–10.5)
CHLORIDE SERPL-SCNC: 91 MMOL/L — LOW (ref 96–108)
CO2 SERPL-SCNC: >45 MMOL/L — CRITICAL HIGH (ref 22–31)
CREAT SERPL-MCNC: 0.34 MG/DL — LOW (ref 0.5–1.3)
GLUCOSE BLDC GLUCOMTR-MCNC: 122 MG/DL — HIGH (ref 70–99)
GLUCOSE BLDC GLUCOMTR-MCNC: 138 MG/DL — HIGH (ref 70–99)
GLUCOSE BLDC GLUCOMTR-MCNC: 147 MG/DL — HIGH (ref 70–99)
GLUCOSE BLDC GLUCOMTR-MCNC: 164 MG/DL — HIGH (ref 70–99)
GLUCOSE BLDC GLUCOMTR-MCNC: 192 MG/DL — HIGH (ref 70–99)
GLUCOSE BLDC GLUCOMTR-MCNC: 214 MG/DL — HIGH (ref 70–99)
GLUCOSE SERPL-MCNC: 131 MG/DL — HIGH (ref 70–99)
HCT VFR BLD CALC: 24.8 % — LOW (ref 34.5–45)
HGB BLD-MCNC: 7.6 G/DL — LOW (ref 11.5–15.5)
MAGNESIUM SERPL-MCNC: 1.7 MG/DL — SIGNIFICANT CHANGE UP (ref 1.6–2.6)
MCHC RBC-ENTMCNC: 30.6 GM/DL — LOW (ref 32–36)
MCHC RBC-ENTMCNC: 30.6 PG — SIGNIFICANT CHANGE UP (ref 27–34)
MCV RBC AUTO: 100 FL — SIGNIFICANT CHANGE UP (ref 80–100)
NRBC # BLD: 0 /100 WBCS — SIGNIFICANT CHANGE UP (ref 0–0)
PHOSPHATE SERPL-MCNC: 2.9 MG/DL — SIGNIFICANT CHANGE UP (ref 2.5–4.5)
PLATELET # BLD AUTO: 208 K/UL — SIGNIFICANT CHANGE UP (ref 150–400)
POTASSIUM SERPL-MCNC: 3.7 MMOL/L — SIGNIFICANT CHANGE UP (ref 3.5–5.3)
POTASSIUM SERPL-SCNC: 3.7 MMOL/L — SIGNIFICANT CHANGE UP (ref 3.5–5.3)
RBC # BLD: 2.48 M/UL — LOW (ref 3.8–5.2)
RBC # FLD: 14.6 % — HIGH (ref 10.3–14.5)
RH IG SCN BLD-IMP: POSITIVE — SIGNIFICANT CHANGE UP
SODIUM SERPL-SCNC: 140 MMOL/L — SIGNIFICANT CHANGE UP (ref 135–145)
WBC # BLD: 14.97 K/UL — HIGH (ref 3.8–10.5)
WBC # FLD AUTO: 14.97 K/UL — HIGH (ref 3.8–10.5)

## 2021-01-14 PROCEDURE — 99233 SBSQ HOSP IP/OBS HIGH 50: CPT

## 2021-01-14 PROCEDURE — 99233 SBSQ HOSP IP/OBS HIGH 50: CPT | Mod: GC

## 2021-01-14 PROCEDURE — 99232 SBSQ HOSP IP/OBS MODERATE 35: CPT

## 2021-01-14 RX ADMIN — Medication 264 MILLIGRAM(S): at 21:49

## 2021-01-14 RX ADMIN — LEVALBUTEROL 0.63 MILLIGRAM(S): 1.25 SOLUTION, CONCENTRATE RESPIRATORY (INHALATION) at 21:56

## 2021-01-14 RX ADMIN — LEVALBUTEROL 0.63 MILLIGRAM(S): 1.25 SOLUTION, CONCENTRATE RESPIRATORY (INHALATION) at 15:44

## 2021-01-14 RX ADMIN — Medication 1: at 10:20

## 2021-01-14 RX ADMIN — Medication 0.25 MILLIGRAM(S): at 17:10

## 2021-01-14 RX ADMIN — Medication 264 MILLIGRAM(S): at 05:53

## 2021-01-14 RX ADMIN — Medication 40 MILLIGRAM(S): at 05:53

## 2021-01-14 RX ADMIN — ISOSORBIDE DINITRATE 10 MILLIGRAM(S): 5 TABLET ORAL at 17:15

## 2021-01-14 RX ADMIN — TIOTROPIUM BROMIDE 1 CAPSULE(S): 18 CAPSULE ORAL; RESPIRATORY (INHALATION) at 12:36

## 2021-01-14 RX ADMIN — LEVALBUTEROL 0.63 MILLIGRAM(S): 1.25 SOLUTION, CONCENTRATE RESPIRATORY (INHALATION) at 05:53

## 2021-01-14 RX ADMIN — BUDESONIDE AND FORMOTEROL FUMARATE DIHYDRATE 2 PUFF(S): 160; 4.5 AEROSOL RESPIRATORY (INHALATION) at 17:10

## 2021-01-14 RX ADMIN — Medication 264 MILLIGRAM(S): at 15:43

## 2021-01-14 RX ADMIN — PANTOPRAZOLE SODIUM 40 MILLIGRAM(S): 20 TABLET, DELAYED RELEASE ORAL at 05:53

## 2021-01-14 RX ADMIN — Medication 2: at 18:14

## 2021-01-14 RX ADMIN — AMLODIPINE BESYLATE 5 MILLIGRAM(S): 2.5 TABLET ORAL at 05:53

## 2021-01-14 RX ADMIN — ENOXAPARIN SODIUM 40 MILLIGRAM(S): 100 INJECTION SUBCUTANEOUS at 12:36

## 2021-01-14 RX ADMIN — BUDESONIDE AND FORMOTEROL FUMARATE DIHYDRATE 2 PUFF(S): 160; 4.5 AEROSOL RESPIRATORY (INHALATION) at 05:53

## 2021-01-14 RX ADMIN — ISOSORBIDE DINITRATE 10 MILLIGRAM(S): 5 TABLET ORAL at 05:53

## 2021-01-14 NOTE — PROGRESS NOTE ADULT - PROBLEM SELECTOR PLAN 1
-In the emergency department, the patient was noted to have an acute desaturation to 86% while managed on 4L nasal cannula   -Etiology of acute respiratory failure with hypoxia most likely is respiratory muscle collapse, which may represent insufficient respiratory muscle reserve in the setting of carbon dioxide retention  -Etiology of acute exacerbation of COPD uncertain; no documentation in [place] of acute viral prodrome; no changes in medication management; no evidence of missed doses   -Arterial blood gas demonstrates marginally less carbon dioxide retention   -Will manage as COPD exacerbation; will administer Xopenex q8 hours  -s/p 40 mg of intravenous methylprednisolone q12 hours; given patient's persistent subjective shortness of breath  -pulsed steroids with 500mg IV methylprednisolone for 3 days per neurology, now on methylprednisolone 40mg qd and will stop on 1/15  -s/p five days of azithromycin  -Will manage with bilevel positive airway pressure (currently at inspiratory pressure of 16 and expiratory pressure of 5) at night and transition to nasal cannula 2L during the day; titrate oxygen saturation on pulse oximetry to 88-92%  -Will administer home doses of LAMA, LABA, and inhaled corticosteroid -In the emergency department, the patient was noted to have an acute desaturation to 86% while managed on 4L nasal cannula   -Etiology of acute respiratory failure with hypoxia most likely is respiratory muscle collapse, which may represent insufficient respiratory muscle reserve in the setting of carbon dioxide retention  -Etiology of acute exacerbation of COPD uncertain; no documentation in [place] of acute viral prodrome; no changes in medication management; no evidence of missed doses   -Arterial blood gas demonstrates marginally less carbon dioxide retention   -Will manage as COPD exacerbation; will administer Xopenex q8 hours  -s/p 40 mg of intravenous methylprednisolone q12 hours; given patient's persistent subjective shortness of breath  -pulsed steroids with 500mg IV methylprednisolone for 3 days per neurology, now on methylprednisolone 40mg qd and will stop on 1/15  -s/p five days of azithromycin  -Will manage with bilevel positive airway pressure (currently at inspiratory pressure of 16 and expiratory pressure of 5) at night and transition to nasal cannula 4L during the day; titrate oxygen saturation on pulse oximetry to 88-92%  -Will administer home doses of LAMA, LABA, and inhaled corticosteroid

## 2021-01-14 NOTE — PROGRESS NOTE ADULT - PROBLEM SELECTOR PLAN 6
-History of anxiety noted; administered alprazolam at home as needed   - restarted home 0.25mg TID alprazolam as needed

## 2021-01-14 NOTE — PROGRESS NOTE ADULT - ASSESSMENT
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

## 2021-01-14 NOTE — PROGRESS NOTE ADULT - SUBJECTIVE AND OBJECTIVE BOX
Killian Rocha MD   Internal Medicine PGY-1  Pager: NS: [phone] [facility]: [phone]    INCOMPLETE NOTE Killian Rocha MD   Internal Medicine PGY-1  Pager: NS: [phone] [facility]: [phone]    Patient is a 75y old  Female who presents with a chief complaint of acute respiratory failure (14 Jan 2021 10:05)    SUBJECTIVE / OVERNIGHT EVENTS:  No acute events overnight. Patient states he was on BiPAP         MEDICATIONS  (STANDING):  acyclovir IVPB 700 milliGRAM(s) IV Intermittent every 8 hours  amLODIPine   Tablet 5 milliGRAM(s) Oral daily  budesonide  80 MICROgram(s)/formoterol 4.5 MICROgram(s) Inhaler 2 Puff(s) Inhalation two times a day  dextrose 40% Gel 15 Gram(s) Oral once  dextrose 5%. 1000 milliLiter(s) (50 mL/Hr) IV Continuous <Continuous>  dextrose 5%. 1000 milliLiter(s) (100 mL/Hr) IV Continuous <Continuous>  dextrose 50% Injectable 25 Gram(s) IV Push once  dextrose 50% Injectable 12.5 Gram(s) IV Push once  dextrose 50% Injectable 25 Gram(s) IV Push once  enoxaparin Injectable 40 milliGRAM(s) SubCutaneous daily  glucagon  Injectable 1 milliGRAM(s) IntraMuscular once  insulin lispro (ADMELOG) corrective regimen sliding scale   SubCutaneous three times a day before meals  insulin lispro (ADMELOG) corrective regimen sliding scale   SubCutaneous at bedtime  isosorbide   dinitrate Tablet (ISORDIL) 10 milliGRAM(s) Oral two times a day  levalbuterol Inhalation 0.63 milliGRAM(s) Inhalation three times a day  pantoprazole    Tablet 40 milliGRAM(s) Oral before breakfast  tiotropium 18 MICROgram(s) Capsule 1 Capsule(s) Inhalation daily    MEDICATIONS  (PRN):  acetaminophen   Tablet .. 650 milliGRAM(s) Oral every 6 hours PRN Mild Pain (1 - 3), Moderate Pain (4 - 6)  ALPRAZolam 0.25 milliGRAM(s) Oral three times a day PRN Anxiety      T(C): 36.4 (01-14-21 @ 07:47), Max: 36.7 (01-13-21 @ 15:51)  HR: 102 (01-14-21 @ 09:49) (101 - 117)  BP: 158/81 (01-14-21 @ 07:47) (127/75 - 158/81)  RR: 22 (01-14-21 @ 11:00) (20 - 26)  SpO2: 97% (01-14-21 @ 11:00) (95% - 100%)  CAPILLARY BLOOD GLUCOSE      POCT Blood Glucose.: 164 mg/dL (14 Jan 2021 10:03)  POCT Blood Glucose.: 192 mg/dL (14 Jan 2021 09:01)  POCT Blood Glucose.: 163 mg/dL (13 Jan 2021 21:57)  POCT Blood Glucose.: 204 mg/dL (13 Jan 2021 17:40)  POCT Blood Glucose.: 188 mg/dL (13 Jan 2021 12:16)    I&O's Summary    13 Jan 2021 07:01  -  14 Jan 2021 07:00  --------------------------------------------------------  IN: 0 mL / OUT: 700 mL / NET: -700 mL        REVIEW OF SYSTEMS    General: No fevers/chills  Skin/Breast: No rash  Ophthalmologic: No vision changes  ENMT:No dysphagia or odynophagia  Respiratory and Thorax: No SOB, wheezing, cough  Cardiovascular: No chest pain or palpitations  Gastrointestinal: No n/v/d, No melena, No Hematochezia  Genitourinary:  No dysuria, No change in urinary frequency  Musculoskeletal: No joint or muscle pains.  Neurological: No focal deficits, No headache    PHYSICAL EXAM:  GENERAL: NAD, well-developed  HEAD:  Atraumatic, Normocephalic  EYES: EOMI, PERRLA, conjunctiva and sclera clear  NECK: Supple, No JVD  CHEST/LUNG: Clear to auscultation bilaterally; No wheeze  HEART: Regular rate and rhythm; No murmurs, rubs, or gallops  ABDOMEN: Soft, Nontender, Nondistended; Bowel sounds present  EXTREMITIES:  2+ Peripheral Pulses, No clubbing, cyanosis, or edema  PSYCH: AAOx3  NEUROLOGY: non-focal  SKIN: No rashes or lesions    LABS:                        7.6    14.97 )-----------( 208      ( 14 Jan 2021 08:21 )             24.8     01-14    140  |  91<L>  |  17  ----------------------------<  131<H>  3.7   |  >45<HH>  |  0.34<L>    Ca    8.9      14 Jan 2021 08:21  Phos  2.9     01-14  Mg     1.7     01-14                RADIOLOGY & ADDITIONAL TESTS:    Imaging Personally Reviewed:    Consultant(s) Notes Reviewed:      Care Discussed with Consultants/Other Providers:   Killian Rocha MD   Internal Medicine PGY-1  Pager: NS: [phone] [facility]: [id number]    Patient is a 75y old  Female who presents with a chief complaint of acute respiratory failure (14 Jan 2021 10:05)    SUBJECTIVE / OVERNIGHT EVENTS:  No acute events overnight. Patient states she was on BiPAP overnight. This morning was on NC, complaining of difficulty breathing, checked O2 saturation and was saturating 70%, was placed back on BiPAP with improvement in O2 saturation. She otherwise has been complaining of feeling cold. Tolerating diet. Denies any fevers, chills, n/v, abdominal pain, chest pain. Has chiu in place draining yellow urine.     MEDICATIONS  (STANDING):  acyclovir IVPB 700 milliGRAM(s) IV Intermittent every 8 hours  amLODIPine   Tablet 5 milliGRAM(s) Oral daily  budesonide  80 MICROgram(s)/formoterol 4.5 MICROgram(s) Inhaler 2 Puff(s) Inhalation two times a day  dextrose 40% Gel 15 Gram(s) Oral once  dextrose 5%. 1000 milliLiter(s) (50 mL/Hr) IV Continuous <Continuous>  dextrose 5%. 1000 milliLiter(s) (100 mL/Hr) IV Continuous <Continuous>  dextrose 50% Injectable 25 Gram(s) IV Push once  dextrose 50% Injectable 12.5 Gram(s) IV Push once  dextrose 50% Injectable 25 Gram(s) IV Push once  enoxaparin Injectable 40 milliGRAM(s) SubCutaneous daily  glucagon  Injectable 1 milliGRAM(s) IntraMuscular once  insulin lispro (ADMELOG) corrective regimen sliding scale   SubCutaneous three times a day before meals  insulin lispro (ADMELOG) corrective regimen sliding scale   SubCutaneous at bedtime  isosorbide   dinitrate Tablet (ISORDIL) 10 milliGRAM(s) Oral two times a day  levalbuterol Inhalation 0.63 milliGRAM(s) Inhalation three times a day  pantoprazole    Tablet 40 milliGRAM(s) Oral before breakfast  tiotropium 18 MICROgram(s) Capsule 1 Capsule(s) Inhalation daily    MEDICATIONS  (PRN):  acetaminophen   Tablet .. 650 milliGRAM(s) Oral every 6 hours PRN Mild Pain (1 - 3), Moderate Pain (4 - 6)  ALPRAZolam 0.25 milliGRAM(s) Oral three times a day PRN Anxiety    T(C): 36.4 (01-14-21 @ 07:47), Max: 36.7 (01-13-21 @ 15:51)  HR: 102 (01-14-21 @ 09:49) (101 - 117)  BP: 158/81 (01-14-21 @ 07:47) (127/75 - 158/81)  RR: 22 (01-14-21 @ 11:00) (20 - 26)  SpO2: 97% (01-14-21 @ 11:00) (95% - 100%)    CAPILLARY BLOOD GLUCOSE  POCT Blood Glucose.: 164 mg/dL (14 Jan 2021 10:03)  POCT Blood Glucose.: 192 mg/dL (14 Jan 2021 09:01)  POCT Blood Glucose.: 163 mg/dL (13 Jan 2021 21:57)  POCT Blood Glucose.: 204 mg/dL (13 Jan 2021 17:40)  POCT Blood Glucose.: 188 mg/dL (13 Jan 2021 12:16)    I&O's Summary    13 Jan 2021 07:01  -  14 Jan 2021 07:00  --------------------------------------------------------  IN: 0 mL / OUT: 700 mL / NET: -700 mL    REVIEW OF SYSTEMS  General: No fevers/chills, + feeling cold  Skin/Breast: No rash  Ophthalmologic: No vision changes  ENMT: No dysphagia or odynophagia  Respiratory and Thorax: + SOB, no wheezing, cough  Cardiovascular: No chest pain or palpitations  Gastrointestinal: No n/v/d, No melena, No Hematochezia  Genitourinary:  No dysuria, No change in urinary frequency  Musculoskeletal: No joint or muscle pains.  Neurological: No focal deficits, No headache    PHYSICAL EXAM:  GENERAL: NAD, well-developed, lying in bed  HEAD:  Atraumatic, Normocephalic, poor dentition  EYES: EOMI, conjunctiva and sclera clear  NECK: Supple, No JVD  CHEST/LUNG: Clear to auscultation bilaterally; No wheeze or crackles, on BIPAP  HEART: Regular rate and rhythm; No murmurs, rubs, or gallops  ABDOMEN: Soft, Nontender, Nondistended; Bowel sounds present  EXTREMITIES:  2+ Peripheral Pulses, No clubbing, cyanosis, or edema  PSYCH: AAOx3  NEUROLOGY: unable to move lower extremities, sensation grossly intact in LE  SKIN: No rashes or lesions    LABS:                        7.6    14.97 )-----------( 208      ( 14 Jan 2021 08:21 )             24.8     01-14    140  |  91<L>  |  17  ----------------------------<  131<H>  3.7   |  >45<HH>  |  0.34<L>    Ca    8.9      14 Jan 2021 08:21  Phos  2.9     01-14  Mg     1.7     01-14    RADIOLOGY & ADDITIONAL TESTS:    Imaging Personally Reviewed: Reviewed    Consultant(s) Notes Reviewed: Palliative, PMR, ID     Care Discussed with Consultants/Other Providers: Palliative, PMR, ID

## 2021-01-14 NOTE — PROGRESS NOTE ADULT - PROBLEM SELECTOR PLAN 2
- appears comfortable on BiPAP, but had desaturation to 70s this AM  - will need continuous pulse oximetry

## 2021-01-14 NOTE — PROGRESS NOTE ADULT - PROBLEM SELECTOR PLAN 4
-History of severe copd noted; managed at home with LAMA, LABA, and long-acting inhaled corticosteroid in addition to home oxygen   -Will manage copd exacerbation as above; also will administer home medications (budesonide/formoterol and tiotropium per forumulary) and will titrate oxygen therapy to 88-92% -History of severe copd noted; managed at home with LAMA, LABA, and long-acting inhaled corticosteroid in addition to home oxygen   -Will manage copd exacerbation as above; also will administer home medications (budesonide/formoterol and tiotropium per formulary) and will titrate oxygen therapy to 88-92%

## 2021-01-14 NOTE — PROGRESS NOTE ADULT - PROBLEM SELECTOR PLAN 3
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  -MR spine- can't r/o acute to subacute infarct, and with possible subdural AV fistula, may need neurointerventional input  -c/w 500mg IV methylprednisolone for 3 days (1/7-1/9) per neurology  -s/p angiogram w/ neurointerventional but unable to identify AVM that would be the cause of her LE paralysis  - f/u outpatient with Lesa Ang, Stroke NP, within 1 week of discharge.  Please email patient contact information to [email or web address].  [facility] [place] @ [address] [place] [address], [address] [place]; [phone]

## 2021-01-14 NOTE — PROGRESS NOTE ADULT - SUBJECTIVE AND OBJECTIVE BOX
Patient is a 75y old  Female who presents with a chief complaint of acute respiratory failure (06 Jan 2021 07:29)    Interval History:  Patient max A transfers.  Still w dyspnea- on BiPAP  No improvement of LE weakness.   Denies pain    MEDICATIONS  (STANDING):  acyclovir IVPB 700 milliGRAM(s) IV Intermittent every 8 hours  amLODIPine   Tablet 5 milliGRAM(s) Oral daily  budesonide  80 MICROgram(s)/formoterol 4.5 MICROgram(s) Inhaler 2 Puff(s) Inhalation two times a day  dextrose 40% Gel 15 Gram(s) Oral once  dextrose 5%. 1000 milliLiter(s) (50 mL/Hr) IV Continuous <Continuous>  dextrose 5%. 1000 milliLiter(s) (100 mL/Hr) IV Continuous <Continuous>  dextrose 50% Injectable 12.5 Gram(s) IV Push once  dextrose 50% Injectable 25 Gram(s) IV Push once  dextrose 50% Injectable 25 Gram(s) IV Push once  enoxaparin Injectable 40 milliGRAM(s) SubCutaneous daily  glucagon  Injectable 1 milliGRAM(s) IntraMuscular once  insulin lispro (ADMELOG) corrective regimen sliding scale   SubCutaneous three times a day before meals  insulin lispro (ADMELOG) corrective regimen sliding scale   SubCutaneous at bedtime  isosorbide   dinitrate Tablet (ISORDIL) 10 milliGRAM(s) Oral two times a day  levalbuterol Inhalation 0.63 milliGRAM(s) Inhalation three times a day  pantoprazole    Tablet 40 milliGRAM(s) Oral before breakfast  tiotropium 18 MICROgram(s) Capsule 1 Capsule(s) Inhalation daily    MEDICATIONS  (PRN):  acetaminophen   Tablet .. 650 milliGRAM(s) Oral every 6 hours PRN Mild Pain (1 - 3), Moderate Pain (4 - 6)  ALPRAZolam 0.25 milliGRAM(s) Oral three times a day PRN Anxiety    Vital Signs Last 24 Hrs  T(C): 36.4 (14 Jan 2021 07:47), Max: 36.7 (13 Jan 2021 15:51)  T(F): 97.5 (14 Jan 2021 07:47), Max: 98.1 (13 Jan 2021 15:51)  HR: 102 (14 Jan 2021 09:49) (98 - 117)  BP: 158/81 (14 Jan 2021 07:47) (127/75 - 158/81)  BP(mean): --  RR: 22 (14 Jan 2021 07:47) (22 - 26)  SpO2: 96% (14 Jan 2021 09:49) (95% - 100%)    PHYSICAL EXAM  Constitutional - Slight resp distress, on Bipap  HEENT - NCAT, EOMI, poor dentition  Neck - Supple, No limited ROM  Chest - Scattered wheezes  Cardiovascular - Tachy, S1S2, No murmurs  Abdomen - BS+, Soft, NTND  Extremities - No C/C/E, No calf tenderness   Neurologic Exam -                 AAO x 3  Speech appropriate  Motor 5/5 bl UE, 0/5 bl LEs  Sensation intact of UE and LEs  No clonus    Psychiatric - Mood stable, Affect WNL    CBC Full  -  ( 14 Jan 2021 08:21 )  WBC Count : 14.97 K/uL  RBC Count : 2.48 M/uL  Hemoglobin : 7.6 g/dL  Hematocrit : 24.8 %  Platelet Count - Automated : 208 K/uL  Mean Cell Volume : 100.0 fl  Mean Cell Hemoglobin : 30.6 pg  Mean Cell Hemoglobin Concentration : 30.6 gm/dL  Auto Neutrophil # : x  Auto Lymphocyte # : x  Auto Monocyte # : x  Auto Eosinophil # : x  Auto Basophil # : x  Auto Neutrophil % : x  Auto Lymphocyte % : x  Auto Monocyte % : x  Auto Eosinophil % : x  Auto Basophil % : x    01-14    140  |  91<L>  |  17  ----------------------------<  131<H>  3.7   |  >45<HH>  |  0.34<L>    Ca    8.9      14 Jan 2021 08:21  Phos  2.9     01-14  Mg     1.7     01-14          Impression:   74 yo with functional deficits secondary to 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

## 2021-01-14 NOTE — PROGRESS NOTE ADULT - ATTENDING COMMENTS
Pt seen and examined. Agree with above with additional findings:    # acute hypoxic-hypercarbic respiratory failure  # bilateral LE weakness  # post-ICU neuropathy/myopathy  # HSV2 meningomyelitis  # anemia    - episodes of desaturation to 70s on NC this morning, pt placed on BIPAP; continue to titrate off  - s/p angiogram, negative for AVM  - discussed care with Dr. Rutherford (neurology)  - complete total 14 day course of acyclovir (Care discussed with Dr. Carrion)  - PT/OT/PMNR consult/OOB to chair  - dc planning to acute rehab; COVID PCR negative    Karen Mcconnell MD  Division of Hospital Medicine  Cell: [phone]  Office: [phone].

## 2021-01-14 NOTE — PROGRESS NOTE ADULT - SUBJECTIVE AND OBJECTIVE BOX
HPI: 75F with PMH of severe COPD on home O2 here with a COPD exacerbation, intubated and extubated at 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 Desaturated in ED at [facility], suspected cause being respiratory muscle collapse, requiring BiPAP and O2 NC, alongside her home COPD medications. Patient has bilateral LE weakness with intact bowel and bladder continence. 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 Patient was placed on steroids, and MR angio ordered to rule out a spinal cord infarct. Acyclovir started by ID. Patient made herself DNR/DNI, and palliative care consulted for further GOC.    INTERVAL EVENTS:  1/7: patient with dyspnea this AM, on BiPAP and feeling better  1/8: patient off BiPAP, appears more comfortable  1/11: appears comfortable on BiPAP, denies concerns  1/14: desat this AM, placed back on BiPAP    ADVANCE DIRECTIVES:    DNR  Yes    MOLST  [X]  Living Will  [ ]   DECISION MAKER(s):  [X] Health Care Proxy(s)  [ ] Surrogate(s)  [ ] Guardian           Name(s): Phone Number(s): Charline Patel (niece) [phone],    BASELINE (I)ADL(s) (prior to admission):  [place]: [X]Total  [ ] Moderate [ ]Dependent    Allergies    albuterol (Other)    Intolerances    MEDICATIONS  (STANDING):  acyclovir IVPB 700 milliGRAM(s) IV Intermittent every 8 hours  amLODIPine   Tablet 5 milliGRAM(s) Oral daily  budesonide  80 MICROgram(s)/formoterol 4.5 MICROgram(s) Inhaler 2 Puff(s) Inhalation two times a day  dextrose 40% Gel 15 Gram(s) Oral once  dextrose 5%. 1000 milliLiter(s) (50 mL/Hr) IV Continuous <Continuous>  dextrose 5%. 1000 milliLiter(s) (100 mL/Hr) IV Continuous <Continuous>  dextrose 50% Injectable 25 Gram(s) IV Push once  dextrose 50% Injectable 12.5 Gram(s) IV Push once  dextrose 50% Injectable 25 Gram(s) IV Push once  enoxaparin Injectable 40 milliGRAM(s) SubCutaneous daily  glucagon  Injectable 1 milliGRAM(s) IntraMuscular once  insulin lispro (ADMELOG) corrective regimen sliding scale   SubCutaneous three times a day before meals  insulin lispro (ADMELOG) corrective regimen sliding scale   SubCutaneous at bedtime  isosorbide   dinitrate Tablet (ISORDIL) 10 milliGRAM(s) Oral two times a day  levalbuterol Inhalation 0.63 milliGRAM(s) Inhalation three times a day  pantoprazole    Tablet 40 milliGRAM(s) Oral before breakfast  tiotropium 18 MICROgram(s) Capsule 1 Capsule(s) Inhalation daily    MEDICATIONS  (PRN):  acetaminophen   Tablet .. 650 milliGRAM(s) Oral every 6 hours PRN Mild Pain (1 - 3), Moderate Pain (4 - 6)  ALPRAZolam 0.25 milliGRAM(s) Oral three times a day PRN Anxiety    PRESENT SYMPTOMS: [ ]Unable to obtain due to poor mentation   Source if other than patient:  [ ]Family   [ ]Team     Pain: [ ]yes [X ]no  QOL impact -   Location -    back                Aggravating factors -  Quality - aching  Radiation -  Timing-  Severity (0-10 scale):  Minimal acceptable level (0-10 scale):     CPOT:    https://www.scc.org/getattachment/dar98v72-9j5c-1h0s-2o6s-7746a9912a4w/Critical-Care-Pain-Observation-Tool-(CPOT)      PAIN AD Score:     http://geriatrictoolkit.[facility]/cog/painad.pdf (press ctrl +  left click to view)    Dyspnea:                           [ ]Mild [ ]Moderate [ ]Severe  Anxiety:                             [ ]Mild [ ]Moderate [ ]Severe  Fatigue:                             [ ]Mild [ ]Moderate [ ]Severe  Nausea:                             [ ]Mild [ ]Moderate [ ]Severe  Loss of appetite:              [ ]Mild [ ]Moderate [ ]Severe  Constipation:                    [ ]Mild [ ]Moderate [ ]Severe    Other Symptoms:  [ ]All other review of systems negative     Palliative Performance Status Version 2:         %    http://npcrc.org/files/news/palliative_performance_scale_ppsv2.pdf    PHYSICAL EXAM:  Vital Signs Last 24 Hrs  T(C): 36.6 (14 Jan 2021 14:32), Max: 36.6 (14 Jan 2021 14:32)  T(F): 97.9 (14 Jan 2021 14:32), Max: 97.9 (14 Jan 2021 14:32)  HR: 100 (14 Jan 2021 14:36) (100 - 113)  BP: 129/81 (14 Jan 2021 14:32) (127/75 - 158/81)  BP(mean): --  RR: 22 (14 Jan 2021 14:32) (20 - 26)  SpO2: 99% (14 Jan 2021 14:36) (95% - 100%)    Limited exam for patient safety and to limit infection risk during COVID-19 outbreak. Please refer to primary team's examination for today.  GENERAL:  [X]Alert  [ ]Oriented x   [ ]Lethargic  [ ]Cachexia  [ ]Unarousable  [X ]Verbal  [ ]Non-Verbal  Behavioral:   [ ] Anxiety  [ ] Delirium [ ] Agitation [ ] Other  HEENT:  [ X]Normal   [ ]Dry mouth   [ ]ET Tube/Trach  [ ]Oral lesions  PULMONARY:   [ ]Clear [ ]Tachypnea  [ ]Audible excessive secretions   [ ]Rhonchi        [ ]Right [ ]Left [ ]Bilateral  [ ]Crackles        [ ]Right [ ]Left [ ]Bilateral  [ ]Wheezing     [ ]Right [ ]Left [ ]Bilateral  [ ]Diminished breath sounds [ ]right [ ]left [ ]bilateral  CARDIOVASCULAR:    [ ]Regular [ ]Irregular [ ]Tachy  [ ]Lance [ ]Murmur [ ]Other  GASTROINTESTINAL:  [ ]Soft  [ ]Distended   [ ]+BS  [ ]Non tender [ ]Tender  [ ]PEG [ ]OGT/ NGT  Last BM:     GENITOURINARY:  [ ]Normal [ ] Incontinent   [ ]Oliguria/Anuria   [ ][place]  MUSCULOSKELETAL:   [ ]Normal   [ ]Weakness  [ ]Bed/Wheelchair bound [ ]Edema  NEUROLOGIC:   [ ]No focal deficits  [ ]Cognitive impairment  [ ]Dysphagia [ ]Dysarthria [ ]Paresis [ ]Other   SKIN:   [ ]Normal    [ ]Rash  [ ]Pressure ulcer(s)       Present on admission [ ]y [ ]n    CRITICAL CARE:  [ ] Shock Present  [ ]Septic [ ]Cardiogenic [ ]Neurologic [ ]Hypovolemic  [ ]  Vasopressors [ ]  Inotropes   [ ]Respiratory failure present [ ]Mechanical ventilation [ ]Non-invasive ventilatory support [ ]High flow  [ ]Acute  [ ]Chronic [ ]Hypoxic  [ ]Hypercarbic [ ]Other  [ ]Other organ failure     LABS: reviewed                             7.6 14.97 )-----------( 208      ( 14 Jan 2021 08:21 )             24.8     01-14    140  |  91<L>  |  17  ----------------------------<  131<H>  3.7   |  >45<HH>  |  0.34<L>    Ca    8.9      14 Jan 2021 08:21  Phos  2.9     01-14  Mg     1.7     01-14        RADIOLOGY & ADDITIONAL STUDIES:    CT chest 1/3/21    No pneumonia.    Emphysema.    A few bilateral 2 or 3 mm pulmonary nodules. A 1 year follow-up noncontrast chest CT is recommended to ensure stability.    Aortic root enlargement measuring about 4.2 cm.    2.4 cm right thyroid nodule. Recommend comparison to prior imaging studies. If none exists, recommend thyroid ultrasound.    Left breast nodules. Correlation with mammography and/or ultrasound is recommended if not recently performed.    PROTEIN CALORIE MALNUTRITION PRESENT: [ ]mild [ ]moderate [ ]severe [ ]underweight [ ]morbid obesity  https://www.andeal.org/vault/2440/web/files/ONC/Table_Clinical%20Characteristics%20to%20Document%20Malnutrition-White%20JV%20et%20al%202012.pdf    Height (cm): 167.6 (12-28-20 @ 17:32)  Weight (kg): 74.8 (12-28-20 @ 17:32)  BMI (kg/m2): 26.6 (12-28-20 @ 17:32)    [ ]PPSV2 < or = to 30% [ ]significant weight loss  [ ]poor nutritional intake  [ ]anasarca     Albumin, Serum: 2.7 g/dL (01-04-21 @ 07:00)   [ ]Artificial Nutrition      REFERRALS:   [ ]Chaplaincy  [ ]Hospice  [ ]Child Life  [ ]Social Work  [ ]Case management [ ]Holistic Therapy     Goals of Care Document:     ______________  Dick Sy MD   of Geriatric and Palliative Medicine  [facility]     Please page the following number for clinical matters between the hours of 9AM and 5PM   from Monday through Friday : [phone]    After 5PM and on weekends, please page: [phone]. The Geriatric and Palliative Medicine consult service has 24/7 coverage for medical recommendations, including for symptom management needs.

## 2021-01-14 NOTE — PROGRESS NOTE ADULT - PROBLEM SELECTOR PLAN 7
-lovenox 40 for dvt prophylaxis   -Pureed diet  -Ongoing goals of care discussions currently DNR/DNI and questions the utility of treatments, and wonders if she may want to transition to a more palliative treatment option, palliative care following

## 2021-01-15 LAB
ANION GAP SERPL CALC-SCNC: 2 MMOL/L — LOW (ref 5–17)
BUN SERPL-MCNC: 17 MG/DL — SIGNIFICANT CHANGE UP (ref 7–23)
CALCIUM SERPL-MCNC: 9.3 MG/DL — SIGNIFICANT CHANGE UP (ref 8.4–10.5)
CHLORIDE SERPL-SCNC: 93 MMOL/L — LOW (ref 96–108)
CO2 SERPL-SCNC: 45 MMOL/L — CRITICAL HIGH (ref 22–31)
CREAT SERPL-MCNC: 0.33 MG/DL — LOW (ref 0.5–1.3)
CULTURE RESULTS: SIGNIFICANT CHANGE UP
GLUCOSE BLDC GLUCOMTR-MCNC: 129 MG/DL — HIGH (ref 70–99)
GLUCOSE BLDC GLUCOMTR-MCNC: 156 MG/DL — HIGH (ref 70–99)
GLUCOSE BLDC GLUCOMTR-MCNC: 174 MG/DL — HIGH (ref 70–99)
GLUCOSE BLDC GLUCOMTR-MCNC: 91 MG/DL — SIGNIFICANT CHANGE UP (ref 70–99)
GLUCOSE SERPL-MCNC: 92 MG/DL — SIGNIFICANT CHANGE UP (ref 70–99)
HCT VFR BLD CALC: 23.4 % — LOW (ref 34.5–45)
HGB BLD-MCNC: 7.4 G/DL — LOW (ref 11.5–15.5)
MAGNESIUM SERPL-MCNC: 1.8 MG/DL — SIGNIFICANT CHANGE UP (ref 1.6–2.6)
MCHC RBC-ENTMCNC: 31.2 PG — SIGNIFICANT CHANGE UP (ref 27–34)
MCHC RBC-ENTMCNC: 31.6 GM/DL — LOW (ref 32–36)
MCV RBC AUTO: 98.7 FL — SIGNIFICANT CHANGE UP (ref 80–100)
NRBC # BLD: 0 /100 WBCS — SIGNIFICANT CHANGE UP (ref 0–0)
PHOSPHATE SERPL-MCNC: 3 MG/DL — SIGNIFICANT CHANGE UP (ref 2.5–4.5)
PLATELET # BLD AUTO: 211 K/UL — SIGNIFICANT CHANGE UP (ref 150–400)
POTASSIUM SERPL-MCNC: 3.7 MMOL/L — SIGNIFICANT CHANGE UP (ref 3.5–5.3)
POTASSIUM SERPL-SCNC: 3.7 MMOL/L — SIGNIFICANT CHANGE UP (ref 3.5–5.3)
RBC # BLD: 2.37 M/UL — LOW (ref 3.8–5.2)
RBC # FLD: 15.1 % — HIGH (ref 10.3–14.5)
SODIUM SERPL-SCNC: 140 MMOL/L — SIGNIFICANT CHANGE UP (ref 135–145)
SPECIMEN SOURCE: SIGNIFICANT CHANGE UP
WBC # BLD: 9.96 K/UL — SIGNIFICANT CHANGE UP (ref 3.8–10.5)
WBC # FLD AUTO: 9.96 K/UL — SIGNIFICANT CHANGE UP (ref 3.8–10.5)

## 2021-01-15 PROCEDURE — 99232 SBSQ HOSP IP/OBS MODERATE 35: CPT | Mod: GC

## 2021-01-15 RX ADMIN — ENOXAPARIN SODIUM 40 MILLIGRAM(S): 100 INJECTION SUBCUTANEOUS at 13:12

## 2021-01-15 RX ADMIN — ISOSORBIDE DINITRATE 10 MILLIGRAM(S): 5 TABLET ORAL at 05:26

## 2021-01-15 RX ADMIN — AMLODIPINE BESYLATE 5 MILLIGRAM(S): 2.5 TABLET ORAL at 05:27

## 2021-01-15 RX ADMIN — BUDESONIDE AND FORMOTEROL FUMARATE DIHYDRATE 2 PUFF(S): 160; 4.5 AEROSOL RESPIRATORY (INHALATION) at 05:28

## 2021-01-15 RX ADMIN — Medication 0.25 MILLIGRAM(S): at 05:26

## 2021-01-15 RX ADMIN — TIOTROPIUM BROMIDE 1 CAPSULE(S): 18 CAPSULE ORAL; RESPIRATORY (INHALATION) at 13:22

## 2021-01-15 RX ADMIN — ISOSORBIDE DINITRATE 10 MILLIGRAM(S): 5 TABLET ORAL at 17:56

## 2021-01-15 RX ADMIN — LEVALBUTEROL 0.63 MILLIGRAM(S): 1.25 SOLUTION, CONCENTRATE RESPIRATORY (INHALATION) at 05:27

## 2021-01-15 RX ADMIN — LEVALBUTEROL 0.63 MILLIGRAM(S): 1.25 SOLUTION, CONCENTRATE RESPIRATORY (INHALATION) at 18:01

## 2021-01-15 RX ADMIN — LEVALBUTEROL 0.63 MILLIGRAM(S): 1.25 SOLUTION, CONCENTRATE RESPIRATORY (INHALATION) at 20:54

## 2021-01-15 RX ADMIN — Medication 1: at 13:12

## 2021-01-15 RX ADMIN — PANTOPRAZOLE SODIUM 40 MILLIGRAM(S): 20 TABLET, DELAYED RELEASE ORAL at 05:28

## 2021-01-15 RX ADMIN — BUDESONIDE AND FORMOTEROL FUMARATE DIHYDRATE 2 PUFF(S): 160; 4.5 AEROSOL RESPIRATORY (INHALATION) at 18:01

## 2021-01-15 RX ADMIN — Medication 0.25 MILLIGRAM(S): at 21:53

## 2021-01-15 NOTE — PROGRESS NOTE ADULT - PROBLEM SELECTOR PLAN 6
-History of anxiety noted; administered alprazolam at home as needed   - restarted home 0.25mg TID alprazolam as needed -History of anxiety noted; administered alprazolam at home as needed   - c/w home 0.25mg TID alprazolam as needed

## 2021-01-15 NOTE — CHART NOTE - NSCHARTNOTEFT_GEN_A_CORE
Nutrition Follow-up  Patient seen for: nutrition follow-up    Source: EMR, ( )     Hospital course as per chart: 75y Female with PMH of severe COPD who presented to the ED as a transfer from [facility] for management of unexplained bilateral lower extremity weakness, admitted 12/29. Of note, COPD exacerbation required endotracheal intubation and management in ICU at [facility]. At [facility], developed acute respiratory failure with hypoxia and hypercapnia, "most likely secondary to an acute exacerbation of her COPD." "Patient has bilateral LE weakness with intact bowel and bladder continence. 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    Diet, Pureed (01-10-21 @ 09:36) [Active]    Patient reports ( )    PO intake:  < 50% [ ] 50-75% [ ]   % [ ]      Source for PO intake:     Most recent weight/% Weight Change: no new weights to assess  Will continue to monitor and trend weights.     Pertinent Medications: MEDICATIONS  (STANDING):  amLODIPine   Tablet 5 milliGRAM(s) Oral daily  budesonide  80 MICROgram(s)/formoterol 4.5 MICROgram(s) Inhaler 2 Puff(s) Inhalation two times a day  dextrose 40% Gel 15 Gram(s) Oral once  dextrose 5%. 1000 milliLiter(s) (100 mL/Hr) IV Continuous <Continuous>  dextrose 5%. 1000 milliLiter(s) (50 mL/Hr) IV Continuous <Continuous>  dextrose 50% Injectable 25 Gram(s) IV Push once  dextrose 50% Injectable 12.5 Gram(s) IV Push once  dextrose 50% Injectable 25 Gram(s) IV Push once  enoxaparin Injectable 40 milliGRAM(s) SubCutaneous daily  glucagon  Injectable 1 milliGRAM(s) IntraMuscular once  insulin lispro (ADMELOG) corrective regimen sliding scale   SubCutaneous three times a day before meals  insulin lispro (ADMELOG) corrective regimen sliding scale   SubCutaneous at bedtime  isosorbide   dinitrate Tablet (ISORDIL) 10 milliGRAM(s) Oral two times a day  levalbuterol Inhalation 0.63 milliGRAM(s) Inhalation three times a day  pantoprazole    Tablet 40 milliGRAM(s) Oral before breakfast  tiotropium 18 MICROgram(s) Capsule 1 Capsule(s) Inhalation daily    MEDICATIONS  (PRN):  acetaminophen   Tablet .. 650 milliGRAM(s) Oral every 6 hours PRN Mild Pain (1 - 3), Moderate Pain (4 - 6)  ALPRAZolam 0.25 milliGRAM(s) Oral three times a day PRN Anxiety    Pertinent Labs:  01-15 Na140 mmol/L Glu 92 mg/dL K+ 3.7 mmol/L Cr  0.33 mg/dL<L> BUN 17 mg/dL 01-15 Phos 3.0 mg/dL 01-10 Alb 2.6 g/dL<L> 12-29 Chol 250 mg/dL<H> LDL --    HDL 76 mg/dL Trig 184 mg/dL<H>    CAPILLARY BLOOD GLUCOSE  POCT Blood Glucose.: 91 mg/dL (15 Danielito 2021 08:27)  POCT Blood Glucose.: 122 mg/dL (14 Jan 2021 21:51)  POCT Blood Glucose.: 214 mg/dL (14 Jan 2021 18:07)  POCT Blood Glucose.: 138 mg/dL (14 Jan 2021 15:09)  POCT Blood Glucose.: 147 mg/dL (14 Jan 2021 12:11)    Skin: +skin tear, no pressure injuries per flowsheets   Edema: 1+ generalized edema as per flowsheets (assessed 1/14)    Estimated Needs: no change since previous assessment  Based on stated  pounds  Estimated Energy Needs (30-35 kcal/kg): 7049-3950 kcal/day  Estimated Protein Needs (1.2-1.4 g/kg): 68-79 g/day  Estimated Fluid Needs (30-35 ml/kg): 5429-4830 ml/day    Previous Nutrition Diagnosis: Inadequate Oral Intake - nutrition diagnosis is ( ), being addressed with Pureed diet, ( )      New Nutrition Diagnosis: [ ] not applicable    [ ] Inadequate Protein Energy Intake [ ]Inadequate Oral Intake [ ] Excessive Energy Intake     [ ] Underweight [ ] Increased Nutrient Needs [ ] Overweight/Obesity     [ ] Altered GI Function [ ] Unintended Weight Loss [ ] Food & Nutrition Related Knowledge Deficit[ ] Limited Adherence to nutrition related recommendations [ ] Malnutrition  [ ] other: Free text    Recommendations:      Monitoring and Evaluation: Monitor PO intake, weight, labs, skin, GI status, diet     RD remains available upon request and will continue to follow-up per protocol.   Laura Diaz MS RD CDN pager #[phone] Nutrition Follow-up  Patient seen for: nutrition follow-up    Source: EMR, patient     Hospital course as per chart: 75y Female with PMH of severe COPD who presented to the ED as a transfer from [facility] for management of unexplained bilateral lower extremity weakness, admitted 12/29. Of note, COPD exacerbation required endotracheal intubation and management in ICU at [facility]. At [facility], developed acute respiratory failure with hypoxia and hypercapnia, "most likely secondary to an acute exacerbation of her COPD." "Patient has bilateral LE weakness with intact bowel and bladder continence. 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    Diet, Pureed (01-10-21 @ 09:36) [Active]    Patient noted to be disoriented, anxious. Interview limited at this time. Pt reports consuming ~50% of meal trays. Flowsheets suggest pt with variable intake since previous RD visit (0-100% of documented meals). Noted pt with fecal incontinence. Last BM 1/12 per flowsheets. Diet education inappropriate at this time. Pt made aware RD remains available.     PO intake: ~50% per pt    Source for PO intake: patient, chart    Most recent weight/% Weight Change: no new weights to assess. Pt in chair at time of visit, unable to obtain new bed scale wt at this time. Will continue to monitor and trend weights.     Pertinent Medications: MEDICATIONS  (STANDING):  amLODIPine   Tablet 5 milliGRAM(s) Oral daily  budesonide  80 MICROgram(s)/formoterol 4.5 MICROgram(s) Inhaler 2 Puff(s) Inhalation two times a day  dextrose 40% Gel 15 Gram(s) Oral once  dextrose 5%. 1000 milliLiter(s) (100 mL/Hr) IV Continuous <Continuous>  dextrose 5%. 1000 milliLiter(s) (50 mL/Hr) IV Continuous <Continuous>  dextrose 50% Injectable 25 Gram(s) IV Push once  dextrose 50% Injectable 12.5 Gram(s) IV Push once  dextrose 50% Injectable 25 Gram(s) IV Push once  enoxaparin Injectable 40 milliGRAM(s) SubCutaneous daily  glucagon  Injectable 1 milliGRAM(s) IntraMuscular once  insulin lispro (ADMELOG) corrective regimen sliding scale   SubCutaneous three times a day before meals  insulin lispro (ADMELOG) corrective regimen sliding scale   SubCutaneous at bedtime  isosorbide   dinitrate Tablet (ISORDIL) 10 milliGRAM(s) Oral two times a day  levalbuterol Inhalation 0.63 milliGRAM(s) Inhalation three times a day  pantoprazole    Tablet 40 milliGRAM(s) Oral before breakfast  tiotropium 18 MICROgram(s) Capsule 1 Capsule(s) Inhalation daily    MEDICATIONS  (PRN):  acetaminophen   Tablet .. 650 milliGRAM(s) Oral every 6 hours PRN Mild Pain (1 - 3), Moderate Pain (4 - 6)  ALPRAZolam 0.25 milliGRAM(s) Oral three times a day PRN Anxiety    Pertinent Labs:  01-15 Na140 mmol/L Glu 92 mg/dL K+ 3.7 mmol/L Cr  0.33 mg/dL<L> BUN 17 mg/dL 01-15 Phos 3.0 mg/dL 01-10 Alb 2.6 g/dL<L> 12-29 Chol 250 mg/dL<H> LDL --    HDL 76 mg/dL Trig 184 mg/dL<H>    CAPILLARY BLOOD GLUCOSE  POCT Blood Glucose.: 91 mg/dL (15 Danielito 2021 08:27)  POCT Blood Glucose.: 122 mg/dL (14 Jan 2021 21:51)  POCT Blood Glucose.: 214 mg/dL (14 Jan 2021 18:07)  POCT Blood Glucose.: 138 mg/dL (14 Jan 2021 15:09)  POCT Blood Glucose.: 147 mg/dL (14 Jan 2021 12:11)    Skin: +skin tear, no pressure injuries per flowsheets   Edema: 1+ generalized edema as per flowsheets     Estimated Needs: no change since previous assessment  Based on stated  pounds  Estimated Energy Needs (30-35 kcal/kg): 1341-2369 kcal/day  Estimated Protein Needs (1.2-1.4 g/kg): 68-79 g/day  Estimated Fluid Needs (30-35 ml/kg): 5826-1814 ml/day    Previous Nutrition Diagnosis: Inadequate Oral Intake - nutrition diagnosis is ongoing, advanced to malnutrition (new diagnosis below), being addressed with Pureed diet; to be addressed with RD provision of Mighty Shakes     New Nutrition Diagnosis: Moderate protein-calorie malnutrition related to acute illness (COPD exacerbation, acute respiratory failure) with increased needs in setting of chronic disease (COPD) as evidenced by <75% EER x > 7 days, 1+ fluid accumulation.   Goal: Pt to meet >75% of estimated nutritional needs during hospital stay.     Recommendations:  1) Continue current diet. Defer texture/consistency to team/Speech Pathologist. Monitor/adjust as needed.    2) RD to provide Mighty Shake 2x/day (200 kcal, 6 gm protein in each) to optimize intake   3) Suggest multivitamin supplementation if no medical contraindications   4) Pt made aware RD remains available. RD to continue to obtain/honor food preferences as feasible.   5) Malnutrition sticker placed, RD to follow-up as per protocol     Monitoring and Evaluation: Monitor PO intake, weight, labs, skin, GI status, diet     RD remains available upon request and will continue to follow-up per protocol.   Laura Diaz MS JOSEPH CDN pager #[phone] Nutrition Follow-up  Patient seen for: nutrition follow-up    Source: EMR, patient     Hospital course as per chart: 75y Female with PMH of severe COPD who presented to the ED as a transfer from [facility] for management of unexplained bilateral lower extremity weakness, admitted 12/29. Of note, COPD exacerbation required endotracheal intubation and management in ICU at [facility]. At [facility], developed acute respiratory failure with hypoxia and hypercapnia, "most likely secondary to an acute exacerbation of her COPD." "Patient has bilateral LE weakness with intact bowel and bladder continence. 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    Diet, Pureed (01-10-21 @ 09:36) [Active]    Patient noted to be disoriented, anxious. Interview limited at this time. Pt reports consuming ~50% of meal trays. Unclear if pt drinking Mighty Shakes, unable to recall. Flowsheets suggest pt with variable intake since previous RD visit (0-100% of documented meals). Noted pt with fecal incontinence. Last BM 1/12 per flowsheets. Diet education inappropriate at this time. Pt made aware RD remains available.     PO intake: ~50% per pt    Source for PO intake: patient, chart    Most recent weight/% Weight Change: no new weights to assess. Pt in chair at time of visit, unable to obtain new bed scale wt at this time. Will continue to monitor and trend weights.     Pertinent Medications: MEDICATIONS  (STANDING):  amLODIPine   Tablet 5 milliGRAM(s) Oral daily  budesonide  80 MICROgram(s)/formoterol 4.5 MICROgram(s) Inhaler 2 Puff(s) Inhalation two times a day  dextrose 40% Gel 15 Gram(s) Oral once  dextrose 5%. 1000 milliLiter(s) (100 mL/Hr) IV Continuous <Continuous>  dextrose 5%. 1000 milliLiter(s) (50 mL/Hr) IV Continuous <Continuous>  dextrose 50% Injectable 25 Gram(s) IV Push once  dextrose 50% Injectable 12.5 Gram(s) IV Push once  dextrose 50% Injectable 25 Gram(s) IV Push once  enoxaparin Injectable 40 milliGRAM(s) SubCutaneous daily  glucagon  Injectable 1 milliGRAM(s) IntraMuscular once  insulin lispro (ADMELOG) corrective regimen sliding scale   SubCutaneous three times a day before meals  insulin lispro (ADMELOG) corrective regimen sliding scale   SubCutaneous at bedtime  isosorbide   dinitrate Tablet (ISORDIL) 10 milliGRAM(s) Oral two times a day  levalbuterol Inhalation 0.63 milliGRAM(s) Inhalation three times a day  pantoprazole    Tablet 40 milliGRAM(s) Oral before breakfast  tiotropium 18 MICROgram(s) Capsule 1 Capsule(s) Inhalation daily    MEDICATIONS  (PRN):  acetaminophen   Tablet .. 650 milliGRAM(s) Oral every 6 hours PRN Mild Pain (1 - 3), Moderate Pain (4 - 6)  ALPRAZolam 0.25 milliGRAM(s) Oral three times a day PRN Anxiety    Pertinent Labs:  01-15 Na140 mmol/L Glu 92 mg/dL K+ 3.7 mmol/L Cr  0.33 mg/dL<L> BUN 17 mg/dL 01-15 Phos 3.0 mg/dL 01-10 Alb 2.6 g/dL<L> 12-29 Chol 250 mg/dL<H> LDL --    HDL 76 mg/dL Trig 184 mg/dL<H>    CAPILLARY BLOOD GLUCOSE  POCT Blood Glucose.: 91 mg/dL (15 Danielito 2021 08:27)  POCT Blood Glucose.: 122 mg/dL (14 Jan 2021 21:51)  POCT Blood Glucose.: 214 mg/dL (14 Jan 2021 18:07)  POCT Blood Glucose.: 138 mg/dL (14 Jan 2021 15:09)  POCT Blood Glucose.: 147 mg/dL (14 Jan 2021 12:11)    Skin: +skin tear, no pressure injuries per flowsheets   Edema: 1+ generalized edema as per flowsheets     Estimated Needs: no change since previous assessment  Based on stated  pounds  Estimated Energy Needs (30-35 kcal/kg): 7499-6862 kcal/day  Estimated Protein Needs (1.2-1.4 g/kg): 68-79 g/day  Estimated Fluid Needs (30-35 ml/kg): 5332-8553 ml/day    Previous Nutrition Diagnosis: Inadequate Oral Intake - nutrition diagnosis is ongoing, advanced to malnutrition (new diagnosis below), being addressed with Pureed diet; to be addressed with RD provision of Mighty Shakes     New Nutrition Diagnosis: Moderate protein-calorie malnutrition related to acute illness (COPD exacerbation, acute respiratory failure) with increased needs in setting of chronic disease (COPD) as evidenced by <75% EER x > 7 days, 1+ fluid accumulation.   Goal: Pt to meet >75% of estimated nutritional needs during hospital stay.     Recommendations:  1) Continue current diet. Defer texture/consistency to team/Speech Pathologist. Monitor/adjust as needed.    2) RD to continue to provide Mighty Shake 3x/day (200 kcal, 6 gm protein in each) to optimize intake   3) Suggest multivitamin supplementation if no medical contraindications   4) Pt made aware RD remains available. RD to continue to obtain/honor food preferences as feasible.   5) Malnutrition sticker placed, RD to follow-up as per protocol     Monitoring and Evaluation: Monitor PO intake, weight, labs, skin, GI status, diet     RD remains available upon request and will continue to follow-up per protocol.   Laura Diaz MS, RD CDN pager #[phone]

## 2021-01-15 NOTE — PROGRESS NOTE ADULT - ASSESSMENT
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

## 2021-01-15 NOTE — PROGRESS NOTE ADULT - PROBLEM SELECTOR PLAN 4
-History of severe copd noted; managed at home with LAMA, LABA, and long-acting inhaled corticosteroid in addition to home oxygen   -Will manage copd exacerbation as above; also will administer home medications (budesonide/formoterol and tiotropium per formulary) and will titrate oxygen therapy to 88-92%

## 2021-01-15 NOTE — PROGRESS NOTE ADULT - PROBLEM SELECTOR PLAN 1
-In the emergency department, the patient was noted to have an acute desaturation to 86% while managed on 4L nasal cannula   -Etiology of acute respiratory failure with hypoxia most likely is respiratory muscle collapse, which may represent insufficient respiratory muscle reserve in the setting of carbon dioxide retention  -Etiology of acute exacerbation of COPD uncertain; no documentation in [place] of acute viral prodrome; no changes in medication management; no evidence of missed doses   -Arterial blood gas demonstrates marginally less carbon dioxide retention   -Will manage as COPD exacerbation; will administer Xopenex q8 hours  -s/p 40 mg of intravenous methylprednisolone q12 hours; given patient's persistent subjective shortness of breath  -pulsed steroids with 500mg IV methylprednisolone for 3 days per neurology, now on methylprednisolone 40mg qd and will stop on 1/15  -s/p five days of azithromycin  -Will manage with bilevel positive airway pressure (currently at inspiratory pressure of 16 and expiratory pressure of 5) at night and transition to nasal cannula 4L during the day; titrate oxygen saturation on pulse oximetry to 88-92%  -Will administer home doses of LAMA, LABA, and inhaled corticosteroid

## 2021-01-15 NOTE — PROGRESS NOTE ADULT - ATTENDING COMMENTS
Pt seen and examined. Agree with above with additional findings:    # acute hypoxic-hypercarbic respiratory failure  # bilateral LE weakness  # post-ICU neuropathy/myopathy  # HSV2 meningomyelitis  # anemia    - s/p nocturnal BIPAP, more alert this morning  - s/p angiogram, negative for AVM  - discussed care with Dr. Rutherford (neurology)  - s/p 14 day course of acyclovir (Care discussed with Dr. Carrion)  - PT/OT/PMNR consult/OOB to chair  - dc planning to acute rehab; COVID PCR negative  - given dependence on BIPAP, unclear if pt could be eligible for acute rehab; may need KORIN to LTC transition  - guarded prognosis    Karen Mcconnell MD  Division of Hospital Medicine  Cell: [phone]  Office: [phone].

## 2021-01-15 NOTE — PROGRESS NOTE ADULT - PROBLEM SELECTOR PLAN 3
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  -MR spine- can't r/o acute to subacute infarct, and with possible subdural AV fistula, may need neurointerventional input  -c/w 500mg IV methylprednisolone for 3 days (1/7-1/9) per neurology  -s/p angiogram w/ neurointerventional but unable to identify AVM that would be the cause of her LE paralysis  - f/u outpatient with Lesa Ang, Stroke NP, within 1 week of discharge.  Please email patient contact information to [email or web address].  [facility] [place] @ [address] [place] [address], [address] [place]; [phone]

## 2021-01-15 NOTE — PROGRESS NOTE ADULT - SUBJECTIVE AND OBJECTIVE BOX
Killian Rocha MD   Internal Medicine PGY-1  Pager: NS: [phone] [facility]: [id number]    Patient is a 75y old  Female who presents with a chief complaint of acute respiratory failure (14 Jan 2021 16:54)      SUBJECTIVE / OVERNIGHT EVENTS:    MEDICATIONS  (STANDING):  amLODIPine   Tablet 5 milliGRAM(s) Oral daily  budesonide  80 MICROgram(s)/formoterol 4.5 MICROgram(s) Inhaler 2 Puff(s) Inhalation two times a day  dextrose 40% Gel 15 Gram(s) Oral once  dextrose 5%. 1000 milliLiter(s) (100 mL/Hr) IV Continuous <Continuous>  dextrose 5%. 1000 milliLiter(s) (50 mL/Hr) IV Continuous <Continuous>  dextrose 50% Injectable 25 Gram(s) IV Push once  dextrose 50% Injectable 12.5 Gram(s) IV Push once  dextrose 50% Injectable 25 Gram(s) IV Push once  enoxaparin Injectable 40 milliGRAM(s) SubCutaneous daily  glucagon  Injectable 1 milliGRAM(s) IntraMuscular once  insulin lispro (ADMELOG) corrective regimen sliding scale   SubCutaneous three times a day before meals  insulin lispro (ADMELOG) corrective regimen sliding scale   SubCutaneous at bedtime  isosorbide   dinitrate Tablet (ISORDIL) 10 milliGRAM(s) Oral two times a day  levalbuterol Inhalation 0.63 milliGRAM(s) Inhalation three times a day  pantoprazole    Tablet 40 milliGRAM(s) Oral before breakfast  tiotropium 18 MICROgram(s) Capsule 1 Capsule(s) Inhalation daily    MEDICATIONS  (PRN):  acetaminophen   Tablet .. 650 milliGRAM(s) Oral every 6 hours PRN Mild Pain (1 - 3), Moderate Pain (4 - 6)  ALPRAZolam 0.25 milliGRAM(s) Oral three times a day PRN Anxiety      T(C): 36.4 (01-15-21 @ 04:22), Max: 36.6 (01-14-21 @ 14:32)  HR: 92 (01-15-21 @ 07:01) (92 - 125)  BP: 133/79 (01-15-21 @ 04:22) (101/69 - 143/81)  RR: 20 (01-15-21 @ 04:22) (20 - 22)  SpO2: 99% (01-15-21 @ 07:01) (95% - 100%)  CAPILLARY BLOOD GLUCOSE      POCT Blood Glucose.: 91 mg/dL (15 Danielito 2021 08:27)  POCT Blood Glucose.: 122 mg/dL (14 Jan 2021 21:51)  POCT Blood Glucose.: 214 mg/dL (14 Jan 2021 18:07)  POCT Blood Glucose.: 138 mg/dL (14 Jan 2021 15:09)  POCT Blood Glucose.: 147 mg/dL (14 Jan 2021 12:11)  POCT Blood Glucose.: 164 mg/dL (14 Jan 2021 10:03)    I&O's Summary    14 Jan 2021 07:01  -  15 Danielito 2021 07:00  --------------------------------------------------------  IN: 0 mL / OUT: 350 mL / NET: -350 mL        REVIEW OF SYSTEMS    General: No fevers/chills  Skin/Breast: No rash  Ophthalmologic: No vision changes  ENMT:No dysphagia or odynophagia  Respiratory and Thorax: No SOB, wheezing, cough  Cardiovascular: No chest pain or palpitations  Gastrointestinal: No n/v/d, No melena, No Hematochezia  Genitourinary:  No dysuria, No change in urinary frequency  Musculoskeletal: No joint or muscle pains.  Neurological: No focal deficits, No headache    PHYSICAL EXAM:  GENERAL: NAD, well-developed  HEAD:  Atraumatic, Normocephalic  EYES: EOMI, PERRLA, conjunctiva and sclera clear  NECK: Supple, No JVD  CHEST/LUNG: Clear to auscultation bilaterally; No wheeze  HEART: Regular rate and rhythm; No murmurs, rubs, or gallops  ABDOMEN: Soft, Nontender, Nondistended; Bowel sounds present  EXTREMITIES:  2+ Peripheral Pulses, No clubbing, cyanosis, or edema  PSYCH: AAOx3  NEUROLOGY: non-focal  SKIN: No rashes or lesions    LABS:                        7.4    9.96  )-----------( 211      ( 15 Danielito 2021 07:28 )             23.4     01-15    140  |  93<L>  |  17  ----------------------------<  92  3.7   |  45<HH>  |  0.33<L>    Ca    9.3      15 Danielito 2021 07:25  Phos  3.0     01-15  Mg     1.8     01-15                RADIOLOGY & ADDITIONAL TESTS:    Imaging Personally Reviewed:    Consultant(s) Notes Reviewed:      Care Discussed with Consultants/Other Providers:   Killian Rocha MD   Internal Medicine PGY-1  Pager: NS: [phone] [facility]: [id number]    Patient is a 75y old  Female who presents with a chief complaint of acute respiratory failure (14 Jan 2021 16:54)    SUBJECTIVE / OVERNIGHT EVENTS:  No acute events overnight. Patient was on the BiPAP overnight, and transitioned to NC this morning. Still having difficulty breathing and decreased O2 sats at some points during the day, so has to be placed on BiPAP intermittently during the day. Re asserts the fact that she wants to try and get to rehab. Tolerating diet. Anxious at times. Feels cold this morning. Denies any fevers, chills, n/v, abdominal pain, chest pain. Has [place] in with good urine output.     MEDICATIONS  (STANDING):  amLODIPine   Tablet 5 milliGRAM(s) Oral daily  budesonide  80 MICROgram(s)/formoterol 4.5 MICROgram(s) Inhaler 2 Puff(s) Inhalation two times a day  dextrose 40% Gel 15 Gram(s) Oral once  dextrose 5%. 1000 milliLiter(s) (100 mL/Hr) IV Continuous <Continuous>  dextrose 5%. 1000 milliLiter(s) (50 mL/Hr) IV Continuous <Continuous>  dextrose 50% Injectable 25 Gram(s) IV Push once  dextrose 50% Injectable 12.5 Gram(s) IV Push once  dextrose 50% Injectable 25 Gram(s) IV Push once  enoxaparin Injectable 40 milliGRAM(s) SubCutaneous daily  glucagon  Injectable 1 milliGRAM(s) IntraMuscular once  insulin lispro (ADMELOG) corrective regimen sliding scale   SubCutaneous three times a day before meals  insulin lispro (ADMELOG) corrective regimen sliding scale   SubCutaneous at bedtime  isosorbide   dinitrate Tablet (ISORDIL) 10 milliGRAM(s) Oral two times a day  levalbuterol Inhalation 0.63 milliGRAM(s) Inhalation three times a day  pantoprazole    Tablet 40 milliGRAM(s) Oral before breakfast  tiotropium 18 MICROgram(s) Capsule 1 Capsule(s) Inhalation daily    MEDICATIONS  (PRN):  acetaminophen   Tablet .. 650 milliGRAM(s) Oral every 6 hours PRN Mild Pain (1 - 3), Moderate Pain (4 - 6)  ALPRAZolam 0.25 milliGRAM(s) Oral three times a day PRN Anxiety    T(C): 36.4 (01-15-21 @ 04:22), Max: 36.6 (01-14-21 @ 14:32)  HR: 92 (01-15-21 @ 07:01) (92 - 125)  BP: 133/79 (01-15-21 @ 04:22) (101/69 - 143/81)  RR: 20 (01-15-21 @ 04:22) (20 - 22)  SpO2: 99% (01-15-21 @ 07:01) (95% - 100%)    CAPILLARY BLOOD GLUCOSE  POCT Blood Glucose.: 91 mg/dL (15 Danielito 2021 08:27)  POCT Blood Glucose.: 122 mg/dL (14 Jan 2021 21:51)  POCT Blood Glucose.: 214 mg/dL (14 Jan 2021 18:07)  POCT Blood Glucose.: 138 mg/dL (14 Jan 2021 15:09)  POCT Blood Glucose.: 147 mg/dL (14 Jan 2021 12:11)  POCT Blood Glucose.: 164 mg/dL (14 Jan 2021 10:03)    I&O's Summary    14 Jan 2021 07:01  -  15 Danielito 2021 07:00  --------------------------------------------------------  IN: 0 mL / OUT: 350 mL / NET: -350 mL    REVIEW OF SYSTEMS  General: No fevers/chills, + feeling cold  Skin/Breast: No rash  Ophthalmologic: No vision changes  ENMT: No dysphagia or odynophagia  Respiratory and Thorax: + SOB, no wheezing, cough  Cardiovascular: No chest pain or palpitations  Gastrointestinal: No n/v/d, No melena, No Hematochezia  Genitourinary:  No dysuria, No change in urinary frequency  Musculoskeletal: No joint or muscle pains.  Neurological: No focal deficits, No headache    PHYSICAL EXAM:  GENERAL: NAD, well-developed, lying in bed  HEAD:  Atraumatic, Normocephalic, poor dentition  EYES: EOMI, conjunctiva and sclera clear  NECK: Supple, No JVD  CHEST/LUNG: Clear to auscultation bilaterally; No wheeze or crackles, on BIPAP  HEART: Regular rate and rhythm; No murmurs, rubs, or gallops  ABDOMEN: Soft, Nontender, Nondistended; Bowel sounds present  EXTREMITIES:  2+ Peripheral Pulses, No clubbing, cyanosis, or edema  PSYCH: AAOx3  NEUROLOGY: unable to move lower extremities, sensation grossly intact in LE  SKIN: No rashes or lesions    LABS:                        7.4    9.96  )-----------( 211      ( 15 Danielito 2021 07:28 )             23.4     01-15    140  |  93<L>  |  17  ----------------------------<  92  3.7   |  45<HH>  |  0.33<L>    Ca    9.3      15 Danielito 2021 07:25  Phos  3.0     01-15  Mg     1.8     01-15    RADIOLOGY & ADDITIONAL TESTS:    Imaging Personally Reviewed: Reviewed     Consultant(s) Notes Reviewed: Palliative, PMR, Neurology, ID    Care Discussed with Consultants/Other Providers: Palliative, PMR, Neurology, ID

## 2021-01-16 DIAGNOSIS — Z71.89 OTHER SPECIFIED COUNSELING: ICD-10-CM

## 2021-01-16 LAB
GLUCOSE BLDC GLUCOMTR-MCNC: 123 MG/DL — HIGH (ref 70–99)
GLUCOSE BLDC GLUCOMTR-MCNC: 126 MG/DL — HIGH (ref 70–99)
GLUCOSE BLDC GLUCOMTR-MCNC: 141 MG/DL — HIGH (ref 70–99)
GLUCOSE BLDC GLUCOMTR-MCNC: 183 MG/DL — HIGH (ref 70–99)

## 2021-01-16 PROCEDURE — 99232 SBSQ HOSP IP/OBS MODERATE 35: CPT | Mod: GC

## 2021-01-16 RX ADMIN — ISOSORBIDE DINITRATE 10 MILLIGRAM(S): 5 TABLET ORAL at 17:09

## 2021-01-16 RX ADMIN — PANTOPRAZOLE SODIUM 40 MILLIGRAM(S): 20 TABLET, DELAYED RELEASE ORAL at 05:37

## 2021-01-16 RX ADMIN — TIOTROPIUM BROMIDE 1 CAPSULE(S): 18 CAPSULE ORAL; RESPIRATORY (INHALATION) at 12:00

## 2021-01-16 RX ADMIN — BUDESONIDE AND FORMOTEROL FUMARATE DIHYDRATE 2 PUFF(S): 160; 4.5 AEROSOL RESPIRATORY (INHALATION) at 17:08

## 2021-01-16 RX ADMIN — Medication 0.25 MILLIGRAM(S): at 23:10

## 2021-01-16 RX ADMIN — AMLODIPINE BESYLATE 5 MILLIGRAM(S): 2.5 TABLET ORAL at 05:37

## 2021-01-16 RX ADMIN — LEVALBUTEROL 0.63 MILLIGRAM(S): 1.25 SOLUTION, CONCENTRATE RESPIRATORY (INHALATION) at 12:00

## 2021-01-16 RX ADMIN — ENOXAPARIN SODIUM 40 MILLIGRAM(S): 100 INJECTION SUBCUTANEOUS at 14:09

## 2021-01-16 RX ADMIN — BUDESONIDE AND FORMOTEROL FUMARATE DIHYDRATE 2 PUFF(S): 160; 4.5 AEROSOL RESPIRATORY (INHALATION) at 05:37

## 2021-01-16 RX ADMIN — ISOSORBIDE DINITRATE 10 MILLIGRAM(S): 5 TABLET ORAL at 05:37

## 2021-01-16 RX ADMIN — LEVALBUTEROL 0.63 MILLIGRAM(S): 1.25 SOLUTION, CONCENTRATE RESPIRATORY (INHALATION) at 05:37

## 2021-01-16 RX ADMIN — LEVALBUTEROL 0.63 MILLIGRAM(S): 1.25 SOLUTION, CONCENTRATE RESPIRATORY (INHALATION) at 23:14

## 2021-01-16 NOTE — PROGRESS NOTE ADULT - PROBLEM SELECTOR PLAN 7
-lovenox 40 for dvt prophylaxis   -Pureed diet  -Ongoing goals of care discussions currently DNR/DNI and questions the utility of treatments, and wonders if she may want to transition to a more palliative treatment option, palliative care following - PT DNR/ DNI  - discussed with her that her COPD is severe and that she is requiring BiPAP during the night and periods during the day, and also the low likelihood of her regaining any LE motor function, she will discuss further with her niece and figure out what she wants ( rehab to long term care vs. hospice option)  - palliative care following

## 2021-01-16 NOTE — PROGRESS NOTE ADULT - PROBLEM SELECTOR PLAN 8
-lovenox 40 for dvt prophylaxis   -Pureed diet  -Dispo: likely rehab to long term care vs. hospice option

## 2021-01-16 NOTE — PROGRESS NOTE ADULT - ASSESSMENT
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

## 2021-01-16 NOTE — PROGRESS NOTE ADULT - PROBLEM SELECTOR PLAN 6
-History of anxiety noted; administered alprazolam at home as needed   - c/w home 0.25mg TID alprazolam as needed

## 2021-01-16 NOTE — PROGRESS NOTE ADULT - PROBLEM SELECTOR PLAN 3
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  -MR spine- can't r/o acute to subacute infarct, and with possible subdural AV fistula, may need neurointerventional input  -c/w 500mg IV methylprednisolone for 3 days (1/7-1/9) per neurology  -s/p angiogram w/ neurointerventional but unable to identify AVM that would be the cause of her LE paralysis  - f/u outpatient with Lesa Ang, Stroke NP, within 1 week of discharge.  Please email patient contact information to [facility] [email or web address]  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  -Noted bilateral lower extremity weakness without sensory deficits and without loss of bowel or bladder continence or tone on exam   -B12, folate, and copper titers normal; mri brain normal; mri lumbar spine demonstrates t2 enhancement of conus medullaris    -Lumbar puncture demonstrates 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; treated with acyclovir in case of viral transverse myelitis  -s/p acyclovir course from (1/1-1/14)  -Per neurology, csf findings are inconsistent with guillane-barre, protein count of 50 marginally too low to diagnose gbs and cell count of 1 noted; will not treat with intravenous immunoglobulin at this time; asymmetric weakness between upper and lower extremities less consistent with weakness of deconditioning  -MR spine- can't r/o acute to subacute infarct, and with possible subdural AV fistula  -c/w 500mg IV methylprednisolone for 3 days (1/7-1/9) per neurology  -s/p angiogram w/ neurointerventional but unable to identify AVM that would be the cause of her LE paralysis  - f/u outpatient with Lesa Ang, Stroke NP, within 1 week of discharge.  Please email patient contact information to [facility] [email or web address]] [email or web address].  [facility] [place] @ [address] [place], [address] [place]; [phone]

## 2021-01-16 NOTE — PROGRESS NOTE ADULT - SUBJECTIVE AND OBJECTIVE BOX
Killian Rocha MD   Internal Medicine PGY-1  Pager: NS: [phone] [facility]: [id number]    INCOMPLETE NOTE Killian Rocha MD   Internal Medicine PGY-1  Pager: NS: [phone] [facility]: [phone]    Patient is a 75y old  Female who presents with a chief complaint of acute respiratory failure (16 Jan 2021 06:49)    SUBJECTIVE / OVERNIGHT EVENTS:  No acute events overnight. Spoke to niece yesterday that patient hasn't been doing so well, and requiring BiPAP at night and during the day periodically, and that she is unlikely to regain function of her legs. Explained to patient the situation as well. She wants to speak to her niece today to see what further care she would like to pursue. This morning she feels okay. Denies any fevers, chills, n/v, chest pain, abdominal pain. [place] with good urine output. Tolerating diet. No other complaints.     MEDICATIONS  (STANDING):  amLODIPine   Tablet 5 milliGRAM(s) Oral daily  budesonide  80 MICROgram(s)/formoterol 4.5 MICROgram(s) Inhaler 2 Puff(s) Inhalation two times a day  dextrose 40% Gel 15 Gram(s) Oral once  dextrose 5%. 1000 milliLiter(s) (50 mL/Hr) IV Continuous <Continuous>  dextrose 5%. 1000 milliLiter(s) (100 mL/Hr) IV Continuous <Continuous>  dextrose 50% Injectable 25 Gram(s) IV Push once  dextrose 50% Injectable 12.5 Gram(s) IV Push once  dextrose 50% Injectable 25 Gram(s) IV Push once  enoxaparin Injectable 40 milliGRAM(s) SubCutaneous daily  glucagon  Injectable 1 milliGRAM(s) IntraMuscular once  insulin lispro (ADMELOG) corrective regimen sliding scale   SubCutaneous three times a day before meals  insulin lispro (ADMELOG) corrective regimen sliding scale   SubCutaneous at bedtime  isosorbide   dinitrate Tablet (ISORDIL) 10 milliGRAM(s) Oral two times a day  levalbuterol Inhalation 0.63 milliGRAM(s) Inhalation three times a day  pantoprazole    Tablet 40 milliGRAM(s) Oral before breakfast  tiotropium 18 MICROgram(s) Capsule 1 Capsule(s) Inhalation daily    MEDICATIONS  (PRN):  acetaminophen   Tablet .. 650 milliGRAM(s) Oral every 6 hours PRN Mild Pain (1 - 3), Moderate Pain (4 - 6)  ALPRAZolam 0.25 milliGRAM(s) Oral three times a day PRN Anxiety    T(C): 36.4 (01-16-21 @ 05:05), Max: 36.5 (01-15-21 @ 20:07)  HR: 111 (01-16-21 @ 09:46) (99 - 117)  BP: 145/88 (01-16-21 @ 05:05) (112/78 - 145/88)  RR: 18 (01-16-21 @ 05:05) (18 - 22)  SpO2: 100% (01-16-21 @ 09:46) (93% - 100%)    CAPILLARY BLOOD GLUCOSE  POCT Blood Glucose.: 126 mg/dL (16 Jan 2021 08:40)  POCT Blood Glucose.: 174 mg/dL (15 Danielito 2021 21:29)  POCT Blood Glucose.: 129 mg/dL (15 Danielito 2021 17:55)  POCT Blood Glucose.: 156 mg/dL (15 Danielito 2021 12:15)    I&O's Summary    15 Danielito 2021 07:01  -  16 Jan 2021 07:00  --------------------------------------------------------  IN: 460 mL / OUT: 1700 mL / NET: -1240 mL    REVIEW OF SYSTEMS  General: No fevers/chills  Skin/Breast: No rash  Ophthalmologic: No vision changes  ENMT: No dysphagia or odynophagia  Respiratory and Thorax: + SOB, no wheezing, cough  Cardiovascular: No chest pain or palpitations  Gastrointestinal: No n/v/d, No melena, No Hematochezia  Genitourinary:  No dysuria, No change in urinary frequency  Musculoskeletal: No joint or muscle pains.  Neurological: No focal deficits, No headache    PHYSICAL EXAM:  GENERAL: NAD, well-developed, lying in bed  HEAD:  Atraumatic, Normocephalic, poor dentition  EYES: EOMI, conjunctiva and sclera clear  NECK: Supple, No JVD  CHEST/LUNG: Clear to auscultation bilaterally; No wheeze or crackles, on nasal cannula   HEART: Regular rate and rhythm; No murmurs, rubs, or gallops  ABDOMEN: Soft, Nontender, Nondistended; Bowel sounds present  EXTREMITIES:  2+ Peripheral Pulses, No clubbing, cyanosis, or edema  PSYCH: AAOx3  NEUROLOGY: unable to move lower extremities, sensation grossly intact in LE  SKIN: No rashes or lesions    LABS:                        7.4    9.96  )-----------( 211      ( 15 Danielito 2021 07:28 )             23.4     01-15    140  |  93<L>  |  17  ----------------------------<  92  3.7   |  45<HH>  |  0.33<L>    Ca    9.3      15 Danielito 2021 07:25  Phos  3.0     01-15  Mg     1.8     01-15    RADIOLOGY & ADDITIONAL TESTS:    Imaging Personally Reviewed: Reviewed    Consultant(s) Notes Reviewed: Palliative, ID, Neurology, Neurosurgery    Care Discussed with Consultants/Other Providers: Palliative, ID, Neurology, Neurosurgery

## 2021-01-16 NOTE — PROGRESS NOTE ADULT - PROBLEM SELECTOR PLAN 1
-In the emergency department, the patient was noted to have an acute desaturation to 86% while managed on 4L nasal cannula   -Etiology of acute respiratory failure with hypoxia most likely is respiratory muscle collapse, which may represent insufficient respiratory muscle reserve in the setting of carbon dioxide retention  -Etiology of acute exacerbation of COPD uncertain; no documentation in [place] of acute viral prodrome; no changes in medication management; no evidence of missed doses   -Arterial blood gas demonstrates marginally less carbon dioxide retention   -Will manage as COPD exacerbation; will administer Xopenex q8 hours  -s/p 40 mg of intravenous methylprednisolone q12 hours; given patient's persistent subjective shortness of breath  -pulsed steroids with 500mg IV methylprednisolone for 3 days per neurology, now on methylprednisolone 40mg qd and will stop on 1/15  -s/p five days of azithromycin  -Will manage with bilevel positive airway pressure (currently at inspiratory pressure of 16 and expiratory pressure of 5) at night and transition to nasal cannula 4L during the day; titrate oxygen saturation on pulse oximetry to 88-92%  -Will administer home doses of LAMA, LABA, and inhaled corticosteroid -In ED the patient had an acute desaturation to 86% while managed on 4L nasal cannula   -Etiology of acute exacerbation of COPD uncertain; no documentation in [place] of acute viral prodrome; no changes in medication management; no evidence of missed doses   -s/p five days of azithromycin (12/29-1/2)  -will administer Xopenex q8 hours  -s/p methylprednisolone course   -pulsed steroids with 500mg IV methylprednisolone for 3 days per neurology (1/7-1/9), tapered off steroids on 1/15  -BiPAP (currently at inspiratory pressure of 16 and expiratory pressure of 5) at night and transition to nasal cannula 4L during the day; titrate oxygen saturation on pulse oximetry to 88-92%  -Will administer home doses of LAMA, LABA, and inhaled corticosteroid

## 2021-01-17 LAB
ALBUMIN SERPL ELPH-MCNC: 2.8 G/DL — LOW (ref 3.3–5)
ALP SERPL-CCNC: 92 U/L — SIGNIFICANT CHANGE UP (ref 40–120)
ALT FLD-CCNC: 45 U/L — SIGNIFICANT CHANGE UP (ref 10–45)
ANION GAP SERPL CALC-SCNC: 1 MMOL/L — LOW (ref 5–17)
AST SERPL-CCNC: 23 U/L — SIGNIFICANT CHANGE UP (ref 10–40)
BASE EXCESS BLDV CALC-SCNC: 19.5 MMOL/L — HIGH (ref -2–2)
BILIRUB SERPL-MCNC: 0.3 MG/DL — SIGNIFICANT CHANGE UP (ref 0.2–1.2)
BUN SERPL-MCNC: 14 MG/DL — SIGNIFICANT CHANGE UP (ref 7–23)
CA-I SERPL-SCNC: 1.14 MMOL/L — SIGNIFICANT CHANGE UP (ref 1.12–1.3)
CALCIUM SERPL-MCNC: 8.6 MG/DL — SIGNIFICANT CHANGE UP (ref 8.4–10.5)
CHLORIDE BLDV-SCNC: 92 MMOL/L — LOW (ref 96–108)
CHLORIDE SERPL-SCNC: 94 MMOL/L — LOW (ref 96–108)
CO2 BLDV-SCNC: 49 MMOL/L — HIGH (ref 22–30)
CO2 SERPL-SCNC: 43 MMOL/L — HIGH (ref 22–31)
CREAT SERPL-MCNC: <0.3 MG/DL — LOW (ref 0.5–1.3)
GAS PNL BLDV: 135 MMOL/L — SIGNIFICANT CHANGE UP (ref 135–145)
GAS PNL BLDV: SIGNIFICANT CHANGE UP
GAS PNL BLDV: SIGNIFICANT CHANGE UP
GLUCOSE BLDC GLUCOMTR-MCNC: 109 MG/DL — HIGH (ref 70–99)
GLUCOSE BLDC GLUCOMTR-MCNC: 126 MG/DL — HIGH (ref 70–99)
GLUCOSE BLDC GLUCOMTR-MCNC: 160 MG/DL — HIGH (ref 70–99)
GLUCOSE BLDC GLUCOMTR-MCNC: 207 MG/DL — HIGH (ref 70–99)
GLUCOSE BLDV-MCNC: 92 MG/DL — SIGNIFICANT CHANGE UP (ref 70–99)
GLUCOSE SERPL-MCNC: 100 MG/DL — HIGH (ref 70–99)
HCO3 BLDV-SCNC: 47 MMOL/L — HIGH (ref 21–29)
HCT VFR BLD CALC: 24.1 % — LOW (ref 34.5–45)
HCT VFR BLDA CALC: 24 % — LOW (ref 39–50)
HGB BLD CALC-MCNC: 7.7 G/DL — LOW (ref 11.5–15.5)
HGB BLD-MCNC: 7.5 G/DL — LOW (ref 11.5–15.5)
LACTATE BLDV-MCNC: 1.1 MMOL/L — SIGNIFICANT CHANGE UP (ref 0.7–2)
MAGNESIUM SERPL-MCNC: 1.8 MG/DL — SIGNIFICANT CHANGE UP (ref 1.6–2.6)
MCHC RBC-ENTMCNC: 31.1 GM/DL — LOW (ref 32–36)
MCHC RBC-ENTMCNC: 31.4 PG — SIGNIFICANT CHANGE UP (ref 27–34)
MCV RBC AUTO: 100.8 FL — HIGH (ref 80–100)
NRBC # BLD: 0 /100 WBCS — SIGNIFICANT CHANGE UP (ref 0–0)
OTHER CELLS CSF MANUAL: 6 ML/DL — LOW (ref 18–22)
PCO2 BLDV: 77 MMHG — HIGH (ref 35–50)
PH BLDV: 7.4 — SIGNIFICANT CHANGE UP (ref 7.35–7.45)
PHOSPHATE SERPL-MCNC: 3.2 MG/DL — SIGNIFICANT CHANGE UP (ref 2.5–4.5)
PLATELET # BLD AUTO: 237 K/UL — SIGNIFICANT CHANGE UP (ref 150–400)
PO2 BLDV: 35 MMHG — SIGNIFICANT CHANGE UP (ref 25–45)
POTASSIUM BLDV-SCNC: 3 MMOL/L — LOW (ref 3.5–5.3)
POTASSIUM SERPL-MCNC: 3.4 MMOL/L — LOW (ref 3.5–5.3)
POTASSIUM SERPL-SCNC: 3.4 MMOL/L — LOW (ref 3.5–5.3)
PROT SERPL-MCNC: 5 G/DL — LOW (ref 6–8.3)
RBC # BLD: 2.39 M/UL — LOW (ref 3.8–5.2)
RBC # FLD: 15.8 % — HIGH (ref 10.3–14.5)
SAO2 % BLDV: 61 % — LOW (ref 67–88)
SODIUM SERPL-SCNC: 141 MMOL/L — SIGNIFICANT CHANGE UP (ref 135–145)
WBC # BLD: 9.63 K/UL — SIGNIFICANT CHANGE UP (ref 3.8–10.5)
WBC # FLD AUTO: 9.63 K/UL — SIGNIFICANT CHANGE UP (ref 3.8–10.5)

## 2021-01-17 PROCEDURE — 99232 SBSQ HOSP IP/OBS MODERATE 35: CPT | Mod: GC

## 2021-01-17 RX ORDER — LANOLIN ALCOHOL/MO/W.PET/CERES
6 CREAM (GRAM) TOPICAL AT BEDTIME
Refills: 0 | Status: DISCONTINUED | OUTPATIENT
Start: 2021-01-17 | End: 2021-01-23

## 2021-01-17 RX ORDER — POTASSIUM CHLORIDE 20 MEQ
20 PACKET (EA) ORAL ONCE
Refills: 0 | Status: COMPLETED | OUTPATIENT
Start: 2021-01-17 | End: 2021-01-17

## 2021-01-17 RX ADMIN — BUDESONIDE AND FORMOTEROL FUMARATE DIHYDRATE 2 PUFF(S): 160; 4.5 AEROSOL RESPIRATORY (INHALATION) at 17:52

## 2021-01-17 RX ADMIN — BUDESONIDE AND FORMOTEROL FUMARATE DIHYDRATE 2 PUFF(S): 160; 4.5 AEROSOL RESPIRATORY (INHALATION) at 05:53

## 2021-01-17 RX ADMIN — LEVALBUTEROL 0.63 MILLIGRAM(S): 1.25 SOLUTION, CONCENTRATE RESPIRATORY (INHALATION) at 17:52

## 2021-01-17 RX ADMIN — ENOXAPARIN SODIUM 40 MILLIGRAM(S): 100 INJECTION SUBCUTANEOUS at 11:59

## 2021-01-17 RX ADMIN — LEVALBUTEROL 0.63 MILLIGRAM(S): 1.25 SOLUTION, CONCENTRATE RESPIRATORY (INHALATION) at 05:53

## 2021-01-17 RX ADMIN — Medication 20 MILLIEQUIVALENT(S): at 15:55

## 2021-01-17 RX ADMIN — TIOTROPIUM BROMIDE 1 CAPSULE(S): 18 CAPSULE ORAL; RESPIRATORY (INHALATION) at 12:00

## 2021-01-17 RX ADMIN — Medication 6 MILLIGRAM(S): at 22:07

## 2021-01-17 RX ADMIN — Medication 6 MILLIGRAM(S): at 03:56

## 2021-01-17 RX ADMIN — PANTOPRAZOLE SODIUM 40 MILLIGRAM(S): 20 TABLET, DELAYED RELEASE ORAL at 05:52

## 2021-01-17 RX ADMIN — LEVALBUTEROL 0.63 MILLIGRAM(S): 1.25 SOLUTION, CONCENTRATE RESPIRATORY (INHALATION) at 22:07

## 2021-01-17 RX ADMIN — ISOSORBIDE DINITRATE 10 MILLIGRAM(S): 5 TABLET ORAL at 05:52

## 2021-01-17 RX ADMIN — AMLODIPINE BESYLATE 5 MILLIGRAM(S): 2.5 TABLET ORAL at 05:52

## 2021-01-17 RX ADMIN — Medication 2: at 08:48

## 2021-01-17 RX ADMIN — ISOSORBIDE DINITRATE 10 MILLIGRAM(S): 5 TABLET ORAL at 17:51

## 2021-01-17 NOTE — PROGRESS NOTE ADULT - PROBLEM SELECTOR PLAN 1
End stage COPD, requiring BiPAP QHS and PRN. Now DNR/DNI, ongoing GOC   -s/p five days of azithromycin (12/29-1/2)  - Xopenex q8 hours  - s/p methylprednisolone course   - pulsed steroids with 500mg IV methylprednisolone for 3 days per neurology (1/7-1/9), tapered off steroids on 1/15  -BiPAP (currently at inspiratory pressure of 16 and expiratory pressure of 5) at night and transition to nasal cannula 4L during the day; titrate oxygen saturation on pulse oximetry to 88-92%  -Will administer home doses of LAMA, LABA, and inhaled corticosteroid

## 2021-01-17 NOTE — PROGRESS NOTE ADULT - PROBLEM SELECTOR PLAN 3
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  Please email patient contact information to [email or web address].   Neuroscience [place] @  [facility], [address] [place]; [phone]

## 2021-01-17 NOTE — PROGRESS NOTE ADULT - PROBLEM SELECTOR PLAN 6
- PT DNR/ DNI  - discussed with her that her COPD is severe and that she is requiring BiPAP during the night and periods during the day, and also the low likelihood of her regaining any LE motor function, she will discuss further with her niece and figure out what she wants ( rehab to long term care vs. hospice option)  - palliative care following

## 2021-01-17 NOTE — PROGRESS NOTE ADULT - PROBLEM SELECTOR PLAN 2
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 Required intubation at [place], now DNR/DNI  - plan as above

## 2021-01-17 NOTE — PROGRESS NOTE ADULT - SUBJECTIVE AND OBJECTIVE BOX
PROGRESS NOTE:   Authoted by Dr. Jerod Hughes MD, S  Pager [phone] [facility], [address] [facility]   After 7pm on weekdays or after 1pm on weekends: [facility] low teams #[id number] / [facility] high teams #[id number], [facility] low teams #[id number], [facility] high teams #[id number]  Patient is a 75y old  Female who presents with a chief complaint of acute respiratory failure (16 Jan 2021 06:49)      SUBJECTIVE / OVERNIGHT EVENTS: Overnight pt frequently pulling off her BiPAP as per nursing staff. No acute events    ADDITIONAL REVIEW OF SYSTEMS: Pt reports chronic SOB, no CP, fever/chills, N/V or abd pain     MEDICATIONS  (STANDING):  amLODIPine   Tablet 5 milliGRAM(s) Oral daily  budesonide  80 MICROgram(s)/formoterol 4.5 MICROgram(s) Inhaler 2 Puff(s) Inhalation two times a day  dextrose 40% Gel 15 Gram(s) Oral once  dextrose 5%. 1000 milliLiter(s) (50 mL/Hr) IV Continuous <Continuous>  dextrose 5%. 1000 milliLiter(s) (100 mL/Hr) IV Continuous <Continuous>  dextrose 50% Injectable 25 Gram(s) IV Push once  dextrose 50% Injectable 12.5 Gram(s) IV Push once  dextrose 50% Injectable 25 Gram(s) IV Push once  enoxaparin Injectable 40 milliGRAM(s) SubCutaneous daily  glucagon  Injectable 1 milliGRAM(s) IntraMuscular once  insulin lispro (ADMELOG) corrective regimen sliding scale   SubCutaneous three times a day before meals  insulin lispro (ADMELOG) corrective regimen sliding scale   SubCutaneous at bedtime  isosorbide   dinitrate Tablet (ISORDIL) 10 milliGRAM(s) Oral two times a day  levalbuterol Inhalation 0.63 milliGRAM(s) Inhalation three times a day  melatonin 6 milliGRAM(s) Oral at bedtime  pantoprazole    Tablet 40 milliGRAM(s) Oral before breakfast  tiotropium 18 MICROgram(s) Capsule 1 Capsule(s) Inhalation daily    MEDICATIONS  (PRN):  acetaminophen   Tablet .. 650 milliGRAM(s) Oral every 6 hours PRN Mild Pain (1 - 3), Moderate Pain (4 - 6)  ALPRAZolam 0.25 milliGRAM(s) Oral three times a day PRN Anxiety      CAPILLARY BLOOD GLUCOSE      POCT Blood Glucose.: 207 mg/dL (17 Jan 2021 08:45)  POCT Blood Glucose.: 183 mg/dL (16 Jan 2021 22:02)  POCT Blood Glucose.: 123 mg/dL (16 Jan 2021 17:10)  POCT Blood Glucose.: 141 mg/dL (16 Jan 2021 13:33)    I&O's Summary    16 Jan 2021 07:01  -  17 Jan 2021 07:00  --------------------------------------------------------  IN: 0 mL / OUT: 2850 mL / NET: -2850 mL        PHYSICAL EXAM:  Vital Signs Last 24 Hrs  T(C): 36.4 (17 Jan 2021 05:20), Max: 36.6 (17 Jan 2021 01:48)  T(F): 97.6 (17 Jan 2021 05:20), Max: 97.9 (17 Jan 2021 01:48)  HR: 105 (17 Jan 2021 05:21) (105 - 119)  BP: 135/85 (17 Jan 2021 05:20) (95/59 - 144/77)  BP(mean): --  RR: 18 (17 Jan 2021 05:20) (18 - 18)  SpO2: 94% (17 Jan 2021 05:21) (94% - 100%)    CONSTITUTIONAL: NAD, well-developed  GENERAL: NAD, lying in bed with BiPAP in place. sat 99% w/ FiO2 30%   HEAD:  Atraumatic, Normocephalic, poor dentition  EYES: EOMI, conjunctiva and sclera clear  NECK: Supple, No JVD  CHEST/LUNG: Clear to auscultation bilaterally; No wheeze or crackles, on nasal cannula   HEART: Regular rate and rhythm; No murmurs, rubs, or gallops  ABDOMEN: Soft, Nontender, Nondistended; Bowel sounds present  EXTREMITIES:  2+ Peripheral Pulses, No clubbing, cyanosis, or edema  PSYCH: AAOx3  NEUROLOGY: unable to move lower extremities, sensation grossly intact in LE  SKIN: No rashes or lesions    LABS:                      RADIOLOGY & ADDITIONAL TESTS:  Results Reviewed:   Imaging Personally Reviewed:  Electrocardiogram Personally Reviewed:    COORDINATION OF CARE:  Care Discussed with Consultants/Other Providers [Y/N]:  Prior or Outpatient Records Reviewed [Y/N]:   PROGRESS NOTE:   Authoted by Dr. Jerod Hughes MD, S  Pager [phone] [facility], [address] [facility]   After 7pm on weekdays or after 1pm on weekends: [facility] low teams #[phone] / [facility] high teams #[id number], [facility] low teams #[id number], [facility] high teams #[id number]  Patient is a 75y old  Female who presents with a chief complaint of acute respiratory failure (16 Jan 2021 06:49)      SUBJECTIVE / OVERNIGHT EVENTS: Overnight pt frequently pulling off her BiPAP as per nursing staff. No acute events    ADDITIONAL REVIEW OF SYSTEMS: Pt reports chronic SOB, no CP, fever/chills, N/V or abd pain     MEDICATIONS  (STANDING):  amLODIPine   Tablet 5 milliGRAM(s) Oral daily  budesonide  80 MICROgram(s)/formoterol 4.5 MICROgram(s) Inhaler 2 Puff(s) Inhalation two times a day  dextrose 40% Gel 15 Gram(s) Oral once  dextrose 5%. 1000 milliLiter(s) (50 mL/Hr) IV Continuous <Continuous>  dextrose 5%. 1000 milliLiter(s) (100 mL/Hr) IV Continuous <Continuous>  dextrose 50% Injectable 25 Gram(s) IV Push once  dextrose 50% Injectable 12.5 Gram(s) IV Push once  dextrose 50% Injectable 25 Gram(s) IV Push once  enoxaparin Injectable 40 milliGRAM(s) SubCutaneous daily  glucagon  Injectable 1 milliGRAM(s) IntraMuscular once  insulin lispro (ADMELOG) corrective regimen sliding scale   SubCutaneous three times a day before meals  insulin lispro (ADMELOG) corrective regimen sliding scale   SubCutaneous at bedtime  isosorbide   dinitrate Tablet (ISORDIL) 10 milliGRAM(s) Oral two times a day  levalbuterol Inhalation 0.63 milliGRAM(s) Inhalation three times a day  melatonin 6 milliGRAM(s) Oral at bedtime  pantoprazole    Tablet 40 milliGRAM(s) Oral before breakfast  tiotropium 18 MICROgram(s) Capsule 1 Capsule(s) Inhalation daily    MEDICATIONS  (PRN):  acetaminophen   Tablet .. 650 milliGRAM(s) Oral every 6 hours PRN Mild Pain (1 - 3), Moderate Pain (4 - 6)  ALPRAZolam 0.25 milliGRAM(s) Oral three times a day PRN Anxiety      CAPILLARY BLOOD GLUCOSE      POCT Blood Glucose.: 207 mg/dL (17 Jan 2021 08:45)  POCT Blood Glucose.: 183 mg/dL (16 Jan 2021 22:02)  POCT Blood Glucose.: 123 mg/dL (16 Jan 2021 17:10)  POCT Blood Glucose.: 141 mg/dL (16 Jan 2021 13:33)    I&O's Summary    16 Jan 2021 07:01  -  17 Jan 2021 07:00  --------------------------------------------------------  IN: 0 mL / OUT: 2850 mL / NET: -2850 mL        PHYSICAL EXAM:  Vital Signs Last 24 Hrs  T(C): 36.4 (17 Jan 2021 05:20), Max: 36.6 (17 Jan 2021 01:48)  T(F): 97.6 (17 Jan 2021 05:20), Max: 97.9 (17 Jan 2021 01:48)  HR: 105 (17 Jan 2021 05:21) (105 - 119)  BP: 135/85 (17 Jan 2021 05:20) (95/59 - 144/77)  BP(mean): --  RR: 18 (17 Jan 2021 05:20) (18 - 18)  SpO2: 94% (17 Jan 2021 05:21) (94% - 100%)    CONSTITUTIONAL: NAD, well-developed  GENERAL: Tachypneic, lying in bed with BiPAP in place. sat 99% w/ FiO2 30%   HEAD:  Atraumatic, Normocephalic, poor dentition  EYES: EOMI, conjunctiva and sclera clear  NECK: Supple, No JVD  CHEST/LUNG: decreased breath sounds bilaterally; No wheeze or crackles   HEART: Regular rate and rhythm; No murmurs, rubs, or gallops  ABDOMEN: Soft, Nontender, Nondistended; Bowel sounds present  EXTREMITIES:  2+ Peripheral Pulses, No clubbing, cyanosis, or edema  PSYCH: AAOx3  NEUROLOGY: unable to move lower extremities, sensation grossly intact in LE  SKIN: No rashes or lesions    LABS:                      RADIOLOGY & ADDITIONAL TESTS:  Results Reviewed:   Imaging Personally Reviewed:  Electrocardiogram Personally Reviewed:    COORDINATION OF CARE:  Care Discussed with Consultants/Other Providers [Y/N]:  Prior or Outpatient Records Reviewed [Y/N]:

## 2021-01-17 NOTE — PROGRESS NOTE ADULT - ASSESSMENT
75-year-old woman with history of severe COPD on home oxygen and recent intubation at 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

## 2021-01-18 LAB
ALBUMIN SERPL ELPH-MCNC: 2.8 G/DL — LOW (ref 3.3–5)
ALP SERPL-CCNC: 95 U/L — SIGNIFICANT CHANGE UP (ref 40–120)
ALT FLD-CCNC: 44 U/L — SIGNIFICANT CHANGE UP (ref 10–45)
ANION GAP SERPL CALC-SCNC: 4 MMOL/L — LOW (ref 5–17)
AST SERPL-CCNC: 22 U/L — SIGNIFICANT CHANGE UP (ref 10–40)
B BURGDOR AB CSF-ACNC: SIGNIFICANT CHANGE UP
BASE EXCESS BLDV CALC-SCNC: 18.9 MMOL/L — HIGH (ref -2–2)
BILIRUB SERPL-MCNC: 0.4 MG/DL — SIGNIFICANT CHANGE UP (ref 0.2–1.2)
BLD GP AB SCN SERPL QL: NEGATIVE — SIGNIFICANT CHANGE UP
BUN SERPL-MCNC: 15 MG/DL — SIGNIFICANT CHANGE UP (ref 7–23)
CA-I SERPL-SCNC: 1.21 MMOL/L — SIGNIFICANT CHANGE UP (ref 1.12–1.3)
CALCIUM SERPL-MCNC: 8.8 MG/DL — SIGNIFICANT CHANGE UP (ref 8.4–10.5)
CHLORIDE BLDV-SCNC: 89 MMOL/L — LOW (ref 96–108)
CHLORIDE SERPL-SCNC: 93 MMOL/L — LOW (ref 96–108)
CO2 BLDV-SCNC: 48 MMOL/L — HIGH (ref 22–30)
CO2 SERPL-SCNC: 43 MMOL/L — HIGH (ref 22–31)
CREAT SERPL-MCNC: 0.35 MG/DL — LOW (ref 0.5–1.3)
GAS PNL BLDV: 138 MMOL/L — SIGNIFICANT CHANGE UP (ref 135–145)
GAS PNL BLDV: SIGNIFICANT CHANGE UP
GAS PNL BLDV: SIGNIFICANT CHANGE UP
GLUCOSE BLDC GLUCOMTR-MCNC: 124 MG/DL — HIGH (ref 70–99)
GLUCOSE BLDC GLUCOMTR-MCNC: 146 MG/DL — HIGH (ref 70–99)
GLUCOSE BLDC GLUCOMTR-MCNC: 149 MG/DL — HIGH (ref 70–99)
GLUCOSE BLDC GLUCOMTR-MCNC: 178 MG/DL — HIGH (ref 70–99)
GLUCOSE BLDV-MCNC: 126 MG/DL — HIGH (ref 70–99)
GLUCOSE SERPL-MCNC: 127 MG/DL — HIGH (ref 70–99)
HCO3 BLDV-SCNC: 46 MMOL/L — HIGH (ref 21–29)
HCT VFR BLD CALC: 25.4 % — LOW (ref 34.5–45)
HCT VFR BLDA CALC: 24 % — LOW (ref 39–50)
HGB BLD CALC-MCNC: 7.8 G/DL — LOW (ref 11.5–15.5)
HGB BLD-MCNC: 7.8 G/DL — LOW (ref 11.5–15.5)
LACTATE BLDV-MCNC: 1.2 MMOL/L — SIGNIFICANT CHANGE UP (ref 0.7–2)
MAGNESIUM SERPL-MCNC: 1.7 MG/DL — SIGNIFICANT CHANGE UP (ref 1.6–2.6)
MCHC RBC-ENTMCNC: 30.7 GM/DL — LOW (ref 32–36)
MCHC RBC-ENTMCNC: 31.1 PG — SIGNIFICANT CHANGE UP (ref 27–34)
MCV RBC AUTO: 101.2 FL — HIGH (ref 80–100)
NRBC # BLD: 0 /100 WBCS — SIGNIFICANT CHANGE UP (ref 0–0)
OTHER CELLS CSF MANUAL: 4 ML/DL — LOW (ref 18–22)
PCO2 BLDV: 76 MMHG — HIGH (ref 35–50)
PH BLDV: 7.4 — SIGNIFICANT CHANGE UP (ref 7.35–7.45)
PHOSPHATE SERPL-MCNC: 2.9 MG/DL — SIGNIFICANT CHANGE UP (ref 2.5–4.5)
PLATELET # BLD AUTO: 263 K/UL — SIGNIFICANT CHANGE UP (ref 150–400)
PO2 BLDV: 23 MMHG — LOW (ref 25–45)
POTASSIUM BLDV-SCNC: 3.7 MMOL/L — SIGNIFICANT CHANGE UP (ref 3.5–5.3)
POTASSIUM SERPL-MCNC: 3.9 MMOL/L — SIGNIFICANT CHANGE UP (ref 3.5–5.3)
POTASSIUM SERPL-SCNC: 3.9 MMOL/L — SIGNIFICANT CHANGE UP (ref 3.5–5.3)
PROT SERPL-MCNC: 5.3 G/DL — LOW (ref 6–8.3)
RBC # BLD: 2.51 M/UL — LOW (ref 3.8–5.2)
RBC # FLD: 16.7 % — HIGH (ref 10.3–14.5)
RH IG SCN BLD-IMP: POSITIVE — SIGNIFICANT CHANGE UP
SAO2 % BLDV: 32 % — LOW (ref 67–88)
SODIUM SERPL-SCNC: 140 MMOL/L — SIGNIFICANT CHANGE UP (ref 135–145)
WBC # BLD: 9.67 K/UL — SIGNIFICANT CHANGE UP (ref 3.8–10.5)
WBC # FLD AUTO: 9.67 K/UL — SIGNIFICANT CHANGE UP (ref 3.8–10.5)

## 2021-01-18 PROCEDURE — 93005 ELECTROCARDIOGRAM TRACING: CPT

## 2021-01-18 PROCEDURE — 94003 VENT MGMT INPAT SUBQ DAY: CPT

## 2021-01-18 PROCEDURE — 94002 VENT MGMT INPAT INIT DAY: CPT

## 2021-01-18 PROCEDURE — 86901 BLOOD TYPING SEROLOGIC RH(D): CPT

## 2021-01-18 PROCEDURE — 83735 ASSAY OF MAGNESIUM: CPT

## 2021-01-18 PROCEDURE — 96365 THER/PROPH/DIAG IV INF INIT: CPT

## 2021-01-18 PROCEDURE — 94660 CPAP INITIATION&MGMT: CPT

## 2021-01-18 PROCEDURE — 83605 ASSAY OF LACTIC ACID: CPT

## 2021-01-18 PROCEDURE — 86769 SARS-COV-2 COVID-19 ANTIBODY: CPT

## 2021-01-18 PROCEDURE — 94640 AIRWAY INHALATION TREATMENT: CPT

## 2021-01-18 PROCEDURE — 85025 COMPLETE CBC W/AUTO DIFF WBC: CPT

## 2021-01-18 PROCEDURE — 97530 THERAPEUTIC ACTIVITIES: CPT

## 2021-01-18 PROCEDURE — 86803 HEPATITIS C AB TEST: CPT

## 2021-01-18 PROCEDURE — 81001 URINALYSIS AUTO W/SCOPE: CPT

## 2021-01-18 PROCEDURE — 84100 ASSAY OF PHOSPHORUS: CPT

## 2021-01-18 PROCEDURE — 36600 WITHDRAWAL OF ARTERIAL BLOOD: CPT

## 2021-01-18 PROCEDURE — 94760 N-INVAS EAR/PLS OXIMETRY 1: CPT

## 2021-01-18 PROCEDURE — 86140 C-REACTIVE PROTEIN: CPT

## 2021-01-18 PROCEDURE — 82803 BLOOD GASES ANY COMBINATION: CPT

## 2021-01-18 PROCEDURE — 84145 PROCALCITONIN (PCT): CPT

## 2021-01-18 PROCEDURE — 71275 CT ANGIOGRAPHY CHEST: CPT

## 2021-01-18 PROCEDURE — 80053 COMPREHEN METABOLIC PANEL: CPT

## 2021-01-18 PROCEDURE — 93306 TTE W/DOPPLER COMPLETE: CPT

## 2021-01-18 PROCEDURE — 0225U NFCT DS DNA&RNA 21 SARSCOV2: CPT

## 2021-01-18 PROCEDURE — 97162 PT EVAL MOD COMPLEX 30 MIN: CPT

## 2021-01-18 PROCEDURE — 85730 THROMBOPLASTIN TIME PARTIAL: CPT

## 2021-01-18 PROCEDURE — 87070 CULTURE OTHR SPECIMN AEROBIC: CPT

## 2021-01-18 PROCEDURE — 87040 BLOOD CULTURE FOR BACTERIA: CPT

## 2021-01-18 PROCEDURE — 87449 NOS EACH ORGANISM AG IA: CPT

## 2021-01-18 PROCEDURE — 99292 CRITICAL CARE ADDL 30 MIN: CPT

## 2021-01-18 PROCEDURE — 71045 X-RAY EXAM CHEST 1 VIEW: CPT

## 2021-01-18 PROCEDURE — 99291 CRITICAL CARE FIRST HOUR: CPT | Mod: 25

## 2021-01-18 PROCEDURE — 99232 SBSQ HOSP IP/OBS MODERATE 35: CPT | Mod: GC

## 2021-01-18 PROCEDURE — 86850 RBC ANTIBODY SCREEN: CPT

## 2021-01-18 PROCEDURE — 86900 BLOOD TYPING SEROLOGIC ABO: CPT

## 2021-01-18 PROCEDURE — 80048 BASIC METABOLIC PNL TOTAL CA: CPT

## 2021-01-18 PROCEDURE — 84443 ASSAY THYROID STIM HORMONE: CPT

## 2021-01-18 PROCEDURE — 36415 COLL VENOUS BLD VENIPUNCTURE: CPT

## 2021-01-18 PROCEDURE — P9047: CPT

## 2021-01-18 PROCEDURE — 92610 EVALUATE SWALLOWING FUNCTION: CPT

## 2021-01-18 PROCEDURE — 70450 CT HEAD/BRAIN W/O DYE: CPT

## 2021-01-18 PROCEDURE — 82962 GLUCOSE BLOOD TEST: CPT

## 2021-01-18 PROCEDURE — 87635 SARS-COV-2 COVID-19 AMP PRB: CPT

## 2021-01-18 PROCEDURE — 85379 FIBRIN DEGRADATION QUANT: CPT

## 2021-01-18 PROCEDURE — 83880 ASSAY OF NATRIURETIC PEPTIDE: CPT

## 2021-01-18 PROCEDURE — 87086 URINE CULTURE/COLONY COUNT: CPT

## 2021-01-18 PROCEDURE — 85027 COMPLETE CBC AUTOMATED: CPT

## 2021-01-18 PROCEDURE — 85610 PROTHROMBIN TIME: CPT

## 2021-01-18 RX ADMIN — ISOSORBIDE DINITRATE 10 MILLIGRAM(S): 5 TABLET ORAL at 17:20

## 2021-01-18 RX ADMIN — Medication 6 MILLIGRAM(S): at 21:00

## 2021-01-18 RX ADMIN — LEVALBUTEROL 0.63 MILLIGRAM(S): 1.25 SOLUTION, CONCENTRATE RESPIRATORY (INHALATION) at 21:02

## 2021-01-18 RX ADMIN — LEVALBUTEROL 0.63 MILLIGRAM(S): 1.25 SOLUTION, CONCENTRATE RESPIRATORY (INHALATION) at 06:02

## 2021-01-18 RX ADMIN — TIOTROPIUM BROMIDE 1 CAPSULE(S): 18 CAPSULE ORAL; RESPIRATORY (INHALATION) at 12:53

## 2021-01-18 RX ADMIN — BUDESONIDE AND FORMOTEROL FUMARATE DIHYDRATE 2 PUFF(S): 160; 4.5 AEROSOL RESPIRATORY (INHALATION) at 17:20

## 2021-01-18 RX ADMIN — Medication 1 TABLET(S): at 17:20

## 2021-01-18 RX ADMIN — PANTOPRAZOLE SODIUM 40 MILLIGRAM(S): 20 TABLET, DELAYED RELEASE ORAL at 06:02

## 2021-01-18 RX ADMIN — AMLODIPINE BESYLATE 5 MILLIGRAM(S): 2.5 TABLET ORAL at 06:02

## 2021-01-18 RX ADMIN — BUDESONIDE AND FORMOTEROL FUMARATE DIHYDRATE 2 PUFF(S): 160; 4.5 AEROSOL RESPIRATORY (INHALATION) at 06:02

## 2021-01-18 RX ADMIN — LEVALBUTEROL 0.63 MILLIGRAM(S): 1.25 SOLUTION, CONCENTRATE RESPIRATORY (INHALATION) at 12:53

## 2021-01-18 RX ADMIN — ENOXAPARIN SODIUM 40 MILLIGRAM(S): 100 INJECTION SUBCUTANEOUS at 12:52

## 2021-01-18 RX ADMIN — ISOSORBIDE DINITRATE 10 MILLIGRAM(S): 5 TABLET ORAL at 06:02

## 2021-01-18 NOTE — PROGRESS NOTE ADULT - ASSESSMENT
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

## 2021-01-18 NOTE — CONSULT NOTE ADULT - SUBJECTIVE AND OBJECTIVE BOX
p ([id number])     HPI:  pt transferred for NSGY evaluation and MRI spine, recent extubation from respiratory arrest/COPD, no w/ significantly decreased sensation/strength in BLE, evaluated by NSGY on arrival, has rectal tone, is continent of urine, has slight sensation intact in BLE, ? related to deconditioning, awaiting imaging and NSGY consul    75 year old female with h/o Severe COPD on home o2, ex smoker, HTN, anxiety who had recent right femur fx at [place] she was discharged to [facility] on 12/18 where shortly after developed AMS and brought to Hospital, Here found to have acute hypercarbic respiratory failure (PCO2 >99) and was unresponsive so she was intubated.    Patient was extubated directly to BiPAP on 12/26/20, she has been able to toelrate BiPAP free periods, but does appears anxious when off BiPAP. Otherwise, she has been doing well since being extubated, with pending discharge plans to rehab.    Events last 24 hours:   Patient was found to be weak in bilateral lower extremities. Patient admits to having weakness to bilateral lower extremities the last several days, but she is a poor historian.  SShe is now being transferred to [facility] at [place] for MRI Lumbar Spine fo further evaluation and suspected spinal shoc    Imaging:  none    Exam: Wide awake, AOx3, pupils 3R, EOMI, no facial, BUE 5/5, no drift, BLE flaccid, no mvmt to deep noxious, no babinski response, decreased but persistent sensation, +rectal tone and aware of urine continence.     --Anticoagulation:    =====================  PAST MEDICAL HISTORY     PAST SURGICAL HISTORY         MEDICATIONS:  Antibiotics:    Neuro:    Other:  lactated ringers. 1000 milliLiter(s) IV Continuous <Continuous>      SOCIAL HISTORY:   Occupation:   Marital Status:     FAMILY HISTORY:      ROS: Negative except per HPI    LABS:               Yes

## 2021-01-18 NOTE — PROGRESS NOTE ADULT - PROBLEM SELECTOR PLAN 2
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 Required intubation at [place], now DNR/DNI  - plan as above

## 2021-01-18 NOTE — PROGRESS NOTE ADULT - PROBLEM SELECTOR PLAN 3
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  Please email patient contact information to [email or web address].  [facility] [place] @ [address] [facility] [address], [address] [place]; [phone]

## 2021-01-18 NOTE — CHART NOTE - NSCHARTNOTESELECT_GEN_ALL_CORE
Event Note
Interventional Neuro Radiology/Event Note
Interventional Neuro Radiology/Event Note
Neurosurgery/Event Note
Nutrition Services
Nutrition Services

## 2021-01-18 NOTE — PROVIDER CONTACT NOTE (CHANGE IN STATUS NOTIFICATION) - ASSESSMENT
Pt dyspneic and tachypneic, reports shortness of breath, SpO2 87-90% on 6LNC. Pt states to RN that she wore the BiPAP for the night, MD Aj pickett at bedside pt reports that she did not wear BiPAP overnight. BiPAP applied.
no distress noted.

## 2021-01-18 NOTE — PROGRESS NOTE ADULT - ATTENDING COMMENTS
Patient seen and examined, case d/w house staff.  Clinically stable, requires occ BiPAP during the day.  d/c planning.

## 2021-01-18 NOTE — PROGRESS NOTE ADULT - SUBJECTIVE AND OBJECTIVE BOX
Carleen Pelayo, PGY-1    CHIEF COMPLAINT: Patient is a 75y old  Female who presents with a chief complaint of acute respiratory failure (17 Jan 2021 09:01)      INTERVAL HPI/OVERNIGHT EVENTS:    MEDICATIONS (STANDING):  amLODIPine   Tablet 5 milliGRAM(s) Oral daily  budesonide  80 MICROgram(s)/formoterol 4.5 MICROgram(s) Inhaler 2 Puff(s) Inhalation two times a day  dextrose 40% Gel 15 Gram(s) Oral once  dextrose 5%. 1000 milliLiter(s) IV Continuous <Continuous>  dextrose 5%. 1000 milliLiter(s) IV Continuous <Continuous>  dextrose 50% Injectable 25 Gram(s) IV Push once  dextrose 50% Injectable 12.5 Gram(s) IV Push once  dextrose 50% Injectable 25 Gram(s) IV Push once  enoxaparin Injectable 40 milliGRAM(s) SubCutaneous daily  glucagon  Injectable 1 milliGRAM(s) IntraMuscular once  insulin lispro (ADMELOG) corrective regimen sliding scale   SubCutaneous three times a day before meals  insulin lispro (ADMELOG) corrective regimen sliding scale   SubCutaneous at bedtime  isosorbide   dinitrate Tablet (ISORDIL) 10 milliGRAM(s) Oral two times a day  levalbuterol Inhalation 0.63 milliGRAM(s) Inhalation three times a day  melatonin 6 milliGRAM(s) Oral at bedtime  pantoprazole    Tablet 40 milliGRAM(s) Oral before breakfast  tiotropium 18 MICROgram(s) Capsule 1 Capsule(s) Inhalation daily    MEDICATIONS  (PRN):  acetaminophen   Tablet .. 650 milliGRAM(s) Oral every 6 hours PRN  ALPRAZolam 0.25 milliGRAM(s) Oral three times a day PRN      REVIEW OF SYSTEMS:      T(F): 97.9 (01-18-21 @ 04:12), Max: 97.9 (01-17-21 @ 21:00)  HR: 109 (01-18-21 @ 06:00) (98 - 112)  BP: 113/75 (01-18-21 @ 04:12) (113/75 - 128/67)  RR: 20 (01-18-21 @ 04:12) (19 - 20)  SpO2: 98% (01-18-21 @ 06:00) (96% - 100%)  Wt(kg): --  CAPILLARY BLOOD GLUCOSE      POCT Blood Glucose.: 160 mg/dL (17 Jan 2021 22:02)  POCT Blood Glucose.: 126 mg/dL (17 Jan 2021 17:16)  POCT Blood Glucose.: 109 mg/dL (17 Jan 2021 13:13)  POCT Blood Glucose.: 207 mg/dL (17 Jan 2021 08:45)    I&O's Summary    17 Jan 2021 07:01  -  18 Jan 2021 07:00  --------------------------------------------------------  IN: 360 mL / OUT: 1450 mL / NET: -1090 mL        PHYSICAL EXAM:      LABS:                        7.5    9.63  )-----------( 237      ( 17 Jan 2021 11:42 )             24.1     01-17    141  |  94<L>  |  14  ----------------------------<  100<H>  3.4<L>   |  43<H>  |  <0.30<L>    Ca    8.6      17 Jan 2021 11:42  Phos  3.2     01-17  Mg     1.8     01-17    TPro  5.0<L>  /  Alb  2.8<L>  /  TBili  0.3  /  DBili  x   /  AST  23  /  ALT  45  /  AlkPhos  92  01-17            RADIOLOGY & ADDITIONAL TESTS:   Carleen Pelayo, PGY-1    CHIEF COMPLAINT: Patient is a 75y old  Female who presents with a chief complaint of acute respiratory failure (17 Jan 2021 09:01)      INTERVAL HPI/OVERNIGHT EVENTS: SUSAN ON, tachycardic HR 110s, VS otherwise stable. Pt laying in bed upon arrival, pleasant to interview but reporting SOB. In addition pt reports diarrhea x 1 day, and persistent L arm and b/l LE weakness. Otherwise denies complaints.    MEDICATIONS (STANDING):  amLODIPine   Tablet 5 milliGRAM(s) Oral daily  budesonide  80 MICROgram(s)/formoterol 4.5 MICROgram(s) Inhaler 2 Puff(s) Inhalation two times a day  dextrose 40% Gel 15 Gram(s) Oral once  dextrose 5%. 1000 milliLiter(s) IV Continuous <Continuous>  dextrose 5%. 1000 milliLiter(s) IV Continuous <Continuous>  dextrose 50% Injectable 25 Gram(s) IV Push once  dextrose 50% Injectable 12.5 Gram(s) IV Push once  dextrose 50% Injectable 25 Gram(s) IV Push once  enoxaparin Injectable 40 milliGRAM(s) SubCutaneous daily  glucagon  Injectable 1 milliGRAM(s) IntraMuscular once  insulin lispro (ADMELOG) corrective regimen sliding scale   SubCutaneous three times a day before meals  insulin lispro (ADMELOG) corrective regimen sliding scale   SubCutaneous at bedtime  isosorbide   dinitrate Tablet (ISORDIL) 10 milliGRAM(s) Oral two times a day  levalbuterol Inhalation 0.63 milliGRAM(s) Inhalation three times a day  melatonin 6 milliGRAM(s) Oral at bedtime  pantoprazole    Tablet 40 milliGRAM(s) Oral before breakfast  tiotropium 18 MICROgram(s) Capsule 1 Capsule(s) Inhalation daily    MEDICATIONS  (PRN):  acetaminophen   Tablet .. 650 milliGRAM(s) Oral every 6 hours PRN  ALPRAZolam 0.25 milliGRAM(s) Oral three times a day PRN      REVIEW OF SYSTEMS:  CONSTITUTIONAL: No fever  EYES: No visual disturbances  ENMT: No sinus or throat pain  RESPIRATORY: +SOB as per HPI  CARDIOVASCULAR: No chest pain, palpitations, or dizziness  GASTROINTESTINAL: No abdominal or epigastric pain. No melena or hematochezia (pt did not visualize stool) +diarrhea as per HPI  GENITOURINARY: No dysuria or hematuria (pt did not visualize urine)  NEUROLOGICAL: No headaches, numbness, or tremors +LUE, b/l LE weakness as per HPI  MUSCULOSKELETAL: No joint pain or swelling; No muscle, back, or extremity pain        T(F): 97.9 (01-18-21 @ 04:12), Max: 97.9 (01-17-21 @ 21:00)  HR: 109 (01-18-21 @ 06:00) (98 - 112)  BP: 113/75 (01-18-21 @ 04:12) (113/75 - 128/67)  RR: 20 (01-18-21 @ 04:12) (19 - 20)  SpO2: 98% (01-18-21 @ 06:00) (96% - 100%)  Wt(kg): --  CAPILLARY BLOOD GLUCOSE      POCT Blood Glucose.: 160 mg/dL (17 Jan 2021 22:02)  POCT Blood Glucose.: 126 mg/dL (17 Jan 2021 17:16)  POCT Blood Glucose.: 109 mg/dL (17 Jan 2021 13:13)  POCT Blood Glucose.: 207 mg/dL (17 Jan 2021 08:45)    I&O's Summary    17 Jan 2021 07:01  -  18 Jan 2021 07:00  --------------------------------------------------------  IN: 360 mL / OUT: 1450 mL / NET: -1090 mL        PHYSICAL EXAM:  GENERAL: Elderly appearing, well-groomed, on BiPAP in mild distress 2/2 discomfort  HEAD: Atraumatic, Normocephalic  EYES: PERRL, conjunctiva and sclera clear  ENMT: +unable to assess 2/2 BiPAP mask  NECK: Supple  NERVOUS SYSTEM:  Alert & Oriented X3, Good concentration; Motor Strength 4/5 RUE, 3/5 LUE, 0/5 b/l LEs, Sensation intact b/l LE however R > L  CHEST/LUNG: Clear to auscultation bilaterally; No rales, rhonchi, wheezing, or rubs however difficult to auscultate 2/2 BiPAP machine noise  HEART: Tachycardic, regular rhythm; No murmurs, rubs, or gallops however difficult to auscultate 2/2 BiPAP machine noise  ABDOMEN: Soft, Nontender, Nondistended; Bowel sounds present  EXTREMITIES: 2+ Peripheral Pulses, No edema        LABS:                        7.5    9.63  )-----------( 237      ( 17 Jan 2021 11:42 )             24.1     01-17    141  |  94<L>  |  14  ----------------------------<  100<H>  3.4<L>   |  43<H>  |  <0.30<L>    Ca    8.6      17 Jan 2021 11:42  Phos  3.2     01-17  Mg     1.8     01-17    TPro  5.0<L>  /  Alb  2.8<L>  /  TBili  0.3  /  DBili  x   /  AST  23  /  ALT  45  /  AlkPhos  92  01-17            RADIOLOGY & ADDITIONAL TESTS:

## 2021-01-18 NOTE — CHART NOTE - NSCHARTNOTEFT_GEN_A_CORE
Nutrition Follow Up Note  Patient seen for: initial malnutrition follow up. Chart reviewed and events noted.    Pt is a 75y Female with PMH of severe COPD who presented to the ED as a transfer from [facility] for management of unexplained bilateral lower extremity weakness, admitted 12/29. Of note, COPD exacerbation required endotracheal intubation and management in ICU at [facility]. At [facility], developed acute respiratory failure with hypoxia and hypercapnia, "most likely secondary to an acute exacerbation of her COPD." "Patient has bilateral LE weakness with intact bowel and bladder continence. 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 PO intake and appetite typically better in the morning. Pt reports tolerating diet texture well, on pureed diet c poor dentition. Pt tried Mighty Shakes, however reports being too sweet. Pt amenable to keeping x1 Mighty Shake daily and trialing High-Protein Apple Juice x1 daily. Pt declined all offered nutrition supplements. Pt with no known recent N/V or diarrhea. Pt reports some constipation. Last BM 1/17. RD verbally educated pt on increased protein-energy needs and encouraged pt to begin meals with protein. Pt made aware RD to remain available.      PO intake :  25-75% meals     Source for PO intake:  Pt and RN flowsheet    RD obtained bed scale wt: 61.1 kG  Dosing wt 164.9 lbs, 74.8 kG  No other daily wts to address. Discrepancy between dosing wt and bed wt noted. Pt visually appears closer to bed wt, RD will continue to trend for new wts as available/able.     Pertinent Medications: MEDICATIONS  (STANDING):  amLODIPine   Tablet 5 milliGRAM(s) Oral daily  budesonide  80 MICROgram(s)/formoterol 4.5 MICROgram(s) Inhaler 2 Puff(s) Inhalation two times a day  dextrose 40% Gel 15 Gram(s) Oral once  dextrose 5%. 1000 milliLiter(s) (100 mL/Hr) IV Continuous <Continuous>  dextrose 5%. 1000 milliLiter(s) (50 mL/Hr) IV Continuous <Continuous>  dextrose 50% Injectable 25 Gram(s) IV Push once  dextrose 50% Injectable 12.5 Gram(s) IV Push once  dextrose 50% Injectable 25 Gram(s) IV Push once  enoxaparin Injectable 40 milliGRAM(s) SubCutaneous daily  glucagon  Injectable 1 milliGRAM(s) IntraMuscular once  insulin lispro (ADMELOG) corrective regimen sliding scale   SubCutaneous three times a day before meals  insulin lispro (ADMELOG) corrective regimen sliding scale   SubCutaneous at bedtime  isosorbide   dinitrate Tablet (ISORDIL) 10 milliGRAM(s) Oral two times a day  levalbuterol Inhalation 0.63 milliGRAM(s) Inhalation three times a day  melatonin 6 milliGRAM(s) Oral at bedtime  pantoprazole    Tablet 40 milliGRAM(s) Oral before breakfast  tiotropium 18 MICROgram(s) Capsule 1 Capsule(s) Inhalation daily    MEDICATIONS  (PRN):  acetaminophen   Tablet .. 650 milliGRAM(s) Oral every 6 hours PRN Mild Pain (1 - 3), Moderate Pain (4 - 6)  ALPRAZolam 0.25 milliGRAM(s) Oral three times a day PRN Anxiety    Pertinent Labs: 01-18 @ 07:26: Na 140, BUN 15, Cr 0.35<L>, <H>, K+ 3.9, Phos 2.9, Mg 1.7, Alk Phos 95, ALT/SGPT 44, AST/SGOT 22,  01-17 @ 11:42: Na 141, BUN 14, Cr <0.30<L>, <H>, K+ 3.4<L>, Phos 3.2, Mg 1.8, Alk Phos 92, ALT/SGPT 45, AST/SGOT 23  -Low K noted, KCl given 1/17    Finger Sticks:  POCT Blood Glucose.: 124 mg/dL (01-18 @ 08:34)  POCT Blood Glucose.: 160 mg/dL (01-17 @ 22:02)  POCT Blood Glucose.: 126 mg/dL (01-17 @ 17:16)  POCT Blood Glucose.: 109 mg/dL (01-17 @ 13:13)  Pt is s/p methylprednisolone course    Skin per nursing documentation: No pressure injuries noted.  Edema per nursing documentation: +1 generalized     Estimated Needs:   [x] no change since previous assessment  Based on stated  pounds  Estimated Energy Needs (30-35 kcal/kg): 9424-1616 kcal/day  Estimated Protein Needs (1.2-1.4 g/kg): 68-79 g/day  Estimated Fluid Needs (30-35 ml/kg): 6678-3397 ml/day    Previous Nutrition Diagnosis: Moderate protein-calorie malnutrition  Nutrition Diagnosis is: ongoing, care plan in place, being addressed with encouraged PO intake and recommended nutrition supplements.     New Nutrition Diagnosis: N/A    Recommend  1) Continue current pureed diet. Defer texture/consistency to team/Speech Pathologist. Monitor/adjust as needed.    -Continue to trend blood glucose, if PO intake >50% meals and blood glucose >150 mg/dL consider addition of consistent carbohydrate restriction.   2) RD to change to Diet Mighty Shake x1 daily and provide High-protein apple juice x1 daily to optimize nutrient intake.   3) Recommend multivitamin if medically feasible  4) Reinforce nutrition education as able.     Monitoring and Evaluation:   Continue to monitor Nutritional intake, Tolerance to diet prescription, weights, labs, skin integrity    RD remains available upon request and will follow up per protocol  Chantelle Chau, MS, RD, CDN Pager #[phone]

## 2021-01-19 LAB
ANION GAP SERPL CALC-SCNC: 4 MMOL/L — LOW (ref 5–17)
BASOPHILS # BLD AUTO: 0.01 K/UL — SIGNIFICANT CHANGE UP (ref 0–0.2)
BASOPHILS NFR BLD AUTO: 0.1 % — SIGNIFICANT CHANGE UP (ref 0–2)
BLD GP AB SCN SERPL QL: NEGATIVE — SIGNIFICANT CHANGE UP
BUN SERPL-MCNC: 16 MG/DL — SIGNIFICANT CHANGE UP (ref 7–23)
CALCIUM SERPL-MCNC: 8.7 MG/DL — SIGNIFICANT CHANGE UP (ref 8.4–10.5)
CHLORIDE SERPL-SCNC: 93 MMOL/L — LOW (ref 96–108)
CO2 SERPL-SCNC: 41 MMOL/L — HIGH (ref 22–31)
CREAT SERPL-MCNC: 0.3 MG/DL — LOW (ref 0.5–1.3)
EOSINOPHIL # BLD AUTO: 0.07 K/UL — SIGNIFICANT CHANGE UP (ref 0–0.5)
EOSINOPHIL NFR BLD AUTO: 0.6 % — SIGNIFICANT CHANGE UP (ref 0–6)
GLUCOSE BLDC GLUCOMTR-MCNC: 106 MG/DL — HIGH (ref 70–99)
GLUCOSE BLDC GLUCOMTR-MCNC: 116 MG/DL — HIGH (ref 70–99)
GLUCOSE BLDC GLUCOMTR-MCNC: 143 MG/DL — HIGH (ref 70–99)
GLUCOSE BLDC GLUCOMTR-MCNC: 161 MG/DL — HIGH (ref 70–99)
GLUCOSE SERPL-MCNC: 106 MG/DL — HIGH (ref 70–99)
HCT VFR BLD CALC: 22 % — LOW (ref 34.5–45)
HCT VFR BLD CALC: 24.2 % — LOW (ref 34.5–45)
HGB BLD-MCNC: 6.8 G/DL — CRITICAL LOW (ref 11.5–15.5)
HGB BLD-MCNC: 7.3 G/DL — LOW (ref 11.5–15.5)
IMM GRANULOCYTES NFR BLD AUTO: 0.5 % — SIGNIFICANT CHANGE UP (ref 0–1.5)
LYMPHOCYTES # BLD AUTO: 0.59 K/UL — LOW (ref 1–3.3)
LYMPHOCYTES # BLD AUTO: 5 % — LOW (ref 13–44)
MCHC RBC-ENTMCNC: 30.2 GM/DL — LOW (ref 32–36)
MCHC RBC-ENTMCNC: 30.9 GM/DL — LOW (ref 32–36)
MCHC RBC-ENTMCNC: 30.9 PG — SIGNIFICANT CHANGE UP (ref 27–34)
MCHC RBC-ENTMCNC: 31.5 PG — SIGNIFICANT CHANGE UP (ref 27–34)
MCV RBC AUTO: 101.9 FL — HIGH (ref 80–100)
MCV RBC AUTO: 102.5 FL — HIGH (ref 80–100)
MONOCYTES # BLD AUTO: 0.71 K/UL — SIGNIFICANT CHANGE UP (ref 0–0.9)
MONOCYTES NFR BLD AUTO: 6.1 % — SIGNIFICANT CHANGE UP (ref 2–14)
NEUTROPHILS # BLD AUTO: 10.28 K/UL — HIGH (ref 1.8–7.4)
NEUTROPHILS NFR BLD AUTO: 87.7 % — HIGH (ref 43–77)
NRBC # BLD: 0 /100 WBCS — SIGNIFICANT CHANGE UP (ref 0–0)
NRBC # BLD: 0 /100 WBCS — SIGNIFICANT CHANGE UP (ref 0–0)
PLATELET # BLD AUTO: 249 K/UL — SIGNIFICANT CHANGE UP (ref 150–400)
PLATELET # BLD AUTO: 267 K/UL — SIGNIFICANT CHANGE UP (ref 150–400)
POTASSIUM SERPL-MCNC: 3.5 MMOL/L — SIGNIFICANT CHANGE UP (ref 3.5–5.3)
POTASSIUM SERPL-SCNC: 3.5 MMOL/L — SIGNIFICANT CHANGE UP (ref 3.5–5.3)
RBC # BLD: 2.16 M/UL — LOW (ref 3.8–5.2)
RBC # BLD: 2.36 M/UL — LOW (ref 3.8–5.2)
RBC # FLD: 17 % — HIGH (ref 10.3–14.5)
RBC # FLD: 17.2 % — HIGH (ref 10.3–14.5)
RH IG SCN BLD-IMP: POSITIVE — SIGNIFICANT CHANGE UP
SARS-COV-2 RNA SPEC QL NAA+PROBE: SIGNIFICANT CHANGE UP
SODIUM SERPL-SCNC: 138 MMOL/L — SIGNIFICANT CHANGE UP (ref 135–145)
WBC # BLD: 11.32 K/UL — HIGH (ref 3.8–10.5)
WBC # BLD: 11.72 K/UL — HIGH (ref 3.8–10.5)
WBC # FLD AUTO: 11.32 K/UL — HIGH (ref 3.8–10.5)
WBC # FLD AUTO: 11.72 K/UL — HIGH (ref 3.8–10.5)

## 2021-01-19 PROCEDURE — 99233 SBSQ HOSP IP/OBS HIGH 50: CPT | Mod: GC

## 2021-01-19 RX ORDER — ALPRAZOLAM 0.25 MG
0.25 TABLET ORAL THREE TIMES A DAY
Refills: 0 | Status: DISCONTINUED | OUTPATIENT
Start: 2021-01-19 | End: 2021-01-21

## 2021-01-19 RX ADMIN — AMLODIPINE BESYLATE 5 MILLIGRAM(S): 2.5 TABLET ORAL at 05:21

## 2021-01-19 RX ADMIN — ISOSORBIDE DINITRATE 10 MILLIGRAM(S): 5 TABLET ORAL at 17:54

## 2021-01-19 RX ADMIN — TIOTROPIUM BROMIDE 1 CAPSULE(S): 18 CAPSULE ORAL; RESPIRATORY (INHALATION) at 12:29

## 2021-01-19 RX ADMIN — ENOXAPARIN SODIUM 40 MILLIGRAM(S): 100 INJECTION SUBCUTANEOUS at 12:28

## 2021-01-19 RX ADMIN — PANTOPRAZOLE SODIUM 40 MILLIGRAM(S): 20 TABLET, DELAYED RELEASE ORAL at 05:21

## 2021-01-19 RX ADMIN — LEVALBUTEROL 0.63 MILLIGRAM(S): 1.25 SOLUTION, CONCENTRATE RESPIRATORY (INHALATION) at 17:55

## 2021-01-19 RX ADMIN — BUDESONIDE AND FORMOTEROL FUMARATE DIHYDRATE 2 PUFF(S): 160; 4.5 AEROSOL RESPIRATORY (INHALATION) at 17:54

## 2021-01-19 RX ADMIN — LEVALBUTEROL 0.63 MILLIGRAM(S): 1.25 SOLUTION, CONCENTRATE RESPIRATORY (INHALATION) at 20:07

## 2021-01-19 RX ADMIN — ISOSORBIDE DINITRATE 10 MILLIGRAM(S): 5 TABLET ORAL at 05:21

## 2021-01-19 RX ADMIN — BUDESONIDE AND FORMOTEROL FUMARATE DIHYDRATE 2 PUFF(S): 160; 4.5 AEROSOL RESPIRATORY (INHALATION) at 05:21

## 2021-01-19 RX ADMIN — Medication 6 MILLIGRAM(S): at 20:08

## 2021-01-19 RX ADMIN — LEVALBUTEROL 0.63 MILLIGRAM(S): 1.25 SOLUTION, CONCENTRATE RESPIRATORY (INHALATION) at 05:21

## 2021-01-19 RX ADMIN — Medication 1 TABLET(S): at 12:30

## 2021-01-19 NOTE — PROGRESS NOTE ADULT - SUBJECTIVE AND OBJECTIVE BOX
Carleen Pelayo, PGY-1    CHIEF COMPLAINT: Patient is a 75y old  Female who presents with a chief complaint of acute respiratory failure (18 Jan 2021 07:24)      INTERVAL HPI/OVERNIGHT EVENTS:    MEDICATIONS (STANDING):  amLODIPine   Tablet 5 milliGRAM(s) Oral daily  budesonide  80 MICROgram(s)/formoterol 4.5 MICROgram(s) Inhaler 2 Puff(s) Inhalation two times a day  dextrose 40% Gel 15 Gram(s) Oral once  dextrose 5%. 1000 milliLiter(s) IV Continuous <Continuous>  dextrose 5%. 1000 milliLiter(s) IV Continuous <Continuous>  dextrose 50% Injectable 25 Gram(s) IV Push once  dextrose 50% Injectable 12.5 Gram(s) IV Push once  dextrose 50% Injectable 25 Gram(s) IV Push once  enoxaparin Injectable 40 milliGRAM(s) SubCutaneous daily  glucagon  Injectable 1 milliGRAM(s) IntraMuscular once  insulin lispro (ADMELOG) corrective regimen sliding scale   SubCutaneous at bedtime  insulin lispro (ADMELOG) corrective regimen sliding scale   SubCutaneous three times a day before meals  isosorbide   dinitrate Tablet (ISORDIL) 10 milliGRAM(s) Oral two times a day  levalbuterol Inhalation 0.63 milliGRAM(s) Inhalation three times a day  melatonin 6 milliGRAM(s) Oral at bedtime  multivitamin 1 Tablet(s) Oral daily  pantoprazole    Tablet 40 milliGRAM(s) Oral before breakfast  tiotropium 18 MICROgram(s) Capsule 1 Capsule(s) Inhalation daily    MEDICATIONS  (PRN):  acetaminophen   Tablet .. 650 milliGRAM(s) Oral every 6 hours PRN  ALPRAZolam 0.25 milliGRAM(s) Oral three times a day PRN      REVIEW OF SYSTEMS:      T(F): 97.3 (01-19-21 @ 05:04), Max: 98.2 (01-18-21 @ 21:20)  HR: 102 (01-19-21 @ 05:13) (101 - 121)  BP: 128/77 (01-19-21 @ 05:04) (112/73 - 128/77)  RR: 18 (01-19-21 @ 05:04) (16 - 20)  SpO2: 100% (01-19-21 @ 05:13) (96% - 100%)  Wt(kg): --  CAPILLARY BLOOD GLUCOSE      POCT Blood Glucose.: 178 mg/dL (18 Jan 2021 22:01)  POCT Blood Glucose.: 146 mg/dL (18 Jan 2021 17:26)  POCT Blood Glucose.: 149 mg/dL (18 Jan 2021 12:21)  POCT Blood Glucose.: 124 mg/dL (18 Jan 2021 08:34)    I&O's Summary    18 Jan 2021 07:01  -  19 Jan 2021 07:00  --------------------------------------------------------  IN: 980 mL / OUT: 650 mL / NET: 330 mL        PHYSICAL EXAM:      LABS:                        7.8    9.67  )-----------( 263      ( 18 Jan 2021 07:26 )             25.4     01-19    138  |  93<L>  |  16  ----------------------------<  106<H>  3.5   |  41<H>  |  0.30<L>    Ca    8.7      19 Jan 2021 07:13  Phos  3.1     01-19  Mg     1.8     01-19    TPro  5.3<L>  /  Alb  2.8<L>  /  TBili  0.4  /  DBili  x   /  AST  22  /  ALT  44  /  AlkPhos  95  01-18            RADIOLOGY & ADDITIONAL TESTS:   Carleen Pelayo, PGY-1    CHIEF COMPLAINT: Patient is a 75y old  Female who presents with a chief complaint of acute respiratory failure (18 Jan 2021 07:24)      INTERVAL HPI/OVERNIGHT EVENTS: SUSAN ON, tachycardic HR 100s-120s, VS otherwise stable on supplemental O2. Pt reporting SOB however mild improvement from prior day, otherwise denies complaints.     MEDICATIONS (STANDING):  amLODIPine   Tablet 5 milliGRAM(s) Oral daily  budesonide  80 MICROgram(s)/formoterol 4.5 MICROgram(s) Inhaler 2 Puff(s) Inhalation two times a day  dextrose 40% Gel 15 Gram(s) Oral once  dextrose 5%. 1000 milliLiter(s) IV Continuous <Continuous>  dextrose 5%. 1000 milliLiter(s) IV Continuous <Continuous>  dextrose 50% Injectable 25 Gram(s) IV Push once  dextrose 50% Injectable 12.5 Gram(s) IV Push once  dextrose 50% Injectable 25 Gram(s) IV Push once  enoxaparin Injectable 40 milliGRAM(s) SubCutaneous daily  glucagon  Injectable 1 milliGRAM(s) IntraMuscular once  insulin lispro (ADMELOG) corrective regimen sliding scale   SubCutaneous at bedtime  insulin lispro (ADMELOG) corrective regimen sliding scale   SubCutaneous three times a day before meals  isosorbide   dinitrate Tablet (ISORDIL) 10 milliGRAM(s) Oral two times a day  levalbuterol Inhalation 0.63 milliGRAM(s) Inhalation three times a day  melatonin 6 milliGRAM(s) Oral at bedtime  multivitamin 1 Tablet(s) Oral daily  pantoprazole    Tablet 40 milliGRAM(s) Oral before breakfast  tiotropium 18 MICROgram(s) Capsule 1 Capsule(s) Inhalation daily    MEDICATIONS  (PRN):  acetaminophen   Tablet .. 650 milliGRAM(s) Oral every 6 hours PRN  ALPRAZolam 0.25 milliGRAM(s) Oral three times a day PRN      REVIEW OF SYSTEMS:  CONSTITUTIONAL: No fever  EYES: No visual disturbances  ENMT: No sinus or throat pain  RESPIRATORY: +SOB as per HPI  CARDIOVASCULAR: No chest pain, palpitations, or dizziness  GASTROINTESTINAL: No abdominal or epigastric pain. No melena or hematochezia (last BM 2 days ago, passing flatus)  GENITOURINARY: No dysuria  NEUROLOGICAL: No headaches, numbness, or tremors +LUE, b/l LE weakness as per HPI  MUSCULOSKELETAL: No joint pain or swelling; No muscle, back, or extremity pain      T(F): 97.3 (01-19-21 @ 05:04), Max: 98.2 (01-18-21 @ 21:20)  HR: 102 (01-19-21 @ 05:13) (101 - 121)  BP: 128/77 (01-19-21 @ 05:04) (112/73 - 128/77)  RR: 18 (01-19-21 @ 05:04) (16 - 20)  SpO2: 100% (01-19-21 @ 05:13) (96% - 100%)  Wt(kg): --  CAPILLARY BLOOD GLUCOSE      POCT Blood Glucose.: 178 mg/dL (18 Jan 2021 22:01)  POCT Blood Glucose.: 146 mg/dL (18 Jan 2021 17:26)  POCT Blood Glucose.: 149 mg/dL (18 Jan 2021 12:21)  POCT Blood Glucose.: 124 mg/dL (18 Jan 2021 08:34)    I&O's Summary    18 Jan 2021 07:01  -  19 Jan 2021 07:00  --------------------------------------------------------  IN: 980 mL / OUT: 650 mL / NET: 330 mL        PHYSICAL EXAM:  GENERAL: Elderly appearing, well-groomed, improved in appearance compared to previous exam, NAD on supplemental O2 via NC  HEAD: Atraumatic, Normocephalic  EYES: PERRL, conjunctiva and sclera clear  ENMT: No tonsillar erythema, exudates, or enlargement; +mildly dry MM w/ brown appearance of tongue (pt w/ open apple sauce at bedside)  NECK: Supple  NERVOUS SYSTEM:  Alert & Oriented X3, Good concentration; Motor Strength 4/5 RUE, 3/5 LUE, 0/5 b/l LEs, slightly wiggles toes b/l, Sensation intact b/l LE  CHEST/LUNG: Clear to auscultation bilaterally; No rales, rhonchi, wheezing, or rubs, however poor movement of air/poor inspiratory effort  HEART: Tachycardic, regular rhythm; No murmurs, rubs, or gallops  ABDOMEN: Soft, Nontender, Nondistended; Bowel sounds present  EXTREMITIES: 2+ Peripheral Pulses, No edema        LABS:                        7.8    9.67  )-----------( 263      ( 18 Jan 2021 07:26 )             25.4     01-19    138  |  93<L>  |  16  ----------------------------<  106<H>  3.5   |  41<H>  |  0.30<L>    Ca    8.7      19 Jan 2021 07:13  Phos  3.1     01-19  Mg     1.8     01-19    TPro  5.3<L>  /  Alb  2.8<L>  /  TBili  0.4  /  DBili  x   /  AST  22  /  ALT  44  /  AlkPhos  95  01-18            RADIOLOGY & ADDITIONAL TESTS:

## 2021-01-19 NOTE — PROGRESS NOTE ADULT - PROBLEM SELECTOR PLAN 1
End stage COPD, requiring BiPAP qhs/prn. Now DNR/DNI, ongoing GOC   -s/p five days of azithromycin (12/29-1/2)  - Xopenex q8 hours  - s/p methylprednisolone course   - pulsed steroids with 500mg IV methylprednisolone for 3 days per neurology (1/7-1/9), tapered off steroids on 1/15  -Will trial pt off of BiPAP during day, keep qhs and daytime nasal cannula 4L during the day; titrate oxygen saturation on pulse oximetry to 88-92%.   -Will administer home doses of LAMA, LABA, and inhaled corticosteroid End stage COPD, requiring BiPAP qhs/prn. Now DNR/DNI, ongoing GOC   -s/p five days of azithromycin (12/29-1/2)  - Xopenex q8 hours  - s/p methylprednisolone course   - pulsed steroids with 500mg IV methylprednisolone for 3 days per neurology (1/7-1/9), tapered off steroids on 1/15  -Will trial pt off of BiPAP during day, keep qhs and daytime nasal cannula 4L; titrate oxygen saturation on pulse oximetry to 88-92%.   -Will administer home doses of LAMA, LABA, and inhaled corticosteroid

## 2021-01-19 NOTE — PROGRESS NOTE ADULT - ATTENDING COMMENTS
patient seen and examined. discussed with team above.  COPD: much improved. 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 need rehab

## 2021-01-19 NOTE — PROGRESS NOTE ADULT - PROBLEM SELECTOR PLAN 3
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  Please email patient contact information to [facility] [email or web address].  [facility] [place] @ [address], [address] [place]; [phone]

## 2021-01-19 NOTE — PROGRESS NOTE ADULT - PROBLEM SELECTOR PLAN 6
- PT DNR/ DNI  - palliative care following  - pt to go to subacute vs acute rehab, likely subacute given hx of COPD, will require rpt COVID swab

## 2021-01-19 NOTE — PROGRESS NOTE ADULT - ASSESSMENT
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

## 2021-01-19 NOTE — PROGRESS NOTE ADULT - PROBLEM SELECTOR PLAN 2
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 Required intubation at [place], now DNR/DNI  - plan as above

## 2021-01-20 ENCOUNTER — TRANSCRIPTION ENCOUNTER (OUTPATIENT)
Age: 76
End: 2021-01-20

## 2021-01-20 LAB
ANION GAP SERPL CALC-SCNC: 2 MMOL/L — LOW (ref 5–17)
BASOPHILS # BLD AUTO: 0 K/UL — SIGNIFICANT CHANGE UP (ref 0–0.2)
BASOPHILS # BLD AUTO: 0.01 K/UL — SIGNIFICANT CHANGE UP (ref 0–0.2)
BASOPHILS NFR BLD AUTO: 0 % — SIGNIFICANT CHANGE UP (ref 0–2)
BASOPHILS NFR BLD AUTO: 0.1 % — SIGNIFICANT CHANGE UP (ref 0–2)
BUN SERPL-MCNC: 17 MG/DL — SIGNIFICANT CHANGE UP (ref 7–23)
CALCIUM SERPL-MCNC: 9 MG/DL — SIGNIFICANT CHANGE UP (ref 8.4–10.5)
CHLORIDE SERPL-SCNC: 91 MMOL/L — LOW (ref 96–108)
CO2 SERPL-SCNC: 44 MMOL/L — HIGH (ref 22–31)
CREAT SERPL-MCNC: 0.33 MG/DL — LOW (ref 0.5–1.3)
EOSINOPHIL # BLD AUTO: 0 K/UL — SIGNIFICANT CHANGE UP (ref 0–0.5)
EOSINOPHIL # BLD AUTO: 0.08 K/UL — SIGNIFICANT CHANGE UP (ref 0–0.5)
EOSINOPHIL NFR BLD AUTO: 0 % — SIGNIFICANT CHANGE UP (ref 0–6)
EOSINOPHIL NFR BLD AUTO: 0.9 % — SIGNIFICANT CHANGE UP (ref 0–6)
GLUCOSE BLDC GLUCOMTR-MCNC: 171 MG/DL — HIGH (ref 70–99)
GLUCOSE SERPL-MCNC: 83 MG/DL — SIGNIFICANT CHANGE UP (ref 70–99)
HCT VFR BLD CALC: 22.8 % — LOW (ref 34.5–45)
HCT VFR BLD CALC: 26.5 % — LOW (ref 34.5–45)
HGB BLD-MCNC: 6.9 G/DL — CRITICAL LOW (ref 11.5–15.5)
HGB BLD-MCNC: 7.9 G/DL — LOW (ref 11.5–15.5)
IMM GRANULOCYTES NFR BLD AUTO: 0.6 % — SIGNIFICANT CHANGE UP (ref 0–1.5)
LYMPHOCYTES # BLD AUTO: 0 % — LOW (ref 13–44)
LYMPHOCYTES # BLD AUTO: 0 K/UL — LOW (ref 1–3.3)
LYMPHOCYTES # BLD AUTO: 0.68 K/UL — LOW (ref 1–3.3)
LYMPHOCYTES # BLD AUTO: 7.7 % — LOW (ref 13–44)
MAGNESIUM SERPL-MCNC: 1.8 MG/DL — SIGNIFICANT CHANGE UP (ref 1.6–2.6)
MANUAL SMEAR VERIFICATION: SIGNIFICANT CHANGE UP
MCHC RBC-ENTMCNC: 29.8 GM/DL — LOW (ref 32–36)
MCHC RBC-ENTMCNC: 30.3 GM/DL — LOW (ref 32–36)
MCHC RBC-ENTMCNC: 31 PG — SIGNIFICANT CHANGE UP (ref 27–34)
MCHC RBC-ENTMCNC: 31.2 PG — SIGNIFICANT CHANGE UP (ref 27–34)
MCV RBC AUTO: 103.2 FL — HIGH (ref 80–100)
MCV RBC AUTO: 103.9 FL — HIGH (ref 80–100)
MONOCYTES # BLD AUTO: 0.46 K/UL — SIGNIFICANT CHANGE UP (ref 0–0.9)
MONOCYTES # BLD AUTO: 0.73 K/UL — SIGNIFICANT CHANGE UP (ref 0–0.9)
MONOCYTES NFR BLD AUTO: 4.3 % — SIGNIFICANT CHANGE UP (ref 2–14)
MONOCYTES NFR BLD AUTO: 8.2 % — SIGNIFICANT CHANGE UP (ref 2–14)
NEUTROPHILS # BLD AUTO: 7.33 K/UL — SIGNIFICANT CHANGE UP (ref 1.8–7.4)
NEUTROPHILS # BLD AUTO: 9.81 K/UL — HIGH (ref 1.8–7.4)
NEUTROPHILS NFR BLD AUTO: 82.5 % — HIGH (ref 43–77)
NEUTROPHILS NFR BLD AUTO: 92.2 % — HIGH (ref 43–77)
NRBC # BLD: 0 /100 WBCS — SIGNIFICANT CHANGE UP (ref 0–0)
PHOSPHATE SERPL-MCNC: 3.3 MG/DL — SIGNIFICANT CHANGE UP (ref 2.5–4.5)
PLAT MORPH BLD: NORMAL — SIGNIFICANT CHANGE UP
PLATELET # BLD AUTO: 262 K/UL — SIGNIFICANT CHANGE UP (ref 150–400)
PLATELET # BLD AUTO: 267 K/UL — SIGNIFICANT CHANGE UP (ref 150–400)
POTASSIUM SERPL-MCNC: 3.6 MMOL/L — SIGNIFICANT CHANGE UP (ref 3.5–5.3)
POTASSIUM SERPL-SCNC: 3.6 MMOL/L — SIGNIFICANT CHANGE UP (ref 3.5–5.3)
RBC # BLD: 2.21 M/UL — LOW (ref 3.8–5.2)
RBC # BLD: 2.55 M/UL — LOW (ref 3.8–5.2)
RBC # FLD: 16.9 % — HIGH (ref 10.3–14.5)
RBC # FLD: 17.3 % — HIGH (ref 10.3–14.5)
RBC BLD AUTO: SIGNIFICANT CHANGE UP
SODIUM SERPL-SCNC: 137 MMOL/L — SIGNIFICANT CHANGE UP (ref 135–145)
VARIANT LYMPHS # BLD: 3.5 % — SIGNIFICANT CHANGE UP (ref 0–6)
WBC # BLD: 10.64 K/UL — HIGH (ref 3.8–10.5)
WBC # BLD: 8.88 K/UL — SIGNIFICANT CHANGE UP (ref 3.8–10.5)
WBC # FLD AUTO: 10.64 K/UL — HIGH (ref 3.8–10.5)
WBC # FLD AUTO: 8.88 K/UL — SIGNIFICANT CHANGE UP (ref 3.8–10.5)

## 2021-01-20 PROCEDURE — 99232 SBSQ HOSP IP/OBS MODERATE 35: CPT | Mod: GC

## 2021-01-20 RX ADMIN — Medication 0.25 MILLIGRAM(S): at 14:08

## 2021-01-20 RX ADMIN — Medication 1 TABLET(S): at 14:09

## 2021-01-20 RX ADMIN — AMLODIPINE BESYLATE 5 MILLIGRAM(S): 2.5 TABLET ORAL at 06:12

## 2021-01-20 RX ADMIN — Medication 6 MILLIGRAM(S): at 21:52

## 2021-01-20 RX ADMIN — LEVALBUTEROL 0.63 MILLIGRAM(S): 1.25 SOLUTION, CONCENTRATE RESPIRATORY (INHALATION) at 06:12

## 2021-01-20 RX ADMIN — ISOSORBIDE DINITRATE 10 MILLIGRAM(S): 5 TABLET ORAL at 06:12

## 2021-01-20 RX ADMIN — ISOSORBIDE DINITRATE 10 MILLIGRAM(S): 5 TABLET ORAL at 18:39

## 2021-01-20 RX ADMIN — PANTOPRAZOLE SODIUM 40 MILLIGRAM(S): 20 TABLET, DELAYED RELEASE ORAL at 06:12

## 2021-01-20 RX ADMIN — Medication 0.25 MILLIGRAM(S): at 21:58

## 2021-01-20 RX ADMIN — LEVALBUTEROL 0.63 MILLIGRAM(S): 1.25 SOLUTION, CONCENTRATE RESPIRATORY (INHALATION) at 21:59

## 2021-01-20 RX ADMIN — BUDESONIDE AND FORMOTEROL FUMARATE DIHYDRATE 2 PUFF(S): 160; 4.5 AEROSOL RESPIRATORY (INHALATION) at 06:12

## 2021-01-20 RX ADMIN — ENOXAPARIN SODIUM 40 MILLIGRAM(S): 100 INJECTION SUBCUTANEOUS at 14:09

## 2021-01-20 RX ADMIN — BUDESONIDE AND FORMOTEROL FUMARATE DIHYDRATE 2 PUFF(S): 160; 4.5 AEROSOL RESPIRATORY (INHALATION) at 18:31

## 2021-01-20 RX ADMIN — TIOTROPIUM BROMIDE 1 CAPSULE(S): 18 CAPSULE ORAL; RESPIRATORY (INHALATION) at 14:09

## 2021-01-20 RX ADMIN — LEVALBUTEROL 0.63 MILLIGRAM(S): 1.25 SOLUTION, CONCENTRATE RESPIRATORY (INHALATION) at 18:31

## 2021-01-20 NOTE — PROGRESS NOTE ADULT - ATTENDING COMMENTS
patient seen and examined. discussed with team above.  COPD: still with intermittent episodes of hypoxia and increase work of breathing. ?anxiety.     Will again give a short course of steroid and monitor.     cont with nebs and inhaler  Polyneuropathy: HSV +2 on LP possible cause . need rehab

## 2021-01-20 NOTE — DISCHARGE NOTE NURSING/CASE MANAGEMENT/SOCIAL WORK - PATIENT PORTAL LINK FT
You can access the FollowMyHealth Patient Portal offered by [facility] by registering at the following website: [email or web address]. By joining [facility]’s FollowMyHealth portal, you will also be able to view your health information using other applications (apps) compatible with our system.

## 2021-01-20 NOTE — DISCHARGE NOTE NURSING/CASE MANAGEMENT/SOCIAL WORK - NSDCFUADDAPPT_GEN_ALL_CORE_FT
[facility] [place] @ [address] [place] [address], [address] [place]; [phone]     Please follow up with your PCP, neurology and PMR, 1-2 weeks after discharge

## 2021-01-20 NOTE — PROGRESS NOTE ADULT - PROBLEM SELECTOR PLAN 2
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 Required intubation at [place], now DNR/DNI  - plan as above

## 2021-01-20 NOTE — PROGRESS NOTE ADULT - PROBLEM SELECTOR PLAN 1
End stage COPD, requiring BiPAP qhs/prn. Now DNR/DNI, ongoing GOC   -s/p five days of azithromycin (12/29-1/2)  - Xopenex q8 hours  - s/p methylprednisolone course   - pulsed steroids with 500mg IV methylprednisolone for 3 days per neurology (1/7-1/9), tapered off steroids on 1/15  -Attempted to trial pt off BiPAP during day, however today 1/20 pt w/ SOB and desat to 80s iso anxiety, requiring increased O2 via NC and application of BiPAP. Will monitor for anxiety and treat w/ goal of decreasing pt BiPAP requirement. keep qhs and daytime nasal cannula 4L; titrate oxygen saturation on pulse oximetry to 88-92%.   -Will administer home doses of LAMA, LABA, and inhaled corticosteroid End stage COPD, requiring BiPAP qhs/prn. Now DNR/DNI, ongoing GOC   -s/p five days of azithromycin (12/29-1/2)  - Xopenex q8 hours  - s/p methylprednisolone course   - pulsed steroids with 500mg IV methylprednisolone for 3 days per neurology (1/7-1/9), tapered off steroids on 1/15  -Attempted to trial pt off BiPAP during day, however today 1/20 pt w/ SOB and desat to 80s iso anxiety, requiring increased O2 via NC and application of BiPAP. Will monitor for anxiety and treat w/ goal of decreasing pt BiPAP requirement. keep qhs and daytime nasal cannula 4L; titrate oxygen saturation on pulse oximetry to 88-92%.   -Will administer home doses of LAMA, LABA, and inhaled corticosteroid  - will give short course prednisone 40mg x 5 days

## 2021-01-20 NOTE — PROGRESS NOTE ADULT - ASSESSMENT
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

## 2021-01-20 NOTE — PROGRESS NOTE ADULT - SUBJECTIVE AND OBJECTIVE BOX
Carleen Pelayo, PGY-1    CHIEF COMPLAINT: Patient is a 75y old  Female who presents with a chief complaint of acute respiratory failure (19 Jan 2021 08:02)      INTERVAL HPI/OVERNIGHT EVENTS:    MEDICATIONS (STANDING):  amLODIPine   Tablet 5 milliGRAM(s) Oral daily  budesonide  80 MICROgram(s)/formoterol 4.5 MICROgram(s) Inhaler 2 Puff(s) Inhalation two times a day  dextrose 40% Gel 15 Gram(s) Oral once  dextrose 5%. 1000 milliLiter(s) IV Continuous <Continuous>  dextrose 5%. 1000 milliLiter(s) IV Continuous <Continuous>  dextrose 50% Injectable 25 Gram(s) IV Push once  dextrose 50% Injectable 12.5 Gram(s) IV Push once  dextrose 50% Injectable 25 Gram(s) IV Push once  enoxaparin Injectable 40 milliGRAM(s) SubCutaneous daily  glucagon  Injectable 1 milliGRAM(s) IntraMuscular once  insulin lispro (ADMELOG) corrective regimen sliding scale   SubCutaneous at bedtime  insulin lispro (ADMELOG) corrective regimen sliding scale   SubCutaneous three times a day before meals  isosorbide   dinitrate Tablet (ISORDIL) 10 milliGRAM(s) Oral two times a day  levalbuterol Inhalation 0.63 milliGRAM(s) Inhalation three times a day  melatonin 6 milliGRAM(s) Oral at bedtime  multivitamin 1 Tablet(s) Oral daily  pantoprazole    Tablet 40 milliGRAM(s) Oral before breakfast  tiotropium 18 MICROgram(s) Capsule 1 Capsule(s) Inhalation daily    MEDICATIONS  (PRN):  acetaminophen   Tablet .. 650 milliGRAM(s) Oral every 6 hours PRN  ALPRAZolam 0.25 milliGRAM(s) Oral three times a day PRN      REVIEW OF SYSTEMS:      T(F): 97.6 (01-20-21 @ 04:39), Max: 97.7 (01-19-21 @ 14:01)  HR: 106 (01-20-21 @ 04:39) (103 - 125)  BP: 112/77 (01-20-21 @ 04:39) (112/77 - 129/76)  RR: 20 (01-20-21 @ 04:39) (18 - 20)  SpO2: 100% (01-20-21 @ 04:39) (95% - 100%)  Wt(kg): --  CAPILLARY BLOOD GLUCOSE      POCT Blood Glucose.: 161 mg/dL (19 Jan 2021 21:32)  POCT Blood Glucose.: 106 mg/dL (19 Jan 2021 17:42)  POCT Blood Glucose.: 143 mg/dL (19 Jan 2021 12:28)  POCT Blood Glucose.: 116 mg/dL (19 Jan 2021 08:57)    I&O's Summary      PHYSICAL EXAM:      LABS:                        7.3    11.72 )-----------( 267      ( 19 Jan 2021 10:57 )             24.2     01-19    138  |  93<L>  |  16  ----------------------------<  106<H>  3.5   |  41<H>  |  0.30<L>    Ca    8.7      19 Jan 2021 07:13  Phos  3.1     01-19  Mg     1.8     01-19    TPro  5.3<L>  /  Alb  2.8<L>  /  TBili  0.4  /  DBili  x   /  AST  22  /  ALT  44  /  AlkPhos  95  01-18            RADIOLOGY & ADDITIONAL TESTS:   Carleen Pelayo, PGY-1    CHIEF COMPLAINT: Patient is a 75y old  Female who presents with a chief complaint of acute respiratory failure (19 Jan 2021 08:02)      INTERVAL HPI/OVERNIGHT EVENTS: SUSAN ON, tachycardic HR 100s-120s, tachypneic RR 20, SpO2%  on supplemental O2, VS otherwise stable. Pt reporting improvement in SOB this am, otherwise denies complaints.    Update: This afternoon pt reporting increased SOB, w/ SpO2% decreasing to 80s on 3L NC. Improved w/ increased O2 administration and slow, deep breaths, will administer BiPAP    MEDICATIONS (STANDING):  amLODIPine   Tablet 5 milliGRAM(s) Oral daily  budesonide  80 MICROgram(s)/formoterol 4.5 MICROgram(s) Inhaler 2 Puff(s) Inhalation two times a day  dextrose 40% Gel 15 Gram(s) Oral once  dextrose 5%. 1000 milliLiter(s) IV Continuous <Continuous>  dextrose 5%. 1000 milliLiter(s) IV Continuous <Continuous>  dextrose 50% Injectable 25 Gram(s) IV Push once  dextrose 50% Injectable 12.5 Gram(s) IV Push once  dextrose 50% Injectable 25 Gram(s) IV Push once  enoxaparin Injectable 40 milliGRAM(s) SubCutaneous daily  glucagon  Injectable 1 milliGRAM(s) IntraMuscular once  insulin lispro (ADMELOG) corrective regimen sliding scale   SubCutaneous at bedtime  insulin lispro (ADMELOG) corrective regimen sliding scale   SubCutaneous three times a day before meals  isosorbide   dinitrate Tablet (ISORDIL) 10 milliGRAM(s) Oral two times a day  levalbuterol Inhalation 0.63 milliGRAM(s) Inhalation three times a day  melatonin 6 milliGRAM(s) Oral at bedtime  multivitamin 1 Tablet(s) Oral daily  pantoprazole    Tablet 40 milliGRAM(s) Oral before breakfast  tiotropium 18 MICROgram(s) Capsule 1 Capsule(s) Inhalation daily    MEDICATIONS  (PRN):  acetaminophen   Tablet .. 650 milliGRAM(s) Oral every 6 hours PRN  ALPRAZolam 0.25 milliGRAM(s) Oral three times a day PRN      REVIEW OF SYSTEMS:  CONSTITUTIONAL: No fever  EYES: No visual disturbances  ENMT: No sinus or throat pain  RESPIRATORY: +SOB as per HPI  CARDIOVASCULAR: No chest pain, palpitations, or dizziness  GASTROINTESTINAL: No abdominal or epigastric pain. No diarrhea or constipation. No melena or hematochezia.   GENITOURINARY: No dysuria  NEUROLOGICAL: No headaches, numbness, or tremors  MUSCULOSKELETAL: No joint pain or swelling; No muscle, back, or extremity pain      T(F): 97.6 (01-20-21 @ 04:39), Max: 97.7 (01-19-21 @ 14:01)  HR: 106 (01-20-21 @ 04:39) (103 - 125)  BP: 112/77 (01-20-21 @ 04:39) (112/77 - 129/76)  RR: 20 (01-20-21 @ 04:39) (18 - 20)  SpO2: 100% (01-20-21 @ 04:39) (95% - 100%)  Wt(kg): --  CAPILLARY BLOOD GLUCOSE      POCT Blood Glucose.: 161 mg/dL (19 Jan 2021 21:32)  POCT Blood Glucose.: 106 mg/dL (19 Jan 2021 17:42)  POCT Blood Glucose.: 143 mg/dL (19 Jan 2021 12:28)  POCT Blood Glucose.: 116 mg/dL (19 Jan 2021 08:57)    I&O's Summary      PHYSICAL EXAM:  GENERAL: Elderly appearing, well-groomed, improved in appearance compared to previous exam, NAD on supplemental O2 via NC  HEAD: Atraumatic, Normocephalic  EYES: PERRL, conjunctiva and sclera clear  ENMT: No tonsillar erythema, exudates, or enlargement; +dry MM w/ brown appearance of tongue  NECK: Supple  NERVOUS SYSTEM:  Alert & Oriented X3, Good concentration; Motor Strength 4/5 RUE, 3/5 LUE, 0/5 b/l LEs, slightly wiggles toes b/l less today compared ot prior exam, Sensation intact b/l LE  CHEST/LUNG: Clear to auscultation bilaterally; No rales, rhonchi, wheezing, or rubs, however poor movement of air/poor inspiratory effort  HEART: Tachycardic, regular rhythm; No murmurs, rubs, or gallops  ABDOMEN: Soft, Nontender, Nondistended; Bowel sounds present  EXTREMITIES: 2+ Peripheral Pulses, No edema        LABS:                        7.3    11.72 )-----------( 267      ( 19 Jan 2021 10:57 )             24.2     01-19    138  |  93<L>  |  16  ----------------------------<  106<H>  3.5   |  41<H>  |  0.30<L>    Ca    8.7      19 Jan 2021 07:13  Phos  3.1     01-19  Mg     1.8     01-19    TPro  5.3<L>  /  Alb  2.8<L>  /  TBili  0.4  /  DBili  x   /  AST  22  /  ALT  44  /  AlkPhos  95  01-18            RADIOLOGY & ADDITIONAL TESTS:

## 2021-01-20 NOTE — PROGRESS NOTE ADULT - PROBLEM SELECTOR PLAN 3
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  Please email patient contact information to [facility] [email or web address].  [facility] [place] @ [address], [address] [place]; [phone]

## 2021-01-21 LAB
ANION GAP SERPL CALC-SCNC: 3 MMOL/L — LOW (ref 5–17)
BUN SERPL-MCNC: 20 MG/DL — SIGNIFICANT CHANGE UP (ref 7–23)
CALCIUM SERPL-MCNC: 9.3 MG/DL — SIGNIFICANT CHANGE UP (ref 8.4–10.5)
CHLORIDE SERPL-SCNC: 93 MMOL/L — LOW (ref 96–108)
CO2 SERPL-SCNC: 43 MMOL/L — HIGH (ref 22–31)
CREAT SERPL-MCNC: 0.38 MG/DL — LOW (ref 0.5–1.3)
GAS PNL BLDA: SIGNIFICANT CHANGE UP
GLUCOSE BLDC GLUCOMTR-MCNC: 155 MG/DL — HIGH (ref 70–99)
GLUCOSE BLDC GLUCOMTR-MCNC: 156 MG/DL — HIGH (ref 70–99)
GLUCOSE BLDC GLUCOMTR-MCNC: 174 MG/DL — HIGH (ref 70–99)
GLUCOSE BLDC GLUCOMTR-MCNC: 180 MG/DL — HIGH (ref 70–99)
GLUCOSE SERPL-MCNC: 192 MG/DL — HIGH (ref 70–99)
HCT VFR BLD CALC: 23.7 % — LOW (ref 34.5–45)
HGB BLD-MCNC: 7.2 G/DL — LOW (ref 11.5–15.5)
MAGNESIUM SERPL-MCNC: 1.9 MG/DL — SIGNIFICANT CHANGE UP (ref 1.6–2.6)
MCHC RBC-ENTMCNC: 30.4 GM/DL — LOW (ref 32–36)
MCHC RBC-ENTMCNC: 31.3 PG — SIGNIFICANT CHANGE UP (ref 27–34)
MCV RBC AUTO: 103 FL — HIGH (ref 80–100)
NRBC # BLD: 0 /100 WBCS — SIGNIFICANT CHANGE UP (ref 0–0)
PHOSPHATE SERPL-MCNC: 3.4 MG/DL — SIGNIFICANT CHANGE UP (ref 2.5–4.5)
PLATELET # BLD AUTO: 329 K/UL — SIGNIFICANT CHANGE UP (ref 150–400)
POTASSIUM SERPL-MCNC: 4.2 MMOL/L — SIGNIFICANT CHANGE UP (ref 3.5–5.3)
POTASSIUM SERPL-SCNC: 4.2 MMOL/L — SIGNIFICANT CHANGE UP (ref 3.5–5.3)
RBC # BLD: 2.3 M/UL — LOW (ref 3.8–5.2)
RBC # FLD: 17.1 % — HIGH (ref 10.3–14.5)
SODIUM SERPL-SCNC: 139 MMOL/L — SIGNIFICANT CHANGE UP (ref 135–145)
WBC # BLD: 9.46 K/UL — SIGNIFICANT CHANGE UP (ref 3.8–10.5)
WBC # FLD AUTO: 9.46 K/UL — SIGNIFICANT CHANGE UP (ref 3.8–10.5)

## 2021-01-21 PROCEDURE — 99233 SBSQ HOSP IP/OBS HIGH 50: CPT | Mod: GC

## 2021-01-21 RX ADMIN — Medication 1: at 18:38

## 2021-01-21 RX ADMIN — LEVALBUTEROL 0.63 MILLIGRAM(S): 1.25 SOLUTION, CONCENTRATE RESPIRATORY (INHALATION) at 21:56

## 2021-01-21 RX ADMIN — AMLODIPINE BESYLATE 5 MILLIGRAM(S): 2.5 TABLET ORAL at 05:50

## 2021-01-21 RX ADMIN — BUDESONIDE AND FORMOTEROL FUMARATE DIHYDRATE 2 PUFF(S): 160; 4.5 AEROSOL RESPIRATORY (INHALATION) at 05:51

## 2021-01-21 RX ADMIN — LEVALBUTEROL 0.63 MILLIGRAM(S): 1.25 SOLUTION, CONCENTRATE RESPIRATORY (INHALATION) at 05:50

## 2021-01-21 RX ADMIN — Medication 40 MILLIGRAM(S): at 05:49

## 2021-01-21 RX ADMIN — Medication 0.25 MILLIGRAM(S): at 07:11

## 2021-01-21 RX ADMIN — LEVALBUTEROL 0.63 MILLIGRAM(S): 1.25 SOLUTION, CONCENTRATE RESPIRATORY (INHALATION) at 13:36

## 2021-01-21 RX ADMIN — Medication 1 TABLET(S): at 13:37

## 2021-01-21 RX ADMIN — Medication 1: at 13:35

## 2021-01-21 RX ADMIN — PANTOPRAZOLE SODIUM 40 MILLIGRAM(S): 20 TABLET, DELAYED RELEASE ORAL at 05:50

## 2021-01-21 RX ADMIN — ISOSORBIDE DINITRATE 10 MILLIGRAM(S): 5 TABLET ORAL at 18:38

## 2021-01-21 RX ADMIN — ISOSORBIDE DINITRATE 10 MILLIGRAM(S): 5 TABLET ORAL at 05:49

## 2021-01-21 RX ADMIN — ENOXAPARIN SODIUM 40 MILLIGRAM(S): 100 INJECTION SUBCUTANEOUS at 13:37

## 2021-01-21 RX ADMIN — Medication 6 MILLIGRAM(S): at 21:56

## 2021-01-21 RX ADMIN — TIOTROPIUM BROMIDE 1 CAPSULE(S): 18 CAPSULE ORAL; RESPIRATORY (INHALATION) at 13:37

## 2021-01-21 RX ADMIN — BUDESONIDE AND FORMOTEROL FUMARATE DIHYDRATE 2 PUFF(S): 160; 4.5 AEROSOL RESPIRATORY (INHALATION) at 18:38

## 2021-01-21 RX ADMIN — Medication 1: at 08:50

## 2021-01-21 NOTE — PROVIDER CONTACT NOTE (CRITICAL VALUE NOTIFICATION) - RECOMMENDATIONS
Provider made aware.
Continue to monitor for now.
Repeat blood.
None so far.
BIPAP
No new orders
Provider made aware.
Provider made aware.
notify provider
Bi-pap
Provider made aware.  Will order antiviral medication

## 2021-01-21 NOTE — PROVIDER CONTACT NOTE (CRITICAL VALUE NOTIFICATION) - ASSESSMENT
Alert but anxious. Not in distress.
VSS.  No distress noted.
VSS.  No distress noted.  Denies any chest discomfort.  No s/s of SOB noted.
Patient is A&Ox3-4 forgetful. VSS.
VSS.  No distress noted.  Denies any chest discomfort.  No s/s of SOB noted.
alert, on BiPAP O2 SAT ABOVE 94%
ABG- PCO2: 81
VSS.  No distress noted.  Denies any chest discomfort.  No s/s of SOB noted.
Alert and oriented times fpur, pale looking but nit in nay distress.
pt had an episode of dyspnea and desaturation in am resolved with BIPAPP
A&Ox3-4, Pt. FS done 144.

## 2021-01-21 NOTE — PROGRESS NOTE ADULT - PROBLEM SELECTOR PLAN 1
Severe COPD requiring BiPAP qhs/prn  - s/p azithromycin 12/29-1/2  - xopenex q8h  - s/p methylprednisolone course  - pulsed steroids 1/7-9 per neuro, tapered off 1/15  - pt w/ desats 1/20 iso anxiety, requiring bipap after trial off bipap, treated w/ prn xanax however 1/21 am pt sedated post xanax, w/ Co2 retention on ABG placed back on BiPAP, dc'd prn xanax  - c/w home dose LAMA, LABA, inhaled corticosteroid   - prednisone 40mg 1/20-24

## 2021-01-21 NOTE — PROGRESS NOTE ADULT - PROBLEM SELECTOR PLAN 3
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  Please email patient contact information to [email or web address].  [facility] [place] @ [address], [address] [place]; [phone]

## 2021-01-21 NOTE — PROGRESS NOTE ADULT - ATTENDING COMMENTS
patient seen and examined. discussed with team above.  COPD exacerbation: this am more lethargic. acute hypercapneic resp failure. Steroid added yesterday.    xanax likely contributing to hypercapnia this am. hold further benzo.     BIPAP now and repeat ABG later.   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 need rehab

## 2021-01-21 NOTE — PROGRESS NOTE ADULT - PROBLEM SELECTOR PLAN 2
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 Required intubation at [place]  - plan as above

## 2021-01-21 NOTE — PROGRESS NOTE ADULT - ASSESSMENT
75-year-old woman with history of severe COPD on home oxygen and recent intubation at [facility] for COPD who presented to 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

## 2021-01-21 NOTE — PROGRESS NOTE ADULT - SUBJECTIVE AND OBJECTIVE BOX
Carleen Pelayo, PGY-1    CHIEF COMPLAINT: Patient is a 75y old  Female who presents with a chief complaint of acute respiratory failure (20 Jan 2021 07:21)      INTERVAL HPI/OVERNIGHT EVENTS:    MEDICATIONS (STANDING):  amLODIPine   Tablet 5 milliGRAM(s) Oral daily  budesonide  80 MICROgram(s)/formoterol 4.5 MICROgram(s) Inhaler 2 Puff(s) Inhalation two times a day  dextrose 40% Gel 15 Gram(s) Oral once  dextrose 5%. 1000 milliLiter(s) IV Continuous <Continuous>  dextrose 5%. 1000 milliLiter(s) IV Continuous <Continuous>  dextrose 50% Injectable 25 Gram(s) IV Push once  dextrose 50% Injectable 12.5 Gram(s) IV Push once  dextrose 50% Injectable 25 Gram(s) IV Push once  enoxaparin Injectable 40 milliGRAM(s) SubCutaneous daily  glucagon  Injectable 1 milliGRAM(s) IntraMuscular once  insulin lispro (ADMELOG) corrective regimen sliding scale   SubCutaneous at bedtime  insulin lispro (ADMELOG) corrective regimen sliding scale   SubCutaneous three times a day before meals  isosorbide   dinitrate Tablet (ISORDIL) 10 milliGRAM(s) Oral two times a day  levalbuterol Inhalation 0.63 milliGRAM(s) Inhalation three times a day  melatonin 6 milliGRAM(s) Oral at bedtime  multivitamin 1 Tablet(s) Oral daily  pantoprazole    Tablet 40 milliGRAM(s) Oral before breakfast  predniSONE   Tablet 40 milliGRAM(s) Oral daily  tiotropium 18 MICROgram(s) Capsule 1 Capsule(s) Inhalation daily    MEDICATIONS  (PRN):  acetaminophen   Tablet .. 650 milliGRAM(s) Oral every 6 hours PRN  ALPRAZolam 0.25 milliGRAM(s) Oral three times a day PRN      REVIEW OF SYSTEMS:      T(F): 98.2 (01-21-21 @ 04:19), Max: 98.6 (01-20-21 @ 20:56)  HR: 104 (01-21-21 @ 05:38) (104 - 121)  BP: 105/65 (01-21-21 @ 04:19) (105/65 - 124/70)  RR: 20 (01-21-21 @ 04:19) (20 - 20)  SpO2: 100% (01-21-21 @ 05:38) (100% - 100%)  Wt(kg): --  CAPILLARY BLOOD GLUCOSE      POCT Blood Glucose.: 171 mg/dL (20 Jan 2021 21:55)    I&O's Summary    20 Jan 2021 07:01  -  21 Jan 2021 07:00  --------------------------------------------------------  IN: 840 mL / OUT: 1200 mL / NET: -360 mL        PHYSICAL EXAM:      LABS:                        7.9    10.64 )-----------( 267      ( 20 Jan 2021 10:12 )             26.5     01-20    137  |  91<L>  |  17  ----------------------------<  83  3.6   |  44<H>  |  0.33<L>    Ca    9.0      20 Jan 2021 07:06  Phos  3.3     01-20  Mg     1.8     01-20              RADIOLOGY & ADDITIONAL TESTS:   Carleen Pelayo, PGY-1    CHIEF COMPLAINT: Patient is a 75y old  Female who presents with a chief complaint of acute respiratory failure (20 Jan 2021 07:21)      INTERVAL HPI/OVERNIGHT EVENTS: SUSAN ON, tachycardic HR 100s-120s, tachypneic RR 20, VS otherwise stable on BiPAP ON w/ supplemental O2 via NC this am. This am pt received prn Xanax for anxiety per nurse, upon entry to room pt laying in bed slumped to side, not responsive to repeated verbal stimuli, minimally responsive to tactile stimuli, opening eyes, tracking movement, and following simple commands including to squeeze hands albeit momentarily. Pt subsequently placed on BiPAP. Interview and exam limited by pt mental status.    MEDICATIONS (STANDING):  amLODIPine   Tablet 5 milliGRAM(s) Oral daily  budesonide  80 MICROgram(s)/formoterol 4.5 MICROgram(s) Inhaler 2 Puff(s) Inhalation two times a day  dextrose 40% Gel 15 Gram(s) Oral once  dextrose 5%. 1000 milliLiter(s) IV Continuous <Continuous>  dextrose 5%. 1000 milliLiter(s) IV Continuous <Continuous>  dextrose 50% Injectable 25 Gram(s) IV Push once  dextrose 50% Injectable 12.5 Gram(s) IV Push once  dextrose 50% Injectable 25 Gram(s) IV Push once  enoxaparin Injectable 40 milliGRAM(s) SubCutaneous daily  glucagon  Injectable 1 milliGRAM(s) IntraMuscular once  insulin lispro (ADMELOG) corrective regimen sliding scale   SubCutaneous at bedtime  insulin lispro (ADMELOG) corrective regimen sliding scale   SubCutaneous three times a day before meals  isosorbide   dinitrate Tablet (ISORDIL) 10 milliGRAM(s) Oral two times a day  levalbuterol Inhalation 0.63 milliGRAM(s) Inhalation three times a day  melatonin 6 milliGRAM(s) Oral at bedtime  multivitamin 1 Tablet(s) Oral daily  pantoprazole    Tablet 40 milliGRAM(s) Oral before breakfast  predniSONE   Tablet 40 milliGRAM(s) Oral daily  tiotropium 18 MICROgram(s) Capsule 1 Capsule(s) Inhalation daily    MEDICATIONS  (PRN):  acetaminophen   Tablet .. 650 milliGRAM(s) Oral every 6 hours PRN  ALPRAZolam 0.25 milliGRAM(s) Oral three times a day PRN      REVIEW OF SYSTEMS: Unable to obtain 2/2 pt mental status      T(F): 98.2 (01-21-21 @ 04:19), Max: 98.6 (01-20-21 @ 20:56)  HR: 104 (01-21-21 @ 05:38) (104 - 121)  BP: 105/65 (01-21-21 @ 04:19) (105/65 - 124/70)  RR: 20 (01-21-21 @ 04:19) (20 - 20)  SpO2: 100% (01-21-21 @ 05:38) (100% - 100%)  Wt(kg): --  CAPILLARY BLOOD GLUCOSE      POCT Blood Glucose.: 171 mg/dL (20 Jan 2021 21:55)    I&O's Summary    20 Jan 2021 07:01  -  21 Jan 2021 07:00  --------------------------------------------------------  IN: 840 mL / OUT: 1200 mL / NET: -360 mL      PHYSICAL EXAM:  GENERAL: Elderly appearing, sedated, slumped to side of bed  HEAD: Atraumatic, Normocephalic  EYES: PERRL, conjunctiva and sclera clear   ENMT: Difficult to assess 2/2 pt participation in interview  NERVOUS SYSTEM: Alert & Oriented X0, sedated, unarousable to verbal stimuli, minimally arousable to tactile stimuli, opens eyes and tracks momentarily before closing again, able to follow simple commands including to squeeze interviewer hands, unable to adequately assess strength 2/2 pt participation in exam  CHEST/LUNG: Clear to auscultation bilaterally; No rales, rhonchi, wheezing, or rubs, however poor movement of air/poor inspiratory effort  HEART: Tachycardic, regular rhythm; No murmurs, rubs, or gallops  ABDOMEN: Soft, Not wincing in pain, guarding, or withdrawing, Nondistended; Bowel sounds present  EXTREMITIES: 2+ Peripheral Pulses, No edema      LABS:                        7.9    10.64 )-----------( 267      ( 20 Jan 2021 10:12 )             26.5     01-20    137  |  91<L>  |  17  ----------------------------<  83  3.6   |  44<H>  |  0.33<L>    Ca    9.0      20 Jan 2021 07:06  Phos  3.3     01-20  Mg     1.8     01-20              RADIOLOGY & ADDITIONAL TESTS:

## 2021-01-21 NOTE — PROVIDER CONTACT NOTE (CRITICAL VALUE NOTIFICATION) - BACKGROUND
Patient admitted for shortness of breath with h/o COPD.
PT admitted DX- Extremity weakness HX- COPD, Anxiety.
COPD exacerbation, LE paralysis,
PT admitted DX- Extremity weakness HX- COPD, Anxiety.
patient admitted for hypoxia and hypercapnia
PT admitted with DX- B/L extremity weakness. HX- COPD, Anxiety.
Patient was on BIPAP the whole night and now on O2 via nasal cannula.
PT admitted DX- Extremity weakness HX- COPD, Anxiety.
PT admitted for severe COPD
pt with COPD, on BIPAP overnight, NC in daytime.
PT admitted DX- Extremity weakness HX- COPD, Anxiety.

## 2021-01-22 LAB
ANION GAP SERPL CALC-SCNC: 7 MMOL/L — SIGNIFICANT CHANGE UP (ref 5–17)
BASOPHILS # BLD AUTO: 0 K/UL — SIGNIFICANT CHANGE UP (ref 0–0.2)
BASOPHILS # BLD AUTO: 0.01 K/UL — SIGNIFICANT CHANGE UP (ref 0–0.2)
BASOPHILS NFR BLD AUTO: 0 % — SIGNIFICANT CHANGE UP (ref 0–2)
BASOPHILS NFR BLD AUTO: 0.1 % — SIGNIFICANT CHANGE UP (ref 0–2)
BUN SERPL-MCNC: 20 MG/DL — SIGNIFICANT CHANGE UP (ref 7–23)
CALCIUM SERPL-MCNC: 9.6 MG/DL — SIGNIFICANT CHANGE UP (ref 8.4–10.5)
CHLORIDE SERPL-SCNC: 92 MMOL/L — LOW (ref 96–108)
CO2 SERPL-SCNC: 39 MMOL/L — HIGH (ref 22–31)
CREAT SERPL-MCNC: 0.35 MG/DL — LOW (ref 0.5–1.3)
EOSINOPHIL # BLD AUTO: 0 K/UL — SIGNIFICANT CHANGE UP (ref 0–0.5)
EOSINOPHIL # BLD AUTO: 0.08 K/UL — SIGNIFICANT CHANGE UP (ref 0–0.5)
EOSINOPHIL NFR BLD AUTO: 0 % — SIGNIFICANT CHANGE UP (ref 0–6)
EOSINOPHIL NFR BLD AUTO: 0.9 % — SIGNIFICANT CHANGE UP (ref 0–6)
GLUCOSE BLDC GLUCOMTR-MCNC: 124 MG/DL — HIGH (ref 70–99)
GLUCOSE BLDC GLUCOMTR-MCNC: 133 MG/DL — HIGH (ref 70–99)
GLUCOSE BLDC GLUCOMTR-MCNC: 137 MG/DL — HIGH (ref 70–99)
GLUCOSE BLDC GLUCOMTR-MCNC: 199 MG/DL — HIGH (ref 70–99)
GLUCOSE SERPL-MCNC: 103 MG/DL — HIGH (ref 70–99)
HCT VFR BLD CALC: 21.8 % — LOW (ref 34.5–45)
HCT VFR BLD CALC: 24.9 % — LOW (ref 34.5–45)
HGB BLD-MCNC: 6.6 G/DL — CRITICAL LOW (ref 11.5–15.5)
HGB BLD-MCNC: 7.5 G/DL — LOW (ref 11.5–15.5)
IMM GRANULOCYTES NFR BLD AUTO: 0.7 % — SIGNIFICANT CHANGE UP (ref 0–1.5)
LYMPHOCYTES # BLD AUTO: 0.23 K/UL — LOW (ref 1–3.3)
LYMPHOCYTES # BLD AUTO: 0.74 K/UL — LOW (ref 1–3.3)
LYMPHOCYTES # BLD AUTO: 2.5 % — LOW (ref 13–44)
LYMPHOCYTES # BLD AUTO: 8 % — LOW (ref 13–44)
MAGNESIUM SERPL-MCNC: 1.9 MG/DL — SIGNIFICANT CHANGE UP (ref 1.6–2.6)
MANUAL SMEAR VERIFICATION: SIGNIFICANT CHANGE UP
MCHC RBC-ENTMCNC: 30.1 GM/DL — LOW (ref 32–36)
MCHC RBC-ENTMCNC: 30.3 GM/DL — LOW (ref 32–36)
MCHC RBC-ENTMCNC: 31 PG — SIGNIFICANT CHANGE UP (ref 27–34)
MCHC RBC-ENTMCNC: 31.4 PG — SIGNIFICANT CHANGE UP (ref 27–34)
MCV RBC AUTO: 102.9 FL — HIGH (ref 80–100)
MCV RBC AUTO: 103.8 FL — HIGH (ref 80–100)
MONOCYTES # BLD AUTO: 0.21 K/UL — SIGNIFICANT CHANGE UP (ref 0–0.9)
MONOCYTES # BLD AUTO: 0.32 K/UL — SIGNIFICANT CHANGE UP (ref 0–0.9)
MONOCYTES NFR BLD AUTO: 2.2 % — SIGNIFICANT CHANGE UP (ref 2–14)
MONOCYTES NFR BLD AUTO: 3.5 % — SIGNIFICANT CHANGE UP (ref 2–14)
NEUTROPHILS # BLD AUTO: 8.09 K/UL — HIGH (ref 1.8–7.4)
NEUTROPHILS # BLD AUTO: 8.86 K/UL — HIGH (ref 1.8–7.4)
NEUTROPHILS NFR BLD AUTO: 85 % — HIGH (ref 43–77)
NEUTROPHILS NFR BLD AUTO: 94.5 % — HIGH (ref 43–77)
NRBC # BLD: 0 /100 WBCS — SIGNIFICANT CHANGE UP (ref 0–0)
PHOSPHATE SERPL-MCNC: 2.8 MG/DL — SIGNIFICANT CHANGE UP (ref 2.5–4.5)
PLAT MORPH BLD: NORMAL — SIGNIFICANT CHANGE UP
PLATELET # BLD AUTO: 329 K/UL — SIGNIFICANT CHANGE UP (ref 150–400)
PLATELET # BLD AUTO: 332 K/UL — SIGNIFICANT CHANGE UP (ref 150–400)
POTASSIUM SERPL-MCNC: 4 MMOL/L — SIGNIFICANT CHANGE UP (ref 3.5–5.3)
POTASSIUM SERPL-SCNC: 4 MMOL/L — SIGNIFICANT CHANGE UP (ref 3.5–5.3)
RBC # BLD: 2.1 M/UL — LOW (ref 3.8–5.2)
RBC # BLD: 2.42 M/UL — LOW (ref 3.8–5.2)
RBC # FLD: 17.3 % — HIGH (ref 10.3–14.5)
RBC # FLD: 17.3 % — HIGH (ref 10.3–14.5)
RBC BLD AUTO: SIGNIFICANT CHANGE UP
SARS-COV-2 RNA SPEC QL NAA+PROBE: SIGNIFICANT CHANGE UP
SODIUM SERPL-SCNC: 140 MMOL/L — SIGNIFICANT CHANGE UP (ref 135–145)
WBC # BLD: 9.23 K/UL — SIGNIFICANT CHANGE UP (ref 3.8–10.5)
WBC # BLD: 9.38 K/UL — SIGNIFICANT CHANGE UP (ref 3.8–10.5)
WBC # FLD AUTO: 9.23 K/UL — SIGNIFICANT CHANGE UP (ref 3.8–10.5)
WBC # FLD AUTO: 9.38 K/UL — SIGNIFICANT CHANGE UP (ref 3.8–10.5)

## 2021-01-22 PROCEDURE — 99232 SBSQ HOSP IP/OBS MODERATE 35: CPT | Mod: GC

## 2021-01-22 RX ADMIN — AMLODIPINE BESYLATE 5 MILLIGRAM(S): 2.5 TABLET ORAL at 05:10

## 2021-01-22 RX ADMIN — Medication 40 MILLIGRAM(S): at 05:10

## 2021-01-22 RX ADMIN — Medication 1 TABLET(S): at 11:18

## 2021-01-22 RX ADMIN — ISOSORBIDE DINITRATE 10 MILLIGRAM(S): 5 TABLET ORAL at 17:19

## 2021-01-22 RX ADMIN — ENOXAPARIN SODIUM 40 MILLIGRAM(S): 100 INJECTION SUBCUTANEOUS at 11:24

## 2021-01-22 RX ADMIN — TIOTROPIUM BROMIDE 1 CAPSULE(S): 18 CAPSULE ORAL; RESPIRATORY (INHALATION) at 11:18

## 2021-01-22 RX ADMIN — BUDESONIDE AND FORMOTEROL FUMARATE DIHYDRATE 2 PUFF(S): 160; 4.5 AEROSOL RESPIRATORY (INHALATION) at 17:19

## 2021-01-22 RX ADMIN — ISOSORBIDE DINITRATE 10 MILLIGRAM(S): 5 TABLET ORAL at 05:10

## 2021-01-22 RX ADMIN — PANTOPRAZOLE SODIUM 40 MILLIGRAM(S): 20 TABLET, DELAYED RELEASE ORAL at 05:10

## 2021-01-22 RX ADMIN — Medication 1: at 12:54

## 2021-01-22 RX ADMIN — BUDESONIDE AND FORMOTEROL FUMARATE DIHYDRATE 2 PUFF(S): 160; 4.5 AEROSOL RESPIRATORY (INHALATION) at 05:15

## 2021-01-22 RX ADMIN — Medication 6 MILLIGRAM(S): at 22:40

## 2021-01-22 NOTE — PROGRESS NOTE ADULT - ATTENDING COMMENTS
patient seen and examined. discussed with team above.  COPD exacerbation: this am more lethargic. acute hypercapneic resp failure. Steroid added yesterday.    xanax likely contributing to hypercapnia this am. hold further benzo.     BIPAP now and repeat ABG later.   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 need rehab patient seen and examined. discussed with team above.  COPD exacerbation: much improved MS this am.  cont with 5 days steroid.   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 need rehab

## 2021-01-22 NOTE — PROVIDER CONTACT NOTE (CRITICAL VALUE NOTIFICATION) - NAME OF MD/NP/PA/DO NOTIFIED:
Awais Marshall MD
Dr Brandt Rehrig Team 1
Dr. Ernandez
Dr Steven Tse, Team 2
Monica Pa - Team 2
Dr. Garrison
Dr. Rocha
Dr. Rocha
Carmel Morgan MD
Dr Nik Fuller
Dr Rocha
Dr Steven Tse
unable to contact MD

## 2021-01-22 NOTE — PROGRESS NOTE ADULT - SUBJECTIVE AND OBJECTIVE BOX
Carleen Pelayo, PGY-1    CHIEF COMPLAINT: Patient is a 75y old  Female who presents with a chief complaint of acute respiratory failure (21 Jan 2021 07:29)      INTERVAL HPI/OVERNIGHT EVENTS:    MEDICATIONS (STANDING):  amLODIPine   Tablet 5 milliGRAM(s) Oral daily  budesonide  80 MICROgram(s)/formoterol 4.5 MICROgram(s) Inhaler 2 Puff(s) Inhalation two times a day  dextrose 40% Gel 15 Gram(s) Oral once  dextrose 5%. 1000 milliLiter(s) IV Continuous <Continuous>  dextrose 5%. 1000 milliLiter(s) IV Continuous <Continuous>  dextrose 50% Injectable 25 Gram(s) IV Push once  dextrose 50% Injectable 12.5 Gram(s) IV Push once  dextrose 50% Injectable 25 Gram(s) IV Push once  enoxaparin Injectable 40 milliGRAM(s) SubCutaneous daily  glucagon  Injectable 1 milliGRAM(s) IntraMuscular once  insulin lispro (ADMELOG) corrective regimen sliding scale   SubCutaneous three times a day before meals  insulin lispro (ADMELOG) corrective regimen sliding scale   SubCutaneous at bedtime  isosorbide   dinitrate Tablet (ISORDIL) 10 milliGRAM(s) Oral two times a day  levalbuterol Inhalation 0.63 milliGRAM(s) Inhalation three times a day  melatonin 6 milliGRAM(s) Oral at bedtime  multivitamin 1 Tablet(s) Oral daily  pantoprazole    Tablet 40 milliGRAM(s) Oral before breakfast  predniSONE   Tablet 40 milliGRAM(s) Oral daily  tiotropium 18 MICROgram(s) Capsule 1 Capsule(s) Inhalation daily    MEDICATIONS  (PRN):  acetaminophen   Tablet .. 650 milliGRAM(s) Oral every 6 hours PRN      REVIEW OF SYSTEMS:      T(F): 97.7 (01-22-21 @ 04:30), Max: 98.5 (01-21-21 @ 08:20)  HR: 118 (01-22-21 @ 05:54) (106 - 120)  BP: 108/67 (01-22-21 @ 04:30) (97/68 - 124/78)  RR: 20 (01-22-21 @ 04:30) (20 - 20)  SpO2: 100% (01-22-21 @ 05:54) (99% - 100%)  Wt(kg): --  CAPILLARY BLOOD GLUCOSE      POCT Blood Glucose.: 156 mg/dL (21 Jan 2021 21:49)  POCT Blood Glucose.: 180 mg/dL (21 Jan 2021 17:46)  POCT Blood Glucose.: 155 mg/dL (21 Jan 2021 12:39)  POCT Blood Glucose.: 174 mg/dL (21 Jan 2021 08:37)    I&O's Summary    21 Jan 2021 07:01  -  22 Jan 2021 07:00  --------------------------------------------------------  IN: 240 mL / OUT: 200 mL / NET: 40 mL        PHYSICAL EXAM:      LABS:                        7.2    9.46  )-----------( 329      ( 21 Jan 2021 19:00 )             23.7     01-21    139  |  93<L>  |  20  ----------------------------<  192<H>  4.2   |  43<H>  |  0.38<L>    Ca    9.3      21 Jan 2021 19:00  Phos  3.4     01-21  Mg     1.9     01-21          ABG - ( 21 Jan 2021 10:09 )  pH, Arterial: 7.35  pH, Blood: x     /  pCO2: 81    /  pO2: 72    / HCO3: 44    / Base Excess: 16.6  /  SaO2: 95                  RADIOLOGY & ADDITIONAL TESTS:   Carleen Pelayo, PGY-1    CHIEF COMPLAINT: Patient is a 75y old  Female who presents with a chief complaint of acute respiratory failure (21 Jan 2021 07:29)      INTERVAL HPI/OVERNIGHT EVENTS: SUSAN ON, tachycardic HR 100s-100s, RR 20, saturating well on BiPAP, VS otherwise stable. Pt reports feeling generally well today, reporting that her breathing is stable. Otherwise she denies complaints.     MEDICATIONS (STANDING):  amLODIPine   Tablet 5 milliGRAM(s) Oral daily  budesonide  80 MICROgram(s)/formoterol 4.5 MICROgram(s) Inhaler 2 Puff(s) Inhalation two times a day  dextrose 40% Gel 15 Gram(s) Oral once  dextrose 5%. 1000 milliLiter(s) IV Continuous <Continuous>  dextrose 5%. 1000 milliLiter(s) IV Continuous <Continuous>  dextrose 50% Injectable 25 Gram(s) IV Push once  dextrose 50% Injectable 12.5 Gram(s) IV Push once  dextrose 50% Injectable 25 Gram(s) IV Push once  enoxaparin Injectable 40 milliGRAM(s) SubCutaneous daily  glucagon  Injectable 1 milliGRAM(s) IntraMuscular once  insulin lispro (ADMELOG) corrective regimen sliding scale   SubCutaneous three times a day before meals  insulin lispro (ADMELOG) corrective regimen sliding scale   SubCutaneous at bedtime  isosorbide   dinitrate Tablet (ISORDIL) 10 milliGRAM(s) Oral two times a day  levalbuterol Inhalation 0.63 milliGRAM(s) Inhalation three times a day  melatonin 6 milliGRAM(s) Oral at bedtime  multivitamin 1 Tablet(s) Oral daily  pantoprazole    Tablet 40 milliGRAM(s) Oral before breakfast  predniSONE   Tablet 40 milliGRAM(s) Oral daily  tiotropium 18 MICROgram(s) Capsule 1 Capsule(s) Inhalation daily    MEDICATIONS  (PRN):  acetaminophen   Tablet .. 650 milliGRAM(s) Oral every 6 hours PRN      REVIEW OF SYSTEMS:  CONSTITUTIONAL: No fever  EYES: No visual disturbances  ENMT: No sinus or throat pain  RESPIRATORY: +SOB stable as per HPI  CARDIOVASCULAR: No chest pain, palpitations, or dizziness  GASTROINTESTINAL: No abdominal or epigastric pain. No diarrhea or constipation. No melena or hematochezia.   GENITOURINARY: No dysuria w/ [place] in place  NEUROLOGICAL: No headaches, numbness, or tremors  MUSCULOSKELETAL: No joint pain or swelling; No muscle, back, or extremity pain      T(F): 97.7 (01-22-21 @ 04:30), Max: 98.5 (01-21-21 @ 08:20)  HR: 118 (01-22-21 @ 05:54) (106 - 120)  BP: 108/67 (01-22-21 @ 04:30) (97/68 - 124/78)  RR: 20 (01-22-21 @ 04:30) (20 - 20)  SpO2: 100% (01-22-21 @ 05:54) (99% - 100%)  Wt(kg): --  CAPILLARY BLOOD GLUCOSE      POCT Blood Glucose.: 156 mg/dL (21 Jan 2021 21:49)  POCT Blood Glucose.: 180 mg/dL (21 Jan 2021 17:46)  POCT Blood Glucose.: 155 mg/dL (21 Jan 2021 12:39)  POCT Blood Glucose.: 174 mg/dL (21 Jan 2021 08:37)    I&O's Summary    21 Jan 2021 07:01  -  22 Jan 2021 07:00  --------------------------------------------------------  IN: 240 mL / OUT: 200 mL / NET: 40 mL        PHYSICAL EXAM:  GENERAL: Elderly appearing, well-groomed, improved in appearance compared to previous exam, more alert, NAD on supplemental O2 via NC  HEAD: Atraumatic, Normocephalic  EYES: PERRL, conjunctiva and sclera clear  ENMT: No tonsillar erythema, exudates, or enlargement, MMM  NECK: Supple  NERVOUS SYSTEM:  Alert & Oriented X3, Good concentration; Motor Strength 4/5 RUE, 3/5 LUE, 0/5 b/l LEs, slightly wiggles toes b/l consistent w/ previous exam  CHEST/LUNG: Clear to auscultation bilaterally; No rales, rhonchi, wheezing, or rubs, however poor movement of air/poor inspiratory effort  HEART: Tachycardic, regular rhythm; No murmurs, rubs, or gallops  ABDOMEN: Soft, Nontender, Nondistended; Bowel sounds present  EXTREMITIES: 2+ Peripheral Pulses, No edema      LABS:                        7.2    9.46  )-----------( 329      ( 21 Jan 2021 19:00 )             23.7     01-21    139  |  93<L>  |  20  ----------------------------<  192<H>  4.2   |  43<H>  |  0.38<L>    Ca    9.3      21 Jan 2021 19:00  Phos  3.4     01-21  Mg     1.9     01-21          ABG - ( 21 Jan 2021 10:09 )  pH, Arterial: 7.35  pH, Blood: x     /  pCO2: 81    /  pO2: 72    / HCO3: 44    / Base Excess: 16.6  /  SaO2: 95                  RADIOLOGY & ADDITIONAL TESTS:

## 2021-01-22 NOTE — PROVIDER CONTACT NOTE (CRITICAL VALUE NOTIFICATION) - TEST AND RESULT REPORTED:
PCO2 86 PO2 32
CO2 was 45.
Glucose 900, Sodium 103, hgb 5.6, hct 18.
Hgb 6.8 an hct. 22
Hgb 7.0
ABG- PCO2: 81
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
CO2 45  on serum chemistry
H&H 6.6 & 21.8
Hematacrit 22, Hgb 6.9
PCO2 80
CO2 greater than 45
PCO2 74

## 2021-01-22 NOTE — PROGRESS NOTE ADULT - NSHPATTENDINGPLANDISCUSS_GEN_ALL_CORE
neurology
neuro resident
Dr. Garrison
neurology team
Dr. Garrison
Dr. Garrison
Dr. Mcconnell, neuro team, pt
Dr. Mcconnell, neuro team
Dr. Tse
Dr. Solomon
Dr. Fuller
Dr. Rocha
Dr. Tse
Dr. Rocha
Dr. Garrison
Dr. Rocha

## 2021-01-22 NOTE — PROGRESS NOTE ADULT - PROBLEM SELECTOR PLAN 2
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 Required intubation at [place]  - plan as above

## 2021-01-22 NOTE — PROVIDER CONTACT NOTE (CRITICAL VALUE NOTIFICATION) - PERSON GIVING RESULT:
Delvin of laboratory
Delvin Cox
Baudilio Damon, Stat Lab
Delvin Cox
Krupa Godfrey
lab, Lanie Jerry
Carmela DUGAN
Edie Angel and Steven Castro
Sherice Reynoso, Lab
stat lab, Marjorie Rene
Carito Cuevas, Lab
Sudarshan, Jake
Josefina [facility]

## 2021-01-22 NOTE — PROGRESS NOTE ADULT - ASSESSMENT
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

## 2021-01-22 NOTE — PROVIDER CONTACT NOTE (CRITICAL VALUE NOTIFICATION) - ACTION/TREATMENT ORDERED:
Repeat CBC at 10am
No intervention at this time
Will continue to monitor.
continue BIPAP for now
PT to be put on BiPAP at bedtime.  Will continue to monitor.
unable to contact covering team resident nor MAR after multiple attempts. Critical value endorsed to dayshift RN.
will continue to monitor
No new orders at this time.  Will continue to monitor.
Will continue to monitor.
Will continue to monitor, patient on continues pulse ox and on O2 at 2 L via nasal cannula.
Will continue to monitor.  Antiviral medication ordered
MD Aware; Bi pap in place. Will continue to monitor throughout shift.
MD aware- labs ordered for repeat. Will continue to monitor.

## 2021-01-22 NOTE — PROVIDER CONTACT NOTE (CRITICAL VALUE NOTIFICATION) - SITUATION
Glucose 900, Sodium 103, hgb 5.6, hct 18.
Patient's hgb. was 6.6 and hct. of 22.
ABG showed PCO2 74.  PT on O2 2LPM via NC - breifly on Bipap because pt was tired and asked for it .
am labwork  CO2>45
ABG- PCO2: 81
H&H 6.6 & 21.8
ABG showed PCO2 80.  PT on O2 2LPM via NC.
CO2 45 on serumchemistry
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
unable to contact MD
Received labs, PCO2 86 , PO2 32
Patient's CO2 level was 45.

## 2021-01-22 NOTE — PROGRESS NOTE ADULT - PROBLEM SELECTOR PLAN 3
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  Please email patient contact information to [email or web address].  [facility] [place] @ [address], [address] [place]; [phone]

## 2021-01-22 NOTE — PROGRESS NOTE ADULT - PROBLEM SELECTOR PLAN 1
Severe COPD requiring BiPAP qhs/prn  - s/p azithromycin 12/29-1/2  - xopenex q8h  - s/p methylprednisolone course  - pulsed steroids 1/7-9 per neuro, tapered off 1/15  - pt w/ desats 1/20 iso anxiety, requiring bipap after trial off bipap, treated w/ prn xanax however 1/21 am pt sedated post xanax, w/ Co2 retention on ABG placed back on BiPAP, dc'd prn xanax  - This am 1/22 pt alert, back to baseline mentation and breathing comfortably  - c/w home dose LAMA, LABA, inhaled corticosteroid   - prednisone 40mg 1/20-24

## 2021-01-23 VITALS
OXYGEN SATURATION: 96 % | TEMPERATURE: 98 F | HEART RATE: 78 BPM | DIASTOLIC BLOOD PRESSURE: 68 MMHG | RESPIRATION RATE: 18 BRPM | SYSTOLIC BLOOD PRESSURE: 122 MMHG

## 2021-01-23 LAB — GLUCOSE BLDC GLUCOMTR-MCNC: 150 MG/DL — HIGH (ref 70–99)

## 2021-01-23 PROCEDURE — 85610 PROTHROMBIN TIME: CPT

## 2021-01-23 PROCEDURE — 87116 MYCOBACTERIA CULTURE: CPT

## 2021-01-23 PROCEDURE — 85027 COMPLETE CBC AUTOMATED: CPT

## 2021-01-23 PROCEDURE — 87640 STAPH A DNA AMP PROBE: CPT

## 2021-01-23 PROCEDURE — 87635 SARS-COV-2 COVID-19 AMP PRB: CPT

## 2021-01-23 PROCEDURE — 83735 ASSAY OF MAGNESIUM: CPT

## 2021-01-23 PROCEDURE — 87070 CULTURE OTHR SPECIMN AEROBIC: CPT

## 2021-01-23 PROCEDURE — 82947 ASSAY GLUCOSE BLOOD QUANT: CPT

## 2021-01-23 PROCEDURE — C1894: CPT

## 2021-01-23 PROCEDURE — 99239 HOSP IP/OBS DSCHRG MGMT >30: CPT

## 2021-01-23 PROCEDURE — 86850 RBC ANTIBODY SCREEN: CPT

## 2021-01-23 PROCEDURE — 86140 C-REACTIVE PROTEIN: CPT

## 2021-01-23 PROCEDURE — 84443 ASSAY THYROID STIM HORMONE: CPT

## 2021-01-23 PROCEDURE — 36215 PLACE CATHETER IN ARTERY: CPT

## 2021-01-23 PROCEDURE — C1760: CPT

## 2021-01-23 PROCEDURE — 86053 AQAPRN-4 ANTB FLO CYTMTRY EA: CPT

## 2021-01-23 PROCEDURE — 76498 UNLISTED MR PROCEDURE: CPT

## 2021-01-23 PROCEDURE — 94660 CPAP INITIATION&MGMT: CPT

## 2021-01-23 PROCEDURE — 83036 HEMOGLOBIN GLYCOSYLATED A1C: CPT

## 2021-01-23 PROCEDURE — 86803 HEPATITIS C AB TEST: CPT

## 2021-01-23 PROCEDURE — 85025 COMPLETE CBC W/AUTO DIFF WBC: CPT

## 2021-01-23 PROCEDURE — 87799 DETECT AGENT NOS DNA QUANT: CPT

## 2021-01-23 PROCEDURE — 97112 NEUROMUSCULAR REEDUCATION: CPT

## 2021-01-23 PROCEDURE — 83090 ASSAY OF HOMOCYSTEINE: CPT

## 2021-01-23 PROCEDURE — 86362 MOG-IGG1 ANTB CBA EACH: CPT

## 2021-01-23 PROCEDURE — 80061 LIPID PANEL: CPT

## 2021-01-23 PROCEDURE — 88184 FLOWCYTOMETRY/ TC 1 MARKER: CPT

## 2021-01-23 PROCEDURE — 97535 SELF CARE MNGMENT TRAINING: CPT

## 2021-01-23 PROCEDURE — 82728 ASSAY OF FERRITIN: CPT

## 2021-01-23 PROCEDURE — 84446 ASSAY OF VITAMIN E: CPT

## 2021-01-23 PROCEDURE — 97110 THERAPEUTIC EXERCISES: CPT

## 2021-01-23 PROCEDURE — 80048 BASIC METABOLIC PNL TOTAL CA: CPT

## 2021-01-23 PROCEDURE — 83921 ORGANIC ACID SINGLE QUANT: CPT

## 2021-01-23 PROCEDURE — 71250 CT THORAX DX C-: CPT

## 2021-01-23 PROCEDURE — U0005: CPT

## 2021-01-23 PROCEDURE — 82435 ASSAY OF BLOOD CHLORIDE: CPT

## 2021-01-23 PROCEDURE — 85014 HEMATOCRIT: CPT

## 2021-01-23 PROCEDURE — 85730 THROMBOPLASTIN TIME PARTIAL: CPT

## 2021-01-23 PROCEDURE — 89051 BODY FLUID CELL COUNT: CPT

## 2021-01-23 PROCEDURE — 84100 ASSAY OF PHOSPHORUS: CPT

## 2021-01-23 PROCEDURE — 84145 PROCALCITONIN (PCT): CPT

## 2021-01-23 PROCEDURE — 85018 HEMOGLOBIN: CPT

## 2021-01-23 PROCEDURE — 84480 ASSAY TRIIODOTHYRONINE (T3): CPT

## 2021-01-23 PROCEDURE — 86341 ISLET CELL ANTIBODY: CPT

## 2021-01-23 PROCEDURE — 97162 PT EVAL MOD COMPLEX 30 MIN: CPT

## 2021-01-23 PROCEDURE — 84436 ASSAY OF TOTAL THYROXINE: CPT

## 2021-01-23 PROCEDURE — 82607 VITAMIN B-12: CPT

## 2021-01-23 PROCEDURE — 80076 HEPATIC FUNCTION PANEL: CPT

## 2021-01-23 PROCEDURE — 83873 ASSAY OF CSF PROTEIN: CPT

## 2021-01-23 PROCEDURE — 82962 GLUCOSE BLOOD TEST: CPT

## 2021-01-23 PROCEDURE — 82746 ASSAY OF FOLIC ACID SERUM: CPT

## 2021-01-23 PROCEDURE — 97760 ORTHOTIC MGMT&TRAING 1ST ENC: CPT

## 2021-01-23 PROCEDURE — 83605 ASSAY OF LACTIC ACID: CPT

## 2021-01-23 PROCEDURE — 83540 ASSAY OF IRON: CPT

## 2021-01-23 PROCEDURE — 84165 PROTEIN E-PHORESIS SERUM: CPT

## 2021-01-23 PROCEDURE — 84155 ASSAY OF PROTEIN SERUM: CPT

## 2021-01-23 PROCEDURE — 87483 CNS DNA AMP PROBE TYPE 12-25: CPT

## 2021-01-23 PROCEDURE — 86255 FLUORESCENT ANTIBODY SCREEN: CPT

## 2021-01-23 PROCEDURE — 86901 BLOOD TYPING SEROLOGIC RH(D): CPT

## 2021-01-23 PROCEDURE — 93005 ELECTROCARDIOGRAM TRACING: CPT

## 2021-01-23 PROCEDURE — 87040 BLOOD CULTURE FOR BACTERIA: CPT

## 2021-01-23 PROCEDURE — C1769: CPT

## 2021-01-23 PROCEDURE — 72157 MRI CHEST SPINE W/O & W/DYE: CPT

## 2021-01-23 PROCEDURE — 97166 OT EVAL MOD COMPLEX 45 MIN: CPT

## 2021-01-23 PROCEDURE — 86334 IMMUNOFIX E-PHORESIS SERUM: CPT

## 2021-01-23 PROCEDURE — 86769 SARS-COV-2 COVID-19 ANTIBODY: CPT

## 2021-01-23 PROCEDURE — 80053 COMPREHEN METABOLIC PANEL: CPT

## 2021-01-23 PROCEDURE — 82803 BLOOD GASES ANY COMBINATION: CPT

## 2021-01-23 PROCEDURE — C1887: CPT

## 2021-01-23 PROCEDURE — C8931: CPT

## 2021-01-23 PROCEDURE — 84157 ASSAY OF PROTEIN OTHER: CPT

## 2021-01-23 PROCEDURE — U0003: CPT

## 2021-01-23 PROCEDURE — 87205 SMEAR GRAM STAIN: CPT

## 2021-01-23 PROCEDURE — 88108 CYTOPATH CONCENTRATE TECH: CPT

## 2021-01-23 PROCEDURE — 82565 ASSAY OF CREATININE: CPT

## 2021-01-23 PROCEDURE — 86617 LYME DISEASE ANTIBODY: CPT

## 2021-01-23 PROCEDURE — 84132 ASSAY OF SERUM POTASSIUM: CPT

## 2021-01-23 PROCEDURE — 83550 IRON BINDING TEST: CPT

## 2021-01-23 PROCEDURE — 94640 AIRWAY INHALATION TREATMENT: CPT

## 2021-01-23 PROCEDURE — 82330 ASSAY OF CALCIUM: CPT

## 2021-01-23 PROCEDURE — 36600 WITHDRAWAL OF ARTERIAL BLOOD: CPT

## 2021-01-23 PROCEDURE — 87641 MR-STAPH DNA AMP PROBE: CPT

## 2021-01-23 PROCEDURE — 82375 ASSAY CARBOXYHB QUANT: CPT

## 2021-01-23 PROCEDURE — 71045 X-RAY EXAM CHEST 1 VIEW: CPT

## 2021-01-23 PROCEDURE — 82945 GLUCOSE OTHER FLUID: CPT

## 2021-01-23 PROCEDURE — 86900 BLOOD TYPING SEROLOGIC ABO: CPT

## 2021-01-23 PROCEDURE — 88185 FLOWCYTOMETRY/TC ADD-ON: CPT

## 2021-01-23 PROCEDURE — 85652 RBC SED RATE AUTOMATED: CPT

## 2021-01-23 PROCEDURE — 75705 ARTERY X-RAYS SPINE: CPT | Mod: 59

## 2021-01-23 PROCEDURE — A9585: CPT

## 2021-01-23 PROCEDURE — 36245 INS CATH ABD/L-EXT ART 1ST: CPT | Mod: 59

## 2021-01-23 PROCEDURE — 82390 ASSAY OF CERULOPLASMIN: CPT

## 2021-01-23 PROCEDURE — 84295 ASSAY OF SERUM SODIUM: CPT

## 2021-01-23 PROCEDURE — 97530 THERAPEUTIC ACTIVITIES: CPT

## 2021-01-23 PROCEDURE — 72149 MRI LUMBAR SPINE W/DYE: CPT

## 2021-01-23 PROCEDURE — 70553 MRI BRAIN STEM W/O & W/DYE: CPT

## 2021-01-23 PROCEDURE — 99285 EMERGENCY DEPT VISIT HI MDM: CPT | Mod: 25

## 2021-01-23 PROCEDURE — 84166 PROTEIN E-PHORESIS/URINE/CSF: CPT

## 2021-01-23 PROCEDURE — 82525 ASSAY OF COPPER: CPT

## 2021-01-23 PROCEDURE — 72158 MRI LUMBAR SPINE W/O & W/DYE: CPT

## 2021-01-23 RX ORDER — TIOTROPIUM BROMIDE 18 UG/1
1 CAPSULE ORAL; RESPIRATORY (INHALATION)
Qty: 0 | Refills: 0 | DISCHARGE
Start: 2021-01-23

## 2021-01-23 RX ORDER — ACETAMINOPHEN 500 MG
2 TABLET ORAL
Qty: 0 | Refills: 0 | DISCHARGE
Start: 2021-01-23

## 2021-01-23 RX ORDER — ALPRAZOLAM 0.25 MG
1 TABLET ORAL
Qty: 0 | Refills: 0 | DISCHARGE

## 2021-01-23 RX ORDER — PANTOPRAZOLE SODIUM 20 MG/1
1 TABLET, DELAYED RELEASE ORAL
Qty: 0 | Refills: 0 | DISCHARGE

## 2021-01-23 RX ORDER — AMLODIPINE BESYLATE 2.5 MG/1
1 TABLET ORAL
Qty: 0 | Refills: 0 | DISCHARGE

## 2021-01-23 RX ORDER — LEVALBUTEROL 1.25 MG/.5ML
3 SOLUTION, CONCENTRATE RESPIRATORY (INHALATION)
Qty: 0 | Refills: 0 | DISCHARGE
Start: 2021-01-23

## 2021-01-23 RX ORDER — UMECLIDINIUM 62.5 UG/1
1 AEROSOL, POWDER ORAL
Qty: 0 | Refills: 0 | DISCHARGE

## 2021-01-23 RX ORDER — ISOSORBIDE DINITRATE 5 MG/1
1 TABLET ORAL
Qty: 0 | Refills: 0 | DISCHARGE

## 2021-01-23 RX ORDER — LEVALBUTEROL 1.25 MG/.5ML
3 SOLUTION, CONCENTRATE RESPIRATORY (INHALATION)
Qty: 0 | Refills: 0 | DISCHARGE

## 2021-01-23 RX ORDER — ISOSORBIDE DINITRATE 5 MG/1
1 TABLET ORAL
Qty: 0 | Refills: 0 | DISCHARGE
Start: 2021-01-23

## 2021-01-23 RX ORDER — FLUTICASONE FUROATE AND VILANTEROL TRIFENATATE 100; 25 UG/1; UG/1
1 POWDER RESPIRATORY (INHALATION)
Qty: 0 | Refills: 0 | DISCHARGE

## 2021-01-23 RX ORDER — AMLODIPINE BESYLATE 2.5 MG/1
1 TABLET ORAL
Qty: 0 | Refills: 0 | DISCHARGE
Start: 2021-01-23

## 2021-01-23 RX ORDER — LANOLIN ALCOHOL/MO/W.PET/CERES
2 CREAM (GRAM) TOPICAL
Qty: 0 | Refills: 0 | DISCHARGE
Start: 2021-01-23

## 2021-01-23 RX ORDER — BUDESONIDE AND FORMOTEROL FUMARATE DIHYDRATE 160; 4.5 UG/1; UG/1
0 AEROSOL RESPIRATORY (INHALATION)
Qty: 0 | Refills: 0 | DISCHARGE
Start: 2021-01-23

## 2021-01-23 RX ADMIN — PANTOPRAZOLE SODIUM 40 MILLIGRAM(S): 20 TABLET, DELAYED RELEASE ORAL at 05:31

## 2021-01-23 RX ADMIN — BUDESONIDE AND FORMOTEROL FUMARATE DIHYDRATE 2 PUFF(S): 160; 4.5 AEROSOL RESPIRATORY (INHALATION) at 05:31

## 2021-01-23 RX ADMIN — AMLODIPINE BESYLATE 5 MILLIGRAM(S): 2.5 TABLET ORAL at 05:31

## 2021-01-23 RX ADMIN — ISOSORBIDE DINITRATE 10 MILLIGRAM(S): 5 TABLET ORAL at 05:31

## 2021-01-23 RX ADMIN — Medication 40 MILLIGRAM(S): at 05:31

## 2021-01-23 NOTE — PROGRESS NOTE ADULT - ASSESSMENT
Carleen Pelayo, PGY-1    CHIEF COMPLAINT: Patient is a 75y old  Female who presents with a chief complaint of acute respiratory failure (22 Jan 2021 07:23)      INTERVAL HPI/OVERNIGHT EVENTS:    MEDICATIONS (STANDING):  amLODIPine   Tablet 5 milliGRAM(s) Oral daily  budesonide  80 MICROgram(s)/formoterol 4.5 MICROgram(s) Inhaler 2 Puff(s) Inhalation two times a day  dextrose 40% Gel 15 Gram(s) Oral once  dextrose 5%. 1000 milliLiter(s) IV Continuous <Continuous>  dextrose 5%. 1000 milliLiter(s) IV Continuous <Continuous>  dextrose 50% Injectable 25 Gram(s) IV Push once  dextrose 50% Injectable 12.5 Gram(s) IV Push once  dextrose 50% Injectable 25 Gram(s) IV Push once  enoxaparin Injectable 40 milliGRAM(s) SubCutaneous daily  glucagon  Injectable 1 milliGRAM(s) IntraMuscular once  insulin lispro (ADMELOG) corrective regimen sliding scale   SubCutaneous three times a day before meals  insulin lispro (ADMELOG) corrective regimen sliding scale   SubCutaneous at bedtime  isosorbide   dinitrate Tablet (ISORDIL) 10 milliGRAM(s) Oral two times a day  levalbuterol Inhalation 0.63 milliGRAM(s) Inhalation three times a day  melatonin 6 milliGRAM(s) Oral at bedtime  multivitamin 1 Tablet(s) Oral daily  pantoprazole    Tablet 40 milliGRAM(s) Oral before breakfast  predniSONE   Tablet 40 milliGRAM(s) Oral daily  tiotropium 18 MICROgram(s) Capsule 1 Capsule(s) Inhalation daily    MEDICATIONS  (PRN):  acetaminophen   Tablet .. 650 milliGRAM(s) Oral every 6 hours PRN      REVIEW OF SYSTEMS:      T(F): 97.2 (01-23-21 @ 05:09), Max: 98.1 (01-22-21 @ 21:11)  HR: 108 (01-23-21 @ 05:09) (99 - 117)  BP: 131/75 (01-23-21 @ 05:09) (123/76 - 131/75)  RR: 18 (01-23-21 @ 05:09) (18 - 18)  SpO2: 100% (01-23-21 @ 05:09) (99% - 100%)  Wt(kg): --  CAPILLARY BLOOD GLUCOSE      POCT Blood Glucose.: 133 mg/dL (22 Jan 2021 22:24)  POCT Blood Glucose.: 124 mg/dL (22 Jan 2021 17:32)  POCT Blood Glucose.: 199 mg/dL (22 Jan 2021 12:27)  POCT Blood Glucose.: 137 mg/dL (22 Jan 2021 08:38)    I&O's Summary    22 Jan 2021 07:01  -  23 Jan 2021 07:00  --------------------------------------------------------  IN: 300 mL / OUT: 1550 mL / NET: -1250 mL        PHYSICAL EXAM:      LABS:                        7.5    9.38  )-----------( 329      ( 22 Jan 2021 10:28 )             24.9     01-22    140  |  92<L>  |  20  ----------------------------<  103<H>  4.0   |  39<H>  |  0.35<L>    Ca    9.6      22 Jan 2021 07:30  Phos  2.8     01-22  Mg     1.9     01-22          ABG - ( 21 Jan 2021 10:09 )  pH, Arterial: 7.35  pH, Blood: x     /  pCO2: 81    /  pO2: 72    / HCO3: 44    / Base Excess: 16.6  /  SaO2: 95                  RADIOLOGY & ADDITIONAL TESTS:   75-year-old woman with history of severe COPD on home oxygen and recent intubation at [facility] for COPD who presented to [facility] as a transfer for 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

## 2021-01-23 NOTE — PROGRESS NOTE ADULT - PROBLEM SELECTOR PROBLEM 3
Polyneuropathy
Polyneuropathy
Weakness of both lower extremities
Polyneuropathy
Weakness of both lower extremities
Weakness of both lower extremities
Polyneuropathy
Weakness of both lower extremities
Polyneuropathy
Weakness of both lower extremities
Polyneuropathy

## 2021-01-23 NOTE — PROGRESS NOTE ADULT - PROBLEM SELECTOR PROBLEM 5
Hypertension
Encounter for palliative care
Hypertension
Encounter for palliative care
Hypertension
Anxiety
Anxiety
Hypertension
Anxiety
Anxiety
Hypertension
Anxiety
Encounter for palliative care
Hypertension
Hypertension
Anxiety
Anxiety

## 2021-01-23 NOTE — PROGRESS NOTE ADULT - PROBLEM SELECTOR PLAN 2
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 Required intubation at [place]  - plan as above

## 2021-01-23 NOTE — PROGRESS NOTE ADULT - PROBLEM SELECTOR PLAN 3
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  Please email patient contact information to [email or web address].  [facility] [place] @ [address], [address] [place]; [phone]

## 2021-01-23 NOTE — PROGRESS NOTE ADULT - PROBLEM SELECTOR PROBLEM 1
COPD exacerbation
Pain
Pain
COPD exacerbation
Pain
COPD exacerbation
Pain
COPD exacerbation
Pain
COPD exacerbation

## 2021-01-23 NOTE — PROGRESS NOTE ADULT - PROBLEM SELECTOR PLAN 1
Severe COPD requiring BiPAP qhs/prn  - s/p azithromycin 12/29-1/2  - xopenex q8h  - s/p methylprednisolone course  - pulsed steroids 1/7-9 per neuro, tapered off 1/15  - pt w/ desats 1/20 iso anxiety, requiring bipap after trial off bipap, treated w/ prn xanax however 1/21 am pt sedated post xanax, w/ Co2 retention on ABG placed back on BiPAP, dc'd prn xanax  - This am 1/23 at baseline mentation and breathing comfortably  - c/w home dose LAMA, LABA, inhaled corticosteroid   - prednisone 40mg 1/20-24

## 2021-01-23 NOTE — PROGRESS NOTE ADULT - REASON FOR ADMISSION
acute respiratory failure

## 2021-01-23 NOTE — PROGRESS NOTE ADULT - PROBLEM SELECTOR PROBLEM 6
Anxiety
Goals of care, counseling/discussion
Anxiety
Goals of care, counseling/discussion
Anxiety
Goals of care, counseling/discussion
Anxiety
Goals of care, counseling/discussion
Anxiety
Anxiety
Goals of care, counseling/discussion

## 2021-01-23 NOTE — PROGRESS NOTE ADULT - ATTENDING COMMENTS
-Patient seen/examined on 1/23/21. Case/plan discussed with the intern and resident as reviewed by me above and in any comments below.  -Was planned for DC. Accepted to facility. -36 minutes spent on DC process. Outpatient pmd and neuro and pulm follow up.

## 2021-01-23 NOTE — PROGRESS NOTE ADULT - SUBJECTIVE AND OBJECTIVE BOX
Carleen Pelayo, PGY-1    CHIEF COMPLAINT: Patient is a 75y old  Female who presents with a chief complaint of acute respiratory failure (22 Jan 2021 07:23)      INTERVAL HPI/OVERNIGHT EVENTS: SUSAN ON, tachycardic HR 100s-110s, VS otherwise stable on BiPAP and supplemental O2. Pt reports feeling generally well, with stable breathing, otherwise denies complaints. Reports she is w/o BM x 2 days but is passing flatus.     MEDICATIONS (STANDING):  amLODIPine   Tablet 5 milliGRAM(s) Oral daily  budesonide  80 MICROgram(s)/formoterol 4.5 MICROgram(s) Inhaler 2 Puff(s) Inhalation two times a day  dextrose 40% Gel 15 Gram(s) Oral once  dextrose 5%. 1000 milliLiter(s) IV Continuous <Continuous>  dextrose 5%. 1000 milliLiter(s) IV Continuous <Continuous>  dextrose 50% Injectable 25 Gram(s) IV Push once  dextrose 50% Injectable 12.5 Gram(s) IV Push once  dextrose 50% Injectable 25 Gram(s) IV Push once  enoxaparin Injectable 40 milliGRAM(s) SubCutaneous daily  glucagon  Injectable 1 milliGRAM(s) IntraMuscular once  insulin lispro (ADMELOG) corrective regimen sliding scale   SubCutaneous three times a day before meals  insulin lispro (ADMELOG) corrective regimen sliding scale   SubCutaneous at bedtime  isosorbide   dinitrate Tablet (ISORDIL) 10 milliGRAM(s) Oral two times a day  levalbuterol Inhalation 0.63 milliGRAM(s) Inhalation three times a day  melatonin 6 milliGRAM(s) Oral at bedtime  multivitamin 1 Tablet(s) Oral daily  pantoprazole    Tablet 40 milliGRAM(s) Oral before breakfast  predniSONE   Tablet 40 milliGRAM(s) Oral daily  tiotropium 18 MICROgram(s) Capsule 1 Capsule(s) Inhalation daily    MEDICATIONS  (PRN):  acetaminophen   Tablet .. 650 milliGRAM(s) Oral every 6 hours PRN      REVIEW OF SYSTEMS:      T(F): 97.2 (01-23-21 @ 05:09), Max: 98.1 (01-22-21 @ 21:11)  HR: 108 (01-23-21 @ 05:09) (99 - 117)  BP: 131/75 (01-23-21 @ 05:09) (123/76 - 131/75)  RR: 18 (01-23-21 @ 05:09) (18 - 18)  SpO2: 100% (01-23-21 @ 05:09) (99% - 100%)  Wt(kg): --  CAPILLARY BLOOD GLUCOSE      POCT Blood Glucose.: 133 mg/dL (22 Jan 2021 22:24)  POCT Blood Glucose.: 124 mg/dL (22 Jan 2021 17:32)  POCT Blood Glucose.: 199 mg/dL (22 Jan 2021 12:27)  POCT Blood Glucose.: 137 mg/dL (22 Jan 2021 08:38)    I&O's Summary    22 Jan 2021 07:01  -  23 Jan 2021 07:00  --------------------------------------------------------  IN: 300 mL / OUT: 1550 mL / NET: -1250 mL        PHYSICAL EXAM:      LABS:                        7.5    9.38  )-----------( 329      ( 22 Jan 2021 10:28 )             24.9     01-22    140  |  92<L>  |  20  ----------------------------<  103<H>  4.0   |  39<H>  |  0.35<L>    Ca    9.6      22 Jan 2021 07:30  Phos  2.8     01-22  Mg     1.9     01-22          ABG - ( 21 Jan 2021 10:09 )  pH, Arterial: 7.35  pH, Blood: x     /  pCO2: 81    /  pO2: 72    / HCO3: 44    / Base Excess: 16.6  /  SaO2: 95                  RADIOLOGY & ADDITIONAL TESTS:     Carleen Pelayo, PGY-1    CHIEF COMPLAINT: Patient is a 75y old  Female who presents with a chief complaint of acute respiratory failure (22 Jan 2021 07:23)      INTERVAL HPI/OVERNIGHT EVENTS: SUSAN ON, tachycardic HR 100s-110s, VS otherwise stable on BiPAP and supplemental O2. Pt reports feeling generally well, with stable breathing, otherwise denies complaints. Reports she is w/o BM x 2 days but is passing flatus.     MEDICATIONS (STANDING):  amLODIPine   Tablet 5 milliGRAM(s) Oral daily  budesonide  80 MICROgram(s)/formoterol 4.5 MICROgram(s) Inhaler 2 Puff(s) Inhalation two times a day  dextrose 40% Gel 15 Gram(s) Oral once  dextrose 5%. 1000 milliLiter(s) IV Continuous <Continuous>  dextrose 5%. 1000 milliLiter(s) IV Continuous <Continuous>  dextrose 50% Injectable 25 Gram(s) IV Push once  dextrose 50% Injectable 12.5 Gram(s) IV Push once  dextrose 50% Injectable 25 Gram(s) IV Push once  enoxaparin Injectable 40 milliGRAM(s) SubCutaneous daily  glucagon  Injectable 1 milliGRAM(s) IntraMuscular once  insulin lispro (ADMELOG) corrective regimen sliding scale   SubCutaneous three times a day before meals  insulin lispro (ADMELOG) corrective regimen sliding scale   SubCutaneous at bedtime  isosorbide   dinitrate Tablet (ISORDIL) 10 milliGRAM(s) Oral two times a day  levalbuterol Inhalation 0.63 milliGRAM(s) Inhalation three times a day  melatonin 6 milliGRAM(s) Oral at bedtime  multivitamin 1 Tablet(s) Oral daily  pantoprazole    Tablet 40 milliGRAM(s) Oral before breakfast  predniSONE   Tablet 40 milliGRAM(s) Oral daily  tiotropium 18 MICROgram(s) Capsule 1 Capsule(s) Inhalation daily    MEDICATIONS  (PRN):  acetaminophen   Tablet .. 650 milliGRAM(s) Oral every 6 hours PRN      REVIEW OF SYSTEMS:  CONSTITUTIONAL: No fever  EYES: No visual disturbances  ENMT: No sinus or throat pain  RESPIRATORY: +SOB stable as per HPI  CARDIOVASCULAR: No chest pain, palpitations, or dizziness  GASTROINTESTINAL: No abdominal or epigastric pain. No diarrhea. No melena or hematochezia. +Constipated but passing flatus as per HPI  GENITOURINARY: No dysuria w/ [place] in place  NEUROLOGICAL: No headaches, numbness, or tremors  MUSCULOSKELETAL: No joint pain or swelling; No muscle, back, or extremity pain       T(F): 97.2 (01-23-21 @ 05:09), Max: 98.1 (01-22-21 @ 21:11)  HR: 108 (01-23-21 @ 05:09) (99 - 117)  BP: 131/75 (01-23-21 @ 05:09) (123/76 - 131/75)  RR: 18 (01-23-21 @ 05:09) (18 - 18)  SpO2: 100% (01-23-21 @ 05:09) (99% - 100%)  Wt(kg): --  CAPILLARY BLOOD GLUCOSE      POCT Blood Glucose.: 133 mg/dL (22 Jan 2021 22:24)  POCT Blood Glucose.: 124 mg/dL (22 Jan 2021 17:32)  POCT Blood Glucose.: 199 mg/dL (22 Jan 2021 12:27)  POCT Blood Glucose.: 137 mg/dL (22 Jan 2021 08:38)    I&O's Summary    22 Jan 2021 07:01  -  23 Jan 2021 07:00  --------------------------------------------------------  IN: 300 mL / OUT: 1550 mL / NET: -1250 mL        PHYSICAL EXAM:  GENERAL: Elderly appearing, well-groomed, NAD on supplemental O2 via NC  HEAD: Atraumatic, Normocephalic  EYES: PERRL, conjunctiva and sclera clear  ENMT: No tonsillar erythema, exudates, or enlargement, +dry MM  NECK: Supple  NERVOUS SYSTEM:  Alert & Oriented X3, Good concentration; Motor Strength 4/5 RUE, 3/5 LUE, 0/5 b/l LEs, slightly wiggles toes b/l consistent w/ previous exam  CHEST/LUNG: Clear to auscultation bilaterally; No rales, rhonchi, wheezing, or rubs, improved air movement today/inspiratory effort  HEART: Tachycardic, regular rhythm; No murmurs, rubs, or gallops  ABDOMEN: Soft, Nontender, Nondistended; Bowel sounds present  EXTREMITIES: 2+ Peripheral Pulses, No edema, w/ pressure boots      LABS:                        7.5    9.38  )-----------( 329      ( 22 Jan 2021 10:28 )             24.9     01-22    140  |  92<L>  |  20  ----------------------------<  103<H>  4.0   |  39<H>  |  0.35<L>    Ca    9.6      22 Jan 2021 07:30  Phos  2.8     01-22  Mg     1.9     01-22          ABG - ( 21 Jan 2021 10:09 )  pH, Arterial: 7.35  pH, Blood: x     /  pCO2: 81    /  pO2: 72    / HCO3: 44    / Base Excess: 16.6  /  SaO2: 95                  RADIOLOGY & ADDITIONAL TESTS:     none

## 2021-01-23 NOTE — PROGRESS NOTE ADULT - PROVIDER SPECIALTY LIST ADULT
Internal Medicine
Palliative Care
Internal Medicine
Neurology
Neurosurgery
Rehab Medicine
Infectious Disease
Infectious Disease
Internal Medicine
Internal Medicine
Neurology
Neurology
Neurosurgery
Infectious Disease
Neurology
Neurosurgery
Internal Medicine
Palliative Care
Internal Medicine

## 2021-01-23 NOTE — PROGRESS NOTE ADULT - PROBLEM SELECTOR PROBLEM 4
COPD, severe
Hypertension
COPD, severe
Hypertension
COPD, severe
Hypertension
COPD, severe
Hypertension
COPD, severe
Hypertension

## 2021-01-23 NOTE — PROGRESS NOTE ADULT - PROBLEM SELECTOR PLAN 5
-Given elevated blood pressures will resume home amlodipine and isordil.
-Given elevated blood pressures will resume home amlodipine and isordil.
- will continue to follow
-Given elevated blood pressures will resume home amlodipine and isordil.
- goal currently is for KORIN  - DNR/DNI  - will sign off, please reconsult PRN
- will continue to follow
-Given elevated blood pressures will resume home amlodipine and isordil.
-History of anxiety noted; administered alprazolam at home as needed   - c/w home 0.25mg TID alprazolam as needed
-History of hypertension noted; holding home amlodipine and isosorbide dinitrate at this time as cannot administer po medications; depending on duration of need for bipap, may administer isosorbide dinitrate via iv
- goal remains to be for KORIN  - signing off  - d/w medicine attending
-Given elevated blood pressures will resume home amlodipine and isordil.
-History of anxiety noted; administered alprazolam at home as needed   - c/w home 0.25mg TID alprazolam as needed
-Given elevated blood pressures will resume home amlodipine and isordil.
- will continue to follow
-History of anxiety noted; administered alprazolam at home as needed   - dc'd xanax as above in setting of sedation, will ctm
-History of anxiety noted; administered alprazolam at home as needed   - dc'd xanax as above in setting of sedation, will ctm
-Given elevated blood pressures will resume home amlodipine and isordil.
-History of anxiety noted; administered alprazolam at home as needed   - c/w home 0.25mg TID alprazolam as needed
-Given elevated blood pressures will resume home amlodipine and isordil.
-History of anxiety noted; administered alprazolam at home as needed   - dc'd xanax as above in setting of sedation, will ctm
-Given elevated blood pressures will resume home amlodipine and isordil.
-Given elevated blood pressures will resume home amlodipine and isordil.
-History of anxiety noted; administered alprazolam at home as needed   - c/w home 0.25mg TID alprazolam as needed
-Given elevated blood pressures will resume home amlodipine and isordil.

## 2021-01-23 NOTE — PROGRESS NOTE ADULT - PROBLEM SELECTOR PROBLEM 2
Dyspnea, unspecified type
Acute respiratory failure with hypoxia and hypercapnia
Dyspnea, unspecified type
Acute respiratory failure with hypoxia and hypercapnia
Dyspnea, unspecified type
Acute respiratory failure with hypoxia and hypercapnia
Dyspnea, unspecified type
Dyspnea, unspecified type
Acute respiratory failure with hypoxia and hypercapnia

## 2021-01-23 NOTE — PROGRESS NOTE ADULT - NUTRITIONAL ASSESSMENT
This patient has been assessed with a concern for Malnutrition and has been determined to have a diagnosis/diagnoses of Moderate protein-calorie malnutrition.    This patient is being managed with:   Diet Pureed-  Entered: Danielito 10 2021  9:36AM    
INCOMPLETE    Assessment: 76 yo female with PMH ?COPD initially admitted at [facility] who had been intubated for respiratory arrest, and upon extubation was noted to be flaccid in the legs with decreased sensation and transferred here for evaluation for possible spinal cord compression. Labs significant for WBC of 23, hemoglobin of 8.6, plt of 421, and bicarbonate of 39. Exam significant for sensory level at about T10. MRI brain w/ and w/o showed symmetric globus pallidus and dentate nucleus enhancement. MR T/L spine w/ and w/o showed cord expansion from T8 through the conus and cauda equina nerve root enhancement.    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 Symmetric enhancement in the GP/dentate nuclei likely metabolic in etiology (?carbon monoxide)    Plan:  Recommend lumbar puncture  Possible LP on 1/1. Please hold lovenox for now.    CSF analysis should include cell count, protein, glucose, oligoclonal bands, IgG index, MBP, PCR, gram stain, Lyme, bacterial/fungal cultures, paraneoplastic panel, flow cytometry, cytopathology)  Will consider starting IV solumedrol 500 mg BID depending on LP results  Serum carboxyhemoglobin level    Case discussed with neurology attending, Dr. Melendrez
This patient has been assessed with a concern for Malnutrition and has been determined to have a diagnosis/diagnoses of Moderate protein-calorie malnutrition.    This patient is being managed with:   Diet Pureed-  Entered: Danielito 10 2021  9:36AM

## 2021-02-05 PROBLEM — F41.9 ANXIETY DISORDER, UNSPECIFIED: Chronic | Status: ACTIVE | Noted: 2020-12-29

## 2021-02-05 PROBLEM — J44.9 CHRONIC OBSTRUCTIVE PULMONARY DISEASE, UNSPECIFIED: Chronic | Status: ACTIVE | Noted: 2020-12-29

## 2021-02-20 LAB
CULTURE RESULTS: SIGNIFICANT CHANGE UP
SPECIMEN SOURCE: SIGNIFICANT CHANGE UP

## 2022-12-23 NOTE — PROGRESS NOTE ADULT - PROBLEM SELECTOR PLAN 7
-Will administer lovenox 40 for dvt prophylaxis   -Pureed diet  -Ongoing goals of care discussions currently DNR/DNI and questions the utility of treatments, and wonders if she may want to transition to a more palliative treatment option, palliative care following uncorrected

## 2023-01-09 NOTE — ED PROVIDER NOTE - NS ED MD DISPO ADMITTING SERVICE
Occupational Therapy    Visit Type: treatment  Precautions:  Medical precautions:  fall risk; standard precautions.    Lower Extremity:    Left:  weight bearing: as tolerated.    Right:  weight bearing: as tolerated.  Safety Measures: bed alarm and chair alarm  SUBJECTIVE  \"I'm feeling ok, I can stand some more and do another puzzle.\"     OBJECTIVE             Activities of Daily Living (ADLs)  Grooming/Oral Hygiene:   - Grooming assist: stand by assist  - Position: standing at sink  Upper Body Dressing:  - Assist: stand by assist  - Position: edge of bed  - Assist needed for: set up and supervision/safety  Lower Body Dressing:   - Assist: stand by assist  - Position: edge of bed  Toileting:   - Toilet transfer:        - Assist: contact guard/touching/steadying assist (Stand pivot w/c <> commode)       - Device: gait belt and 2-wheeled walker  - Assist: contact guard/touching/steadying assist  - Assist needed for: steadying, supervision/safety and set up  - Equipment: bedside commode  Tub Transfer:   - Assist: contact guard/touching/steadying assist  - Equipment: shower transfer bench  Pt performs toileting with bedside commode. Pt requires CGA for safety and for steadying while standing with RW to perform hygiene and for clothing management.    Pt ambulates room<>gym with SBA and CGA for ambulating on ramp. Pt ambulates from w/c outside of bathroom to shower to perform transfer to tub bench requiring CGA for safety and verbal cues for body mechanics and safety. Pt receptive to using shower bench at home for bathing.     Pt ambulates from entryway of room to sink with RW to stand to perform hand hygiene with incidental cueing for location of soap, towels, etc.          Pt ambulates with RW from bathroom door across room to stand at table to perform dynamic activity of completing parquetry puzzle for 15 min with no LOB and min verbal cues for error recognition and problem solving. Pt requires one sitting break for  rest.          ASSESSMENT  Impairments: safety awareness and activity tolerance  Functional Limitations: functional mobility, toileting, dressing and bathing     Treatment today focused on ADLs, functional transfers in preparation for ADLs, and endurance and maintaining balance while performing dynamic activity. Pt progresses towards her goal of completing commode transfer, requiring CGA for safety only. Pt also progresses to CGA-SBA for LB dressing and toileting. Pt receptive to use of tub bench at home for upcoming discharge, handout to be issued at family training session.        Progress: progressing toward goals    • Skilled therapy is required to address these limitations in attempt to maximize the patient's independence.    Therapy Participation: This patient participated in all scheduled occupational therapy time this session.    Patient at End of Session:   Location: in wheelchair  Safety measures: call light within reach and alarm system in place/re-engaged      I was in the immediate presence of the student and directed the student’s performance of the services. I am responsible for all treatment, assessment, documentation, and billing rendered for this patient.  Jacquelyn Quinonez, OTR/L        PLAN  Suggestions for next session as indicated: Standing balance and tolerance, functional mobility, strengthening.      GOALS  Short Term Goals (STGs): to be met 7 days from date established, unless otherwise stated.  Pt will manage footwear with min A using long handled AE as needed  Pt will perform commode transfer with SBA  Pt will bathe with supervision from EOB or in shower using AE as needed  Long Term Goals (LTGs): to be met by discharge from rehab program.  - Patient will complete grooming at independent level with adaptive equipment as deemed appropriate.  - Patient will complete bathing at supervision level with adaptive equipment as deemed appropriate.  - Patient will complete upper body dressing at  independent level with adaptive equipment as deemed appropriate.  - Patient will complete lower body dressing at supervision level with adaptive equipment as deemed appropriate.  - Patient will complete toileting at supervision level with adaptive equipment as deemed appropriate.  - Patient will complete toilet transfer at supervision level with adaptive equipment as deemed appropriate.  - Patient will complete tub transfer at supervision level with adaptive equipment as deemed appropriate.          Therapy procedure time and total treatment time can be found documented on the Time Entry flowsheet   NEURO MED

## 2023-05-02 NOTE — CONSULT NOTE ADULT - CONSULT REQUESTED BY NAME
Dr. Arreola Isotretinoin Counseling: Patient should get monthly blood tests, not donate blood, not drive at night if vision affected, not share medication, and not undergo elective surgery for 6 months after tx completed. Side effects reviewed, pt to contact office should one occur.
